# Patient Record
Sex: FEMALE | Race: WHITE | NOT HISPANIC OR LATINO | Employment: OTHER | URBAN - METROPOLITAN AREA
[De-identification: names, ages, dates, MRNs, and addresses within clinical notes are randomized per-mention and may not be internally consistent; named-entity substitution may affect disease eponyms.]

---

## 2017-03-23 ENCOUNTER — ALLSCRIPTS OFFICE VISIT (OUTPATIENT)
Dept: OTHER | Facility: OTHER | Age: 72
End: 2017-03-23

## 2017-03-23 DIAGNOSIS — Z12.11 ENCOUNTER FOR SCREENING FOR MALIGNANT NEOPLASM OF COLON: ICD-10-CM

## 2017-03-23 DIAGNOSIS — I10 ESSENTIAL (PRIMARY) HYPERTENSION: ICD-10-CM

## 2017-04-10 ENCOUNTER — GENERIC CONVERSION - ENCOUNTER (OUTPATIENT)
Dept: OTHER | Facility: OTHER | Age: 72
End: 2017-04-10

## 2017-04-10 ENCOUNTER — TRANSCRIBE ORDERS (OUTPATIENT)
Dept: LAB | Facility: HOSPITAL | Age: 72
End: 2017-04-10

## 2017-04-10 ENCOUNTER — APPOINTMENT (OUTPATIENT)
Dept: LAB | Facility: HOSPITAL | Age: 72
End: 2017-04-10
Payer: MEDICARE

## 2017-04-10 DIAGNOSIS — I10 ESSENTIAL (PRIMARY) HYPERTENSION: ICD-10-CM

## 2017-04-10 LAB
ALBUMIN SERPL BCP-MCNC: 3.2 G/DL (ref 3.5–5)
ALP SERPL-CCNC: 113 U/L (ref 46–116)
ALT SERPL W P-5'-P-CCNC: 24 U/L (ref 12–78)
ANION GAP SERPL CALCULATED.3IONS-SCNC: 7 MMOL/L (ref 4–13)
AST SERPL W P-5'-P-CCNC: 16 U/L (ref 5–45)
BACTERIA UR QL AUTO: ABNORMAL /HPF
BILIRUB SERPL-MCNC: 0.4 MG/DL (ref 0.2–1)
BILIRUB UR QL STRIP: NEGATIVE
BUN SERPL-MCNC: 14 MG/DL (ref 5–25)
CALCIUM SERPL-MCNC: 8.7 MG/DL (ref 8.3–10.1)
CHLORIDE SERPL-SCNC: 106 MMOL/L (ref 100–108)
CHOLEST SERPL-MCNC: 190 MG/DL (ref 50–200)
CLARITY UR: CLEAR
CO2 SERPL-SCNC: 29 MMOL/L (ref 21–32)
COLOR UR: YELLOW
CREAT SERPL-MCNC: 1.21 MG/DL (ref 0.6–1.3)
GFR SERPL CREATININE-BSD FRML MDRD: 43.7 ML/MIN/1.73SQ M
GLUCOSE P FAST SERPL-MCNC: 101 MG/DL (ref 65–99)
GLUCOSE UR STRIP-MCNC: NEGATIVE MG/DL
HDLC SERPL-MCNC: 52 MG/DL (ref 40–60)
HGB UR QL STRIP.AUTO: ABNORMAL
KETONES UR STRIP-MCNC: NEGATIVE MG/DL
LDLC SERPL CALC-MCNC: 112 MG/DL (ref 0–100)
LEUKOCYTE ESTERASE UR QL STRIP: ABNORMAL
NITRITE UR QL STRIP: NEGATIVE
NON-SQ EPI CELLS URNS QL MICRO: ABNORMAL /HPF
PH UR STRIP.AUTO: 6.5 [PH] (ref 4.5–8)
POTASSIUM SERPL-SCNC: 3.8 MMOL/L (ref 3.5–5.3)
PROT SERPL-MCNC: 7 G/DL (ref 6.4–8.2)
PROT UR STRIP-MCNC: NEGATIVE MG/DL
RBC #/AREA URNS AUTO: ABNORMAL /HPF
SODIUM SERPL-SCNC: 142 MMOL/L (ref 136–145)
SP GR UR STRIP.AUTO: 1.02 (ref 1–1.03)
TRIGL SERPL-MCNC: 131 MG/DL
UROBILINOGEN UR QL STRIP.AUTO: 0.2 E.U./DL
WBC #/AREA URNS AUTO: ABNORMAL /HPF

## 2017-04-10 PROCEDURE — 80061 LIPID PANEL: CPT

## 2017-04-10 PROCEDURE — 87086 URINE CULTURE/COLONY COUNT: CPT

## 2017-04-10 PROCEDURE — 36415 COLL VENOUS BLD VENIPUNCTURE: CPT

## 2017-04-10 PROCEDURE — 81001 URINALYSIS AUTO W/SCOPE: CPT

## 2017-04-10 PROCEDURE — 80053 COMPREHEN METABOLIC PANEL: CPT

## 2017-04-11 ENCOUNTER — GENERIC CONVERSION - ENCOUNTER (OUTPATIENT)
Dept: OTHER | Facility: OTHER | Age: 72
End: 2017-04-11

## 2017-04-11 LAB — BACTERIA UR CULT: NORMAL

## 2017-04-21 ENCOUNTER — ALLSCRIPTS OFFICE VISIT (OUTPATIENT)
Dept: OTHER | Facility: OTHER | Age: 72
End: 2017-04-21

## 2017-05-15 ENCOUNTER — GENERIC CONVERSION - ENCOUNTER (OUTPATIENT)
Dept: OTHER | Facility: OTHER | Age: 72
End: 2017-05-15

## 2017-06-27 ENCOUNTER — APPOINTMENT (EMERGENCY)
Dept: CT IMAGING | Facility: HOSPITAL | Age: 72
End: 2017-06-27
Payer: MEDICARE

## 2017-06-27 ENCOUNTER — HOSPITAL ENCOUNTER (EMERGENCY)
Facility: HOSPITAL | Age: 72
Discharge: HOME | End: 2017-06-28
Attending: EMERGENCY MEDICINE | Admitting: EMERGENCY MEDICINE
Payer: MEDICARE

## 2017-06-27 VITALS
DIASTOLIC BLOOD PRESSURE: 74 MMHG | RESPIRATION RATE: 17 BRPM | SYSTOLIC BLOOD PRESSURE: 134 MMHG | TEMPERATURE: 97.9 F | OXYGEN SATURATION: 97 % | HEART RATE: 71 BPM | WEIGHT: 275.13 LBS

## 2017-06-27 DIAGNOSIS — M54.16 LUMBAR BACK PAIN WITH RADICULOPATHY AFFECTING LEFT LOWER EXTREMITY: ICD-10-CM

## 2017-06-27 DIAGNOSIS — R10.9 LEFT FLANK PAIN: Primary | ICD-10-CM

## 2017-06-27 LAB
ALBUMIN SERPL BCP-MCNC: 3.4 G/DL (ref 3.5–5)
ALP SERPL-CCNC: 101 U/L (ref 46–116)
ALT SERPL W P-5'-P-CCNC: 18 U/L (ref 12–78)
ANION GAP SERPL CALCULATED.3IONS-SCNC: 9 MMOL/L (ref 4–13)
AST SERPL W P-5'-P-CCNC: 16 U/L (ref 5–45)
BASOPHILS # BLD AUTO: 0.04 THOUSANDS/ΜL (ref 0–0.1)
BASOPHILS NFR BLD AUTO: 1 % (ref 0–1)
BILIRUB DIRECT SERPL-MCNC: 0.1 MG/DL (ref 0–0.2)
BILIRUB SERPL-MCNC: 0.4 MG/DL (ref 0.2–1)
BUN SERPL-MCNC: 18 MG/DL (ref 5–25)
CALCIUM SERPL-MCNC: 9.1 MG/DL (ref 8.3–10.1)
CHLORIDE SERPL-SCNC: 106 MMOL/L (ref 100–108)
CLARITY, POC: CLEAR
CO2 SERPL-SCNC: 25 MMOL/L (ref 21–32)
COLOR, POC: NORMAL
CREAT SERPL-MCNC: 1.54 MG/DL (ref 0.6–1.3)
EOSINOPHIL # BLD AUTO: 0.11 THOUSAND/ΜL (ref 0–0.61)
EOSINOPHIL NFR BLD AUTO: 2 % (ref 0–6)
ERYTHROCYTE [DISTWIDTH] IN BLOOD BY AUTOMATED COUNT: 13.9 % (ref 11.6–15.1)
EXT BILIRUBIN, UA: NORMAL
EXT BLOOD URINE: NORMAL
EXT GLUCOSE, UA: NORMAL
EXT KETONES: NORMAL
EXT NITRITE, UA: NORMAL
EXT PH, UA: 5
EXT PROTEIN, UA: NORMAL
EXT SPECIFIC GRAVITY, UA: 1.03
EXT UROBILINOGEN: 1
GFR SERPL CREATININE-BSD FRML MDRD: 33 ML/MIN/1.73SQ M
GLUCOSE SERPL-MCNC: 105 MG/DL (ref 65–140)
HCT VFR BLD AUTO: 39.9 % (ref 34.8–46.1)
HGB BLD-MCNC: 12.8 G/DL (ref 11.5–15.4)
LYMPHOCYTES # BLD AUTO: 1.93 THOUSANDS/ΜL (ref 0.6–4.47)
LYMPHOCYTES NFR BLD AUTO: 28 % (ref 14–44)
MCH RBC QN AUTO: 26.6 PG (ref 26.8–34.3)
MCHC RBC AUTO-ENTMCNC: 32.1 G/DL (ref 31.4–37.4)
MCV RBC AUTO: 83 FL (ref 82–98)
MONOCYTES # BLD AUTO: 0.47 THOUSAND/ΜL (ref 0.17–1.22)
MONOCYTES NFR BLD AUTO: 7 % (ref 4–12)
NEUTROPHILS # BLD AUTO: 4.25 THOUSANDS/ΜL (ref 1.85–7.62)
NEUTS SEG NFR BLD AUTO: 62 % (ref 43–75)
NRBC BLD AUTO-RTO: 0 /100 WBCS
PLATELET # BLD AUTO: 224 THOUSANDS/UL (ref 149–390)
PMV BLD AUTO: 11.1 FL (ref 8.9–12.7)
POTASSIUM SERPL-SCNC: 4.2 MMOL/L (ref 3.5–5.3)
PROT SERPL-MCNC: 7.1 G/DL (ref 6.4–8.2)
RBC # BLD AUTO: 4.82 MILLION/UL (ref 3.81–5.12)
SODIUM SERPL-SCNC: 140 MMOL/L (ref 136–145)
WBC # BLD AUTO: 6.82 THOUSAND/UL (ref 4.31–10.16)
WBC # BLD EST: NORMAL 10*3/UL

## 2017-06-27 PROCEDURE — 74176 CT ABD & PELVIS W/O CONTRAST: CPT

## 2017-06-27 PROCEDURE — 80076 HEPATIC FUNCTION PANEL: CPT | Performed by: PHYSICIAN ASSISTANT

## 2017-06-27 PROCEDURE — 81002 URINALYSIS NONAUTO W/O SCOPE: CPT | Performed by: PHYSICIAN ASSISTANT

## 2017-06-27 PROCEDURE — 96374 THER/PROPH/DIAG INJ IV PUSH: CPT

## 2017-06-27 PROCEDURE — 36415 COLL VENOUS BLD VENIPUNCTURE: CPT | Performed by: PHYSICIAN ASSISTANT

## 2017-06-27 PROCEDURE — 85025 COMPLETE CBC W/AUTO DIFF WBC: CPT | Performed by: PHYSICIAN ASSISTANT

## 2017-06-27 PROCEDURE — 96375 TX/PRO/DX INJ NEW DRUG ADDON: CPT

## 2017-06-27 PROCEDURE — 80048 BASIC METABOLIC PNL TOTAL CA: CPT | Performed by: PHYSICIAN ASSISTANT

## 2017-06-27 RX ORDER — ASPIRIN 81 MG/1
81 TABLET ORAL DAILY
COMMUNITY
End: 2020-06-25 | Stop reason: ALTCHOICE

## 2017-06-27 RX ORDER — ONDANSETRON 2 MG/ML
4 INJECTION INTRAMUSCULAR; INTRAVENOUS ONCE
Status: COMPLETED | OUTPATIENT
Start: 2017-06-27 | End: 2017-06-27

## 2017-06-27 RX ORDER — KETOROLAC TROMETHAMINE 30 MG/ML
15 INJECTION, SOLUTION INTRAMUSCULAR; INTRAVENOUS ONCE
Status: COMPLETED | OUTPATIENT
Start: 2017-06-27 | End: 2017-06-27

## 2017-06-27 RX ORDER — AMLODIPINE BESYLATE 10 MG/1
10 TABLET ORAL DAILY
COMMUNITY
End: 2018-06-03 | Stop reason: SDUPTHER

## 2017-06-27 RX ORDER — LISINOPRIL 20 MG/1
20 TABLET ORAL DAILY
COMMUNITY
End: 2018-06-14 | Stop reason: SDUPTHER

## 2017-06-27 RX ORDER — LIDOCAINE 50 MG/G
1 PATCH TOPICAL EVERY 24 HOURS
Qty: 10 PATCH | Refills: 0 | Status: SHIPPED | OUTPATIENT
Start: 2017-06-27 | End: 2018-09-25 | Stop reason: ALTCHOICE

## 2017-06-27 RX ORDER — MORPHINE SULFATE 4 MG/ML
4 INJECTION, SOLUTION INTRAMUSCULAR; INTRAVENOUS ONCE
Status: COMPLETED | OUTPATIENT
Start: 2017-06-27 | End: 2017-06-27

## 2017-06-27 RX ORDER — FLUOXETINE HYDROCHLORIDE 20 MG/1
20 CAPSULE ORAL DAILY
COMMUNITY
End: 2018-03-19 | Stop reason: DRUGHIGH

## 2017-06-27 RX ORDER — DEXLANSOPRAZOLE 60 MG/1
60 CAPSULE, DELAYED RELEASE ORAL DAILY
COMMUNITY
End: 2018-03-01 | Stop reason: SDUPTHER

## 2017-06-27 RX ADMIN — ONDANSETRON 4 MG: 2 INJECTION INTRAMUSCULAR; INTRAVENOUS at 22:40

## 2017-06-27 RX ADMIN — SODIUM CHLORIDE 1000 ML: 0.9 INJECTION, SOLUTION INTRAVENOUS at 23:36

## 2017-06-27 RX ADMIN — MORPHINE SULFATE 4 MG: 4 INJECTION, SOLUTION INTRAMUSCULAR; INTRAVENOUS at 23:36

## 2017-06-27 RX ADMIN — KETOROLAC TROMETHAMINE 15 MG: 30 INJECTION, SOLUTION INTRAMUSCULAR at 22:40

## 2017-06-28 ENCOUNTER — ALLSCRIPTS OFFICE VISIT (OUTPATIENT)
Dept: OTHER | Facility: OTHER | Age: 72
End: 2017-06-28

## 2017-06-28 DIAGNOSIS — M54.9 DORSALGIA: ICD-10-CM

## 2017-06-28 PROCEDURE — 99284 EMERGENCY DEPT VISIT MOD MDM: CPT

## 2017-06-30 ENCOUNTER — GENERIC CONVERSION - ENCOUNTER (OUTPATIENT)
Dept: OTHER | Facility: OTHER | Age: 72
End: 2017-06-30

## 2017-07-10 ENCOUNTER — APPOINTMENT (OUTPATIENT)
Dept: PHYSICAL THERAPY | Facility: CLINIC | Age: 72
End: 2017-07-10
Payer: MEDICARE

## 2017-07-10 ENCOUNTER — GENERIC CONVERSION - ENCOUNTER (OUTPATIENT)
Dept: OTHER | Facility: OTHER | Age: 72
End: 2017-07-10

## 2017-07-10 DIAGNOSIS — M54.9 DORSALGIA: ICD-10-CM

## 2017-07-10 PROCEDURE — G8991 OTHER PT/OT GOAL STATUS: HCPCS

## 2017-07-10 PROCEDURE — 97161 PT EVAL LOW COMPLEX 20 MIN: CPT

## 2017-07-10 PROCEDURE — G8990 OTHER PT/OT CURRENT STATUS: HCPCS

## 2017-07-10 PROCEDURE — 97110 THERAPEUTIC EXERCISES: CPT

## 2017-07-11 ENCOUNTER — APPOINTMENT (OUTPATIENT)
Dept: PHYSICAL THERAPY | Facility: CLINIC | Age: 72
End: 2017-07-11
Payer: MEDICARE

## 2017-07-11 PROCEDURE — 97140 MANUAL THERAPY 1/> REGIONS: CPT

## 2017-07-11 PROCEDURE — 97110 THERAPEUTIC EXERCISES: CPT

## 2017-07-18 ENCOUNTER — APPOINTMENT (OUTPATIENT)
Dept: PHYSICAL THERAPY | Facility: CLINIC | Age: 72
End: 2017-07-18
Payer: MEDICARE

## 2017-07-20 ENCOUNTER — APPOINTMENT (OUTPATIENT)
Dept: PHYSICAL THERAPY | Facility: CLINIC | Age: 72
End: 2017-07-20
Payer: MEDICARE

## 2017-07-20 PROCEDURE — 97140 MANUAL THERAPY 1/> REGIONS: CPT

## 2017-07-20 PROCEDURE — 97110 THERAPEUTIC EXERCISES: CPT

## 2017-07-27 ENCOUNTER — APPOINTMENT (OUTPATIENT)
Dept: PHYSICAL THERAPY | Facility: CLINIC | Age: 72
End: 2017-07-27
Payer: MEDICARE

## 2017-07-27 PROCEDURE — 97110 THERAPEUTIC EXERCISES: CPT

## 2017-07-27 PROCEDURE — 97140 MANUAL THERAPY 1/> REGIONS: CPT

## 2017-08-01 ENCOUNTER — APPOINTMENT (OUTPATIENT)
Dept: PHYSICAL THERAPY | Facility: CLINIC | Age: 72
End: 2017-08-01
Payer: MEDICARE

## 2017-08-01 PROCEDURE — 97140 MANUAL THERAPY 1/> REGIONS: CPT

## 2017-08-01 PROCEDURE — 97110 THERAPEUTIC EXERCISES: CPT

## 2017-08-03 ENCOUNTER — APPOINTMENT (OUTPATIENT)
Dept: PHYSICAL THERAPY | Facility: CLINIC | Age: 72
End: 2017-08-03
Payer: MEDICARE

## 2017-08-15 ENCOUNTER — APPOINTMENT (OUTPATIENT)
Dept: PHYSICAL THERAPY | Facility: CLINIC | Age: 72
End: 2017-08-15
Payer: MEDICARE

## 2017-08-17 ENCOUNTER — APPOINTMENT (OUTPATIENT)
Dept: PHYSICAL THERAPY | Facility: CLINIC | Age: 72
End: 2017-08-17
Payer: MEDICARE

## 2017-08-22 ENCOUNTER — APPOINTMENT (OUTPATIENT)
Dept: PHYSICAL THERAPY | Facility: CLINIC | Age: 72
End: 2017-08-22
Payer: MEDICARE

## 2017-08-24 ENCOUNTER — APPOINTMENT (OUTPATIENT)
Dept: PHYSICAL THERAPY | Facility: CLINIC | Age: 72
End: 2017-08-24
Payer: MEDICARE

## 2017-08-29 ENCOUNTER — APPOINTMENT (OUTPATIENT)
Dept: PHYSICAL THERAPY | Facility: CLINIC | Age: 72
End: 2017-08-29
Payer: MEDICARE

## 2017-08-31 ENCOUNTER — APPOINTMENT (OUTPATIENT)
Dept: PHYSICAL THERAPY | Facility: CLINIC | Age: 72
End: 2017-08-31
Payer: MEDICARE

## 2017-10-31 ENCOUNTER — GENERIC CONVERSION - ENCOUNTER (OUTPATIENT)
Dept: OTHER | Facility: OTHER | Age: 72
End: 2017-10-31

## 2017-10-31 DIAGNOSIS — G31.84 MILD COGNITIVE IMPAIRMENT: ICD-10-CM

## 2017-10-31 DIAGNOSIS — R31.21 ASYMPTOMATIC MICROSCOPIC HEMATURIA: ICD-10-CM

## 2017-10-31 DIAGNOSIS — I10 ESSENTIAL (PRIMARY) HYPERTENSION: ICD-10-CM

## 2017-11-21 ENCOUNTER — GENERIC CONVERSION - ENCOUNTER (OUTPATIENT)
Dept: FAMILY MEDICINE CLINIC | Facility: CLINIC | Age: 72
End: 2017-11-21

## 2017-12-13 ENCOUNTER — HOSPITAL ENCOUNTER (EMERGENCY)
Facility: HOSPITAL | Age: 72
Discharge: HOME | End: 2017-12-13
Attending: EMERGENCY MEDICINE
Payer: COMMERCIAL

## 2017-12-13 ENCOUNTER — APPOINTMENT (EMERGENCY)
Dept: RADIOLOGY | Facility: HOSPITAL | Age: 72
End: 2017-12-13
Payer: COMMERCIAL

## 2017-12-13 VITALS
HEART RATE: 70 BPM | SYSTOLIC BLOOD PRESSURE: 143 MMHG | TEMPERATURE: 98.2 F | WEIGHT: 267.64 LBS | RESPIRATION RATE: 20 BRPM | DIASTOLIC BLOOD PRESSURE: 68 MMHG | OXYGEN SATURATION: 98 %

## 2017-12-13 DIAGNOSIS — S80.02XA CONTUSION OF LEFT KNEE, INITIAL ENCOUNTER: ICD-10-CM

## 2017-12-13 DIAGNOSIS — S39.012A STRAIN OF LUMBAR REGION, INITIAL ENCOUNTER: Primary | ICD-10-CM

## 2017-12-13 PROCEDURE — 73564 X-RAY EXAM KNEE 4 OR MORE: CPT

## 2017-12-13 PROCEDURE — 99284 EMERGENCY DEPT VISIT MOD MDM: CPT

## 2017-12-13 PROCEDURE — 72100 X-RAY EXAM L-S SPINE 2/3 VWS: CPT

## 2017-12-13 RX ORDER — DIAZEPAM 5 MG/1
5 TABLET ORAL 2 TIMES DAILY
Qty: 15 TABLET | Refills: 0 | Status: SHIPPED | OUTPATIENT
Start: 2017-12-13 | End: 2019-09-17 | Stop reason: ALTCHOICE

## 2017-12-13 RX ORDER — DIAZEPAM 5 MG/1
5 TABLET ORAL ONCE
Status: COMPLETED | OUTPATIENT
Start: 2017-12-13 | End: 2017-12-13

## 2017-12-13 RX ADMIN — DIAZEPAM 5 MG: 5 TABLET ORAL at 12:32
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
## 2017-12-13 NOTE — DISCHARGE INSTRUCTIONS
Contusion in Adults   WHAT YOU NEED TO KNOW:   A contusion is a bruise that appears on your skin after an injury  A bruise happens when small blood vessels tear but skin does not  When blood vessels tear, blood leaks into nearby tissue, such as soft tissue or muscle  DISCHARGE INSTRUCTIONS:   Return to the emergency department if:   · You have new trouble moving the injured area  · You have tingling or numbness in or near the injured area  · Your hand or foot below the bruise gets cold or turns pale  Contact your healthcare provider if:   · You find a new lump in the injured area  · Your symptoms do not improve with treatment after 4 to 5 days  · You have questions or concerns about your condition or care  Medicines: You may need any of the following:  · NSAIDs  help decrease swelling and pain or fever  This medicine is available with or without a doctor's order  NSAIDs can cause stomach bleeding or kidney problems in certain people  If you take blood thinner medicine, always ask your healthcare provider if NSAIDs are safe for you  Always read the medicine label and follow directions  · Prescription pain medicine  may be given  Do not wait until the pain is severe before you take your medicine  · Take your medicine as directed  Contact your healthcare provider if you think your medicine is not helping or if you have side effects  Tell him of her if you are allergic to any medicine  Keep a list of the medicines, vitamins, and herbs you take  Include the amounts, and when and why you take them  Bring the list or the pill bottles to follow-up visits  Carry your medicine list with you in case of an emergency  Follow up with your healthcare provider as directed: You may need to return within a week to check your injury again  Write down your questions so you remember to ask them during your visits    Help a contusion heal:   · Rest the injured area  or use it less than usual  If you bruised your leg or foot, you may need crutches or a cane to help you walk  This will help you keep weight off your injured body part  · Apply ice  to decrease swelling and pain  Ice may also help prevent tissue damage  Use an ice pack, or put crushed ice in a plastic bag  Cover it with a towel and place it on your bruise for 15 to 20 minutes every hour or as directed  · Use compression  to support the area and decrease swelling  Wrap an elastic bandage around the area over the bruised muscle  Make sure the bandage is not too tight  You should be able to fit 1 finger between the bandage and your skin  · Elevate (raise) your injured body part  above the level of your heart to help decrease pain and swelling  Use pillows, blankets, or rolled towels to elevate the area as often as you can  · Do not drink alcohol  as directed  Alcohol may slow healing  · Do not stretch injured muscles  right after your injury  Ask your healthcare provider when and how you may safely stretch after your injury  Gentle stretches can help increase your flexibility  · Do not massage the area or put heating pads  on the bruise right after your injury  Heat and massage may slow healing  Your healthcare provider may tell you to apply heat after several days  At that time, heat will start to help the injury heal   Prevent another contusion:   · Stretch and warm up before you play sports or exercise  · Wear protective gear when you play sports  Examples are shin guards and padding  · If you begin a new physical activity, start slowly to give your body a chance to adjust   © 2017 [employer] Information is for End User's use only and may not be sold, redistributed or otherwise used for commercial purposes  All illustrations and images included in CareNotes® are the copyrighted property of [employer]  or [employer]  The above information is an  only   It is not intended as medical advice for individual conditions or treatments  Talk to your doctor, nurse or pharmacist before following any medical regimen to see if it is safe and effective for you  Tylenol/valium for pain    Low Back Strain   WHAT YOU NEED TO KNOW:   Low back strain is an injury to your lower back muscles or tendons  Tendons are strong tissues that connect muscles to bones  The lower back supports most of your body weight and helps you move, twist, and bend  DISCHARGE INSTRUCTIONS:   Return to the emergency department if:   · You hear or feel a pop in your lower back  · You have increased swelling or pain in your lower back  · You have trouble moving your legs  · Your legs are numb  Contact your healthcare provider if:   · You have a fever  · Your pain does not go away, even after treatment  · You have questions or concerns about your condition or care  Medicines: The following medicines may be ordered by your healthcare provider:  · Acetaminophen decreases pain and fever  It is available without a doctor's order  Ask how much to take and how often to take it  Follow directions  Acetaminophen can cause liver damage if not taken correctly  · NSAIDs , such as ibuprofen, help decrease swelling, pain, and fever  This medicine is available with or without a doctor's order  NSAIDs can cause stomach bleeding or kidney problems in certain people  If you take blood thinner medicine, always ask your healthcare provider if NSAIDs are safe for you  Always read the medicine label and follow directions  · Muscle relaxers  help decrease pain and muscle spasms  · Prescription pain medicine  may be given  Ask how to take this medicine safely  · Take your medicine as directed  Contact your healthcare provider if you think your medicine is not helping or if you have side effects  Tell him or her if you are allergic to any medicine  Keep a list of the medicines, vitamins, and herbs you take   Include the amounts, and when and why you take them  Bring the list or the pill bottles to follow-up visits  Carry your medicine list with you in case of an emergency  Self-care:   · Rest  as directed  You may need to rest in bed for a period of time after your injury  Do not lift heavy objects  · Apply ice  on your back for 15 to 20 minutes every hour or as directed  Use an ice pack, or put crushed ice in a plastic bag  Cover it with a towel  Ice helps prevent tissue damage and decreases swelling and pain  · Apply heat  on your lower back for 20 to 30 minutes every 2 hours for as many days as directed  Heat helps decrease pain and muscle spasms  · Slowly start to increase your activity  as the pain decreases, or as directed  Prevent another low back strain:   · Use correct body movements  ¨ Bend at the hips and knees when you  objects  Do not bend from the waist  Use your leg muscles as you lift the load  Do not use your back  Keep the object close to your chest as you lift it  Try not to twist or lift anything above your waist     ¨ Change your position often when you stand for long periods of time  Rest one foot on a small box or footrest, and then switch to the other foot often  ¨ Try not to sit for long periods of time  When you do, sit in a straight-backed chair with your feet flat on the floor  ¨ Never reach, pull, or push while you are sitting  · Warm up before you exercise  Do exercises that strengthen your back muscles  Ask your healthcare provider about the best exercise plan for you  · Maintain a healthy weight  Ask your healthcare provider how much you should weigh  Ask him to help you create a weight loss plan if you are overweight  © 2017 [employer]  Information is for End User's use only and may not be sold, redistributed or otherwise used for commercial purposes   All illustrations and images included in CareNotes® are the copyrighted property of [employer]  or [employer]  The above information is an  only  It is not intended as medical advice for individual conditions or treatments  Talk to your doctor, nurse or pharmacist before following any medical regimen to see if it is safe and effective for you

## 2017-12-13 NOTE — ED NOTES
Patient transported to [facility], [address] [place] [address]  12/13/17 9328

## 2017-12-20 ENCOUNTER — HOSPITAL ENCOUNTER (EMERGENCY)
Facility: HOSPITAL | Age: 72
Discharge: HOME | End: 2017-12-20
Attending: EMERGENCY MEDICINE
Payer: COMMERCIAL

## 2017-12-20 ENCOUNTER — ALLSCRIPTS OFFICE VISIT (OUTPATIENT)
Dept: OTHER | Facility: OTHER | Age: 72
End: 2017-12-20

## 2017-12-20 ENCOUNTER — APPOINTMENT (EMERGENCY)
Dept: RADIOLOGY | Facility: HOSPITAL | Age: 72
End: 2017-12-20
Payer: COMMERCIAL

## 2017-12-20 ENCOUNTER — APPOINTMENT (EMERGENCY)
Dept: CT IMAGING | Facility: HOSPITAL | Age: 72
End: 2017-12-20
Payer: COMMERCIAL

## 2017-12-20 VITALS
WEIGHT: 250 LBS | OXYGEN SATURATION: 95 % | SYSTOLIC BLOOD PRESSURE: 160 MMHG | DIASTOLIC BLOOD PRESSURE: 70 MMHG | BODY MASS INDEX: 44.3 KG/M2 | HEART RATE: 78 BPM | RESPIRATION RATE: 17 BRPM | HEIGHT: 63 IN | TEMPERATURE: 98.3 F

## 2017-12-20 DIAGNOSIS — S16.1XXA STRAIN OF NECK MUSCLE, INITIAL ENCOUNTER: ICD-10-CM

## 2017-12-20 DIAGNOSIS — G44.209 ACUTE NON INTRACTABLE TENSION-TYPE HEADACHE: Primary | ICD-10-CM

## 2017-12-20 PROCEDURE — 99284 EMERGENCY DEPT VISIT MOD MDM: CPT

## 2017-12-20 PROCEDURE — 73030 X-RAY EXAM OF SHOULDER: CPT

## 2017-12-20 PROCEDURE — 70450 CT HEAD/BRAIN W/O DYE: CPT

## 2017-12-20 RX ORDER — ACETAMINOPHEN 325 MG/1
650 TABLET ORAL ONCE
Status: COMPLETED | OUTPATIENT
Start: 2017-12-20 | End: 2017-12-20

## 2017-12-20 RX ORDER — METHOCARBAMOL 750 MG/1
750 TABLET, FILM COATED ORAL 3 TIMES DAILY PRN
Qty: 21 TABLET | Refills: 0 | Status: SHIPPED | OUTPATIENT
Start: 2017-12-20 | End: 2018-09-25 | Stop reason: ALTCHOICE

## 2017-12-20 RX ORDER — METHOCARBAMOL 500 MG/1
1000 TABLET, FILM COATED ORAL ONCE
Status: COMPLETED | OUTPATIENT
Start: 2017-12-20 | End: 2017-12-20

## 2017-12-20 RX ORDER — IBUPROFEN 600 MG/1
600 TABLET ORAL ONCE
Status: COMPLETED | OUTPATIENT
Start: 2017-12-20 | End: 2017-12-20

## 2017-12-20 RX ADMIN — ACETAMINOPHEN 650 MG: 325 TABLET ORAL at 19:41

## 2017-12-20 RX ADMIN — METHOCARBAMOL 1000 MG: 500 TABLET ORAL at 19:40

## 2017-12-20 RX ADMIN — IBUPROFEN 600 MG: 600 TABLET ORAL at 19:44

## 2017-12-21 NOTE — DISCHARGE INSTRUCTIONS
Acute Headache   WHAT YOU NEED TO KNOW:   An acute headache is pain or discomfort that starts suddenly and gets worse quickly  You may have an acute headache only when you feel stress or eat certain foods  Other acute headache pain can happen every day, and sometimes several times a day  DISCHARGE INSTRUCTIONS:   Return to the emergency department if:   · You have severe pain  · You have numbness or weakness on one side of your face or body  · You have a headache that occurs after a blow to the head, a fall, or other trauma  · You have a headache, are forgetful or confused, or have trouble speaking  · You have a headache, stiff neck, and a fever  Contact your healthcare provider if:   · You have a constant headache and are vomiting  · You have a headache each day that does not get better, even after treatment  · You have changes in your headaches, or new symptoms that occur when you have a headache  · You have questions or concerns about your condition or care  Medicines: You may need any of the following:  · Prescription pain medicine  may be given  The medicine your healthcare provider recommends will depend on the kind of headaches you have  You will need to take prescription headache medicines as directed to prevent a problem called rebound headache  These headaches happen with regular use of pain relievers for headache disorders  · NSAIDs , such as ibuprofen, help decrease swelling, pain, and fever  This medicine is available with or without a doctor's order  NSAIDs can cause stomach bleeding or kidney problems in certain people  If you take blood thinner medicine, always ask your healthcare provider if NSAIDs are safe for you  Always read the medicine label and follow directions  · Acetaminophen  decreases pain and fever  It is available without a doctor's order  Ask how much to take and how often to take it  Follow directions   Read the labels of all other medicines you are using to see if they also contain acetaminophen, or ask your doctor or pharmacist  Acetaminophen can cause liver damage if not taken correctly  Do not use more than 3 grams (3,000 milligrams) total of acetaminophen in one day  · Antidepressants  may be given for some kinds of headaches  · Take your medicine as directed  Contact your healthcare provider if you think your medicine is not helping or if you have side effects  Tell him or her if you are allergic to any medicine  Keep a list of the medicines, vitamins, and herbs you take  Include the amounts, and when and why you take them  Bring the list or the pill bottles to follow-up visits  Carry your medicine list with you in case of an emergency  Manage your symptoms:   · Apply heat or ice  on the headache area  Use a heat or ice pack  For an ice pack, you can also put crushed ice in a plastic bag  Cover the pack or bag with a towel before you apply it to your skin  Ice and heat both help decrease pain, and heat also helps decrease muscle spasms  Apply heat for 20 to 30 minutes every 2 hours  Apply ice for 15 to 20 minutes every hour  Apply heat or ice for as long and for as many days as directed  You may alternate heat and ice  · Relax your muscles  Lie down in a comfortable position and close your eyes  Relax your muscles slowly  Start at your toes and work your way up your body  · Keep a record of your headaches  Write down when your headaches start and stop  Include your symptoms and what you were doing when the headache began  Record what you ate or drank for 24 hours before the headache started  Describe the pain and where it hurts  Keep track of what you did to treat your headache and if it worked  Prevent an acute headache:   · Avoid anything that triggers an acute headache  Examples include exposure to chemicals, going to high altitude, or not getting enough sleep  Create a regular sleep routine   Go to sleep at the same time and wake up at the same time each day  Do not use electronic devices before bedtime  These may trigger a headache or prevent you from sleeping well  · Do not smoke  Nicotine and other chemicals in cigarettes and cigars can trigger an acute headache or make it worse  Ask your healthcare provider for information if you currently smoke and need help to quit  E-cigarettes or smokeless tobacco still contain nicotine  Talk to your healthcare provider before you use these products  · Limit alcohol as directed  Alcohol can trigger an acute headache or make it worse  If you have cluster headaches, do not drink alcohol during an episode  For other types of headaches, ask your healthcare provider if it is safe for you to drink alcohol  Ask how much is safe for you to drink, and how often  · Exercise as directed  Exercise can reduce tension and help with headache pain  Aim for 30 minutes of physical activity on most days of the week  Your healthcare provider can help you create an exercise plan  · Eat a variety of healthy foods  Healthy foods include fruits, vegetables, low-fat dairy products, lean meats, fish, whole grains, and cooked beans  Your healthcare provider or dietitian can help you create meals plans if you need to avoid foods that trigger headaches  Follow up with your healthcare provider as directed:  Bring your headache record with you when you see your healthcare provider  Write down your questions so you remember to ask them during your visits  © 2017 [facility] Information is for End User's use only and may not be sold, redistributed or otherwise used for commercial purposes  All illustrations and images included in CareNotes® are the copyrighted property of [employer]  or [employer]  The above information is an  only  It is not intended as medical advice for individual conditions or treatments   Talk to your doctor, nurse or pharmacist before following any medical regimen to see if it is safe and effective for you  Cervical Strain   WHAT YOU NEED TO KNOW:   A cervical strain is a stretched or torn muscle or tendon in your neck  Tendons are strong tissues that connect muscles to bones  Common causes of cervical strains include a car accident, a fall, or a sports injury  DISCHARGE INSTRUCTIONS:   Return to the emergency department if:   · You have pain or numbness from your shoulder down to your hand  · You have problems with your vision, hearing, or balance  · You feel confused or cannot concentrate  · You have problems with movement and strength  Contact your healthcare provider if:   · You have increased swelling or pain in your neck  · You have questions or concerns about your condition or care  Medicines: You may need any of the following:  · Acetaminophen  decreases pain and fever  It is available without a doctor's order  Ask how much to take and how often to take it  Follow directions  Read the labels of all other medicines you are using to see if they also contain acetaminophen, or ask your doctor or pharmacist  Acetaminophen can cause liver damage if not taken correctly  Do not use more than 4 grams (4,000 milligrams) total of acetaminophen in one day  · NSAIDs , such as ibuprofen, help decrease swelling, pain, and fever  This medicine is available with or without a doctor's order  NSAIDs can cause stomach bleeding or kidney problems in certain people  If you take blood thinner medicine, always ask your healthcare provider if NSAIDs are safe for you  Always read the medicine label and follow directions  · Muscle relaxers  help decrease pain and muscle spasms  · Prescription pain medicine  may be given  Ask your healthcare provider how to take this medicine safely  Some prescription pain medicines contain acetaminophen  Do not take other medicines that contain acetaminophen without talking to your healthcare provider   Too much acetaminophen may cause liver damage  Prescription pain medicine may cause constipation  Ask your healthcare provider how to prevent or treat constipation  · Take your medicine as directed  Contact your healthcare provider if you think your medicine is not helping or if you have side effects  Tell him or her if you are allergic to any medicine  Keep a list of the medicines, vitamins, and herbs you take  Include the amounts, and when and why you take them  Bring the list or the pill bottles to follow-up visits  Carry your medicine list with you in case of an emergency  Manage your symptoms:   · Apply heat  on your neck for 15 to 20 minutes, 4 to 6 times a day or as directed  Heat helps decrease pain, stiffness, and muscle spasms  · Begin gentle neck exercises  as soon as you can move your neck without pain  Exercises will help decrease stiffness and improve the strength and movement of your neck  Ask your healthcare provider what kind of exercises you should do  · Gradually return to your usual activities as directed  Stop if you have pain  Avoid activities that can cause more damage to your neck, such as heavy lifting or strenuous exercise  · Sleep without a pillow  to help decrease pain  Instead, roll a small towel tightly and place it under your neck  · Go to physical therapy as directed  A physical therapist teaches you exercises to help improve movement and strength, and to decrease pain  Prevent neck injury:   · Drive safely  Make sure everyone in your car wears a seatbelt  A seatbelt can save your life if you are in an accident  Do not use your cell phone when you are driving  This could distract you and cause an accident  Pull over if you need to make a call or send a text message  · Wear helmets, lifejackets, and protective gear  Always wear a helmet when you ride a bike or motorcycle, go skiing, or play sports that could cause a head injury   Wear protective equipment when you play sports  Wear a lifejacket when you are on a boat or doing water sports  Follow up with your healthcare provider as directed: You may be referred to an orthopedist or physical therapies  Write down your questions so you remember to ask them during your visits  © 2017 [address] [employer] Information is for End User's use only and may not be sold, redistributed or otherwise used for commercial purposes  All illustrations and images included in CareNotes® are the copyrighted property of [employer] M [employer] Inc  or [employer]  The above information is an  only  It is not intended as medical advice for individual conditions or treatments  Talk to your doctor, nurse or pharmacist before following any medical regimen to see if it is safe and effective for you
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
## 2018-01-12 NOTE — RESULT NOTES
Verified Results  (1) URINALYSIS w URINE C/S REFLEX (will reflex a microscopy if leukocytes, occult blood, or nitrites are not within normal limits) 82Gig3407 10:21AM Kerrie Malik Order Number: [id number]     Test Name Result Flag Reference   COLOR Yellow     CLARITY Clear     PH UA 6 5  4 5-8 0   LEUKOCYTE ESTERASE UA Trace A Negative   NITRITE UA Negative  Negative   PROTEIN UA Negative mg/dl  Negative   GLUCOSE UA Negative mg/dl  Negative   KETONES UA Negative mg/dl  Negative   UROBILINOGEN UA 0 2 E U /dl  0 2, 1 0 E U /dl   BILIRUBIN UA Negative  Negative   BLOOD UA Trace-Intact A Negative   SPECIFIC GRAVITY UA 1 020  1 003-1 030   BACTERIA Occasional /hpf  None Seen, Occasional   EPITHELIAL CELLS Moderate /hpf A None Seen, Occasional   RBC UA 1-2 /hpf A None Seen   WBC UA 2-4 /hpf A None Seen

## 2018-01-13 VITALS
HEART RATE: 78 BPM | DIASTOLIC BLOOD PRESSURE: 70 MMHG | HEIGHT: 64 IN | WEIGHT: 269.25 LBS | SYSTOLIC BLOOD PRESSURE: 112 MMHG | BODY MASS INDEX: 45.97 KG/M2 | OXYGEN SATURATION: 98 %

## 2018-01-14 VITALS
WEIGHT: 269 LBS | HEART RATE: 89 BPM | DIASTOLIC BLOOD PRESSURE: 90 MMHG | RESPIRATION RATE: 18 BRPM | HEIGHT: 64 IN | BODY MASS INDEX: 45.93 KG/M2 | OXYGEN SATURATION: 98 % | SYSTOLIC BLOOD PRESSURE: 140 MMHG

## 2018-01-15 NOTE — PROGRESS NOTES
Assessment    1  Benign essential hypertension (401 1) (I10)   2  Chronic obstructive pulmonary disease, unspecified COPD type (496) (J44 9)   3  Anxiety (300 00) (F41 9)   4  Intention tremor (333 1) (G25 2)    Plan  Benign essential hypertension    · (1) ALT (SGPT); Status:Active; Requested for:79Awl8925;    · (1) AST (SGOT); Status:Active; Requested for:26Xem7888;    · (1) [place]; Status:Active; Requested for:40Slz9100;    · (1) LIPID PANEL, FASTING; Status:Active; Requested for:60Eyp5785;    · (1) MICROALBUMIN, URINE RANDOM ([place] ONLY); Status:Active; Requested  for:95Nzv6751;    · (1) URINALYSIS (will reflex a microscopy if leukocytes, occult blood, protein or nitrites are  not within normal limits); Status:Active; Requested for:88Uuy3829;   Benign essential hypertension, Chronic obstructive pulmonary disease, unspecified  COPD type    · Follow-up visit in 3 months Evaluation and Treatment  Follow-up  Status: Complete   Done: 74GQI3847  Chronic obstructive pulmonary disease, unspecified COPD type    · Combivent Respimat  MCG/ACT Inhalation Aerosol Solution; INHALE 1  PUFFS Every 6 hours prn for shortness of breath  Chronic obstructive pulmonary disease, unspecified COPD type, SocHx: Former smoker    · Complete PFT; Status:Hold For - Scheduling; Requested for:15Ley5476;     Discussion/Summary  Discussion Summary:   Will request and review old records including recent op report and biopsy  Continue the current meds for now  Have the labs done prior to the next appt  Call if you need refills  We will schedule pft's for you  Use the inhaler as needed for shortness of breath  Continue to work on the weight  Counseling Documentation With Imm: The patient was counseled regarding instructions for management  Medication SE Review and Pt Understands Tx: Possible side effects of new medications were reviewed with the patient/guardian today   The treatment plan was reviewed with the patient/guardian  The patient/guardian understands and agrees with the treatment plan   Self Referrals:   Self Referrals: No      Chief Complaint  Chief Complaint Free Text Note Form: Pt in office to establish      History of Present Illness  HPI: [facility]  Was known to me from another practice and presents today with multiple concerns  Had breast surgery again for a blocked milk duct on the left breast  No cancer per patient  Done by Dr Giovanna Baumgarten  Had labs done last month prior to the surgery  Review of Systems  Complete-Female:   Constitutional: no fever, not feeling poorly and no chills  Eyes: no eye pain and no purulent discharge from the eyes  ENT: no nosebleeds and no sore throat  Cardiovascular: no chest pain and no palpitations  Respiratory: shortness of breath, shortness of breath during exertion and Worse in hot weather  Has not had pft's in years  , but no wheezing  Gastrointestinal: no abdominal pain and no constipation  Genitourinary: no dysuria and no incontinence  Musculoskeletal: no arthralgias and no myalgias  Integumentary: no rashes and no skin lesions  Neurological: Sometimes has a tremor right hand  Psychiatric: anxiety and Tremor is worse when she has anxiety, but no depression  ROS Reviewed:   ROS reviewed  Past Medical History  Active Problems And Past Medical History Reviewed: The active problems and past medical history were reviewed and updated today  Surgical History    1  History of Breast Surgery Lumpectomy    Family History  Family History Reviewed: The family history was reviewed and updated today  Social History    · Always uses seat belt   · Former smoker (O19 50) (O12 652)   · Retired   ·   Social History Reviewed: The social history was reviewed and updated today  Allergies    1   Codeine    Vitals  Vital Signs    Recorded: 63HUI7102 51:11MF   Systolic 631   Diastolic 82   Heart Rate 72 Height 5 ft 3 in   Weight 270 lb 9 6 oz   BMI Calculated 47 93   BSA Calculated 2 2     Physical Exam    Constitutional   General appearance: No acute distress, well appearing and well nourished  Eyes   Conjunctiva and lids: No swelling, erythema or discharge  Pupils and irises: Equal, round and reactive to light  Ears, Nose, Mouth, and Throat   External inspection of ears and nose: Normal     Otoscopic examination: Tympanic membranes translucent with normal light reflex  Canals patent without erythema  Nasal mucosa, septum, and turbinates: Normal without edema or erythema  Oropharynx: Normal with no erythema, edema, exudate or lesions  Pulmonary   Respiratory effort: No increased work of breathing or signs of respiratory distress  Auscultation of lungs: Abnormal   Decreased breath sounds bilaterally with no wheezing or rales  Cardiovascular   Auscultation of heart: Normal rate and rhythm, normal S1 and S2, without murmurs  RRR distant heart sounds  Lymphatic   Palpation of lymph nodes in neck: No lymphadenopathy  Musculoskeletal   Gait and station: Normal     Inspection/palpation of joints, bones, and muscles: Normal     Neurologic Intention tremor noted right hand  Minimal tremor noted left hand  Sensation: No sensory loss  Psychiatric   Orientation to person, place, and time: Normal     Mood and affect: Normal          Results/Data  PHQ-2 Adult Depression Screening 07Kux1635 01:15PM User, [employer]     Test Name Result Flag Reference   PHQ-2 Adult Depression Score 0     Over the last two weeks, how often have you been bothered by any of the following problems? Little interest or pleasure in doing things: Not at all - 0  Feeling down, depressed, or hopeless: Not at all - 0   PHQ-2 Adult Depression Screening Negative         Provider Comments  Provider Comments: Will need to review old records for previous surgeries that were done  Pt cannot recall        Signatures   Electronically signed by : MONIQUE Angelo;  Aug 18 2016  2:17PM EST                       (Author)

## 2018-01-16 NOTE — PROGRESS NOTES
Assessment    1  Medicare annual wellness visit, subsequent (V70 0) (Z00 00)   2  Screening for malignant neoplasm of colon (V76 51) (Z12 11)   3  At risk for falls (V15 88) (Z91 81)   4  Urinary incontinence in female (788 30) (R32)    Plan  At risk for falls    · There ways to avoid falling ; Status:Complete;   Done: 21Apr2017   · These are things you can do to prevent falls in and around the home ; Status:Complete;    Done: 21Apr2017  Screening for malignant neoplasm of colon    · (1) OCCULT BLOOD, FECAL IMMUNOCHEMICAL TEST; Status:Active; Requested  for:21Apr2017;   Urinary incontinence in female    · 3 - Erick CUADRA, Alessandra  Obstetrics/Gynecology Co-Management  *  Status: Hold For -  Scheduling  Requested for: 21Apr2017  are Referring to a non- Preferred Provider : Scheduling access issues  Care Summary provided  : Yes   · The plan of care for urinary incontinence is detailed in the plan and/or discussion section  of today's note ; Status:Complete;   Done: 21Apr2017    Discussion/Summary    Will get fit test to check the stool for hidden blood  Will request the ekg from [facility]  REad over the information given to you  Will refer to Dr Fugn Running for mixed incontinence  Pt to bring us a copy of her living will  Impression: Subsequent Annual Wellness Visit, with preventive exam as well as age and risk appropriate counseling completed  Cardiovascular screening and counseling: counseling was given on maintaining a healthy diet, counseling was given on maintaining a healthy weight, counseling was given on ways to improve blood pressure and Will obtain ekg from [facility]  Diabetes screening and counseling: screening is current  Cervical cancer screening and counseling: the risks and benefits of screening were discussed and screening is current  Osteoporosis screening and counseling: the risks and benefits of screening were discussed and screening is current     Abdominal aortic aneurysm screening and counseling: screening not indicated  Glaucoma screening and counseling: the risks and benefits of screening were discussed and screening is current  HIV screening and counseling: the risks and benefits of screening were discussed and the patient declines screening  Immunizations: the risks and benefits of influenza vaccination were discussed with the patient, influenza vaccine is up to date this year, the lifetime pneumococcal vaccine has been completed, hepatitis B vaccination series is not indicated at this time due to the patient's low risk of christel the disease, Zostavax vaccination up to date and Td vaccination up to date  Advance Directive Planning: complete and up to date  Advice and education were given regarding fall risk reduction and skin self-exam  Patient Discussion: plan discussed with the patient, follow-up visit needed in one year  Chief Complaint  PATIENT IS HERE FOR AWV      History of Present Illness  Pt is here for annual wellness exam  Not sure if she had a previous one  The patient is being seen for the initial annual wellness visit  Medicare Screening and Risk Factors   Hospitalizations: no previous hospitalizations  Once per lifetime medicare screening tests: ECG (Done at [facility]) and AAA screening US has not yet been done  Medicare Screening Tests Risk Questions   Abdominal aortic aneurysm risk assessment: none indicated  Osteoporosis risk assessment: , female gender and over 48years of age, but none indicated  HIV risk assessment: none indicated  Drug and Alcohol Use: The patient is a former cigarette smoker and quit smoking 7 YRS  The patient reports occasional alcohol use  She has never used illicit drugs  Diet and Physical Activity: Current diet includes low salt food choices, unhealthy food choices, frequent junk food, 2-3 cups of tea per day and 1 cans of regular soda per day  She exercises infrequently  Exercise: walking     Mood Disorder and Cognitive Impairment Screening: PHQ-9 Depression Scale   Over the past 2 weeks, how often have you been bothered by the following problems? 1 ) Little interest or pleasure in doing things? Not at all    2 ) Feeling down, depressed or hopeless? Not at all    3 ) Trouble falling asleep or sleeping too much? Several days  4 ) Feeling tired or having little energy? Several days  5 ) Poor appetite or overeating? Several days  6 ) Feeling bad about yourself, or that you are a failure, or have let yourself or your family down? Several days  7 ) Trouble concentrating on things, such as reading a newspaper or watching television? Not at all    8 ) Moving or speaking so slowly that other people could have noticed, or the opposite, moving or speaking faster than usual? Several days  She denies feeling down, depressed, or hopeless over the past two weeks  She denies feeling little interest or pleasure in doing things over the past two weeks  Functional Ability/Level of Safety: Hearing is normal bilaterally, normal in the right ear, normal in the left ear and a hearing aid is not used  Activities of daily living details: does not need help using the phone, no transportation help needed, does not need help shopping, no meal preparation help needed, does not need help doing housework, does not need help doing laundry, does not need help managing medications and does not need help managing money  Fall risk factors:  polypharmacy and antidepressant use, but The patient fell 2 times in the past 12 months  Home safety risk factors:  no unfamiliar surroundings, no loose rugs, no poor household lighting, no uneven floors, no household clutter, grab bars in the bathroom and handrails on the stairs  Advance Directives: Advance directives: living will, durable power of  for health care directives and Pt's daughter Kacey Denis has a copy phone # [phone]   end of life decisions were reviewed with the patient and I agree with the patient's decisions  Concerns with the patient's end of life decisions: Pt does not want resuscitation and wants to be cremated next to her   Co-Managers and Medical Equipment/Suppliers: See Patient Care Team   Symptoms Include: no vertigo, but no dizziness, no syncope and no impaired balance  The patient is currently experiencing urinary symptoms  Urinary Incontinence Symptoms includes: urinary incontinence, but no incomplete bladder emptying        Patient Care Team    Care Team Member Role Specialty Office Number   Brittani Parker   [phone]   Jay MARIE MD  Surgical Oncology [phone]     Active Problems    1  Anxiety (300 00) (F41 9)   2  Benign essential hypertension (401 1) (I10)   3  Breast cancer (174 9) (C50 919)   4  Cardiomegaly (429 3) (I51 7)   5  Change in bowel habits (787 99) (R19 4)   6  Chronic obstructive pulmonary disease, unspecified COPD type (496) (J44 9)   7  Dysuria (788 1) (R30 0)   8  GERD without esophagitis (530 81) (K21 9)   9  Grieving (309 0) (F43 20)   10  Intention tremor (333 1) (G25 2)   11  Mixed hyperlipidemia (272 2) (E78 2)   12  Mixed incontinence (788 33) (N39 46)   13  Morbid obesity (278 01) (E66 01)   14  Need for influenza vaccination (V04 81) (Z23)   15  Osteoarthritis (715 90) (M19 90)   16  Osteopenia (733 90) (M85 80)   17  Personal history of breast cancer (V10 3) (Z85 3)    Past Medical History    1  History of Acute ill-defined cerebrovascular disease (436) (I67 89)   2  History of Depressive disorder (311) (F32 9)   3  History of benign essential tremor (V12 49) (Z86 69)   4  History of chronic kidney disease (V13 09) (Z87 448)   5  History of Impaired fasting glucose (790 21) (R73 01)   6  History of Intention tremor (333 1) (G25 2)   7  History of Osteochondropathy (732 9) (M93 90)   8  History of Panic disorder without agoraphobia with mild panic attacks (300 01) (F41 0)    Surgical History    1   History of Breast Lumpectomy Location 2  History of Breast Surgery Lumpectomy    Family History  Mother    1  Family history of alcoholism (V17 0) (Z81 1)   2  Family history of asthma (V17 5) (Z82 5)   3  Family history of malignant neoplasm (V16 9) (Z80 9)   4  Family history of mental disorder (V17 0) (Z81 8)   5  Family history of osteoarthritis (V17 89) (Z82 69)   6  Denied: Family history of substance abuse  Father    7  Family history of gout (V18 19) (Z82 69)   8  Family history of malignant neoplasm (V16 9) (Z80 9)  Sister    5  Family history of Carotid arterial disease   10  Family history of chronic obstructive pulmonary disease (V17 6) (Z82 5)   11  Family history of hypercholesterolemia (V18 19) (Z83 42)   15  Family history of hypertension (V17 49) (Z82 49)   13  Family history of myocardial infarction (V17 3) (Z82 49)    Social History    · Always uses seat belt   · Denies alcohol consumption (V49 89) (Z78 9)   · Former smoker (S33 00) (I89 568)   · Retired   ·     Current Meds   1  AmLODIPine Besylate 10 MG Oral Tablet; take one tablet by mouth daily; Therapy: 11QOA2553 to (Last Rx:18Nov2016)  Requested for: 71RFP2669 Ordered   2  Aspirin 81 MG CAPS; Therapy: (Recorded:98Smd0840) to Recorded   3  Combivent Respimat  MCG/ACT Inhalation Aerosol Solution; ONE INHALATION 4   TIMES DAILY (MAX OF 6 INHALATIONS PER 24 HOURS); Therapy: 21IED9128 to (Last Alexis Middleton)  Requested for: 20Oct2016 Ordered   4  Dexilant 60 MG Oral Capsule Delayed Release; TAKE 1 CAPSULE DAILY EVERY   MORNING BEFORE BREAKFAST  Requested for: 84SQG5501; Last Rx:18Nov2016   Ordered   5  FLUoxetine HCl - 40 MG Oral Capsule; TAKE 1 CAPSULE DAILY  Requested for:   70CHC2808; Last Rx:18Nov2016 Ordered   6  Lisinopril 20 MG Oral Tablet; TAKE 1 TABLET DAILY  Requested for: 47XOC9625; Last   Rx:18Nov2016 Ordered    Allergies    1  Amoxil TABS   2   Codeine    Immunizations  Influenza --- Az Cinnamon: 28-Npd-6225Hzqitrx Splinter: 94-Cjk-4277[place]ivw [place]: 28-Jan-2011; Series4:  08-Sep-2011; Series5: 20-Aug-2012; Series6: 20-Sep-2013; Series7: 23-Sep-2014; Series8:  27-Oct-2015; Series9: 18-Nov-2016   PCV --- Series1: 06-Aug-2015   Pneumo Other --- Series1: 01-Jul-2011   Tdap --- Series1: 01-Jul-2011   Zoster --- Series1: 24-Jun-2015     Vitals  Signs   Recorded: 21Apr2017 02:05PM   Heart Rate: 111  Respiration: 16  Systolic: 199  Diastolic: 90  Height: 5 ft 4 in  Weight: 271 lb   BMI Calculated: 46 52  BSA Calculated: 2 24  O2 Saturation: 95    Attending Note  Collaborating Physician Note: Collaborating Note: I supervised the Advanced Practitioner and I agree with the Advanced Practitioner note  Signatures   Electronically signed by : MONIQUE Angelo; Apr 21 2017  2:39PM EST                       (Author)    Electronically signed by : Ailyn Ashton DO;  Apr 23 2017  7:45AM EST                       (Co-author)

## 2018-01-17 NOTE — RESULT NOTES
Verified Results  (1) URINALYSIS w URINE C/S REFLEX (will reflex a microscopy if leukocytes, occult blood, or nitrites are not within normal limits) 28Hoi3906 10:21AM Maria L Sheridan Order Number: [id number]     Test Name Result Flag Reference   COLOR Yellow     CLARITY Clear     PH UA 6 5  4 5-8 0   LEUKOCYTE ESTERASE UA Trace A Negative   NITRITE UA Negative  Negative   PROTEIN UA Negative mg/dl  Negative   GLUCOSE UA Negative mg/dl  Negative   KETONES UA Negative mg/dl  Negative   UROBILINOGEN UA 0 2 E U /dl  0 2, 1 0 E U /dl   BILIRUBIN UA Negative  Negative   BLOOD UA Trace-Intact A Negative   SPECIFIC GRAVITY UA 1 020  1 003-1 030   BACTERIA Occasional /hpf  None Seen, Occasional   EPITHELIAL CELLS Moderate /hpf A None Seen, Occasional   RBC UA 1-2 /hpf A None Seen   WBC UA 2-4 /hpf A None Seen   CLINICAL REPORT (Report)     Test:        Urine culture  Specimen Type:   Urine  Specimen Date:   4/10/2017 10:21 AM  Result Date:    4/11/2017 1:45 PM  Result Status:   Final result  Resulting Lab:    [facility]            Tel: [phone]      CULTURE                                       ------------------                                   No Growth <1000 cfu/mL

## 2018-01-17 NOTE — RESULT NOTES
PFT Results v2:   Diagnosis/Reason For Study: COPD   Referring Provider: Dr Pernell Rosas   Spirometry: Forced vital capacity: 2 53L and 86% Predicted Values  Forced expiratory volume in one second: 1 79L and 81% Predicted Value  FEV1/FVC ratio is 94% Predicted Values  F EF 25-75 percent, 1 07 L, 59% predicted    Post Bronchodilator Spirometry: Forced vital capacity : 2 59L and 88% Predicted Values  Forced expiratory volume in one second : 1 94L and 88% Predicted Value  FEV1/FVC ratio is 99% Predicted Values  F EF 25-75 percent, 1 16 L, 89% predicted    Lung Volumes: Total lung capacity : 5 08L and 100% Predicted Values  RV: 110% Predicted Values  RV/T% Predicted Values  DLCO:   DLCO 81% Predicted Values  PFT Interpretation:   Patient had a full lung function testing with spirometry lung volumes and DLCO  Patient gave a good effort  Results meet the ATS standards for acceptability and repeat ability  The flow volume curve is normal   There is mild small airway obstructive ventilatory limitation with an appreciable response to the bronchi dilator  The lung volumes and DLCO are normal   Findings are consistent with asthma  Clinical correlation is required        Future Appointments    Date/Time Provider Specialty Site   2016 02:15 PM Mimi Walters, [address] [place] [address]      Electronically signed by : AWAIS Mccloud ; Aug 25 2016  6:23PM EST                       (Author)

## 2018-01-22 VITALS
RESPIRATION RATE: 16 BRPM | BODY MASS INDEX: 46.26 KG/M2 | DIASTOLIC BLOOD PRESSURE: 90 MMHG | HEIGHT: 64 IN | SYSTOLIC BLOOD PRESSURE: 160 MMHG | WEIGHT: 271 LBS | HEART RATE: 111 BPM | OXYGEN SATURATION: 95 %

## 2018-01-22 VITALS
SYSTOLIC BLOOD PRESSURE: 160 MMHG | RESPIRATION RATE: 16 BRPM | HEART RATE: 92 BPM | BODY MASS INDEX: 45.87 KG/M2 | DIASTOLIC BLOOD PRESSURE: 84 MMHG | WEIGHT: 267.25 LBS

## 2018-01-22 VITALS
SYSTOLIC BLOOD PRESSURE: 128 MMHG | OXYGEN SATURATION: 97 % | RESPIRATION RATE: 18 BRPM | DIASTOLIC BLOOD PRESSURE: 72 MMHG | HEART RATE: 83 BPM | HEIGHT: 64 IN | TEMPERATURE: 96.5 F

## 2018-01-23 VITALS
SYSTOLIC BLOOD PRESSURE: 140 MMHG | WEIGHT: 266 LBS | DIASTOLIC BLOOD PRESSURE: 82 MMHG | BODY MASS INDEX: 45.41 KG/M2 | TEMPERATURE: 97.2 F | OXYGEN SATURATION: 94 % | HEIGHT: 64 IN | RESPIRATION RATE: 15 BRPM | HEART RATE: 81 BPM

## 2018-03-01 DIAGNOSIS — K21.9 GASTROESOPHAGEAL REFLUX DISEASE WITHOUT ESOPHAGITIS: Primary | ICD-10-CM

## 2018-03-06 DIAGNOSIS — K21.9 GASTROESOPHAGEAL REFLUX DISEASE WITHOUT ESOPHAGITIS: ICD-10-CM

## 2018-03-19 DIAGNOSIS — F32.9 REACTIVE DEPRESSION: Primary | ICD-10-CM

## 2018-03-19 RX ORDER — FLUOXETINE HYDROCHLORIDE 40 MG/1
CAPSULE ORAL
Qty: 90 CAPSULE | Refills: 1 | Status: SHIPPED | OUTPATIENT
Start: 2018-03-19 | End: 2018-09-09 | Stop reason: SDUPTHER

## 2018-04-30 ENCOUNTER — OFFICE VISIT (OUTPATIENT)
Dept: FAMILY MEDICINE CLINIC | Facility: CLINIC | Age: 73
End: 2018-04-30
Payer: MEDICARE

## 2018-04-30 VITALS
WEIGHT: 256 LBS | DIASTOLIC BLOOD PRESSURE: 72 MMHG | HEIGHT: 63 IN | OXYGEN SATURATION: 98 % | SYSTOLIC BLOOD PRESSURE: 135 MMHG | BODY MASS INDEX: 45.36 KG/M2 | HEART RATE: 87 BPM

## 2018-04-30 DIAGNOSIS — R53.83 FATIGUE, UNSPECIFIED TYPE: ICD-10-CM

## 2018-04-30 DIAGNOSIS — G25.2 INTENTION TREMOR: Primary | ICD-10-CM

## 2018-04-30 DIAGNOSIS — F32.A DEPRESSIVE DISORDER: ICD-10-CM

## 2018-04-30 DIAGNOSIS — Z12.39 SCREENING FOR MALIGNANT NEOPLASM OF BREAST: ICD-10-CM

## 2018-04-30 DIAGNOSIS — N64.4 MASTALGIA: ICD-10-CM

## 2018-04-30 DIAGNOSIS — G25.0 BENIGN ESSENTIAL TREMOR: ICD-10-CM

## 2018-04-30 PROCEDURE — 99214 OFFICE O/P EST MOD 30 MIN: CPT | Performed by: FAMILY MEDICINE

## 2018-04-30 RX ORDER — MIRABEGRON 50 MG/1
1 TABLET, FILM COATED, EXTENDED RELEASE ORAL DAILY
Refills: 0 | COMMUNITY
Start: 2018-04-27 | End: 2019-09-17 | Stop reason: ALTCHOICE

## 2018-04-30 NOTE — PROGRESS NOTES
Assessment/Plan:     Chronic Problems:  Benign essential tremor  Blood pressure today is perfect continue the current medications  Depressive disorder    Continue current dose of fluoxetine as you seemed to be doing very well on this dose  Intention tremor    Continue the current medicines but it looks like that intention tremor is somewhat better  Visit Diagnosis:  Diagnoses and all orders for this visit:    Intention tremor    Benign essential tremor  -     Comprehensive metabolic panel; Future  -     Lipid panel; Future  -     Microalbumin / creatinine urine ratio    Mastalgia    Screening for malignant neoplasm of breast  Comments: Will get bilateral diagnostic mammogram and ultrasound as patient has a history of breast cancer on the left  Orders:  -     Mammo diagnostic bilateral w 3d & cad; Future  -     US breast right limited (diagnostic); Future    Depressive disorder    Fatigue, unspecified type  Comments: Will check labs but I have to wonder if it is not from boredom and a very long winter  Keep busy  Orders:  -     CBC and differential; Future  -     TSH, 3rd generation with T4 reflex; Future    Other orders  -     MYRBETRIQ 50 MG TB24; Take 1 tablet by mouth daily          Subjective:    Patient ID: Soraya Coughlin is a 67 y o  female  Pt is here for routine f/u  Feels she has no energy since her sister passed away  Does not feel depressed  Sleeps ok at night  No anxiety  Does not feel sick  Does not think her myrbetriq is helping  Ran out and no change in urinary symptoms  Has her usual aches and pains  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 and the sun to come out  Wonders if the racing season will help her be more motivated  Takes all other meds as directed  No side effects noted           The following portions of the patient's history were reviewed and updated as appropriate: allergies, current medications, past family history, past medical history, past social history, past surgical history and problem list     Review of Systems   Constitutional: Negative for chills, diaphoresis, fatigue and fever  HENT: Negative  Eyes: Negative  Respiratory: Positive for shortness of breath (still with sob if she overexerts  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  )  Negative for cough and wheezing  Cardiovascular: Negative for chest pain and palpitations  Gastrointestinal: Negative for abdominal pain, diarrhea, nausea, rectal pain and vomiting  Genitourinary: Positive for dysuria and urgency  Negative for flank pain and frequency  Still with some incontinence  Gets an urge to urinate that is painful  Musculoskeletal:        Usual joint pains  Skin: Negative for rash and wound  Neurological: Negative for dizziness, light-headedness and headaches  Psychiatric/Behavioral: Negative for dysphoric mood and sleep disturbance  The patient is not nervous/anxious            /72   Pulse 87   Ht 5' 3" (1 6 m)   Wt 116 kg (256 lb)   SpO2 98%   BMI 45 35 kg/m²   Social History     Social History    Marital status: Single     Spouse name: N/A    Number of children: N/A    Years of education: N/A     Occupational History    Retired      Social History Main Topics    Smoking status: Former Smoker    Smokeless tobacco: Never Used    Alcohol use Yes      Comment: socially - Per allscripts - denies alcohol consumption    Drug use: No    Sexual activity: Not on file     Other Topics Concern    Not on file     Social History Narrative         Always uses seat belt     Past Medical History:   Diagnosis Date    Acute ill-defined cerebrovascular disease     Benign essential tremor     Chronic kidney disease     COPD [facility]chronic obstructive pulmonary disease) ([facility] [address] )     Depressive disorder     Hypertension     Impaired fasting glucose     Intention tremor     last assessed: 8/18/2016    Osteochondropathy     Panic disorder without agoraphobia with mild panic attacks  Psychiatric disorder     anxiety     Family History   Problem Relation Age of Onset    Alcohol abuse Mother     Asthma Mother     Cancer Mother     Mental illness Mother     Osteoarthritis Mother     Gout Father     Cancer Father     Other Sister      carotid arterial disease    COPD Sister     Hyperlipidemia Sister     Hypertension Sister     Heart attack Sister      Past Surgical History:   Procedure Laterality Date    AUGMENTATION BREAST      BREAST LUMPECTOMY W/ NEEDLE LOCALIZATION Left     description: benign last assessed; 8/21/2016    CHOLECYSTECTOMY      HYSTERECTOMY         Current Outpatient Prescriptions:     amLODIPine (NORVASC) 10 mg tablet, Take 10 mg by mouth daily, Disp: , Rfl:     aspirin (ECOTRIN LOW STRENGTH) 81 mg EC tablet, Take 81 mg by mouth daily, Disp: , Rfl:     DEXILANT 60 MG capsule, take 1 capsule by mouth every morning BEFORE BREAKFAST, Disp: 90 capsule, Rfl: 1    diazepam (VALIUM) 5 mg tablet, Take 1 tablet by mouth 2 (two) times a day, Disp: 15 tablet, Rfl: 0    FLUoxetine (PROzac) 40 MG capsule, take 1 capsule by mouth once daily, Disp: 90 capsule, Rfl: 1    lidocaine (LIDODERM) 5 %, Place 1 patch on the skin every 24 hours for 30 days Remove & Discard patch within 12 hours or as directed by MD, Disp: 10 patch, Rfl: 0    lisinopril (ZESTRIL) 20 mg tablet, Take 20 mg by mouth daily, Disp: , Rfl:     methocarbamol (ROBAXIN) 750 mg tablet, Take 1 tablet by mouth 3 (three) times a day as needed for muscle spasms for up to 7 days, Disp: 21 tablet, Rfl: 0    MYRBETRIQ 50 MG TB24, Take 1 tablet by mouth daily, Disp: , Rfl: 0    Allergies   Allergen Reactions    Amoxicillin     Codeine     Morphine           Lab Review   No visits with results within 6 Month(s) from this visit     Latest known visit with results is:   Admission on 06/27/2017, Discharged on 06/28/2017   Component Date Value    WBC 06/27/2017 6 82     RBC 06/27/2017 4 82     Hemoglobin 06/27/2017 12 8     Hematocrit 06/27/2017 39 9     MCV 06/27/2017 83     MCH 06/27/2017 26 6*    MCHC 06/27/2017 32 1     RDW 06/27/2017 13 9     MPV 06/27/2017 11 1     Platelets 90/28/0088 224     nRBC 06/27/2017 0     Neutrophils Relative 06/27/2017 62     Lymphocytes Relative 06/27/2017 28     Monocytes Relative 06/27/2017 7     Eosinophils Relative 06/27/2017 2     Basophils Relative 06/27/2017 1     Neutrophils Absolute 06/27/2017 4 25     Lymphocytes Absolute 06/27/2017 1 93     Monocytes Absolute 06/27/2017 0 47     Eosinophils Absolute 06/27/2017 0 11     Basophils Absolute 06/27/2017 0 04     Sodium 06/27/2017 140     Potassium 06/27/2017 4 2     Chloride 06/27/2017 106     CO2 06/27/2017 25     Anion Gap 06/27/2017 9     BUN 06/27/2017 18     Creatinine 06/27/2017 1 54*    Glucose 06/27/2017 105     Calcium 06/27/2017 9 1     eGFR 06/27/2017 33 0     Total Bilirubin 06/27/2017 0 40     Bilirubin, Direct 06/27/2017 0 10     Alkaline Phosphatase 06/27/2017 101     AST 06/27/2017 16     ALT 06/27/2017 18     Total Protein 06/27/2017 7 1     Albumin 06/27/2017 3 4*    Color, UA 06/27/2017 moshe     Clarity, UA 06/27/2017 clear     EXT Glucose, UA (Ref: Ne* 06/27/2017 neg     EXT Bilirubin, UA (Ref: * 06/27/2017 Large     EXT Ketones, UA (Ref: Ne* 06/27/2017 Small     EXT Spec Grav, UA 06/27/2017 1 030     EXT pH, UA 06/27/2017 5 0     EXT Protein, UA (Ref: Ne* 06/27/2017 Trace     EXT Urobilinogen, UA (Re* 06/27/2017 1 0     EXT Nitrite, UA (Ref: Ne* 06/27/2017 neg     EXT Leukocytes, UA (Ref:* 06/27/2017 neg     EXT Blood, UA (Ref: Nega* 06/27/2017 neg         Imaging: No results found  Objective:     Physical Exam   Constitutional: She is oriented to person, place, and time  She appears well-developed and well-nourished  No distress  HENT:   Head: Normocephalic and atraumatic     Right Ear: External ear normal    Left Ear: External ear normal    Nose: Nose normal    Mouth/Throat: Oropharynx is clear and moist    Eyes: Conjunctivae and EOM are normal  Pupils are equal, round, and reactive to light  Right eye exhibits no discharge  Left eye exhibits no discharge  Neck: Normal range of motion  Neck supple  Cardiovascular: Normal rate, regular rhythm and normal heart sounds  No murmur heard  Pulmonary/Chest: Effort normal and breath sounds normal  No respiratory distress  She has no wheezes  She has no rales  Abdominal: Soft  Abdomen is obese  Musculoskeletal: Normal range of motion  She exhibits no edema or tenderness  Lymphadenopathy:     She has no cervical adenopathy  Neurological: She is alert and oriented to person, place, and time  Intermittent intention tremor  Skin: Skin is warm and dry  No rash noted  She is not diaphoretic  No erythema  Breasts very tender on the right with irregularity at 2:00  Left breast s/p lumpectomy for breast cancer  Psychiatric: She has a normal mood and affect  Her behavior is normal  Judgment and thought content normal          Patient Instructions    Have the labs done fasting and I will call with results  I have to wonder if her fatigue is not from a very long winter and just boredom  See how it goes now that your  Racing season is here  Will order bilateral mammogram and ultrasound on the right as the breasts are very tender  Continue current medications your blood pressure is perfect        MONIQUE Gordon

## 2018-04-30 NOTE — PATIENT INSTRUCTIONS
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

## 2018-05-04 ENCOUNTER — TELEPHONE (OUTPATIENT)
Dept: FAMILY MEDICINE CLINIC | Facility: CLINIC | Age: 73
End: 2018-05-04

## 2018-05-04 ENCOUNTER — APPOINTMENT (OUTPATIENT)
Dept: LAB | Facility: HOSPITAL | Age: 73
End: 2018-05-04
Payer: MEDICARE

## 2018-05-04 DIAGNOSIS — G25.0 BENIGN ESSENTIAL TREMOR: ICD-10-CM

## 2018-05-04 DIAGNOSIS — R53.83 FATIGUE, UNSPECIFIED TYPE: ICD-10-CM

## 2018-05-04 LAB
ALBUMIN SERPL BCP-MCNC: 3.2 G/DL (ref 3.5–5)
ALP SERPL-CCNC: 103 U/L (ref 46–116)
ALT SERPL W P-5'-P-CCNC: 19 U/L (ref 12–78)
ANION GAP SERPL CALCULATED.3IONS-SCNC: 7 MMOL/L (ref 4–13)
AST SERPL W P-5'-P-CCNC: 13 U/L (ref 5–45)
BASOPHILS # BLD AUTO: 0.04 THOUSANDS/ΜL (ref 0–0.1)
BASOPHILS NFR BLD AUTO: 1 % (ref 0–1)
BILIRUB SERPL-MCNC: 0.5 MG/DL (ref 0.2–1)
BUN SERPL-MCNC: 16 MG/DL (ref 5–25)
CALCIUM SERPL-MCNC: 8.6 MG/DL (ref 8.3–10.1)
CHLORIDE SERPL-SCNC: 105 MMOL/L (ref 100–108)
CHOLEST SERPL-MCNC: 191 MG/DL (ref 50–200)
CO2 SERPL-SCNC: 29 MMOL/L (ref 21–32)
CREAT SERPL-MCNC: 1.17 MG/DL (ref 0.6–1.3)
CREAT UR-MCNC: 238 MG/DL
EOSINOPHIL # BLD AUTO: 0.25 THOUSAND/ΜL (ref 0–0.61)
EOSINOPHIL NFR BLD AUTO: 4 % (ref 0–6)
ERYTHROCYTE [DISTWIDTH] IN BLOOD BY AUTOMATED COUNT: 14.6 % (ref 11.6–15.1)
GFR SERPL CREATININE-BSD FRML MDRD: 46 ML/MIN/1.73SQ M
GLUCOSE P FAST SERPL-MCNC: 93 MG/DL (ref 65–99)
HCT VFR BLD AUTO: 42.9 % (ref 34.8–46.1)
HDLC SERPL-MCNC: 41 MG/DL (ref 40–60)
HGB BLD-MCNC: 13.4 G/DL (ref 11.5–15.4)
LDLC SERPL CALC-MCNC: 121 MG/DL (ref 0–100)
LYMPHOCYTES # BLD AUTO: 1.4 THOUSANDS/ΜL (ref 0.6–4.47)
LYMPHOCYTES NFR BLD AUTO: 25 % (ref 14–44)
MCH RBC QN AUTO: 26.1 PG (ref 26.8–34.3)
MCHC RBC AUTO-ENTMCNC: 31.2 G/DL (ref 31.4–37.4)
MCV RBC AUTO: 84 FL (ref 82–98)
MICROALBUMIN UR-MCNC: 13.6 MG/L (ref 0–20)
MICROALBUMIN/CREAT 24H UR: 6 MG/G CREATININE (ref 0–30)
MONOCYTES # BLD AUTO: 0.42 THOUSAND/ΜL (ref 0.17–1.22)
MONOCYTES NFR BLD AUTO: 8 % (ref 4–12)
NEUTROPHILS # BLD AUTO: 3.51 THOUSANDS/ΜL (ref 1.85–7.62)
NEUTS SEG NFR BLD AUTO: 62 % (ref 43–75)
NONHDLC SERPL-MCNC: 150 MG/DL
NRBC BLD AUTO-RTO: 0 /100 WBCS
PLATELET # BLD AUTO: 227 THOUSANDS/UL (ref 149–390)
PMV BLD AUTO: 11.5 FL (ref 8.9–12.7)
POTASSIUM SERPL-SCNC: 3.9 MMOL/L (ref 3.5–5.3)
PROT SERPL-MCNC: 7.1 G/DL (ref 6.4–8.2)
RBC # BLD AUTO: 5.14 MILLION/UL (ref 3.81–5.12)
SODIUM SERPL-SCNC: 141 MMOL/L (ref 136–145)
T4 FREE SERPL-MCNC: 0.99 NG/DL (ref 0.76–1.46)
TRIGL SERPL-MCNC: 147 MG/DL
TSH SERPL DL<=0.05 MIU/L-ACNC: 4.38 UIU/ML (ref 0.36–3.74)
WBC # BLD AUTO: 5.63 THOUSAND/UL (ref 4.31–10.16)

## 2018-05-04 PROCEDURE — 36415 COLL VENOUS BLD VENIPUNCTURE: CPT

## 2018-05-04 PROCEDURE — 85025 COMPLETE CBC W/AUTO DIFF WBC: CPT

## 2018-05-04 PROCEDURE — 84439 ASSAY OF FREE THYROXINE: CPT

## 2018-05-04 PROCEDURE — 82043 UR ALBUMIN QUANTITATIVE: CPT | Performed by: FAMILY MEDICINE

## 2018-05-04 PROCEDURE — 82570 ASSAY OF URINE CREATININE: CPT | Performed by: FAMILY MEDICINE

## 2018-05-04 PROCEDURE — 80061 LIPID PANEL: CPT

## 2018-05-04 PROCEDURE — 84443 ASSAY THYROID STIM HORMONE: CPT

## 2018-05-04 PROCEDURE — 80053 COMPREHEN METABOLIC PANEL: CPT

## 2018-05-04 NOTE — TELEPHONE ENCOUNTER
Spoke with patient and she is aware of results         ----- Message from [address] [place] [address]  sent at 5/4/2018 12:59 PM EDT -----  Labs look ok  Just tsh is pending   Will call if abnormal

## 2018-05-08 ENCOUNTER — TELEPHONE (OUTPATIENT)
Dept: FAMILY MEDICINE CLINIC | Facility: CLINIC | Age: 73
End: 2018-05-08

## 2018-05-08 ENCOUNTER — HOSPITAL ENCOUNTER (OUTPATIENT)
Dept: MAMMOGRAPHY | Facility: CLINIC | Age: 73
Discharge: HOME | End: 2018-05-08
Payer: MEDICARE

## 2018-05-08 ENCOUNTER — HOSPITAL ENCOUNTER (OUTPATIENT)
Dept: ULTRASOUND IMAGING | Facility: CLINIC | Age: 73
Discharge: HOME | End: 2018-05-08
Payer: MEDICARE

## 2018-05-08 DIAGNOSIS — Z12.39 SCREENING FOR MALIGNANT NEOPLASM OF BREAST: ICD-10-CM

## 2018-05-08 PROCEDURE — G0279 TOMOSYNTHESIS, MAMMO: HCPCS

## 2018-05-08 PROCEDURE — 77066 DX MAMMO INCL CAD BI: CPT

## 2018-05-08 PROCEDURE — 76642 ULTRASOUND BREAST LIMITED: CPT

## 2018-05-08 NOTE — TELEPHONE ENCOUNTER
Spoke with patient she is aware of her results       She is still waiting on her tsh results that were pending, also she wanted to know what derm you told her to call for her skin

## 2018-05-08 NOTE — TELEPHONE ENCOUNTER
----- Message from [address] [place] [address]  sent at 5/8/2018 12:19 PM EDT -----  Let her know mammo and us are wnl, but they are getting her old films to compare

## 2018-05-08 NOTE — TELEPHONE ENCOUNTER
Spoke with patient and she is aware of her tsh results and she also has the number for dedicated derm

## 2018-05-18 ENCOUNTER — TELEPHONE (OUTPATIENT)
Dept: FAMILY MEDICINE CLINIC | Facility: CLINIC | Age: 73
End: 2018-05-18

## 2018-05-18 NOTE — TELEPHONE ENCOUNTER
----- Message from [address] [place] [address]  sent at 5/17/2018  1:39 PM EDT -----  Let her know they see nothing in her breast but are requesting bilateral diagnostic mammo in 1 year

## 2018-06-03 DIAGNOSIS — I10 ESSENTIAL HYPERTENSION: Primary | ICD-10-CM

## 2018-06-03 RX ORDER — AMLODIPINE BESYLATE 10 MG/1
TABLET ORAL
Qty: 90 TABLET | Refills: 1 | Status: SHIPPED | OUTPATIENT
Start: 2018-06-03 | End: 2018-09-13 | Stop reason: SDUPTHER

## 2018-06-14 DIAGNOSIS — I10 ESSENTIAL HYPERTENSION: Primary | ICD-10-CM

## 2018-06-14 RX ORDER — LISINOPRIL 20 MG/1
TABLET ORAL
Qty: 90 TABLET | Refills: 1 | Status: SHIPPED | OUTPATIENT
Start: 2018-06-14 | End: 2018-07-02 | Stop reason: SDUPTHER

## 2018-07-02 DIAGNOSIS — I10 ESSENTIAL HYPERTENSION: ICD-10-CM

## 2018-07-02 RX ORDER — LISINOPRIL 20 MG/1
20 TABLET ORAL DAILY
Qty: 90 TABLET | Refills: 1 | Status: SHIPPED | OUTPATIENT
Start: 2018-07-02 | End: 2018-12-26 | Stop reason: SDUPTHER

## 2018-09-09 DIAGNOSIS — F32.9 REACTIVE DEPRESSION: ICD-10-CM

## 2018-09-10 RX ORDER — FLUOXETINE HYDROCHLORIDE 40 MG/1
CAPSULE ORAL
Qty: 90 CAPSULE | Refills: 0 | Status: SHIPPED | OUTPATIENT
Start: 2018-09-10 | End: 2018-12-11 | Stop reason: SDUPTHER

## 2018-09-13 ENCOUNTER — TELEPHONE (OUTPATIENT)
Dept: FAMILY MEDICINE CLINIC | Facility: CLINIC | Age: 73
End: 2018-09-13

## 2018-09-13 DIAGNOSIS — K21.9 GASTROESOPHAGEAL REFLUX DISEASE WITHOUT ESOPHAGITIS: ICD-10-CM

## 2018-09-13 DIAGNOSIS — R12 HEARTBURN: Primary | ICD-10-CM

## 2018-09-13 DIAGNOSIS — I10 ESSENTIAL HYPERTENSION: ICD-10-CM

## 2018-09-13 RX ORDER — DEXLANSOPRAZOLE 60 MG/1
1 CAPSULE, DELAYED RELEASE ORAL
Qty: 90 CAPSULE | Refills: 0 | OUTPATIENT
Start: 2018-09-13

## 2018-09-13 RX ORDER — PANTOPRAZOLE SODIUM 40 MG/1
40 TABLET, DELAYED RELEASE ORAL DAILY
Qty: 90 TABLET | Refills: 0 | Status: SHIPPED | OUTPATIENT
Start: 2018-09-13 | End: 2018-09-14

## 2018-09-13 RX ORDER — AMLODIPINE BESYLATE 10 MG/1
10 TABLET ORAL DAILY
Qty: 90 TABLET | Refills: 0 | Status: CANCELLED | OUTPATIENT
Start: 2018-09-13

## 2018-09-13 RX ORDER — AMLODIPINE BESYLATE 10 MG/1
10 TABLET ORAL DAILY
Qty: 90 TABLET | Refills: 1 | Status: SHIPPED | OUTPATIENT
Start: 2018-09-13 | End: 2018-12-11 | Stop reason: SDUPTHER

## 2018-09-13 NOTE — TELEPHONE ENCOUNTER
Please let her know they are not covering dexilant and I sent in pantoprazole for her  Let me know if this is not helping

## 2018-09-13 NOTE — TELEPHONE ENCOUNTER
Dexilant 60 mg- she said [employer] won't cover this  I'm not sure if we are working on a prior auth for this, but she hasn't taken it and has been sick everyday because she doesn't have it  Amlodipine 10 mg      [facility]

## 2018-09-14 ENCOUNTER — TELEPHONE (OUTPATIENT)
Dept: FAMILY MEDICINE CLINIC | Facility: CLINIC | Age: 73
End: 2018-09-14

## 2018-09-14 DIAGNOSIS — K21.00 REFLUX ESOPHAGITIS: Primary | ICD-10-CM

## 2018-09-14 RX ORDER — DEXLANSOPRAZOLE 60 MG/1
60 CAPSULE, DELAYED RELEASE ORAL DAILY
Qty: 90 CAPSULE | Refills: 0 | Status: SHIPPED | OUTPATIENT
Start: 2018-09-14 | End: 2018-09-14 | Stop reason: SDUPTHER

## 2018-09-14 NOTE — TELEPHONE ENCOUNTER
Spoke pharmacy medication would be $9 I am not sure what the issue was with the [facility] thing but she is okay to  medication

## 2018-09-14 NOTE — TELEPHONE ENCOUNTER
Patient stated she is still waiting on dexilant script    The script the pharmacy has on file is too old to fill      Pt is at pharmacy now waiting on script

## 2018-09-17 NOTE — TELEPHONE ENCOUNTER
Spoke with patient and she is aware   She will pay 9 dollars for the dexilant instead she got it cheaper

## 2018-09-25 ENCOUNTER — OFFICE VISIT (OUTPATIENT)
Dept: FAMILY MEDICINE CLINIC | Facility: CLINIC | Age: 73
End: 2018-09-25
Payer: MEDICARE

## 2018-09-25 VITALS
BODY MASS INDEX: 46.95 KG/M2 | WEIGHT: 265 LBS | RESPIRATION RATE: 16 BRPM | DIASTOLIC BLOOD PRESSURE: 76 MMHG | HEIGHT: 63 IN | TEMPERATURE: 97.5 F | OXYGEN SATURATION: 99 % | HEART RATE: 90 BPM | SYSTOLIC BLOOD PRESSURE: 142 MMHG

## 2018-09-25 DIAGNOSIS — N18.30 CKD (CHRONIC KIDNEY DISEASE) STAGE 3, GFR 30-59 ML/MIN (HCC): ICD-10-CM

## 2018-09-25 DIAGNOSIS — I10 ESSENTIAL HYPERTENSION: Primary | ICD-10-CM

## 2018-09-25 DIAGNOSIS — E04.1 THYROID NODULE: ICD-10-CM

## 2018-09-25 DIAGNOSIS — G25.0 BENIGN ESSENTIAL TREMOR: ICD-10-CM

## 2018-09-25 DIAGNOSIS — F32.A DEPRESSIVE DISORDER: ICD-10-CM

## 2018-09-25 DIAGNOSIS — G25.2 INTENTION TREMOR: ICD-10-CM

## 2018-09-25 DIAGNOSIS — F32.9 REACTIVE DEPRESSION: ICD-10-CM

## 2018-09-25 PROCEDURE — 99214 OFFICE O/P EST MOD 30 MIN: CPT | Performed by: FAMILY MEDICINE

## 2018-09-25 NOTE — PATIENT INSTRUCTIONS
Discussed all with patient  Will get ultrasound of thyroid nodule and will call you with results  Continue medications and take blood pressure at home  Will check labs and will call you with results  Follow up in 4 months

## 2018-09-25 NOTE — PROGRESS NOTES
Assessment/Plan:     Chronic Problems:  Depressive disorder  Doing well on fluoxetine, continue med  Benign essential tremor  States tremor is only when she is very anxious/nervous, but that it is improving  Essential hypertension  Recheck BP is 122/81, continue medications  CKD (chronic kidney disease) stage 3, GFR 30-59 ml/min  Will continue to follow  Visit Diagnosis:  Diagnoses and all orders for this visit:    Essential hypertension    Reactive depression    Benign essential tremor  -     Comprehensive metabolic panel; Future  -     Microalbumin / creatinine urine ratio  -     Urinalysis with microscopic    CKD (chronic kidney disease) stage 3, GFR 30-59 ml/min    Intention tremor    Depressive disorder    Thyroid nodule  -     US thyroid; Future  -     TSH, 3rd generation with Free T4 reflex; Future          Subjective:    Patient ID: Masoud Valverde is a 75 y o  female  Patient is here for routine follow up  Patient has a lump on right side of neck  She noticed the lump about a month ago, it is not painful, does not affect her swallowing  She does a history of nodules in her throat and Dr Miya Villaseñor removed them  Mammo done in May, colonoscopy last in 2013  She is not taking BP's at home  States she is not taking her valium  Takes all other meds as directed  No side effects noted  The following portions of the patient's history were reviewed and updated as appropriate: allergies, current medications, past family history, past medical history, past social history, past surgical history and problem list     Review of Systems   Constitutional: Positive for fatigue (always tired)  Negative for chills and fever  HENT: Positive for postnasal drip (slight)  Negative for congestion and sore throat  Respiratory: Positive for cough (slight)  Negative for chest tightness, shortness of breath and wheezing  Cardiovascular: Negative for chest pain and palpitations     Gastrointestinal: Negative for abdominal pain, diarrhea, nausea and vomiting  Genitourinary: Negative  Musculoskeletal: Negative  Neurological: Positive for tremors (slight tremor when nervous)  Negative for dizziness, light-headedness and headaches  Psychiatric/Behavioral: Positive for dysphoric mood (feels like fluoxetine is working well)  Negative for sleep disturbance  The patient is nervous/anxious (states it is manageable)            /76   Pulse 90   Temp 97 5 °F (36 4 °C)   Resp 16   Ht 5' 3" (1 6 m)   Wt 120 kg (265 lb)   SpO2 99%   BMI 46 94 kg/m²   Social History     Social History    Marital status: Single     Spouse name: N/A    Number of children: N/A    Years of education: N/A     Occupational History    Retired      Social History Main Topics    Smoking status: Former Smoker    Smokeless tobacco: Never Used    Alcohol use Yes      Comment: socially - Per allscripts - denies alcohol consumption    Drug use: No    Sexual activity: Not on file     Other Topics Concern    Not on file     Social History Narrative         Always uses seat belt     Past Medical History:   Diagnosis Date    Acute ill-defined cerebrovascular disease     Benign essential tremor     Chronic kidney disease     COPD [facility]ch[facility] obstructive [facility] disease) ([facility] [address]     Depressive disorder     Hypertension     Impaired fasting glucose     Intention tremor     last assessed: 8/18/2016    Osteochondropathy     Panic disorder without agoraphobia with mild panic attacks     Psychiatric disorder     anxiety     Family History   Problem Relation Age of Onset    Alcohol abuse Mother     Asthma Mother     Cancer Mother     Mental illness Mother     Osteoarthritis Mother     Gout Father     Cancer Father     Other Sister         carotid arterial disease    COPD Sister     Hyperlipidemia Sister     Hypertension Sister     Heart attack Sister      Past Surgical History:   Procedure Laterality Date    AUGMENTATION BREAST      BREAST LUMPECTOMY W/ NEEDLE LOCALIZATION Left     description: benign last assessed; 8/21/2016    CHOLECYSTECTOMY      HYSTERECTOMY         Current Outpatient Prescriptions:     amLODIPine (NORVASC) 10 mg tablet, Take 1 tablet (10 mg total) by mouth daily, Disp: 90 tablet, Rfl: 1    aspirin (ECOTRIN LOW STRENGTH) 81 mg EC tablet, Take 81 mg by mouth daily, Disp: , Rfl:     DEXILANT 60 MG capsule, TAKE 1 CAPSULE BY MOUTH EVERY DAY BEFORE BREAKFAST, Disp: 90 capsule, Rfl: 0    lisinopril (ZESTRIL) 20 mg tablet, Take 1 tablet (20 mg total) by mouth daily, Disp: 90 tablet, Rfl: 1    diazepam (VALIUM) 5 mg tablet, Take 1 tablet by mouth 2 (two) times a day (Patient not taking: Reported on 9/25/2018 ), Disp: 15 tablet, Rfl: 0    FLUoxetine (PROzac) 40 MG capsule, TAKE 1 CAPSULE BY MOUTH DAILY (Patient not taking: Reported on 9/25/2018), Disp: 90 capsule, Rfl: 0    MYRBETRIQ 50 MG TB24, Take 1 tablet by mouth daily, Disp: , Rfl: 0    Allergies   Allergen Reactions    Amoxicillin     Codeine     Morphine           Lab Review   No visits with results within 2 Month(s) from this visit     Latest known visit with results is:   Appointment on 05/04/2018   Component Date Value    WBC 05/04/2018 5 63     RBC 05/04/2018 5 14*    Hemoglobin 05/04/2018 13 4     Hematocrit 05/04/2018 42 9     MCV 05/04/2018 84     MCH 05/04/2018 26 1*    MCHC 05/04/2018 31 2*    RDW 05/04/2018 14 6     MPV 05/04/2018 11 5     Platelets 16/15/1687 227     nRBC 05/04/2018 0     Neutrophils Relative 05/04/2018 62     Lymphocytes Relative 05/04/2018 25     Monocytes Relative 05/04/2018 8     Eosinophils Relative 05/04/2018 4     Basophils Relative 05/04/2018 1     Neutrophils Absolute 05/04/2018 3 51     Lymphocytes Absolute 05/04/2018 1 40     Monocytes Absolute 05/04/2018 0 42     Eosinophils Absolute 05/04/2018 0 25     Basophils Absolute 05/04/2018 0 04     Sodium 05/04/2018 141     Potassium 05/04/2018 3 9     Chloride 05/04/2018 105     CO2 05/04/2018 29     ANION GAP 05/04/2018 7     BUN 05/04/2018 16     Creatinine 05/04/2018 1 17     Glucose, Fasting 05/04/2018 93     Calcium 05/04/2018 8 6     AST 05/04/2018 13     ALT 05/04/2018 19     Alkaline Phosphatase 05/04/2018 103     Total Protein 05/04/2018 7 1     Albumin 05/04/2018 3 2*    Total Bilirubin 05/04/2018 0 50     eGFR 05/04/2018 46     Cholesterol 05/04/2018 191     Triglycerides 05/04/2018 147     HDL, Direct 05/04/2018 41     LDL Calculated 05/04/2018 121*    Non-HDL-Chol (CHOL-HDL) 05/04/2018 150     TSH 3RD GENERATON 05/04/2018 4 383*    Free T4 05/04/2018 0 99         Imaging: No results found  Objective:     Physical Exam   Constitutional: She is oriented to person, place, and time  She appears well-developed and well-nourished  No distress  HENT:   Head: Normocephalic and atraumatic  Right Ear: External ear normal    Left Ear: External ear normal    Mouth/Throat: Oropharynx is clear and moist    Eyes: Conjunctivae and EOM are normal  Pupils are equal, round, and reactive to light  Right eye exhibits no discharge  Left eye exhibits no discharge  Neck: Normal range of motion  Neck supple  No thyromegaly (but ? small mass right thyroid) present  Cardiovascular: Normal rate, regular rhythm and normal heart sounds  Exam reveals no friction rub  No murmur heard  BP repeated 122/82  Pulmonary/Chest: Effort normal and breath sounds normal  No respiratory distress  She has no wheezes  She has no rales  She exhibits no tenderness  Abdominal: Soft  Bowel sounds are normal  There is no tenderness  Musculoskeletal: Normal range of motion  She exhibits no edema, tenderness or deformity  Lymphadenopathy:     She has no cervical adenopathy  Neurological: She is alert and oriented to person, place, and time  No cranial nerve deficit    + tremor   Skin: Skin is warm and dry  No rash noted   She is not diaphoretic  Psychiatric: She has a normal mood and affect  Her behavior is normal  Judgment and thought content normal          Patient Instructions   Discussed all with patient  Will get ultrasound of thyroid nodule and will call you with results  Continue medications and take blood pressure at home  Will check labs and will call you with results  Follow up in 4 months         MONIQUE Adler

## 2018-10-09 ENCOUNTER — APPOINTMENT (OUTPATIENT)
Dept: LAB | Facility: HOSPITAL | Age: 73
End: 2018-10-09
Payer: MEDICARE

## 2018-10-09 ENCOUNTER — HOSPITAL ENCOUNTER (OUTPATIENT)
Dept: ULTRASOUND IMAGING | Facility: HOSPITAL | Age: 73
Discharge: HOME | End: 2018-10-09
Payer: MEDICARE

## 2018-10-09 DIAGNOSIS — E04.1 THYROID NODULE: ICD-10-CM

## 2018-10-09 DIAGNOSIS — G25.0 BENIGN ESSENTIAL TREMOR: ICD-10-CM

## 2018-10-09 LAB
ALBUMIN SERPL BCP-MCNC: 3.2 G/DL (ref 3.5–5)
ALP SERPL-CCNC: 92 U/L (ref 46–116)
ALT SERPL W P-5'-P-CCNC: 18 U/L (ref 12–78)
ANION GAP SERPL CALCULATED.3IONS-SCNC: 8 MMOL/L (ref 4–13)
AST SERPL W P-5'-P-CCNC: 10 U/L (ref 5–45)
BACTERIA UR QL AUTO: ABNORMAL /HPF
BILIRUB SERPL-MCNC: 0.5 MG/DL (ref 0.2–1)
BILIRUB UR QL STRIP: NEGATIVE
BUN SERPL-MCNC: 14 MG/DL (ref 5–25)
CALCIUM SERPL-MCNC: 8.7 MG/DL (ref 8.3–10.1)
CHLORIDE SERPL-SCNC: 106 MMOL/L (ref 100–108)
CLARITY UR: CLEAR
CO2 SERPL-SCNC: 28 MMOL/L (ref 21–32)
COLOR UR: YELLOW
CREAT SERPL-MCNC: 1.28 MG/DL (ref 0.6–1.3)
CREAT UR-MCNC: 137 MG/DL
GFR SERPL CREATININE-BSD FRML MDRD: 41 ML/MIN/1.73SQ M
GLUCOSE P FAST SERPL-MCNC: 95 MG/DL (ref 65–99)
GLUCOSE UR STRIP-MCNC: NEGATIVE MG/DL
HGB UR QL STRIP.AUTO: ABNORMAL
KETONES UR STRIP-MCNC: NEGATIVE MG/DL
LEUKOCYTE ESTERASE UR QL STRIP: ABNORMAL
MICROALBUMIN UR-MCNC: 10.1 MG/L (ref 0–20)
MICROALBUMIN/CREAT 24H UR: 7 MG/G CREATININE (ref 0–30)
MUCOUS THREADS UR QL AUTO: ABNORMAL
NITRITE UR QL STRIP: NEGATIVE
NON-SQ EPI CELLS URNS QL MICRO: ABNORMAL /HPF
PH UR STRIP.AUTO: 5.5 [PH] (ref 4.5–8)
POTASSIUM SERPL-SCNC: 3.8 MMOL/L (ref 3.5–5.3)
PROT SERPL-MCNC: 7 G/DL (ref 6.4–8.2)
PROT UR STRIP-MCNC: NEGATIVE MG/DL
RBC #/AREA URNS AUTO: ABNORMAL /HPF
SODIUM SERPL-SCNC: 142 MMOL/L (ref 136–145)
SP GR UR STRIP.AUTO: 1.02 (ref 1–1.03)
T4 FREE SERPL-MCNC: 1.02 NG/DL (ref 0.76–1.46)
TSH SERPL DL<=0.05 MIU/L-ACNC: 4.72 UIU/ML (ref 0.36–3.74)
UROBILINOGEN UR QL STRIP.AUTO: 0.2 E.U./DL
WBC #/AREA URNS AUTO: ABNORMAL /HPF

## 2018-10-09 PROCEDURE — 36415 COLL VENOUS BLD VENIPUNCTURE: CPT

## 2018-10-09 PROCEDURE — 82570 ASSAY OF URINE CREATININE: CPT | Performed by: FAMILY MEDICINE

## 2018-10-09 PROCEDURE — 82043 UR ALBUMIN QUANTITATIVE: CPT | Performed by: FAMILY MEDICINE

## 2018-10-09 PROCEDURE — 80053 COMPREHEN METABOLIC PANEL: CPT

## 2018-10-09 PROCEDURE — 81001 URINALYSIS AUTO W/SCOPE: CPT | Performed by: FAMILY MEDICINE

## 2018-10-09 PROCEDURE — 84443 ASSAY THYROID STIM HORMONE: CPT

## 2018-10-09 PROCEDURE — 76536 US EXAM OF HEAD AND NECK: CPT

## 2018-10-09 PROCEDURE — 84439 ASSAY OF FREE THYROXINE: CPT

## 2018-10-26 ENCOUNTER — OFFICE VISIT (OUTPATIENT)
Dept: FAMILY MEDICINE CLINIC | Facility: CLINIC | Age: 73
End: 2018-10-26
Payer: MEDICARE

## 2018-10-26 VITALS
DIASTOLIC BLOOD PRESSURE: 82 MMHG | WEIGHT: 261.4 LBS | HEIGHT: 63 IN | BODY MASS INDEX: 46.32 KG/M2 | OXYGEN SATURATION: 98 % | SYSTOLIC BLOOD PRESSURE: 145 MMHG | HEART RATE: 76 BPM

## 2018-10-26 DIAGNOSIS — R53.83 OTHER FATIGUE: Primary | ICD-10-CM

## 2018-10-26 DIAGNOSIS — R79.89 ELEVATED TSH: ICD-10-CM

## 2018-10-26 DIAGNOSIS — Z23 NEED FOR INFLUENZA VACCINATION: ICD-10-CM

## 2018-10-26 PROCEDURE — 99213 OFFICE O/P EST LOW 20 MIN: CPT | Performed by: FAMILY MEDICINE

## 2018-10-26 PROCEDURE — G0008 ADMIN INFLUENZA VIRUS VAC: HCPCS

## 2018-10-26 PROCEDURE — 90662 IIV NO PRSV INCREASED AG IM: CPT

## 2018-10-26 RX ORDER — LEVOTHYROXINE SODIUM 0.03 MG/1
25 TABLET ORAL DAILY
Qty: 30 TABLET | Refills: 1 | Status: SHIPPED | OUTPATIENT
Start: 2018-10-26 | End: 2018-10-29 | Stop reason: SDUPTHER

## 2018-10-26 NOTE — PATIENT INSTRUCTIONS
Discussed all with patient  Even though the free T4 is normal and her TSH is only minimally elevated will start levothyroxine 1st thing in the morning every day with no other medications  Have the blood work rechecked in 6 weeks I will call with results and keep your follow-up appointment for January  Give it time see if this helps with your fatigue

## 2018-10-26 NOTE — PROGRESS NOTES
Assessment/Plan:     Chronic Problems:  No problem-specific Assessment & Plan notes found for this encounter  Visit Diagnosis:  Diagnoses and all orders for this visit:    Other fatigue  Comments: Will start levothyroxine and recheck in 6 weeks  Orders:  -     levothyroxine 25 mcg tablet; Take 1 tablet (25 mcg total) by mouth daily  -     TSH, 3rd generation with Free T4 reflex; Future  -     T3, free; Future  -     Thyroid Antibodies Panel; Future    Elevated TSH with severe fatigue  Comments: Will start levothyroxine first thing in the am with no other pills for 1 hour every day to see if this helps with your fatigue  Orders:  -     levothyroxine 25 mcg tablet; Take 1 tablet (25 mcg total) by mouth daily  -     TSH, 3rd generation with Free T4 reflex; Future  -     T3, free; Future  -     Thyroid Antibodies Panel; Future          Subjective:    Patient ID: Marcia Etienne is a 75 y o  female  Pt is here for f/u on her thyroid us and her labs  Feels very tired  Naps frequently during the day  Pt is never rested  Always wants to sleep  No pains but increased stress in her home  Having electricity problems in her trailer  Never checks her bp's at home, but has a machine  Thinks her bp is up today r/t the stress from her trailer  Takes all other meds as directed  No side effects noted  The following portions of the patient's history were reviewed and updated as appropriate: allergies, current medications, past family history, past medical history, past social history, past surgical history and problem list     Review of Systems   Constitutional: Positive for fatigue  Negative for chills, diaphoresis and fever  HENT: Negative  Eyes: Negative  Respiratory: Positive for cough (on occasion  Wants her flu shot  No fevers  )  Negative for shortness of breath and wheezing  Cardiovascular: Negative for chest pain and palpitations     Gastrointestinal: Negative for abdominal pain, constipation, diarrhea, nausea and vomiting  Genitourinary: Negative  Neurological: Positive for light-headedness (feels r/t not eating right  ) and headaches (when she is very stressed  )  Negative for dizziness  Psychiatric/Behavioral: Positive for sleep disturbance (sleeps too much  )  Negative for dysphoric mood  The patient is nervous/anxious            /82   Pulse 76   Ht 5' 3" (1 6 m)   Wt 119 kg (261 lb 6 4 oz)   SpO2 98%   BMI 46 30 kg/m²   Social History     Social History    Marital status: Single     Spouse name: N/A    Number of children: N/A    Years of education: N/A     Occupational History    Retired      Social History Main Topics    Smoking status: Former Smoker    Smokeless tobacco: Never Used    Alcohol use Yes      Comment: socially - Per allscripts - denies alcohol consumption    Drug use: No    Sexual activity: Not on file     Other Topics Concern    Not on file     Social History Narrative         Always uses seat belt     Past Medical History:   Diagnosis Date    Acute ill-defined cerebrovascular disease     Benign essential tremor     Chronic kidney disease     [facility]) ([facility] [address]     Depressive disorder     Hypertension     Impaired fasting glucose     Intention tremor     last assessed: 8/18/2016    Osteochondropathy     Panic disorder without agoraphobia with mild panic attacks     Psychiatric disorder     anxiety     Family History   Problem Relation Age of Onset    Alcohol abuse Mother     Asthma Mother     Cancer Mother     Mental illness Mother     Osteoarthritis Mother     Gout Father     Cancer Father     Other Sister         carotid arterial disease    COPD Sister     Hyperlipidemia Sister     Hypertension Sister     Heart attack Sister      Past Surgical History:   Procedure Laterality Date    AUGMENTATION BREAST      BREAST LUMPECTOMY W/ NEEDLE LOCALIZATION Left     description: benign last assessed; 8/21/2016    CHOLECYSTECTOMY      HYSTERECTOMY         Current Outpatient Prescriptions:     amLODIPine (NORVASC) 10 mg tablet, Take 1 tablet (10 mg total) by mouth daily, Disp: 90 tablet, Rfl: 1    aspirin (ECOTRIN LOW STRENGTH) 81 mg EC tablet, Take 81 mg by mouth daily, Disp: , Rfl:     DEXILANT 60 MG capsule, TAKE 1 CAPSULE BY MOUTH EVERY DAY BEFORE BREAKFAST, Disp: 90 capsule, Rfl: 0    diazepam (VALIUM) 5 mg tablet, Take 1 tablet by mouth 2 (two) times a day (Patient not taking: Reported on 9/25/2018 ), Disp: 15 tablet, Rfl: 0    FLUoxetine (PROzac) 40 MG capsule, TAKE 1 CAPSULE BY MOUTH DAILY (Patient not taking: Reported on 9/25/2018), Disp: 90 capsule, Rfl: 0    levothyroxine 25 mcg tablet, Take 1 tablet (25 mcg total) by mouth daily, Disp: 30 tablet, Rfl: 1    lisinopril (ZESTRIL) 20 mg tablet, Take 1 tablet (20 mg total) by mouth daily, Disp: 90 tablet, Rfl: 1    MYRBETRIQ 50 MG TB24, Take 1 tablet by mouth daily, Disp: , Rfl: 0    Allergies   Allergen Reactions    Amoxicillin     Codeine     Morphine           Lab Review   Appointment on 10/09/2018   Component Date Value    Sodium 10/09/2018 142     Potassium 10/09/2018 3 8     Chloride 10/09/2018 106     CO2 10/09/2018 28     ANION GAP 10/09/2018 8     BUN 10/09/2018 14     Creatinine 10/09/2018 1 28     Glucose, Fasting 10/09/2018 95     Calcium 10/09/2018 8 7     AST 10/09/2018 10     ALT 10/09/2018 18     Alkaline Phosphatase 10/09/2018 92     Total Protein 10/09/2018 7 0     Albumin 10/09/2018 3 2*    Total Bilirubin 10/09/2018 0 50     eGFR 10/09/2018 41     TSH 3RD GENERATON 10/09/2018 4 717*    Free T4 10/09/2018 1 02    Office Visit on 09/25/2018   Component Date Value    Creatinine, Ur 10/09/2018 137 0     Microalbum  ,U,Random 10/09/2018 10 1     Microalb Creat Ratio 10/09/2018 7     Clarity, UA 10/09/2018 Clear     Color, UA 10/09/2018 Yellow     Specific Gravity, UA 10/09/2018 1 020     pH, UA 10/09/2018 5 5     Glucose, UA 10/09/2018 Negative     Ketones, UA 10/09/2018 Negative     Blood, UA 10/09/2018 Trace-Intact*    Protein, UA 10/09/2018 Negative     Nitrite, UA 10/09/2018 Negative     Bilirubin, UA 10/09/2018 Negative     Urobilinogen, UA 10/09/2018 0 2     Leukocytes, UA 10/09/2018 Small*    WBC, UA 10/09/2018 0-1*    RBC, UA 10/09/2018 1-2*    Bacteria, UA 10/09/2018 Occasional     Epithelial Cells 10/09/2018 Moderate*    MUCOUS THREADS 10/09/2018 Occasional*        Imaging: Us Thyroid    Result Date: 10/10/2018  Narrative: THYROID ULTRASOUND INDICATION:    Palpable abnormality on the right    COMPARISON:  None TECHNIQUE:   Ultrasound of the thyroid was performed with a high frequency linear transducer in transverse and sagittal planes including volumetric imaging sweeps as well as traditional still imaging technique  FINDINGS:  Normal homogeneous smooth echotexture  Right lobe:  1 9 x 1 5 x 4 0 cm  Left lobe:  2 0 x 1 6 x 3 6 cm  Isthmus:  0 2 cm  There is a focus of coarse calcification in the right mid to lower pole  This is small measuring approximately 6 mm and not associated with a solid nodule  There is an ill-defined focus in the left mid to lower pole measuring 7 mm which is likely an area of heterogeneity  Impression: No nodules which meet ultrasound criteria for fine-needle aspiration or continued follow-up  Tiny coarse calcification on the right is unlikely palpable and an ill-defined subcentimeter focus in the left lower pole is likely an area of heterogeneity  Reference: ACR Thyroid Imaging, Reporting and Data System (TI-RADS): White Paper of the Weekdone  J AM Martin Radiol [id number]  (additional recommendations based on American Thyroid Association 2015 guidelines ) Workstation performed: [id number]       Objective:     Physical Exam   Constitutional: She is oriented to person, place, and time  She appears well-developed and well-nourished   No distress  Pt is morbidly obese  HENT:   Head: Normocephalic and atraumatic  Right Ear: External ear normal    Left Ear: External ear normal    Mouth/Throat: Oropharynx is clear and moist    Eyes: Pupils are equal, round, and reactive to light  Conjunctivae and EOM are normal  Right eye exhibits no discharge  Left eye exhibits no discharge  Neck: Normal range of motion  Neck supple  No thyromegaly present  Cardiovascular: Normal rate, regular rhythm and normal heart sounds  Exam reveals no friction rub  No murmur heard  Pulmonary/Chest: Effort normal and breath sounds normal  No respiratory distress  She has no wheezes  She has no rales  Musculoskeletal: Normal range of motion  She exhibits no edema, tenderness or deformity  Neurological: She is alert and oriented to person, place, and time  No cranial nerve deficit  Skin: Skin is warm and dry  No rash noted  She is not diaphoretic  Psychiatric: She has a normal mood and affect  Her behavior is normal  Judgment and thought content normal          Patient Instructions   Discussed all with patient  Even though the free T4 is normal and her TSH is only minimally elevated will start levothyroxine 1st thing in the morning every day with no other medications  Have the blood work rechecked in 6 weeks I will call with results and keep your follow-up appointment for January  Give it time see if this helps with your fatigue        MONIQUE Martinez

## 2018-10-29 DIAGNOSIS — R79.89 ELEVATED TSH: ICD-10-CM

## 2018-10-29 DIAGNOSIS — R53.83 OTHER FATIGUE: ICD-10-CM

## 2018-10-29 RX ORDER — LEVOTHYROXINE SODIUM 0.03 MG/1
TABLET ORAL
Qty: 90 TABLET | Refills: 0 | Status: SHIPPED | OUTPATIENT
Start: 2018-10-29 | End: 2018-12-04 | Stop reason: SDUPTHER

## 2018-11-30 ENCOUNTER — APPOINTMENT (OUTPATIENT)
Dept: LAB | Facility: HOSPITAL | Age: 73
End: 2018-11-30
Payer: MEDICARE

## 2018-11-30 DIAGNOSIS — R79.89 ELEVATED TSH: ICD-10-CM

## 2018-11-30 DIAGNOSIS — R53.83 OTHER FATIGUE: ICD-10-CM

## 2018-11-30 LAB
T3FREE SERPL-MCNC: 2.22 PG/ML (ref 2.3–4.2)
TSH SERPL DL<=0.05 MIU/L-ACNC: 2.65 UIU/ML (ref 0.36–3.74)

## 2018-11-30 PROCEDURE — 84481 FREE ASSAY (FT-3): CPT

## 2018-11-30 PROCEDURE — 84443 ASSAY THYROID STIM HORMONE: CPT

## 2018-11-30 PROCEDURE — 86800 THYROGLOBULIN ANTIBODY: CPT

## 2018-11-30 PROCEDURE — 36415 COLL VENOUS BLD VENIPUNCTURE: CPT

## 2018-11-30 PROCEDURE — 86376 MICROSOMAL ANTIBODY EACH: CPT

## 2018-12-01 LAB
THYROGLOB AB SERPL-ACNC: <1 IU/ML (ref 0–0.9)
THYROPEROXIDASE AB SERPL-ACNC: 12 IU/ML (ref 0–34)

## 2018-12-04 DIAGNOSIS — R79.89 ELEVATED TSH: ICD-10-CM

## 2018-12-04 DIAGNOSIS — R53.83 OTHER FATIGUE: ICD-10-CM

## 2018-12-04 RX ORDER — LEVOTHYROXINE SODIUM 0.05 MG/1
TABLET ORAL
Qty: 30 TABLET | Refills: 1 | Status: SHIPPED | OUTPATIENT
Start: 2018-12-04 | End: 2019-05-07 | Stop reason: SDUPTHER

## 2018-12-04 RX ORDER — LEVOTHYROXINE SODIUM 0.05 MG/1
50 TABLET ORAL DAILY
Qty: 30 TABLET | Refills: 1 | Status: SHIPPED | OUTPATIENT
Start: 2018-12-04 | End: 2018-12-04 | Stop reason: SDUPTHER

## 2018-12-11 DIAGNOSIS — F32.9 REACTIVE DEPRESSION: ICD-10-CM

## 2018-12-11 DIAGNOSIS — I10 ESSENTIAL HYPERTENSION: ICD-10-CM

## 2018-12-11 DIAGNOSIS — K21.00 REFLUX ESOPHAGITIS: ICD-10-CM

## 2018-12-12 RX ORDER — AMLODIPINE BESYLATE 10 MG/1
TABLET ORAL
Qty: 90 TABLET | Refills: 0 | Status: SHIPPED | OUTPATIENT
Start: 2018-12-12 | End: 2019-03-15 | Stop reason: SDUPTHER

## 2018-12-12 RX ORDER — FLUOXETINE HYDROCHLORIDE 40 MG/1
CAPSULE ORAL
Qty: 90 CAPSULE | Refills: 0 | Status: SHIPPED | OUTPATIENT
Start: 2018-12-12 | End: 2019-03-15 | Stop reason: SDUPTHER

## 2018-12-26 DIAGNOSIS — I10 ESSENTIAL HYPERTENSION: ICD-10-CM

## 2018-12-26 RX ORDER — LISINOPRIL 20 MG/1
TABLET ORAL
Qty: 90 TABLET | Refills: 0 | Status: SHIPPED | OUTPATIENT
Start: 2018-12-26 | End: 2019-03-21 | Stop reason: SDUPTHER

## 2019-03-15 DIAGNOSIS — F32.9 REACTIVE DEPRESSION: ICD-10-CM

## 2019-03-15 DIAGNOSIS — I10 ESSENTIAL HYPERTENSION: ICD-10-CM

## 2019-03-15 RX ORDER — FLUOXETINE HYDROCHLORIDE 40 MG/1
40 CAPSULE ORAL DAILY
Qty: 90 CAPSULE | Refills: 1 | Status: SHIPPED | OUTPATIENT
Start: 2019-03-15 | End: 2019-09-13 | Stop reason: SDUPTHER

## 2019-03-15 RX ORDER — AMLODIPINE BESYLATE 10 MG/1
10 TABLET ORAL DAILY
Qty: 90 TABLET | Refills: 1 | Status: SHIPPED | OUTPATIENT
Start: 2019-03-15 | End: 2019-09-13 | Stop reason: SDUPTHER

## 2019-03-18 DIAGNOSIS — K21.00 REFLUX ESOPHAGITIS: ICD-10-CM

## 2019-03-18 RX ORDER — DEXLANSOPRAZOLE 60 MG/1
1 CAPSULE, DELAYED RELEASE ORAL
Qty: 90 CAPSULE | Refills: 0 | Status: SHIPPED | OUTPATIENT
Start: 2019-03-18 | End: 2019-06-20 | Stop reason: SDUPTHER

## 2019-03-21 ENCOUNTER — OFFICE VISIT (OUTPATIENT)
Dept: FAMILY MEDICINE CLINIC | Facility: CLINIC | Age: 74
End: 2019-03-21
Payer: MEDICARE

## 2019-03-21 VITALS
HEIGHT: 63 IN | SYSTOLIC BLOOD PRESSURE: 124 MMHG | RESPIRATION RATE: 16 BRPM | WEIGHT: 264.02 LBS | DIASTOLIC BLOOD PRESSURE: 80 MMHG | OXYGEN SATURATION: 97 % | TEMPERATURE: 98.2 F | BODY MASS INDEX: 46.78 KG/M2 | HEART RATE: 67 BPM

## 2019-03-21 DIAGNOSIS — I10 ESSENTIAL HYPERTENSION: ICD-10-CM

## 2019-03-21 DIAGNOSIS — E03.9 HYPOTHYROIDISM, UNSPECIFIED TYPE: ICD-10-CM

## 2019-03-21 DIAGNOSIS — Z13.220 SCREENING, LIPID: ICD-10-CM

## 2019-03-21 DIAGNOSIS — R53.83 OTHER FATIGUE: ICD-10-CM

## 2019-03-21 DIAGNOSIS — R06.02 SHORTNESS OF BREATH: ICD-10-CM

## 2019-03-21 DIAGNOSIS — M25.562 ACUTE PAIN OF LEFT KNEE: Primary | ICD-10-CM

## 2019-03-21 DIAGNOSIS — Z87.891 FORMER SMOKER: ICD-10-CM

## 2019-03-21 DIAGNOSIS — E66.01 MORBID OBESITY (HCC): ICD-10-CM

## 2019-03-21 PROCEDURE — 99214 OFFICE O/P EST MOD 30 MIN: CPT | Performed by: FAMILY MEDICINE

## 2019-03-21 RX ORDER — LISINOPRIL 20 MG/1
20 TABLET ORAL DAILY
Qty: 90 TABLET | Refills: 1 | Status: SHIPPED | OUTPATIENT
Start: 2019-03-21 | End: 2019-04-01 | Stop reason: SDUPTHER

## 2019-03-21 NOTE — PATIENT INSTRUCTIONS
Discussed all with patient  Will get pulmonary function test as you have a very significant smoking history and order a lung screening  For the knee pain I would like you to have an x-ray of the knee and take 2 Tylenol up to 3 times daily for the pain  Also use eye and sports cream on the knee  Try to work on the weight as that is part of your problem  Read over the information below  Consider hypnosis as this helped you stop smoking in the past and you had a very significant history  Will get labs and call with results but I would like to see you back here in about 5 weeks  Also needs awv next visit  Obesity   AMBULATORY CARE:   Obesity  is when your body mass index (BMI) is greater than 30  Your healthcare provider will use your height and weight to measure your BMI  The risks of obesity include  many health problems, such as injuries or physical disability  You may need tests to check for the following:  · Diabetes     · High blood pressure or high cholesterol     · Heart disease     · Gallbladder or liver disease     · Cancer of the colon, breast, prostate, liver, or kidney     · Sleep apnea     · Arthritis or gout  Seek care immediately if:   · You have a severe headache, confusion, or difficulty speaking  · You have weakness on one side of your body  · You have chest pain, sweating, or shortness of breath  Contact your healthcare provider if:   · You have symptoms of gallbladder or liver disease, such as pain in your upper abdomen  · You have knee or hip pain and discomfort while walking  · You have symptoms of diabetes, such as intense hunger and thirst, and frequent urination  · You have symptoms of sleep apnea, such as snoring or daytime sleepiness  · You have questions or concerns about your condition or care  Treatment for obesity  focuses on helping you lose weight to improve your health  Even a small decrease in BMI can reduce the risk for many health problems   Your healthcare provider will help you set a weight-loss goal   · Lifestyle changes  are the first step in treating obesity  These include making healthy food choices and getting regular physical activity  Your healthcare provider may suggest a weight-loss program that involves coaching, education, and therapy  · Medicine  may help you lose weight when it is used with a healthy diet and physical activity  · Surgery  can help you lose weight if you are very obese and have other health problems  There are several types of weight-loss surgery  Ask your healthcare provider for more information  Be successful losing weight:   · Set small, realistic goals  An example of a small goal is to walk for 20 minutes 5 days a week  Anther goal is to lose 5% of your body weight  · Tell friends, family members, and coworkers about your goals  and ask for their support  Ask a friend to lose weight with you, or join a weight-loss support group  · Identify foods or triggers that may cause you to overeat , and find ways to avoid them  Remove tempting high-calorie foods from your home and workplace  Place a bowl of fresh fruit on your kitchen counter  If stress causes you to eat, then find other ways to cope with stress  · Keep a diary to track what you eat and drink  Also write down how many minutes of physical activity you do each day  Weigh yourself once a week and record it in your diary  Eating changes: You will need to eat 500 to 1,000 fewer calories each day than you currently eat to lose 1 to 2 pounds a week  The following changes will help you cut calories:  · Eat smaller portions  Use small plates, no larger than 9 inches in diameter  Fill your plate half full of fruits and vegetables  Measure your food using measuring cups until you know what a serving size looks like  · Eat 3 meals and 1 or 2 snacks each day  Plan your meals in advance  [employer] [employer] and eat at home most of the time  Eat slowly       · Eat fruits and vegetables at every meal   They are low in calories and high in fiber, which makes you feel full  Do not add butter, margarine, or cream sauce to vegetables  Use herbs to season steamed vegetables  · Eat less fat and fewer fried foods  Eat more baked or grilled chicken and fish  These protein sources are lower in calories and fat than red meat  Limit fast food  Dress your salads with olive oil and vinegar instead of bottled dressing  · Limit the amount of sugar you eat  Do not drink sugary beverages  Limit alcohol  Activity changes:  Physical activity is good for your body in many ways  It helps you burn calories and build strong muscles  It decreases stress and depression, and improves your mood  It can also help you sleep better  Talk to your healthcare provider before you begin an exercise program   · Exercise for at least 30 minutes 5 days a week  Start slowly  Set aside time each day for physical activity that you enjoy and that is convenient for you  It is best to do both weight training and an activity that increases your heart rate, such as walking, bicycling, or swimming  · Find ways to be more active  Do yard work and housecleaning  Walk up the stairs instead of using elevators  Spend your leisure time going to events that require walking, such as outdoor festivals or fairs  This extra physical activity can help you lose weight and keep it off  Follow up with your healthcare provider as directed: You may need to meet with a dietitian  Write down your questions so you remember to ask them during your visits  © 2017 [employer] Information is for End User's use only and may not be sold, redistributed or otherwise used for commercial purposes  All illustrations and images included in CareNotes® are the copyrighted property of [employer]  or [employer]  The above information is an  only   It is not intended as medical advice for individual conditions or treatments  Talk to your doctor, nurse or pharmacist before following any medical regimen to see if it is safe and effective for you  Weight Management   AMBULATORY CARE:   Why it is important to manage your weight:  Being overweight increases your risk of health conditions such as heart disease, high blood pressure, type 2 diabetes, and certain types of cancer  It can also increase your risk for osteoarthritis, sleep apnea, and other respiratory problems  Aim for a slow, steady weight loss  Even a small amount of weight loss can lower your risk of health problems  How to lose weight safely:  A safe and healthy way to lose weight is to eat fewer calories and get regular exercise  You can lose up about 1 pound a week by decreasing the number of calories you eat by 500 calories each day  You can decrease calories by eating smaller portion sizes or by cutting out high-calorie foods  Read labels to find out how many calories are in the foods you eat  You can also burn calories with exercise such as walking, swimming, or biking  You will be more likely to keep weight off if you make these changes part of your lifestyle  Healthy meal plan for weight management:  A healthy meal plan includes a variety of foods, contains fewer calories, and helps you stay healthy  A healthy meal plan includes the following:  · Eat whole-grain foods more often  A healthy meal plan should contain fiber  Fiber is the part of grains, fruits, and vegetables that is not broken down by your body  Whole-grain foods are healthy and provide extra fiber in your diet  Some examples of whole-grain foods are whole-wheat breads and pastas, oatmeal, brown rice, and bulgur  · Eat a variety of vegetables every day  Include dark, leafy greens such as spinach, kale, neeraj greens, and mustard greens  Eat yellow and orange vegetables such as carrots, sweet potatoes, and winter squash  · Eat a variety of fruits every day  Choose fresh or canned fruit (canned in its own juice or light syrup) instead of juice  Fruit juice has very little or no fiber  · Eat low-fat dairy foods  Drink fat-free (skim) milk or 1% milk  Eat fat-free yogurt and low-fat cottage cheese  Try low-fat cheeses such as mozzarella and other reduced-fat cheeses  · Choose meat and other protein foods that are low in fat  Choose beans or other legumes such as split peas or lentils  Choose fish, skinless poultry (chicken or turkey), or lean cuts of red meat (beef or pork)  Before you cook meat or poultry, cut off any visible fat  · Use less fat and oil  Try baking foods instead of frying them  Add less fat, such as margarine, sour cream, regular salad dressing and mayonnaise to foods  Eat fewer high-fat foods  Some examples of high-fat foods include french fries, doughnuts, ice cream, and cakes  · Eat fewer sweets  Limit foods and drinks that are high in sugar  This includes candy, cookies, regular soda, and sweetened drinks  Ways to decrease calories:   · Eat smaller portions  ¨ Use a small plate with smaller servings  ¨ Do not eat second helpings  ¨ When you eat at a restaurant, ask for a box and place half of your meal in the box before you eat  ¨ Share an entrée with someone else  · Replace high-calorie snacks with healthy, low-calorie snacks  ¨ Choose fresh fruit, vegetables, fat-free rice cakes, or air-popped popcorn instead of potato chips, nuts, or chocolate  ¨ Choose water or calorie-free drinks instead of soda or sweetened drinks  · Eat regular meals  Skipping meals can lead to overeating later in the day  Eat a healthy snack in place of a meal if you do not have time to eat a regular meal      · Do not shop for groceries when you are hungry  You may be more likely to make unhealthy food choices  Take a grocery list of healthy foods and shop after you have eaten    Exercise:  Exercise at least 30 minutes per day on most days of the week  Some examples of exercise include walking, biking, dancing, and swimming  You can also fit in more physical activity by taking the stairs instead of the elevator or parking farther away from stores  Ask your healthcare provider about the best exercise plan for you  Other things to consider as you try to lose weight:   · Be aware of situations that may give you the urge to overeat, such as eating while watching television  Find ways to avoid these situations  For example, read a book, go for a walk, or do crafts  · Meet with a weight loss support group or friends who are also trying to lose weight  This may help you stay motivated to continue working on your weight loss goals  © 2017 [employer]  Information is for End User's use only and may not be sold, redistributed or otherwise used for commercial purposes  All illustrations and images included in CareNotes® are the copyrighted property of [employer]  or [employer]  The above information is an  only  It is not intended as medical advice for individual conditions or treatments  Talk to your doctor, nurse or pharmacist before following any medical regimen to see if it is safe and effective for you  Low Fat Diet   AMBULATORY CARE:   A low-fat diet  is an eating plan that is low in total fat, unhealthy fat, and cholesterol  You may need to follow a low-fat diet if you have trouble digesting or absorbing fat  You may also need to follow this diet if you have high cholesterol  You can also lower your cholesterol by increasing the amount of fiber in your diet  Soluble fiber is a type of fiber that helps to decrease cholesterol levels  Different types of fat in food:   · Limit unhealthy fats  A diet that is high in cholesterol, saturated fat, and trans fat may cause unhealthy cholesterol levels  Unhealthy cholesterol levels increase your risk of heart disease      ¨ Cholesterol:  Limit intake of cholesterol to less than 200 mg per day  Cholesterol is found in meat, eggs, and dairy  ¨ Saturated fat:  Limit saturated fat to less than 7% of your total daily calories  Ask your dietitian how many calories you need each day  Saturated fat is found in butter, cheese, ice cream, whole milk, and palm oil  Saturated fat is also found in meat, such as beef, pork, chicken skin, and processed meats  Processed meats include sausage, hot dogs, and bologna  ¨ Trans fat:  Avoid trans fat as much as possible  Trans fat is used in fried and baked foods  Foods that say trans fat free on the label may still have up to 0 5 grams of trans fat per serving  · Include healthy fats  Replace foods that are high in saturated and trans fat with foods high in healthy fats  This may help to decrease high cholesterol levels  ¨ Monounsaturated fats: These are found in avocados, nuts, and vegetable oils, such as olive, canola, and sunflower oil  ¨ Polyunsaturated fats: These can be found in vegetable oils, such as soybean or corn oil  Omega-3 fats can help to decrease the risk of heart disease  Omega-3 fats are found in fish, such as salmon, herring, trout, and tuna  Omega-3 fats can also be found in plant foods, such as walnuts, flaxseed, soybeans, and canola oil    Foods to limit or avoid:   · Grains:      ¨ Snacks that are made with partially hydrogenated oils, such as chips, regular crackers, and butter-flavored popcorn    ¨ High-fat baked goods, such as biscuits, croissants, doughnuts, pies, cookies, and pastries    · Dairy:      ¨ Whole milk, 2% milk, and yogurt and ice cream made with whole milk    ¨ Half and half creamer, heavy cream, and whipping cream    ¨ Cheese, cream cheese, and sour cream    · Meats and proteins:      ¨ High-fat cuts of meat (T-bone steak, regular hamburger, and ribs)    ¨ [employer], poultry (turkey and chicken), and fish    ¨ Poultry (chicken and turkey) with skin    ¨ Cold cuts (salami or bologna), hot dogs, umanzor, and sausage    ¨ Whole eggs and egg yolks    · Vegetables and fruits with added fat:      ¨ Fried vegetables or vegetables in butter or high-fat sauces, such as cream or cheese sauces    ¨ Fried fruit or fruit served with butter or cream    · Fats:      ¨ Butter, stick margarine, and shortening    ¨ Coconut, palm oil, and palm kernel oil  Foods to include:   · Grains:      ¨ Whole-grain breads, cereals, pasta, and brown rice    ¨ Low-fat crackers and pretzels    · Vegetables and fruits:      ¨ Fresh, frozen, or canned vegetables (no salt or low-sodium)    ¨ Fresh, frozen, dried, or canned fruit (canned in light syrup or fruit juice)    ¨ Avocado    · Low-fat dairy products:      ¨ Nonfat (skim) or 1% milk    ¨ Nonfat or low-fat cheese, yogurt, and cottage cheese    · Meats and proteins:      ¨ Chicken or turkey with no skin    ¨ Baked or broiled fish    ¨ Lean beef and pork (loin, round, extra lean hamburger)    ¨ Beans and peas, unsalted nuts, soy products    ¨ Egg whites and substitutes    ¨ Seeds and nuts    · Fats:      ¨ Unsaturated oil, such as canola, olive, peanut, soybean, or sunflower oil    ¨ Soft or liquid margarine and vegetable oil spread    ¨ Low-fat salad dressing  Other ways to decrease fat:   · Read food labels before you buy foods  Choose foods that have less than 30% of calories from fat  Choose low-fat or fat-free dairy products  Remember that fat free does not mean calorie free  These foods still contain calories, and too many calories can lead to weight gain  · Trim fat from meat and avoid fried food  Trim all visible fat from meat before you cook it  Remove the skin from poultry  Do not fermin meat, fish, or poultry  Bake, roast, boil, or broil these foods instead  Avoid fried foods  Eat a baked potato instead of [place] fries  Steam vegetables instead of sautéing them in butter  · Add less fat to foods    Use imitation umanzor bits on salads and baked potatoes instead of regular umanzor bits  Use fat-free or low-fat salad dressings instead of regular dressings  Use low-fat or nonfat butter-flavored topping instead of regular butter or margarine on popcorn and other foods  Ways to decrease fat in recipes:  Replace high-fat ingredients with low-fat or nonfat ones  This may cause baked goods to be drier than usual  You may need to use nonfat cooking spray on pans to prevent food from sticking  You also may need to change the amount of other ingredients, such as water, in the recipe  Try the following:  · Use low-fat or light margarine instead of regular margarine or shortening  · Use lean ground turkey breast or chicken, or lean ground beef (less than 5% fat) instead of hamburger  · Add 1 teaspoon of canola oil to 8 ounces of skim milk instead of using cream or half and half  · Use grated zucchini, carrots, or apples in breads instead of coconut  · Use blenderized, low-fat cottage cheese, plain tofu, or low-fat ricotta cheese instead of cream cheese  · Use 1 egg white and 1 teaspoon of canola oil, or use ¼ cup (2 ounces) of fat-free egg substitute instead of a whole egg  · Replace half of the oil that is called for in a recipe with applesauce when you bake  Use 3 tablespoons of cocoa powder and 1 tablespoon of canola oil instead of a square of baking chocolate  How to increase fiber:  Eat enough high-fiber foods to get 20 to 30 grams of fiber every day  Slowly increase your fiber intake to avoid stomach cramps, gas, and other problems  · Eat 3 ounces of whole-grain foods each day  An ounce is about 1 slice of bread  Eat whole-grain breads, such as whole-wheat bread  Whole wheat, whole-wheat flour, or other whole grains should be listed as the first ingredient on the food label  Replace white flour with whole-grain flour or use half of each in recipes   Whole-grain flour is heavier than white flour, so you may have to add more yeast or baking powder  · Eat a high-fiber cereal for breakfast   Oatmeal is a good source of soluble fiber  Look for cereals that have bran or fiber in the name  Choose whole-grain products, such as brown rice, barley, and whole-wheat pasta  · Eat more beans, peas, and lentils  For example, add beans to soups or salads  Eat at least 5 cups of fruits and vegetables each day  Eat fruits and vegetables with the peel because the peel is high in fiber  © 2017 [employer]  Information is for End User's use only and may not be sold, redistributed or otherwise used for commercial purposes  All illustrations and images included in CareNotes® are the copyrighted property of [employer]  or [employer]  The above information is an  only  It is not intended as medical advice for individual conditions or treatments  Talk to your doctor, nurse or pharmacist before following any medical regimen to see if it is safe and effective for you  Heart Healthy Diet   AMBULATORY CARE:   A heart healthy diet  is an eating plan low in total fat, unhealthy fats, and sodium (salt)  A heart healthy diet helps decrease your risk for heart disease and stroke  Limit the amount of fat you eat to 25% to 35% of your total daily calories  Limit sodium to less than 2,300 mg each day  Healthy fats:  Healthy fats can help improve cholesterol levels  The risk for heart disease is decreased when cholesterol levels are normal  Choose healthy fats, such as the following:  · Unsaturated fat  is found in foods such as soybean, canola, olive, corn, and safflower oils  It is also found in soft tub margarine that is made with liquid vegetable oil  · Omega-3 fat  is found in certain fish, such as salmon, tuna, and trout, and in walnuts and flaxseed  Unhealthy fats:  Unhealthy fats can cause unhealthy cholesterol levels in your blood and increase your risk of heart disease   Limit unhealthy fats, such as the following:  · Cholesterol  is found in animal foods, such as eggs and lobster, and in dairy products made from whole milk  Limit cholesterol to less than 300 milligrams (mg) each day  You may need to limit cholesterol to 200 mg each day if you have heart disease  · Saturated fat  is found in meats, such as umanzor and hamburger  It is also found in chicken or turkey skin, whole milk, and butter  Limit saturated fat to less than 7% of your total daily calories  Limit saturated fat to less than 6% if you have heart disease or are at increased risk for it  · Trans fat  is found in packaged foods, such as potato chips and cookies  It is also in hard margarine, some fried foods, and shortening  Avoid trans fats as much as possible    Heart healthy foods and drinks to include:  Ask your dietitian or healthcare provider how many servings to have from each of the following food groups:  · Grains:      ¨ Whole-wheat breads, cereals, and pastas, and brown rice    ¨ Low-fat, low-sodium crackers and chips    · Vegetables:      ¨ Broccoli, green beans, green peas, and spinach    ¨ Collards, kale, and lima beans    ¨ Carrots, sweet potatoes, tomatoes, and peppers    ¨ Canned vegetables with no salt added    · Fruits:      ¨ Bananas, peaches, pears, and pineapple    ¨ Grapes, raisins, and dates    ¨ Oranges, tangerines, grapefruit, orange juice, and grapefruit juice    ¨ Apricots, mangoes, melons, and papaya    ¨ Raspberries and strawberries    ¨ Canned fruit with no added sugar    · Low-fat dairy products:      ¨ Nonfat (skim) milk, 1% milk, and low-fat almond, cashew, or soy milks fortified with calcium    ¨ Low-fat cheese, regular or frozen yogurt, and cottage cheese    · Meats and proteins , such as lean cuts of beef and pork (loin, leg, round), skinless chicken and turkey, legumes, soy products, egg whites, and nuts  Foods and drinks to limit or avoid:  Ask your dietitian or healthcare provider about these and other foods that are high in unhealthy fat, sodium, and sugar:  · Snack or packaged foods , such as frozen dinners, cookies, macaroni and cheese, and cereals with more than 300 mg of sodium per serving    · Canned or dry mixes  for cakes, soups, sauces, or gravies    · Vegetables with added sodium , such as instant potatoes, vegetables with added sauces, or regular canned vegetables    · Other foods high in sodium , such as ketchup, barbecue sauce, salad dressing, pickles, olives, soy sauce, and miso    · High-fat dairy foods  such as whole or 2% milk, cream cheese, or sour cream, and cheeses     · High-fat protein foods  such as high-fat cuts of beef (T-bone steaks, ribs), chicken or turkey with skin, and organ meats, such as liver    · Cured or smoked meats , such as hot dogs, umanzor, and sausage    · Unhealthy fats and oils , such as butter, stick margarine, shortening, and cooking oils such as coconut or palm oil    · Food and drinks high in sugar , such as soft drinks (soda), sports drinks, sweetened tea, candy, cake, cookies, pies, and doughnuts  Other diet guidelines to follow:   · Eat more foods containing omega-3 fats  Eat fish high in omega-3 fats at least 2 times a week  · Limit alcohol  Too much alcohol can damage your heart and raise your blood pressure  Women should limit alcohol to 1 drink a day  Men should limit alcohol to 2 drinks a day  A drink of alcohol is 12 ounces of beer, 5 ounces of wine, or 1½ ounces of liquor  · Choose low-sodium foods  High-sodium foods can lead to high blood pressure  Add little or no salt to food you prepare  Use herbs and spices in place of salt  · Eat more fiber  to help lower cholesterol levels  Eat at least 5 servings of fruits and vegetables each day  Eat 3 ounces of whole-grain foods each day  Legumes (beans) are also a good source of fiber  Lifestyle guidelines:   · Do not smoke    Nicotine and other chemicals in cigarettes and cigars can cause lung and heart damage  Ask your healthcare provider for information if you currently smoke and need help to quit  E-cigarettes or smokeless tobacco still contain nicotine  Talk to your healthcare provider before you use these products  · Exercise regularly  to help you maintain a healthy weight and improve your blood pressure and cholesterol levels  Ask your healthcare provider about the best exercise plan for you  Do not start an exercise program without asking your healthcare provider  Follow up with your healthcare provider as directed:  Write down your questions so you remember to ask them during your visits  © 2017 [employer] Information is for End User's use only and may not be sold, redistributed or otherwise used for commercial purposes  All illustrations and images included in CareNotes® are the copyrighted property of [employer]  or [employer]  The above information is an  only  It is not intended as medical advice for individual conditions or treatments  Talk to your doctor, nurse or pharmacist before following any medical regimen to see if it is safe and effective for you  Calorie Counting Diet   WHAT YOU NEED TO KNOW:   What is a calorie counting diet? It is a meal plan based on counting calories each day to reach a healthy body weight  You will need to eat fewer calories if you are trying to lose weight  Weight loss may decrease your risk for certain health problems or improve your health if you have health problems  Some of these health problems include heart disease, high blood pressure, and diabetes  What foods should I avoid? Your dietitian will tell you if you need to avoid certain foods based on your body weight and health condition  You may need to avoid high-fat foods if you are at risk for or have heart disease  You may need to eat fewer foods from the breads and starches food group if you have diabetes  How many calories are in foods? The following is a list of foods and drinks with the approximate number of calories in each  Check the food label to find the exact number of calories  A dietitian can tell you how many calories you should have from each food group each day    · Carbohydrate:      ¨ ½ of a 3-inch bagel, 1 slice of bread, or ½ of a hamburger bun or hot dog bun (80)    ¨ 1 (8-inch) flour tortilla or ½ cup of cooked rice (100)    ¨ 1 (6-inch) corn tortilla (80)    ¨ 1 (6-inch) pancake or 1 cup of bran flakes cereal (110)    ¨ ½ cup of cooked cereal (80)    ¨ ½ cup of cooked pasta (85)    ¨ 1 ounce of pretzels (100)    ¨ 3 cups of air-popped popcorn without butter or oil (80)    · Dairy:      ¨ 1 cup of skim or 1% milk (90)    ¨ 1 cup of 2% milk (120)    ¨ 1 cup of whole milk (160)    ¨ 1 cup of 2% chocolate milk (220)    ¨ 1 ounce of low-fat cheese with 3 grams of fat per ounce (70)    ¨ 1 ounce of cheddar cheese (114)    ¨ ½ cup of 1% fat cottage cheese (80)    ¨ 1 cup of plain or sugar-free, fat-free yogurt (90)    · Protein foods:      ¨ 3 ounces of fish (not breaded or fried) (95)    ¨ 3 ounces of breaded, fried fish (195)    ¨ ¾ cup of tuna canned in water (105)    ¨ 3 ounces of chicken breast without skin (105)    ¨ 1 fried chicken breast with skin (350)    ¨ ¼ cup of fat free egg substitute (40)    ¨ 1 large egg (75)    ¨ 3 ounces of lean beef or pork (165)    ¨ 3 ounces of fried pork chop or ham (185)    ¨ ½ cup of cooked dried beans, such as kidney, germain, lentils, or navy (115)    ¨ 3 ounces of bologna or lunch meat (225)    ¨ 2 links of breakfast sausage (140)    · Vegetables:      ¨ ½ cup of sliced mushrooms (10)    ¨ 1 cup of salad greens, such as lettuce, spinach, or garima (15)    ¨ ½ cup of steamed asparagus (20)    ¨ ½ cup of cooked summer squash, zucchini squash, or green or wax beans (25)    ¨ 1 cup of broccoli or cauliflower florets, or 1 medium tomato (25)    ¨ 1 large raw carrot or ½ cup of cooked carrots (40)    ¨ ? of a medium cucumber or 1 stalk of celery (5)    ¨ 1 small baked potato (160)    ¨ 1 cup of breaded, fried vegetables (230)    · Fruit:      ¨ 1 (6-inch) banana (55)     ¨ ½ of a 4-inch grapefruit (55)    ¨ 15 grapes (60)    ¨ 1 medium orange or apple (70)    ¨ 1 large peach (65)    ¨ 1 cup of fresh pineapple chunks (75)    ¨ 1 cup of melon cubes (50)    ¨ 1¼ cups of whole strawberries (45)    ¨ ½ cup of fruit canned in juice (55)    ¨ ½ cup of fruit canned in heavy syrup (110)    ¨ ?  cup of raisins (130)    ¨ ½ cup of unsweetened fruit juice (60)    ¨ ½ cup of grape, cranberry, or prune juice (90)    · Fat:      ¨ 10 peanuts or 2 teaspoons of peanut butter (55)    ¨ 2 tablespoons of avocado or 1 tablespoon of regular salad dressing (45)    ¨ 2 slices of umanzor (90)    ¨ 1 teaspoon of oil, such as safflower, canola, corn, or olive oil (45)    ¨ 2 teaspoons of low-fat margarine, or 1 tablespoon of low-fat mayonnaise (50)    ¨ 1 teaspoon of regular margarine (40)    ¨ 1 tablespoon of regular mayonnaise (135)    ¨ 1 tablespoon of cream cheese or 2 tablespoons of low-fat cream cheese (45)    ¨ 2 tablespoons of vegetable shortening (215)    · Dessert and sweets:      ¨ 8 animal crackers or 5 vanilla wafers (80)    ¨ 1 frozen fruit juice bar (80)    ¨ ½ cup of ice milk or low-fat frozen yogurt (90)    ¨ ½ cup of sherbet or sorbet (125)    ¨ ½ cup of sugar-free pudding or custard (60)    ¨ ½ cup of ice cream (140)    ¨ ½ cup of pudding or custard (175)    ¨ 1 (2-inch) square chocolate brownie (185)    · Combination foods:      ¨ Bean burrito made with an 8-inch tortilla, without cheese (275)    ¨ Chicken breast sandwich with lettuce and tomato (325)    ¨ 1 cup of chicken noodle soup (60)    ¨ 1 beef taco (175)    ¨ Regular hamburger with lettuce and tomato (310)    ¨ Regular cheeseburger with lettuce and tomato (410)     ¨ ¼ of a 12-inch cheese pizza (280)    ¨ Fried fish sandwich with lettuce and tomato (425)    ¨ Hot dog and bun (275)    ¨ 1½ cups of macaroni and cheese (310)    ¨ Taco salad with a fried tortilla shell (870)    · Low-calorie foods:      ¨ 1 tablespoon of ketchup or 1 tablespoon of fat free sour cream (15)    ¨ 1 teaspoon of mustard (5)    ¨ ¼ cup of salsa (20)    ¨ 1 large dill pickle (15)    ¨ 1 tablespoon of fat free salad dressing (10)    ¨ 2 teaspoons of low-sugar, light jam or jelly, or 1 tablespoon of sugar-free syrup (15)    ¨ 1 sugar-free popsicle (15)    ¨ 1 cup of club soda, seltzer water, or diet soda (0)  CARE AGREEMENT:   You have the right to help plan your care  Discuss treatment options with your caregivers to decide what care you want to receive  You always have the right to refuse treatment  The above information is an  only  It is not intended as medical advice for individual conditions or treatments  Talk to your doctor, nurse or pharmacist before following any medical regimen to see if it is safe and effective for you  © 2017 [employer]  Information is for End User's use only and may not be sold, redistributed or otherwise used for commercial purposes  All illustrations and images included in CareNotes® are the copyrighted property of [employer]  or [employer]

## 2019-03-21 NOTE — PROGRESS NOTES
Assessment/Plan:     Chronic Problems:  CKD (chronic kidney disease) stage 3, GFR 30-59 ml/min  Will get labs and call with results  Essential hypertension  BP is perfect  Continue current meds  Visit Diagnosis:  Diagnoses and all orders for this visit:    Acute pain of left knee  Comments: Will get x-ray and call with results  Okay to take 2 Tylenol up to 3 times daily for the pain  Also advised to use a sports cream like icy Hot on the knee  Orders:  -     XR knee 3 vw left non injury; Future  -     RF Screen w/ Reflex to Titer; Future  -     AMY Screen w/ Reflex to Titer/Pattern; Future  -     Sedimentation rate, automated; Future  -     Lyme Antibody Profile with reflex to WB; Future    Morbid obesity ([facility] [address] )  Comments:  Given info on weight loss  consider hypnosis as this has helped her stop smoking in the past      Shortness of breath  -     Complete pulmonary function test; Future  -     CT lung screening program; Future    Former smoker  Comments:  Pt was about a 75 pack year smoker  Will get pft's and lung screening  Orders:  -     Complete pulmonary function test; Future  -     CT lung screening program; Future    Other fatigue  Comments: Will check labs  Orders:  -     TSH, 3rd generation with Free T4 reflex; Future  -     CBC and differential; Future    Hypothyroidism, unspecified type  -     TSH, 3rd generation with Free T4 reflex; Future  -     CBC and differential; Future    Screening, lipid  -     Comprehensive metabolic panel; Future  -     Lipid panel; Future  -     Microalbumin / creatinine urine ratio  -     Urinalysis with microscopic    Essential hypertension  -     lisinopril (ZESTRIL) 20 mg tablet; Take 1 tablet (20 mg total) by mouth daily          Subjective:    Patient ID: Shara Linn is a 75 y o  female  Pt is here with c/o left knee pain for about 3 to 4 weeks and feels the same as when she had rheumatic fever  Pt googled it and feels she has all the symptoms  No h/o any injury  Not taking anything for the pain  Feels her levothyroxine is not working as she can sleep 24/7  Sister thinks she is depressed  Does not feel depressed or anxious  Excited about summer  Very tired all time  Not taking her myrbetriq as she did not think that was helping  Otherwise takes her meds as directed  The following portions of the patient's history were reviewed and updated as appropriate: allergies, current medications, past family history, past medical history, past social history, past surgical history and problem list     Review of Systems   Constitutional: Positive for diaphoresis and fatigue  Negative for chills and fever  HENT: Positive for ear pain (on the right  ) and sore throat (freaquently )  Eyes: Negative  Respiratory: Positive for shortness of breath (with exertion)  Negative for cough and wheezing  Cardiovascular: Positive for palpitations (rarely)  Negative for chest pain  Had a normal stress test with Dr Farzad Marx in 2015   Gastrointestinal: Positive for diarrhea (maybe once a week  )  Negative for blood in stool, constipation, nausea and vomiting  No heartburn unless she is off dexilant  Feels she can't manage without this drug  Genitourinary: Positive for urgency  Negative for dysuria and frequency  Musculoskeletal: Positive for arthralgias (left knee pain  Knee swells, but no redness or heat  )  Neurological: Positive for headaches  Negative for dizziness and light-headedness  Psychiatric/Behavioral: Negative for dysphoric mood  The patient is not nervous/anxious            /80   Pulse 67   Temp 98 2 °F (36 8 °C)   Resp 16   Ht 5' 3" (1 6 m)   Wt 120 kg (264 lb 0 4 oz)   SpO2 97%   BMI 46 77 kg/m²   Social History     Socioeconomic History    Marital status: Single     Spouse name: Not on file    Number of children: Not on file    Years of education: Not on file    Highest education level: Not on file   Occupational History    Occupation: Retired   Social Needs    Financial resource strain: Not on file    Food insecurity:     Worry: Not on file     Inability: Not on file   [employer] needs:     Medical: Not on file     Non-medical: Not on file   Tobacco Use    Smoking status: Former Smoker    Smokeless tobacco: Never Used   Substance and Sexual Activity    Alcohol use: Yes     Comment: socially - Per allscripts - denies alcohol consumption    Drug use: No    Sexual activity: Not on file   Lifestyle    Physical activity:     Days per week: Not on file     Minutes per session: Not on file    Stress: Not on file   Relationships    Social connections:     Talks on phone: Not on file     Gets together: Not on file     Attends [employer] service: Not on file     Active member of club or organization: Not on file     Attends meetings of clubs or organizations: Not on file     Relationship status: Not on file    Intimate partner violence:     Fear of current or ex partner: Not on file     Emotionally abused: Not on file     Physically abused: Not on file     Forced sexual activity: Not on file   Other Topics Concern    Not on file   Social History Narrative         Always uses seat belt     Past Medical History:   Diagnosis Date    Acute ill-defined cerebrovascular disease     Benign essential tremor     Chronic kidney disease     COPD (chronic obstructive pulmonary disease) ([facility] [address] )     Depressive disorder     Hypertension     Impaired fasting glucose     Intention tremor     last assessed: 8/18/2016    Osteochondropathy     Panic disorder without agoraphobia with mild panic attacks     Psychiatric disorder     anxiety     Family History   Problem Relation Age of Onset    Alcohol abuse Mother     Asthma Mother     Cancer Mother     Mental illness Mother     Osteoarthritis Mother     Gout Father     Cancer Father     Other Sister         carotid arterial disease    COPD Sister    Gildardo Pierce Hyperlipidemia Sister     Hypertension Sister     Heart attack Sister      Past Surgical History:   Procedure Laterality Date    AUGMENTATION BREAST      BREAST LUMPECTOMY W/ NEEDLE LOCALIZATION Left     description: benign last assessed; 8/21/2016    CHOLECYSTECTOMY      HYSTERECTOMY         Current Outpatient Medications:     amLODIPine (NORVASC) 10 mg tablet, Take 1 tablet (10 mg total) by mouth daily, Disp: 90 tablet, Rfl: 1    aspirin (ECOTRIN LOW STRENGTH) 81 mg EC tablet, Take 81 mg by mouth daily, Disp: , Rfl:     dexlansoprazole (DEXILANT) 60 MG capsule, Take 1 capsule (60 mg total) by mouth daily before breakfast, Disp: 90 capsule, Rfl: 0    FLUoxetine (PROzac) 40 MG capsule, Take 1 capsule (40 mg total) by mouth daily, Disp: 90 capsule, Rfl: 1    levothyroxine 50 mcg tablet, TAKE 1 TABLET BY MOUTH DAILY, Disp: 30 tablet, Rfl: 1    lisinopril (ZESTRIL) 20 mg tablet, Take 1 tablet (20 mg total) by mouth daily, Disp: 90 tablet, Rfl: 1    diazepam (VALIUM) 5 mg tablet, Take 1 tablet by mouth 2 (two) times a day (Patient not taking: Reported on 9/25/2018 ), Disp: 15 tablet, Rfl: 0    MYRBETRIQ 50 MG TB24, Take 1 tablet by mouth daily, Disp: , Rfl: 0    Allergies   Allergen Reactions    Amoxicillin     Codeine     Morphine           Lab Review   No visits with results within 2 Month(s) from this visit  Latest known visit with results is:   Appointment on 11/30/2018   Component Date Value    TSH 3RD GENERATON 11/30/2018 2 655     T3, Free 11/30/2018 2 22*    THYROID MICROSOMAL ANTIB* 11/30/2018 12     Thyroglobulin Ab 11/30/2018 <1 0         Imaging: No results found  Objective:     Physical Exam   Constitutional: She is oriented to person, place, and time  She appears well-developed and well-nourished  No distress  HENT:   Head: Normocephalic and atraumatic     Right Ear: External ear normal    Left Ear: External ear normal    Mouth/Throat: Oropharynx is clear and moist    Eyes: Pupils are equal, round, and reactive to light  Conjunctivae and EOM are normal  Right eye exhibits no discharge  Left eye exhibits no discharge  Neck: Normal range of motion  Neck supple  No thyromegaly present  Cardiovascular: Normal rate, regular rhythm and normal heart sounds  Exam reveals no friction rub  No murmur heard  Pulmonary/Chest: Effort normal and breath sounds normal  No respiratory distress  She has no wheezes  She has no rales  She exhibits no tenderness  Abdominal: Soft  Bowel sounds are normal  There is no tenderness  Musculoskeletal: Normal range of motion  She exhibits no edema, tenderness or deformity  Lymphadenopathy:     She has no cervical adenopathy  Neurological: She is alert and oriented to person, place, and time  No cranial nerve deficit  Skin: Skin is warm and dry  No rash noted  She is not diaphoretic  Psychiatric: She has a normal mood and affect  Her behavior is normal  Judgment and thought content normal          Patient Instructions   Discussed all with patient  Will get pulmonary function test as you have a very significant smoking history and order a lung screening  For the knee pain I would like you to have an x-ray of the knee and take 2 Tylenol up to 3 times daily for the pain  Also use eye and sports cream on the knee  Try to work on the weight as that is part of your problem  Read over the information below  Consider hypnosis as this helped you stop smoking in the past and you had a very significant history  Will get labs and call with results but I would like to see you back here in about 5 weeks  Also needs awv next visit  Obesity   AMBULATORY CARE:   Obesity  is when your body mass index (BMI) is greater than 30  Your healthcare provider will use your height and weight to measure your BMI  The risks of obesity include  many health problems, such as injuries or physical disability   You may need tests to check for the following:  · Diabetes · High blood pressure or high cholesterol     · Heart disease     · Gallbladder or liver disease     · Cancer of the colon, breast, prostate, liver, or kidney     · Sleep apnea     · Arthritis or gout  Seek care immediately if:   · You have a severe headache, confusion, or difficulty speaking  · You have weakness on one side of your body  · You have chest pain, sweating, or shortness of breath  Contact your healthcare provider if:   · You have symptoms of gallbladder or liver disease, such as pain in your upper abdomen  · You have knee or hip pain and discomfort while walking  · You have symptoms of diabetes, such as intense hunger and thirst, and frequent urination  · You have symptoms of sleep apnea, such as snoring or daytime sleepiness  · You have questions or concerns about your condition or care  Treatment for obesity  focuses on helping you lose weight to improve your health  Even a small decrease in BMI can reduce the risk for many health problems  Your healthcare provider will help you set a weight-loss goal   · Lifestyle changes  are the first step in treating obesity  These include making healthy food choices and getting regular physical activity  Your healthcare provider may suggest a weight-loss program that involves coaching, education, and therapy  · Medicine  may help you lose weight when it is used with a healthy diet and physical activity  · Surgery  can help you lose weight if you are very obese and have other health problems  There are several types of weight-loss surgery  Ask your healthcare provider for more information  Be successful losing weight:   · Set small, realistic goals  An example of a small goal is to walk for 20 minutes 5 days a week  Anther goal is to lose 5% of your body weight  · Tell friends, family members, and coworkers about your goals  and ask for their support   Ask a friend to lose weight with you, or join a weight-loss support group     · Identify foods or triggers that may cause you to overeat , and find ways to avoid them  Remove tempting high-calorie foods from your home and workplace  Place a bowl of fresh fruit on your kitchen counter  If stress causes you to eat, then find other ways to cope with stress  · Keep a diary to track what you eat and drink  Also write down how many minutes of physical activity you do each day  Weigh yourself once a week and record it in your diary  Eating changes: You will need to eat 500 to 1,000 fewer calories each day than you currently eat to lose 1 to 2 pounds a week  The following changes will help you cut calories:  · Eat smaller portions  Use small plates, no larger than 9 inches in diameter  Fill your plate half full of fruits and vegetables  Measure your food using measuring cups until you know what a serving size looks like  · Eat 3 meals and 1 or 2 snacks each day  Plan your meals in advance  Lynnette Lin and eat at home most of the time  Eat slowly  · Eat fruits and vegetables at every meal   They are low in calories and high in fiber, which makes you feel full  Do not add butter, margarine, or cream sauce to vegetables  Use herbs to season steamed vegetables  · Eat less fat and fewer fried foods  Eat more baked or grilled chicken and fish  These protein sources are lower in calories and fat than red meat  Limit fast food  Dress your salads with olive oil and vinegar instead of bottled dressing  · Limit the amount of sugar you eat  Do not drink sugary beverages  Limit alcohol  Activity changes:  Physical activity is good for your body in many ways  It helps you burn calories and build strong muscles  It decreases stress and depression, and improves your mood  It can also help you sleep better  Talk to your healthcare provider before you begin an exercise program   · Exercise for at least 30 minutes 5 days a week  Start slowly   Set aside time each day for physical activity that you enjoy and that is convenient for you  It is best to do both weight training and an activity that increases your heart rate, such as walking, bicycling, or swimming  · Find ways to be more active  Do yard work and housecleaning  Walk up the stairs instead of using elevators  Spend your leisure time going to events that require walking, such as outdoor festivals or fairs  This extra physical activity can help you lose weight and keep it off  Follow up with your healthcare provider as directed: You may need to meet with a dietitian  Write down your questions so you remember to ask them during your visits  © 2017 [facility] Information is for End User's use only and may not be sold, redistributed or otherwise used for commercial purposes  All illustrations and images included in CareNotes® are the copyrighted property of A D A M [employer] Inc  or Sammy [employer]  The above information is an  only  It is not intended as medical advice for individual conditions or treatments  Talk to your doctor, nurse or pharmacist before following any medical regimen to see if it is safe and effective for you  Weight Management   AMBULATORY CARE:   Why it is important to manage your weight:  Being overweight increases your risk of health conditions such as heart disease, high blood pressure, type 2 diabetes, and certain types of cancer  It can also increase your risk for osteoarthritis, sleep apnea, and other respiratory problems  Aim for a slow, steady weight loss  Even a small amount of weight loss can lower your risk of health problems  How to lose weight safely:  A safe and healthy way to lose weight is to eat fewer calories and get regular exercise  You can lose up about 1 pound a week by decreasing the number of calories you eat by 500 calories each day  You can decrease calories by eating smaller portion sizes or by cutting out high-calorie foods   Read labels to find out how many calories are in the foods you eat  You can also burn calories with exercise such as walking, swimming, or biking  You will be more likely to keep weight off if you make these changes part of your lifestyle  Healthy meal plan for weight management:  A healthy meal plan includes a variety of foods, contains fewer calories, and helps you stay healthy  A healthy meal plan includes the following:  · Eat whole-grain foods more often  A healthy meal plan should contain fiber  Fiber is the part of grains, fruits, and vegetables that is not broken down by your body  Whole-grain foods are healthy and provide extra fiber in your diet  Some examples of whole-grain foods are whole-wheat breads and pastas, oatmeal, brown rice, and bulgur  · Eat a variety of vegetables every day  Include dark, leafy greens such as spinach, kale, neeraj greens, and mustard greens  Eat yellow and orange vegetables such as carrots, sweet potatoes, and winter squash  · Eat a variety of fruits every day  Choose fresh or canned fruit (canned in its own juice or light syrup) instead of juice  Fruit juice has very little or no fiber  · Eat low-fat dairy foods  Drink fat-free (skim) milk or 1% milk  Eat fat-free yogurt and low-fat cottage cheese  Try low-fat cheeses such as mozzarella and other reduced-fat cheeses  · Choose meat and other protein foods that are low in fat  Choose beans or other legumes such as split peas or lentils  Choose fish, skinless poultry (chicken or turkey), or lean cuts of red meat (beef or pork)  Before you cook meat or poultry, cut off any visible fat  · Use less fat and oil  Try baking foods instead of frying them  Add less fat, such as margarine, sour cream, regular salad dressing and mayonnaise to foods  Eat fewer high-fat foods  Some examples of high-fat foods include french fries, doughnuts, ice cream, and cakes  · Eat fewer sweets  Limit foods and drinks that are high in sugar   This includes candy, cookies, regular soda, and sweetened drinks  Ways to decrease calories:   · Eat smaller portions  ¨ Use a small plate with smaller servings  ¨ Do not eat second helpings  ¨ When you eat at a restaurant, ask for a box and place half of your meal in the box before you eat  ¨ Share an entrée with someone else  · Replace high-calorie snacks with healthy, low-calorie snacks  ¨ Choose fresh fruit, vegetables, fat-free rice cakes, or air-popped popcorn instead of potato chips, nuts, or chocolate  ¨ Choose water or calorie-free drinks instead of soda or sweetened drinks  · Eat regular meals  Skipping meals can lead to overeating later in the day  Eat a healthy snack in place of a meal if you do not have time to eat a regular meal      · Do not shop for groceries when you are hungry  You may be more likely to make unhealthy food choices  Take a grocery list of healthy foods and shop after you have eaten  Exercise:  Exercise at least 30 minutes per day on most days of the week  Some examples of exercise include walking, biking, dancing, and swimming  You can also fit in more physical activity by taking the stairs instead of the elevator or parking farther away from stores  Ask your healthcare provider about the best exercise plan for you  Other things to consider as you try to lose weight:   · Be aware of situations that may give you the urge to overeat, such as eating while watching television  Find ways to avoid these situations  For example, read a book, go for a walk, or do crafts  · Meet with a weight loss support group or friends who are also trying to lose weight  This may help you stay motivated to continue working on your weight loss goals  © 2017 [facility] Information is for End User's use only and may not be sold, redistributed or otherwise used for commercial purposes   All illustrations and images included in CareNotes® are the copyrighted property of [employer]  or Sammy Yan  The above information is an  only  It is not intended as medical advice for individual conditions or treatments  Talk to your doctor, nurse or pharmacist before following any medical regimen to see if it is safe and effective for you  Low Fat Diet   AMBULATORY CARE:   A low-fat diet  is an eating plan that is low in total fat, unhealthy fat, and cholesterol  You may need to follow a low-fat diet if you have trouble digesting or absorbing fat  You may also need to follow this diet if you have high cholesterol  You can also lower your cholesterol by increasing the amount of fiber in your diet  Soluble fiber is a type of fiber that helps to decrease cholesterol levels  Different types of fat in food:   · Limit unhealthy fats  A diet that is high in cholesterol, saturated fat, and trans fat may cause unhealthy cholesterol levels  Unhealthy cholesterol levels increase your risk of heart disease  ¨ Cholesterol:  Limit intake of cholesterol to less than 200 mg per day  Cholesterol is found in meat, eggs, and dairy  ¨ Saturated fat:  Limit saturated fat to less than 7% of your total daily calories  Ask your dietitian how many calories you need each day  Saturated fat is found in butter, cheese, ice cream, whole milk, and palm oil  Saturated fat is also found in meat, such as beef, pork, chicken skin, and processed meats  Processed meats include sausage, hot dogs, and bologna  ¨ Trans fat:  Avoid trans fat as much as possible  Trans fat is used in fried and baked foods  Foods that say trans fat free on the label may still have up to 0 5 grams of trans fat per serving  · Include healthy fats  Replace foods that are high in saturated and trans fat with foods high in healthy fats  This may help to decrease high cholesterol levels  ¨ Monounsaturated fats:   These are found in avocados, nuts, and vegetable oils, such as olive, canola, and sunflower oil  ¨ Polyunsaturated fats: These can be found in vegetable oils, such as soybean or corn oil  Omega-3 fats can help to decrease the risk of heart disease  Omega-3 fats are found in fish, such as salmon, herring, trout, and tuna  Omega-3 fats can also be found in plant foods, such as walnuts, flaxseed, soybeans, and canola oil    Foods to limit or avoid:   · Grains:      ¨ Snacks that are made with partially hydrogenated oils, such as chips, regular crackers, and butter-flavored popcorn    ¨ High-fat baked goods, such as biscuits, croissants, doughnuts, pies, cookies, and pastries    · Dairy:      ¨ Whole milk, 2% milk, and yogurt and ice cream made with whole milk    ¨ Half and half creamer, heavy cream, and whipping cream    ¨ Cheese, cream cheese, and sour cream    · Meats and proteins:      ¨ High-fat cuts of meat (T-bone steak, regular hamburger, and ribs)    ¨ Fried meat, poultry (turkey and chicken), and fish    ¨ Poultry (chicken and turkey) with skin    ¨ Cold cuts (salami or bologna), hot dogs, umanzor, and sausage    ¨ Whole eggs and egg yolks    · Vegetables and fruits with added fat:      ¨ Fried vegetables or vegetables in butter or high-fat sauces, such as cream or cheese sauces    ¨ Fried fruit or fruit served with butter or cream    · Fats:      ¨ Butter, stick margarine, and shortening    ¨ Coconut, palm oil, and palm kernel oil  Foods to include:   · Grains:      ¨ Whole-grain breads, cereals, pasta, and brown rice    ¨ Low-fat crackers and pretzels    · Vegetables and fruits:      ¨ Fresh, frozen, or canned vegetables (no salt or low-sodium)    ¨ Fresh, frozen, dried, or canned fruit (canned in light syrup or fruit juice)    ¨ Avocado    · Low-fat dairy products:      ¨ Nonfat (skim) or 1% milk    ¨ Nonfat or low-fat cheese, yogurt, and cottage cheese    · Meats and proteins:      ¨ Chicken or turkey with no skin    ¨ Baked or broiled fish    ¨ Lean beef and pork (loin, round, extra lean hamburger)    ¨ Beans and peas, unsalted nuts, soy products    ¨ Egg whites and substitutes    ¨ Seeds and nuts    · Fats:      ¨ Unsaturated oil, such as canola, olive, peanut, soybean, or sunflower oil    ¨ Soft or liquid margarine and vegetable oil spread    ¨ Low-fat salad dressing  Other ways to decrease fat:   · Read food labels before you buy foods  Choose foods that have less than 30% of calories from fat  Choose low-fat or fat-free dairy products  Remember that fat free does not mean calorie free  These foods still contain calories, and too many calories can lead to weight gain  · Trim fat from meat and avoid fried food  Trim all visible fat from meat before you cook it  Remove the skin from poultry  Do not fermin meat, fish, or poultry  Bake, roast, boil, or broil these foods instead  Avoid fried foods  Eat a baked potato instead of [place] fries  Steam vegetables instead of sautéing them in butter  · Add less fat to foods  Use imitation umanzor bits on salads and baked potatoes instead of regular umanzor bits  Use fat-free or low-fat salad dressings instead of regular dressings  Use low-fat or nonfat butter-flavored topping instead of regular butter or margarine on popcorn and other foods  Ways to decrease fat in recipes:  Replace high-fat ingredients with low-fat or nonfat ones  This may cause baked goods to be drier than usual  You may need to use nonfat cooking spray on pans to prevent food from sticking  You also may need to change the amount of other ingredients, such as water, in the recipe  Try the following:  · Use low-fat or light margarine instead of regular margarine or shortening  · Use lean ground turkey breast or chicken, or lean ground beef (less than 5% fat) instead of hamburger  · Add 1 teaspoon of canola oil to 8 ounces of skim milk instead of using cream or half and half  · Use grated zucchini, carrots, or apples in breads instead of coconut      · Use blenderized, low-fat cottage cheese, plain tofu, or low-fat ricotta cheese instead of cream cheese  · Use 1 egg white and 1 teaspoon of canola oil, or use ¼ cup (2 ounces) of fat-free egg substitute instead of a whole egg  · Replace half of the oil that is called for in a recipe with applesauce when you bake  Use 3 tablespoons of cocoa powder and 1 tablespoon of canola oil instead of a square of baking chocolate  How to increase fiber:  Eat enough high-fiber foods to get 20 to 30 grams of fiber every day  Slowly increase your fiber intake to avoid stomach cramps, gas, and other problems  · Eat 3 ounces of whole-grain foods each day  An ounce is about 1 slice of bread  Eat whole-grain breads, such as whole-wheat bread  Whole wheat, whole-wheat flour, or other whole grains should be listed as the first ingredient on the food label  Replace white flour with whole-grain flour or use half of each in recipes  Whole-grain flour is heavier than white flour, so you may have to add more yeast or baking powder  · Eat a high-fiber cereal for breakfast   Oatmeal is a good source of soluble fiber  Look for cereals that have bran or fiber in the name  Choose whole-grain products, such as brown rice, barley, and whole-wheat pasta  · Eat more beans, peas, and lentils  For example, add beans to soups or salads  Eat at least 5 cups of fruits and vegetables each day  Eat fruits and vegetables with the peel because the peel is high in fiber  © 2017 [employer] [employer] Information is for End User's use only and may not be sold, redistributed or otherwise used for commercial purposes  All illustrations and images included in CareNotes® are the copyrighted property of [employer]  or Sammy Yan  The above information is an  only  It is not intended as medical advice for individual conditions or treatments   Talk to your doctor, nurse or pharmacist before following any medical regimen to see if it is safe and effective for you  Heart Healthy Diet   AMBULATORY CARE:   A heart healthy diet  is an eating plan low in total fat, unhealthy fats, and sodium (salt)  A heart healthy diet helps decrease your risk for heart disease and stroke  Limit the amount of fat you eat to 25% to 35% of your total daily calories  Limit sodium to less than 2,300 mg each day  Healthy fats:  Healthy fats can help improve cholesterol levels  The risk for heart disease is decreased when cholesterol levels are normal  Choose healthy fats, such as the following:  · Unsaturated fat  is found in foods such as soybean, canola, olive, corn, and safflower oils  It is also found in soft tub margarine that is made with liquid vegetable oil  · Omega-3 fat  is found in certain fish, such as salmon, tuna, and trout, and in walnuts and flaxseed  Unhealthy fats:  Unhealthy fats can cause unhealthy cholesterol levels in your blood and increase your risk of heart disease  Limit unhealthy fats, such as the following:  · Cholesterol  is found in animal foods, such as eggs and lobster, and in dairy products made from whole milk  Limit cholesterol to less than 300 milligrams (mg) each day  You may need to limit cholesterol to 200 mg each day if you have heart disease  · Saturated fat  is found in meats, such as umanzor and hamburger  It is also found in chicken or turkey skin, whole milk, and butter  Limit saturated fat to less than 7% of your total daily calories  Limit saturated fat to less than 6% if you have heart disease or are at increased risk for it  · Trans fat  is found in packaged foods, such as potato chips and cookies  It is also in hard margarine, some fried foods, and shortening  Avoid trans fats as much as possible    Heart healthy foods and drinks to include:  Ask your dietitian or healthcare provider how many servings to have from each of the following food groups:  · Grains:      ¨ Whole-wheat breads, cereals, and pastas, and brown rice    ¨ Low-fat, low-sodium crackers and chips    · Vegetables:      ¨ Broccoli, green beans, green peas, and spinach    ¨ Collards, kale, and lima beans    ¨ Carrots, sweet potatoes, tomatoes, and peppers    ¨ Canned vegetables with no salt added    · Fruits:      ¨ Bananas, peaches, pears, and pineapple    ¨ Grapes, raisins, and dates    ¨ Oranges, tangerines, grapefruit, orange juice, and grapefruit juice    ¨ Apricots, mangoes, melons, and papaya    ¨ Raspberries and strawberries    ¨ Canned fruit with no added sugar    · Low-fat dairy products:      ¨ Nonfat (skim) milk, 1% milk, and low-fat almond, cashew, or soy milks fortified with calcium    ¨ Low-fat cheese, regular or frozen yogurt, and cottage cheese    · Meats and proteins , such as lean cuts of beef and pork (loin, leg, round), skinless chicken and turkey, legumes, soy products, egg whites, and nuts  Foods and drinks to limit or avoid:  Ask your dietitian or healthcare provider about these and other foods that are high in unhealthy fat, sodium, and sugar:  · Snack or packaged foods , such as frozen dinners, cookies, macaroni and cheese, and cereals with more than 300 mg of sodium per serving    · Canned or dry mixes  for cakes, soups, sauces, or gravies    · Vegetables with added sodium , such as instant potatoes, vegetables with added sauces, or regular canned vegetables    · Other foods high in sodium , such as ketchup, barbecue sauce, salad dressing, pickles, olives, soy sauce, and miso    · High-fat dairy foods  such as whole or 2% milk, cream cheese, or sour cream, and cheeses     · High-fat protein foods  such as high-fat cuts of beef (T-bone steaks, ribs), chicken or turkey with skin, and organ meats, such as liver    · Cured or smoked meats , such as hot dogs, umanzor, and sausage    · Unhealthy fats and oils , such as butter, stick margarine, shortening, and cooking oils such as coconut or palm oil    · Food and drinks high in sugar , such as soft drinks (soda), sports drinks, sweetened tea, candy, cake, cookies, pies, and doughnuts  Other diet guidelines to follow:   · Eat more foods containing omega-3 fats  Eat fish high in omega-3 fats at least 2 times a week  · Limit alcohol  Too much alcohol can damage your heart and raise your blood pressure  Women should limit alcohol to 1 drink a day  Men should limit alcohol to 2 drinks a day  A drink of alcohol is 12 ounces of beer, 5 ounces of wine, or 1½ ounces of liquor  · Choose low-sodium foods  High-sodium foods can lead to high blood pressure  Add little or no salt to food you prepare  Use herbs and spices in place of salt  · Eat more fiber  to help lower cholesterol levels  Eat at least 5 servings of fruits and vegetables each day  Eat 3 ounces of whole-grain foods each day  Legumes (beans) are also a good source of fiber  Lifestyle guidelines:   · Do not smoke  Nicotine and other chemicals in cigarettes and cigars can cause lung and heart damage  Ask your healthcare provider for information if you currently smoke and need help to quit  E-cigarettes or smokeless tobacco still contain nicotine  Talk to your healthcare provider before you use these products  · Exercise regularly  to help you maintain a healthy weight and improve your blood pressure and cholesterol levels  Ask your healthcare provider about the best exercise plan for you  Do not start an exercise program without asking your healthcare provider  Follow up with your healthcare provider as directed:  Write down your questions so you remember to ask them during your visits  © 2017 [address] [facility] Information is for End User's use only and may not be sold, redistributed or otherwise used for commercial purposes  All illustrations and images included in CareNotes® are the copyrighted property of [employer]  or Sammy Yan    The above information is an educational aid only  It is not intended as medical advice for individual conditions or treatments  Talk to your doctor, nurse or pharmacist before following any medical regimen to see if it is safe and effective for you  Calorie Counting Diet   WHAT YOU NEED TO KNOW:   What is a calorie counting diet? It is a meal plan based on counting calories each day to reach a healthy body weight  You will need to eat fewer calories if you are trying to lose weight  Weight loss may decrease your risk for certain health problems or improve your health if you have health problems  Some of these health problems include heart disease, high blood pressure, and diabetes  What foods should I avoid? Your dietitian will tell you if you need to avoid certain foods based on your body weight and health condition  You may need to avoid high-fat foods if you are at risk for or have heart disease  You may need to eat fewer foods from the breads and starches food group if you have diabetes  How many calories are in foods? The following is a list of foods and drinks with the approximate number of calories in each  Check the food label to find the exact number of calories  A dietitian can tell you how many calories you should have from each food group each day    · Carbohydrate:      ¨ ½ of a 3-inch bagel, 1 slice of bread, or ½ of a hamburger bun or hot dog bun (80)    ¨ 1 (8-inch) flour tortilla or ½ cup of cooked rice (100)    ¨ 1 (6-inch) corn tortilla (80)    ¨ 1 (6-inch) pancake or 1 cup of bran flakes cereal (110)    ¨ ½ cup of cooked cereal (80)    ¨ ½ cup of cooked pasta (85)    ¨ 1 ounce of pretzels (100)    ¨ 3 cups of air-popped popcorn without butter or oil (80)    · Dairy:      ¨ 1 cup of skim or 1% milk (90)    ¨ 1 cup of 2% milk (120)    ¨ 1 cup of whole milk (160)    ¨ 1 cup of 2% chocolate milk (220)    ¨ 1 ounce of low-fat cheese with 3 grams of fat per ounce (70)    ¨ 1 ounce of cheddar cheese (114)    ¨ ½ cup of 1% fat cottage cheese (80)    ¨ 1 cup of plain or sugar-free, fat-free yogurt (90)    · Protein foods:      ¨ 3 ounces of fish (not breaded or fried) (95)    ¨ 3 ounces of breaded, fried fish (195)    ¨ ¾ cup of tuna canned in water (105)    ¨ 3 ounces of chicken breast without skin (105)    ¨ 1 fried chicken breast with skin (350)    ¨ ¼ cup of fat free egg substitute (40)    ¨ 1 large egg (75)    ¨ 3 ounces of lean beef or pork (165)    ¨ 3 ounces of fried pork chop or ham (185)    ¨ ½ cup of cooked dried beans, such as kidney, germain, lentils, or navy (115)    ¨ 3 ounces of bologna or lunch meat (225)    ¨ 2 links of breakfast sausage (140)    · Vegetables:      ¨ ½ cup of sliced mushrooms (10)    ¨ 1 cup of salad greens, such as lettuce, spinach, or garima (15)    ¨ ½ cup of steamed asparagus (20)    ¨ ½ cup of cooked summer squash, zucchini squash, or green or wax beans (25)    ¨ 1 cup of broccoli or cauliflower florets, or 1 medium tomato (25)    ¨ 1 large raw carrot or ½ cup of cooked carrots (40)    ¨ ? of a medium cucumber or 1 stalk of celery (5)    ¨ 1 small baked potato (160)    ¨ 1 cup of breaded, fried vegetables (230)    · Fruit:      ¨ 1 (6-inch) banana (55)     ¨ ½ of a 4-inch grapefruit (55)    ¨ 15 grapes (60)    ¨ 1 medium orange or apple (70)    ¨ 1 large peach (65)    ¨ 1 cup of fresh pineapple chunks (75)    ¨ 1 cup of melon cubes (50)    ¨ 1¼ cups of whole strawberries (45)    ¨ ½ cup of fruit canned in juice (55)    ¨ ½ cup of fruit canned in heavy syrup (110)    ¨ ?  cup of raisins (130)    ¨ ½ cup of unsweetened fruit juice (60)    ¨ ½ cup of grape, cranberry, or prune juice (90)    · Fat:      ¨ 10 peanuts or 2 teaspoons of peanut butter (55)    ¨ 2 tablespoons of avocado or 1 tablespoon of regular salad dressing (45)    ¨ 2 slices of umanzor (90)    ¨ 1 teaspoon of oil, such as safflower, canola, corn, or olive oil (45)    ¨ 2 teaspoons of low-fat margarine, or 1 tablespoon of low-fat mayonnaise (50)    ¨ 1 teaspoon of regular margarine (40)    ¨ 1 tablespoon of regular mayonnaise (135)    ¨ 1 tablespoon of cream cheese or 2 tablespoons of low-fat cream cheese (45)    ¨ 2 tablespoons of vegetable shortening (215)    · Dessert and sweets:      ¨ 8 animal crackers or 5 vanilla wafers (80)    ¨ 1 frozen fruit juice bar (80)    ¨ ½ cup of ice milk or low-fat frozen yogurt (90)    ¨ ½ cup of sherbet or sorbet (125)    ¨ ½ cup of sugar-free pudding or custard (60)    ¨ ½ cup of ice cream (140)    ¨ ½ cup of pudding or custard (175)    ¨ 1 (2-inch) square chocolate brownie (185)    · Combination foods:      ¨ Bean burrito made with an 8-inch tortilla, without cheese (275)    ¨ Chicken breast sandwich with lettuce and tomato (325)    ¨ 1 cup of chicken noodle soup (60)    ¨ 1 beef taco (175)    ¨ Regular hamburger with lettuce and tomato (310)    ¨ Regular cheeseburger with lettuce and tomato (410)     ¨ ¼ of a 12-inch cheese pizza (280)    ¨ Fried fish sandwich with lettuce and tomato (425)    ¨ Hot dog and bun (275)    ¨ 1½ cups of macaroni and cheese (310)    ¨ Taco salad with a fried tortilla shell (870)    · Low-calorie foods:      ¨ 1 tablespoon of ketchup or 1 tablespoon of fat free sour cream (15)    ¨ 1 teaspoon of mustard (5)    ¨ ¼ cup of salsa (20)    ¨ 1 large dill pickle (15)    ¨ 1 tablespoon of fat free salad dressing (10)    ¨ 2 teaspoons of low-sugar, light jam or jelly, or 1 tablespoon of sugar-free syrup (15)    ¨ 1 sugar-free popsicle (15)    ¨ 1 cup of club soda, seltzer water, or diet soda (0)  CARE AGREEMENT:   You have the right to help plan your care  Discuss treatment options with your caregivers to decide what care you want to receive  You always have the right to refuse treatment  The above information is an  only  It is not intended as medical advice for individual conditions or treatments   Talk to your doctor, nurse or pharmacist before following any medical regimen to see if it is safe and effective for you  © 2017 [address] [employer] Information is for End User's use only and may not be sold, redistributed or otherwise used for commercial purposes  All illustrations and images included in CareNotes® are the copyrighted property of [employer]  or MONIQUE Kaye    Portions of the record may have been created with voice recognition software   Occasional wrong word or "sound a like" substitutions may have occurred due to the inherent limitations of voice recognition software   Read the chart carefully and recognize, using context, where substitutions have occurred  BMI Counseling: Body mass index is 46 77 kg/m²  Discussed the patient's BMI with her  The BMI is above average  BMI counseling and education was provided to the patient  Nutrition recommendations include reducing portion sizes, decreasing overall calorie intake, 3-5 servings of fruits/vegetables daily, reducing fast food intake, consuming healthier snacks, decreasing soda and/or juice intake, moderation in carbohydrate intake, increasing intake of lean protein, reducing intake of saturated fat and trans fat and reducing intake of cholesterol  Exercise recommendations include exercising 3-5 times per week

## 2019-03-22 ENCOUNTER — HOSPITAL ENCOUNTER (OUTPATIENT)
Dept: RADIOLOGY | Facility: HOSPITAL | Age: 74
Discharge: HOME | End: 2019-03-22
Payer: MEDICARE

## 2019-03-22 ENCOUNTER — APPOINTMENT (OUTPATIENT)
Dept: LAB | Facility: HOSPITAL | Age: 74
End: 2019-03-22
Payer: MEDICARE

## 2019-03-22 DIAGNOSIS — R53.83 OTHER FATIGUE: ICD-10-CM

## 2019-03-22 DIAGNOSIS — E03.9 HYPOTHYROIDISM, UNSPECIFIED TYPE: ICD-10-CM

## 2019-03-22 DIAGNOSIS — M25.562 ACUTE PAIN OF LEFT KNEE: ICD-10-CM

## 2019-03-22 DIAGNOSIS — Z13.220 SCREENING, LIPID: ICD-10-CM

## 2019-03-22 LAB
ALBUMIN SERPL BCP-MCNC: 3.4 G/DL (ref 3.5–5)
ALP SERPL-CCNC: 82 U/L (ref 46–116)
ALT SERPL W P-5'-P-CCNC: 22 U/L (ref 12–78)
ANION GAP SERPL CALCULATED.3IONS-SCNC: 9 MMOL/L (ref 4–13)
AST SERPL W P-5'-P-CCNC: 14 U/L (ref 5–45)
BACTERIA UR QL AUTO: ABNORMAL /HPF
BASOPHILS # BLD AUTO: 0.03 THOUSANDS/ΜL (ref 0–0.1)
BASOPHILS NFR BLD AUTO: 1 % (ref 0–1)
BILIRUB SERPL-MCNC: 0.4 MG/DL (ref 0.2–1)
BILIRUB UR QL STRIP: NEGATIVE
BUN SERPL-MCNC: 18 MG/DL (ref 5–25)
CALCIUM SERPL-MCNC: 9 MG/DL (ref 8.3–10.1)
CHLORIDE SERPL-SCNC: 107 MMOL/L (ref 100–108)
CHOLEST SERPL-MCNC: 184 MG/DL (ref 50–200)
CLARITY UR: CLEAR
CO2 SERPL-SCNC: 28 MMOL/L (ref 21–32)
COLOR UR: YELLOW
CREAT SERPL-MCNC: 1.24 MG/DL (ref 0.6–1.3)
CREAT UR-MCNC: 404 MG/DL
EOSINOPHIL # BLD AUTO: 0.1 THOUSAND/ΜL (ref 0–0.61)
EOSINOPHIL NFR BLD AUTO: 2 % (ref 0–6)
ERYTHROCYTE [DISTWIDTH] IN BLOOD BY AUTOMATED COUNT: 14.2 % (ref 11.6–15.1)
ERYTHROCYTE [SEDIMENTATION RATE] IN BLOOD: 10 MM/HOUR (ref 0–20)
GFR SERPL CREATININE-BSD FRML MDRD: 43 ML/MIN/1.73SQ M
GLUCOSE P FAST SERPL-MCNC: 98 MG/DL (ref 65–99)
GLUCOSE UR STRIP-MCNC: NEGATIVE MG/DL
HCT VFR BLD AUTO: 42.6 % (ref 34.8–46.1)
HDLC SERPL-MCNC: 43 MG/DL (ref 40–60)
HGB BLD-MCNC: 13.5 G/DL (ref 11.5–15.4)
HGB UR QL STRIP.AUTO: ABNORMAL
IMM GRANULOCYTES # BLD AUTO: 0.03 THOUSAND/UL (ref 0–0.2)
IMM GRANULOCYTES NFR BLD AUTO: 1 % (ref 0–2)
KETONES UR STRIP-MCNC: NEGATIVE MG/DL
LDLC SERPL CALC-MCNC: 114 MG/DL (ref 0–100)
LEUKOCYTE ESTERASE UR QL STRIP: ABNORMAL
LYMPHOCYTES # BLD AUTO: 1.36 THOUSANDS/ΜL (ref 0.6–4.47)
LYMPHOCYTES NFR BLD AUTO: 27 % (ref 14–44)
MCH RBC QN AUTO: 26.8 PG (ref 26.8–34.3)
MCHC RBC AUTO-ENTMCNC: 31.7 G/DL (ref 31.4–37.4)
MCV RBC AUTO: 85 FL (ref 82–98)
MICROALBUMIN UR-MCNC: 25.3 MG/L (ref 0–20)
MICROALBUMIN/CREAT 24H UR: 6 MG/G CREATININE (ref 0–30)
MONOCYTES # BLD AUTO: 0.34 THOUSAND/ΜL (ref 0.17–1.22)
MONOCYTES NFR BLD AUTO: 7 % (ref 4–12)
MUCOUS THREADS UR QL AUTO: ABNORMAL
NEUTROPHILS # BLD AUTO: 3.28 THOUSANDS/ΜL (ref 1.85–7.62)
NEUTS SEG NFR BLD AUTO: 62 % (ref 43–75)
NITRITE UR QL STRIP: NEGATIVE
NON-SQ EPI CELLS URNS QL MICRO: ABNORMAL /HPF
NONHDLC SERPL-MCNC: 141 MG/DL
NRBC BLD AUTO-RTO: 0 /100 WBCS
PH UR STRIP.AUTO: 6 [PH]
PLATELET # BLD AUTO: 214 THOUSANDS/UL (ref 149–390)
PMV BLD AUTO: 11.3 FL (ref 8.9–12.7)
POTASSIUM SERPL-SCNC: 4.1 MMOL/L (ref 3.5–5.3)
PROT SERPL-MCNC: 7 G/DL (ref 6.4–8.2)
PROT UR STRIP-MCNC: NEGATIVE MG/DL
RBC # BLD AUTO: 5.03 MILLION/UL (ref 3.81–5.12)
RBC #/AREA URNS AUTO: ABNORMAL /HPF
SODIUM SERPL-SCNC: 144 MMOL/L (ref 136–145)
SP GR UR STRIP.AUTO: >=1.03 (ref 1–1.03)
TRIGL SERPL-MCNC: 134 MG/DL
TSH SERPL DL<=0.05 MIU/L-ACNC: 1.23 UIU/ML (ref 0.36–3.74)
UROBILINOGEN UR QL STRIP.AUTO: 1 E.U./DL
WBC # BLD AUTO: 5.14 THOUSAND/UL (ref 4.31–10.16)
WBC #/AREA URNS AUTO: ABNORMAL /HPF

## 2019-03-22 PROCEDURE — 36415 COLL VENOUS BLD VENIPUNCTURE: CPT

## 2019-03-22 PROCEDURE — 80061 LIPID PANEL: CPT

## 2019-03-22 PROCEDURE — 82043 UR ALBUMIN QUANTITATIVE: CPT | Performed by: FAMILY MEDICINE

## 2019-03-22 PROCEDURE — 86618 LYME DISEASE ANTIBODY: CPT

## 2019-03-22 PROCEDURE — 82570 ASSAY OF URINE CREATININE: CPT | Performed by: FAMILY MEDICINE

## 2019-03-22 PROCEDURE — 84443 ASSAY THYROID STIM HORMONE: CPT

## 2019-03-22 PROCEDURE — 85652 RBC SED RATE AUTOMATED: CPT

## 2019-03-22 PROCEDURE — 86038 ANTINUCLEAR ANTIBODIES: CPT

## 2019-03-22 PROCEDURE — 86617 LYME DISEASE ANTIBODY: CPT

## 2019-03-22 PROCEDURE — 80053 COMPREHEN METABOLIC PANEL: CPT

## 2019-03-22 PROCEDURE — 85025 COMPLETE CBC W/AUTO DIFF WBC: CPT

## 2019-03-22 PROCEDURE — 73562 X-RAY EXAM OF KNEE 3: CPT

## 2019-03-22 PROCEDURE — 81001 URINALYSIS AUTO W/SCOPE: CPT | Performed by: FAMILY MEDICINE

## 2019-03-22 PROCEDURE — 86430 RHEUMATOID FACTOR TEST QUAL: CPT

## 2019-03-23 LAB — RHEUMATOID FACT SER QL LA: NEGATIVE

## 2019-03-25 LAB
B BURGDOR IGG SER IA-ACNC: 1.86
B BURGDOR IGM SER IA-ACNC: 0.51

## 2019-03-26 LAB
B BURGDOR IGG PATRN SER IB-IMP: NEGATIVE
B BURGDOR IGM PATRN SER IB-IMP: NEGATIVE
B BURGDOR18KD IGG SER QL IB: PRESENT
B BURGDOR23KD IGG SER QL IB: PRESENT
B BURGDOR23KD IGM SER QL IB: PRESENT
B BURGDOR28KD IGG SER QL IB: ABNORMAL
B BURGDOR30KD IGG SER QL IB: ABNORMAL
B BURGDOR39KD IGG SER QL IB: PRESENT
B BURGDOR39KD IGM SER QL IB: ABNORMAL
B BURGDOR41KD IGG SER QL IB: PRESENT
B BURGDOR41KD IGM SER QL IB: ABNORMAL
B BURGDOR45KD IGG SER QL IB: ABNORMAL
B BURGDOR58KD IGG SER QL IB: ABNORMAL
B BURGDOR66KD IGG SER QL IB: ABNORMAL
B BURGDOR93KD IGG SER QL IB: ABNORMAL
RYE IGE QN: NEGATIVE

## 2019-04-01 DIAGNOSIS — I10 ESSENTIAL HYPERTENSION: ICD-10-CM

## 2019-04-01 RX ORDER — LISINOPRIL 20 MG/1
TABLET ORAL
Qty: 90 TABLET | Refills: 0 | Status: SHIPPED | OUTPATIENT
Start: 2019-04-01 | End: 2019-07-01 | Stop reason: SDUPTHER

## 2019-04-10 DIAGNOSIS — R53.83 OTHER FATIGUE: ICD-10-CM

## 2019-04-10 DIAGNOSIS — R79.89 ELEVATED TSH: ICD-10-CM

## 2019-04-10 RX ORDER — LEVOTHYROXINE SODIUM 0.05 MG/1
TABLET ORAL
Qty: 30 TABLET | Refills: 0 | Status: SHIPPED | OUTPATIENT
Start: 2019-04-10 | End: 2019-05-06 | Stop reason: SDUPTHER

## 2019-05-06 DIAGNOSIS — R53.83 OTHER FATIGUE: ICD-10-CM

## 2019-05-06 DIAGNOSIS — R79.89 ELEVATED TSH: ICD-10-CM

## 2019-05-07 RX ORDER — LEVOTHYROXINE SODIUM 0.05 MG/1
TABLET ORAL
Qty: 30 TABLET | Refills: 0 | Status: SHIPPED | OUTPATIENT
Start: 2019-05-07 | End: 2019-06-17 | Stop reason: SDUPTHER

## 2019-05-23 ENCOUNTER — HOSPITAL ENCOUNTER (OUTPATIENT)
Dept: CT IMAGING | Facility: HOSPITAL | Age: 74
Discharge: HOME | End: 2019-05-23
Payer: COMMERCIAL

## 2019-05-23 ENCOUNTER — HOSPITAL ENCOUNTER (OUTPATIENT)
Dept: PULMONOLOGY | Facility: HOSPITAL | Age: 74
Discharge: HOME | End: 2019-05-23
Payer: MEDICARE

## 2019-05-23 DIAGNOSIS — R06.02 SHORTNESS OF BREATH: ICD-10-CM

## 2019-05-23 DIAGNOSIS — Z87.891 FORMER SMOKER: ICD-10-CM

## 2019-05-23 PROCEDURE — 94760 N-INVAS EAR/PLS OXIMETRY 1: CPT

## 2019-05-23 PROCEDURE — 94729 DIFFUSING CAPACITY: CPT | Performed by: INTERNAL MEDICINE

## 2019-05-23 PROCEDURE — 94060 EVALUATION OF WHEEZING: CPT | Performed by: INTERNAL MEDICINE

## 2019-05-23 PROCEDURE — 94060 EVALUATION OF WHEEZING: CPT

## 2019-05-23 PROCEDURE — 94727 GAS DIL/WSHOT DETER LNG VOL: CPT | Performed by: INTERNAL MEDICINE

## 2019-05-23 PROCEDURE — 94727 GAS DIL/WSHOT DETER LNG VOL: CPT

## 2019-05-23 PROCEDURE — 94729 DIFFUSING CAPACITY: CPT

## 2019-05-23 RX ORDER — ALBUTEROL SULFATE 2.5 MG/3ML
2.5 SOLUTION RESPIRATORY (INHALATION) ONCE
Status: COMPLETED | OUTPATIENT
Start: 2019-05-23 | End: 2019-05-23

## 2019-05-23 RX ADMIN — ALBUTEROL SULFATE 2.5 MG: 2.5 SOLUTION RESPIRATORY (INHALATION) at 13:01

## 2019-06-03 ENCOUNTER — TELEPHONE (OUTPATIENT)
Dept: FAMILY MEDICINE CLINIC | Facility: CLINIC | Age: 74
End: 2019-06-03

## 2019-06-17 DIAGNOSIS — R53.83 OTHER FATIGUE: ICD-10-CM

## 2019-06-17 DIAGNOSIS — R79.89 ELEVATED TSH: ICD-10-CM

## 2019-06-17 RX ORDER — LEVOTHYROXINE SODIUM 0.05 MG/1
TABLET ORAL
Qty: 90 TABLET | Refills: 0 | Status: SHIPPED | OUTPATIENT
Start: 2019-06-17 | End: 2019-09-12 | Stop reason: SDUPTHER

## 2019-06-17 RX ORDER — LEVOTHYROXINE SODIUM 0.05 MG/1
50 TABLET ORAL DAILY
Qty: 30 TABLET | Refills: 0 | Status: SHIPPED | OUTPATIENT
Start: 2019-06-17 | End: 2019-06-17 | Stop reason: SDUPTHER

## 2019-06-20 DIAGNOSIS — K21.00 REFLUX ESOPHAGITIS: ICD-10-CM

## 2019-06-21 RX ORDER — DEXLANSOPRAZOLE 60 MG/1
1 CAPSULE, DELAYED RELEASE ORAL
Qty: 90 CAPSULE | Refills: 0 | Status: SHIPPED | OUTPATIENT
Start: 2019-06-21 | End: 2019-09-28 | Stop reason: SDUPTHER

## 2019-07-01 DIAGNOSIS — I10 ESSENTIAL HYPERTENSION: ICD-10-CM

## 2019-07-01 RX ORDER — LISINOPRIL 20 MG/1
TABLET ORAL
Qty: 90 TABLET | Refills: 0 | Status: SHIPPED | OUTPATIENT
Start: 2019-07-01 | End: 2019-09-26 | Stop reason: SDUPTHER

## 2019-07-08 ENCOUNTER — OFFICE VISIT (OUTPATIENT)
Dept: FAMILY MEDICINE CLINIC | Facility: CLINIC | Age: 74
End: 2019-07-08
Payer: MEDICARE

## 2019-07-08 VITALS
DIASTOLIC BLOOD PRESSURE: 80 MMHG | WEIGHT: 264 LBS | BODY MASS INDEX: 46.78 KG/M2 | OXYGEN SATURATION: 97 % | TEMPERATURE: 99 F | HEIGHT: 63 IN | SYSTOLIC BLOOD PRESSURE: 124 MMHG | RESPIRATION RATE: 16 BRPM | HEART RATE: 84 BPM

## 2019-07-08 DIAGNOSIS — Z12.11 SCREENING FOR COLORECTAL CANCER: ICD-10-CM

## 2019-07-08 DIAGNOSIS — Z12.12 SCREENING FOR COLORECTAL CANCER: ICD-10-CM

## 2019-07-08 DIAGNOSIS — Z23 ENCOUNTER FOR IMMUNIZATION: ICD-10-CM

## 2019-07-08 DIAGNOSIS — Z00.00 MEDICARE ANNUAL WELLNESS VISIT, SUBSEQUENT: Primary | ICD-10-CM

## 2019-07-08 DIAGNOSIS — Z78.0 ASYMPTOMATIC POSTMENOPAUSAL STATE: ICD-10-CM

## 2019-07-08 DIAGNOSIS — Z12.31 ENCOUNTER FOR SCREENING MAMMOGRAM FOR BREAST CANCER: ICD-10-CM

## 2019-07-08 PROCEDURE — G0439 PPPS, SUBSEQ VISIT: HCPCS | Performed by: FAMILY MEDICINE

## 2019-07-08 RX ORDER — ZOSTER VACCINE RECOMBINANT, ADJUVANTED 50 MCG/0.5
50 KIT INTRAMUSCULAR ONCE
Qty: 1 EACH | Refills: 1 | Status: SHIPPED | OUTPATIENT
Start: 2019-07-08 | End: 2019-07-08

## 2019-07-08 NOTE — PATIENT INSTRUCTIONS
Schedule your colonoscopy and your mammo and your DEXA scan  Continue to work on the weight with your apple cider vinegar and honey  Cut back on carbohydrates and calories  Exercise more drink more water  Read over the information below  Obesity   AMBULATORY CARE:   Obesity  is when your body mass index (BMI) is greater than 30  Your healthcare provider will use your height and weight to measure your BMI  The risks of obesity include  many health problems, such as injuries or physical disability  You may need tests to check for the following:  · Diabetes     · High blood pressure or high cholesterol     · Heart disease     · Gallbladder or liver disease     · Cancer of the colon, breast, prostate, liver, or kidney     · Sleep apnea     · Arthritis or gout  Seek care immediately if:   · You have a severe headache, confusion, or difficulty speaking  · You have weakness on one side of your body  · You have chest pain, sweating, or shortness of breath  Contact your healthcare provider if:   · You have symptoms of gallbladder or liver disease, such as pain in your upper abdomen  · You have knee or hip pain and discomfort while walking  · You have symptoms of diabetes, such as intense hunger and thirst, and frequent urination  · You have symptoms of sleep apnea, such as snoring or daytime sleepiness  · You have questions or concerns about your condition or care  Treatment for obesity  focuses on helping you lose weight to improve your health  Even a small decrease in BMI can reduce the risk for many health problems  Your healthcare provider will help you set a weight-loss goal   · Lifestyle changes  are the first step in treating obesity  These include making healthy food choices and getting regular physical activity  Your healthcare provider may suggest a weight-loss program that involves coaching, education, and therapy       · Medicine  may help you lose weight when it is used with a healthy diet and physical activity  · Surgery  can help you lose weight if you are very obese and have other health problems  There are several types of weight-loss surgery  Ask your healthcare provider for more information  Be successful losing weight:   · Set small, realistic goals  An example of a small goal is to walk for 20 minutes 5 days a week  Anther goal is to lose 5% of your body weight  · Tell friends, family members, and coworkers about your goals  and ask for their support  Ask a friend to lose weight with you, or join a weight-loss support group  · Identify foods or triggers that may cause you to overeat , and find ways to avoid them  Remove tempting high-calorie foods from your home and workplace  Place a bowl of fresh fruit on your kitchen counter  If stress causes you to eat, then find other ways to cope with stress  · Keep a diary to track what you eat and drink  Also write down how many minutes of physical activity you do each day  Weigh yourself once a week and record it in your diary  Eating changes: You will need to eat 500 to 1,000 fewer calories each day than you currently eat to lose 1 to 2 pounds a week  The following changes will help you cut calories:  · Eat smaller portions  Use small plates, no larger than 9 inches in diameter  Fill your plate half full of fruits and vegetables  Measure your food using measuring cups until you know what a serving size looks like  · Eat 3 meals and 1 or 2 snacks each day  Plan your meals in advance  [employer] and eat at home most of the time  Eat slowly  · Eat fruits and vegetables at every meal   They are low in calories and high in fiber, which makes you feel full  Do not add butter, margarine, or cream sauce to vegetables  Use herbs to season steamed vegetables  · Eat less fat and fewer fried foods  Eat more baked or grilled chicken and fish  These protein sources are lower in calories and fat than red meat   Limit fast food  Dress your salads with olive oil and vinegar instead of bottled dressing  · Limit the amount of sugar you eat  Do not drink sugary beverages  Limit alcohol  Activity changes:  Physical activity is good for your body in many ways  It helps you burn calories and build strong muscles  It decreases stress and depression, and improves your mood  It can also help you sleep better  Talk to your healthcare provider before you begin an exercise program   · Exercise for at least 30 minutes 5 days a week  Start slowly  Set aside time each day for physical activity that you enjoy and that is convenient for you  It is best to do both weight training and an activity that increases your heart rate, such as walking, bicycling, or swimming  · Find ways to be more active  Do yard work and housecleaning  Walk up the stairs instead of using elevators  Spend your leisure time going to events that require walking, such as outdoor festivals or fairs  This extra physical activity can help you lose weight and keep it off  Follow up with your healthcare provider as directed: You may need to meet with a dietitian  Write down your questions so you remember to ask them during your visits  © 2017 [facility] [facility] Information is for End User's use only and may not be sold, redistributed or otherwise used for commercial purposes  All illustrations and images included in CareNotes® are the copyrighted property of [employer]  or Sammy Yan  The above information is an  only  It is not intended as medical advice for individual conditions or treatments  Talk to your doctor, nurse or pharmacist before following any medical regimen to see if it is safe and effective for you  Urinary Incontinence   WHAT YOU NEED TO KNOW:   What is urinary incontinence? Urinary incontinence (UI) is when you lose control of your bladder  What causes UI?   UI occurs because your bladder cannot store or empty urine properly  The following are the most common types of UI:  · Stress incontinence  is when you leak urine due to increased bladder pressure  This may happen when you cough, sneeze, or exercise  · Urge incontinence  is when you feel the need to urinate right away and leak urine accidentally  · Mixed incontinence  is when you have both stress and urge UI  What are the signs and symptoms of UI?   · You feel like your bladder does not empty completely when you urinate  · You urinate often and need to urinate immediately  · You leak urine when you sleep, or you wake up with the urge to urinate  · You leak urine when you cough, sneeze, exercise, or laugh  How is UI diagnosed? Your healthcare provider will ask how often you leak urine and whether you have stress or urge symptoms  Tell him which medicines you take, how often you urinate, and how much liquid you drink each day  You may need any of the following tests:  · Urine tests  may show infection or kidney function  · A pelvic exam  may be done to check for blockages  A pelvic exam will also show if your bladder, uterus, or other organs have moved out of place  · An x-ray, ultrasound, or CT  may show problems with parts of your urinary system  You may be given contrast liquid to help your organs show up better in the pictures  Tell the healthcare provider if you have ever had an allergic reaction to contrast liquid  Do not enter the MRI room with anything metal  Metal can cause serious injury  Tell the healthcare provider if you have any metal in or on your body  · A bladder scan  will show how much urine is left in your bladder after you urinate  You will be asked to urinate and then healthcare providers will use a small ultrasound machine to check the urine left in your bladder  · Cystometry  is used to check the function of your urinary system   Your healthcare provider checks the pressure in your bladder while filling it with fluid  Your bladder pressure may also be tested when your bladder is full and while you urinate  How is UI treated? · Medicines  can help strengthen your bladder control  · Electrical stimulation  is used to send a small amount of electrical energy to your pelvic floor muscles  This helps control your bladder function  Electrodes may be placed outside your body or in your rectum  For women, the electrodes may be placed in the vagina  · A bulking agent  may be injected into the wall of your urethra to make it thicker  This helps keep your urethra closed and decreases urine leakage  · Devices  such as a clamp, pessary, or tampon may help stop urine leaks  Ask your healthcare provider for more information about these and other devices  · Surgery  may be needed if other treatments do not work  Several types of surgery can help improve your bladder control  Ask your healthcare provider for more information about the surgery you may need  How can I manage my symptoms? · Do pelvic muscle exercises often  Your pelvic muscles help you stop urinating  Squeeze these muscles tight for 5 seconds, then relax for 5 seconds  Gradually work up to squeezing for 10 seconds  Do 3 sets of 15 repetitions a day, or as directed  This will help strengthen your pelvic muscles and improve bladder control  · A catheter  may be used to help empty your bladder  A catheter is a tiny, plastic tube that is put into your bladder to drain your urine  Your healthcare provider may tell you to use a catheter to prevent your bladder from getting too full and leaking urine  · Keep a UI record  Write down how often you leak urine and how much you leak  Make a note of what you were doing when you leaked urine  · Train your bladder  Go to the bathroom at set times, such as every 2 hours, even if you do not feel the urge to go  You can also try to hold your urine when you feel the urge to go   For example, hold your urine for 5 minutes when you feel the urge to go  As that becomes easier, hold your urine for 10 minutes  · Drink liquids as directed  Ask your healthcare provider how much liquid to drink each day and which liquids are best for you  You may need to limit the amount of liquid you drink to help control your urine leakage  Limit or do not have drinks that contain caffeine or alcohol  Do not drink any liquid right before you go to bed  · Prevent constipation  Eat a variety of high-fiber foods  Good examples are high-fiber cereals, beans, vegetables, and whole-grain breads  Prune juice may help make your bowel movement softer  Walking is the best way to trigger your intestines to have a bowel movement  · Exercise regularly and maintain a healthy weight  Ask your healthcare provider how much you should weigh and about the best exercise plan for you  Weight loss and exercise will decrease pressure on your bladder and help you control your leakage  Ask him to help you create a weight loss plan if you are overweight  When should I seek immediate care? · You have severe pain  · You are confused or cannot think clearly  When should I contact my healthcare provider? · You have a fever  · You see blood in your urine  · You have pain when you urinate  · You have new or worse pain, even after treatment  · Your mouth feels dry or you have vision changes  · Your urine is cloudy or smells bad  · You have questions or concerns about your condition or care  CARE AGREEMENT:   You have the right to help plan your care  Learn about your health condition and how it may be treated  Discuss treatment options with your caregivers to decide what care you want to receive  You always have the right to refuse treatment  The above information is an  only  It is not intended as medical advice for individual conditions or treatments   Talk to your doctor, nurse or pharmacist before following any medical regimen to see if it is safe and effective for you  © 2017 [address] [employer] Information is for End User's use only and may not be sold, redistributed or otherwise used for commercial purposes  All illustrations and images included in CareNotes® are the copyrighted property of [employer]  or Sammy Yan  Cigarette Smoking and Your Health   AMBULATORY CARE:   Risks to your health if you smoke:  Nicotine and other chemicals found in tobacco damage every cell in your body  Even if you are a light smoker, you have an increased risk for cancer, heart disease, and lung disease  If you are pregnant or have diabetes, smoking increases your risk for complications  Benefits to your health if you stop smoking:   · You decrease respiratory symptoms such as coughing, wheezing, and shortness of breath  · You reduce your risk for cancers of the lung, mouth, throat, kidney, bladder, pancreas, stomach, and cervix  If you already have cancer, you increase the benefits of chemotherapy  You also reduce your risk for cancer returning or a second cancer from developing  · You reduce your risk for heart disease, blood clots, heart attack, and stroke  · You reduce your risk for lung infections, and diseases such as pneumonia, asthma, chronic bronchitis, and emphysema  · Your circulation improves  More oxygen can be delivered to your body  If you have diabetes, you lower your risk for complications, such as kidney, artery, and eye diseases  You also lower your risk for nerve damage  Nerve damage can lead to amputations, poor vision, and blindness  · You improve your body's ability to heal and to fight infections  Benefits to the health of others if you stop smoking:  Tobacco is harmful to nonsmokers who breathe in your secondhand smoke   The following are ways the health of others around you may improve when you stop smoking:  · You lower the risks for lung cancer and heart disease in nonsmoking adults  · If you are pregnant, you lower the risk for miscarriage, early delivery, low birth weight, and stillbirth  You also lower your baby's risk for SIDS, obesity, developmental delay, and neurobehavioral problems, such as ADHD  · If you have children, you lower their risk for ear infections, colds, pneumonia, bronchitis, and asthma  For more information and support to stop smoking:   · [employer]  Phone: 7- 922 - [phone]  Web Address: www [employer]  Follow up with your healthcare provider as directed:  Write down your questions so you remember to ask them during your visits  © 2017 [address] [employer] Information is for End User's use only and may not be sold, redistributed or otherwise used for commercial purposes  All illustrations and images included in CareNotes® are the copyrighted property of [employer]  or Sammy Yan  The above information is an  only  It is not intended as medical advice for individual conditions or treatments  Talk to your doctor, nurse or pharmacist before following any medical regimen to see if it is safe and effective for you  Fall Prevention   AMBULATORY CARE:   Fall prevention  includes ways to make your home and other areas safer  It also includes ways you can move more carefully to prevent a fall  Health conditions that cause changes in your blood pressure, vision, or muscle strength and coordination may increase your risk for falls  Medicines may also increase your risk for falls if they make you dizzy, weak, or sleepy  Call 911 or have someone else call if:   · You have fallen and are unconscious  · You have fallen and cannot move part of your body  Contact your healthcare provider if:   · You have fallen and have pain or a headache  · You have questions or concerns about your condition or care  Fall prevention tips:   · Stand or sit up slowly    This may help you keep your balance and prevent falls  · Use assistive devices as directed  Your healthcare provider may suggest that you use a cane or walker to help you keep your balance  You may need to have grab bars put in your bathroom near the toilet or in the shower  · Wear shoes that fit well and have soles that   Wear shoes both inside and outside  Use slippers with good   Do not wear shoes with high heels  · Wear a personal alarm  This is a device that allows you to call 911 if you fall and need help  Ask your healthcare provider for more information  · Stay active  Exercise can help strengthen your muscles and improve your balance  Your healthcare provider may recommend water aerobics or walking  He or she may also recommend physical therapy to improve your coordination  Never start an exercise program without talking to your healthcare provider first      · Manage your medical conditions  Keep all appointments with your healthcare providers  Visit your eye doctor as directed  Home safety tips:   · Add items to prevent falls in the bathroom  Put nonslip strips on your bath or shower floor to prevent you from slipping  Use a bath mat if you do not have carpet in the bathroom  This will prevent you from falling when you step out of the bath or shower  Use a shower seat so you do not need to stand while you shower  Sit on the toilet or a chair in your bathroom to dry yourself and put on clothing  This will prevent you from losing your balance from drying or dressing yourself while you are standing  · Keep paths clear  Remove books, shoes, and other objects from walkways and stairs  Place cords for telephones and lamps out of the way so that you do not need to walk over them  Tape them down if you cannot move them  Remove small rugs  If you cannot remove a rug, secure it with double-sided tape  This will prevent you from tripping  · Install bright lights in your home    Use night lights to help light paths to the bathroom or kitchen  Always turn on the light before you start walking  · Keep items you use often on shelves within reach  Do not use a step stool to help you reach an item  · Paint or place reflective tape on the edges of your stairs  This will help you see the stairs better  Follow up with your healthcare provider as directed:  Write down your questions so you remember to ask them during your visits  © 2017 [address] [employer] Information is for End User's use only and may not be sold, redistributed or otherwise used for commercial purposes  All illustrations and images included in CareNotes® are the copyrighted property of [employer] Inc  or [employer]  The above information is an  only  It is not intended as medical advice for individual conditions or treatments  Talk to your doctor, nurse or pharmacist before following any medical regimen to see if it is safe and effective for you  Advance Directives   WHAT YOU NEED TO KNOW:   What are advance directives? Advance directives are legal documents that state your wishes and plans for medical care  These plans are made ahead of time in case you lose your ability to make decisions for yourself  Advance directives can apply to any medical decision, such as the treatments you want, and if you want to donate organs  What are the types of advance directives? There are many types of advance directives, and each state has rules about how to use them  You may choose a combination of any of the following:  · Living will: This is a written record of the treatment you want  You can also choose which treatments you do not want, which to limit, and which to stop at a certain time  This includes surgery, medicine, IV fluid, and tube feedings  · Durable power of  for healthcare [facility]):   This is a written record that states who you want to make healthcare choices for you when you are unable to make them for yourself  This person, called a proxy, is usually a family member or a friend  You may choose more than 1 proxy  · Do not resuscitate (DNR) order:  A DNR order is used in case your heart stops beating or you stop breathing  It is a request not to have certain forms of treatment, such as CPR  A DNR order may be included in other types of advance directives  · Medical directive: This covers the care that you want if you are in a coma, near death, or unable to make decisions for yourself  You can list the treatments you want for each condition  Treatment may include pain medicine, surgery, blood transfusions, dialysis, IV or tube feedings, and a ventilator (breathing machine)  · Values history: This document has questions about your views, beliefs, and how you feel and think about life  This information can help others choose the care that you would choose  Why are advance directives important? An advance directive helps you control your care  Although spoken wishes may be used, it is better to have your wishes written down  Spoken wishes can be misunderstood, or not followed  Treatments may be given even if you do not want them  An advance directive may make it easier for your family to make difficult choices about your care  How do I decide what to put in my advance directives? · Make informed decisions:  Make sure you fully understand treatments or care you may receive  Think about the benefits and problems your decisions could cause for you or your family  Talk to healthcare providers if you have concerns or questions before you write down your wishes  You may also want to talk with your Mormonism or , or a   Check your state laws to make sure that what you put in your advance directive is legal      · Sign all forms:  Sign and date your advance directive when you have finished  You may also need 2 witnesses to sign the forms   Witnesses cannot be your doctor or his staff, your spouse, heirs or beneficiaries, people you owe money to, or your chosen proxy  Talk to your family, proxy, and healthcare providers about your advance directive  Give each person a copy, and keep one for yourself in a place you can get to easily  Do not keep it hidden or locked away  · Review and revise your plans: You can revise your advance directive at any time, as long as you are able to make decisions  Review your plan every year, and when there are changes in your life, or your health  When you make changes, let your family, proxy, and healthcare providers know  Give each a new copy  Where can I find more information? · [employer]  [address] , [place]  Phone: 5- [phone] - [phone]  Phone: 6- 701 [phone]  Web Address: http://www  aafp org  · [address] [facility])  [address] [address] [facility], [address]  Phone: [phone]  Phone: [phone]  Web Address: Jessica fitzgerald  CARE AGREEMENT:   You have the right to help plan your care  To help with this plan, you must learn about your health condition and treatment options  You must also learn about advance directives and how they are used  Work with your healthcare providers to decide what care will be used to treat you  You always have the right to refuse treatment  The above information is an  only  It is not intended as medical advice for individual conditions or treatments  Talk to your doctor, nurse or pharmacist before following any medical regimen to see if it is safe and effective for you  © 2017 [employer] [employer]  Information is for End User's use only and may not be sold, redistributed or otherwise used for commercial purposes  All illustrations and images included in CareNotes® are the copyrighted property of [employer]  or Sammy Yan

## 2019-07-08 NOTE — PROGRESS NOTES
Assessment and Plan:     Problem List Items Addressed This Visit     None      Visit Diagnoses     Medicare annual wellness visit, subsequent    -  Primary    Encounter for screening mammogram for breast cancer        Relevant Orders    Mammo screening bilateral w 3d & cad    Screening for colorectal cancer        Relevant Orders    Ambulatory referral to Gastroenterology    Asymptomatic postmenopausal state        Relevant Orders    DXA bone density spine hip and pelvis    Encounter for immunization        Relevant Medications    SHINGRIX 50 MCG/0 5ML SUSR         History of Present Illness:     Patient presents for Medicare Annual Wellness visit    Patient Care Team:  Jessica De La O as PCP - General (Family Medicine)  Jacques Vance MD     Problem List:     Patient Active Problem List   Diagnosis    Benign essential tremor    Depressive disorder    Essential hypertension    CKD (chronic kidney disease) stage 3, GFR 30-59 ml/min ([facility])    Hypothyroidism    Shortness of breath      Past Medical and Surgical History:     Past Medical History:   Diagnosis Date    Acute ill-defined cerebrovascular disease     Benign essential tremor     Chronic kidney disease     [facility] obstructive pulmonary disease) ([facility] [address] [facility]     Depressive disorder     Hypertension     Impaired fasting glucose     Intention tremor     last assessed: 8/18/2016    Osteochondropathy     Panic disorder without agoraphobia with mild panic attacks     Psychiatric disorder     anxiety     Past Surgical History:   Procedure Laterality Date    AUGMENTATION BREAST      BREAST LUMPECTOMY W/ NEEDLE LOCALIZATION Left     description: benign last assessed; 8/21/2016    CHOLECYSTECTOMY      HYSTERECTOMY        Family History:     Family History   Problem Relation Age of Onset    Alcohol abuse Mother     Asthma Mother     Cancer Mother     Mental illness Mother     Osteoarthritis Mother     Gout Father     Cancer Father  Other Sister         carotid arterial disease    COPD Sister     Hyperlipidemia Sister     Hypertension Sister     Heart attack Sister       Social History:     Social History     Tobacco Use   Smoking Status Former Smoker   Smokeless Tobacco Never Used     Social History     Substance and Sexual Activity   Alcohol Use Yes    Comment: socially - Per allscripts - denies alcohol consumption     Social History     Substance and Sexual Activity   Drug Use No      Medications and Allergies:     Current Outpatient Medications   Medication Sig Dispense Refill    amLODIPine (NORVASC) 10 mg tablet Take 1 tablet (10 mg total) by mouth daily 90 tablet 1    aspirin (ECOTRIN LOW STRENGTH) 81 mg EC tablet Take 81 mg by mouth daily      dexlansoprazole (DEXILANT) 60 MG capsule Take 1 capsule (60 mg total) by mouth daily before breakfast 90 capsule 0    FLUoxetine (PROzac) 40 MG capsule Take 1 capsule (40 mg total) by mouth daily 90 capsule 1    levothyroxine 50 mcg tablet TAKE 1 TABLET(50 MCG) BY MOUTH DAILY 90 tablet 0    lisinopril (ZESTRIL) 20 mg tablet TAKE 1 TABLET BY MOUTH EVERY DAY 90 tablet 0    MYRBETRIQ 50 MG TB24 Take 1 tablet by mouth daily  0    diazepam (VALIUM) 5 mg tablet Take 1 tablet by mouth 2 (two) times a day (Patient not taking: Reported on 9/25/2018 ) 15 tablet 0    SHINGRIX 50 MCG/0 5ML SUSR Inject 50 mcg into a muscle once for 1 dose 1 each 1     No current facility-administered medications for this visit        Allergies   Allergen Reactions    Amoxicillin     Codeine     Morphine       Immunizations:     Immunization History   Administered Date(s) Administered    INFLUENZA 01/30/2007, 10/02/2009, 01/28/2011, 09/08/2011, 08/20/2012, 09/20/2013, 09/23/2014, 10/27/2015, 10/26/2018    Influenza Split High Dose Preservative Free IM 11/18/2016, 10/31/2017    Influenza TIV (IM) 01/30/2007, 10/02/2009, 01/28/2011, 09/08/2011, 08/20/2012, 09/20/2013, 09/23/2014, 10/27/2015    Influenza, high dose seasonal 0 5 mL 10/26/2018    Pneumococcal Conjugate 13-Valent 08/06/2015    Pneumococcal Polysaccharide PPV23 07/01/2011    Tdap 07/01/2011    Zoster 06/24/2015      Medicare Screening Tests and Risk Assessments:     Temi Kaufman is here for her Subsequent Wellness visit  Health Risk Assessment:  Patient rates overall health as good  Patient feels that their physical health rating is Slightly better  Eyesight was rated as Slightly worse  Hearing was rated as Same  Patient feels that their emotional and mental health rating is Slightly better  Pain experienced by patient in the last 7 days has been Some  Patient's pain rating has been 3/10  Emotional/Mental Health:  Patient has not been feeling nervous/anxious  PHQ-9 Depression Screening:    Frequency of the following problems over the past two weeks:      1  Little interest or pleasure in doing things: 0 - not at all      2  Feeling down, depressed, or hopeless: 0 - not at all  PHQ-2 Score: 0          Broken Bones/Falls: Fall Risk Assessment:    In the past year, patient has experienced: History of falling in past year    Number of falls: 1    Injured during fall: Yes     Patient feels steady when standing or walking  Patient is not worried about falling  Bladder/Bowel:  Patient has not leaked urine accidently in the last six months  Patient reports no loss of bowel control  (Additional Comments: Sometimes )    Immunizations:  Patient has had a flu vaccination within the last year  Patient has received a pneumonia shot  Home Safety:  Patient has trouble with stairs inside or outside of their home  Patient currently reports that there are no safety hazards present in home, working smoke alarms, no working carbon monoxide detectors        Preventative Screenings:   Breast cancer screening performed, colon cancer screen completed, cholesterol screen completed, no glaucoma eye exam completed    Nutrition:  Current diet: Regular and No Added Salt with servings of the following:    Medications:  Patient is currently taking over-the-counter supplements  Patient is able to manage medications  Lifestyle Choices:  Patient reports no tobacco use  Patient has smoked or used tobacco in the past   Patient has stopped her tobacco use  Patient reports no alcohol use  Patient drives a vehicle  Patient wears seat belt  Activities of Daily Living:  Can get out of bed by his or her self, able to dress self, able to make own meals, able to do own shopping, able to bathe self, can do own laundry/housekeeping, can manage own money, pay bills and track expenses    Previous Hospitalizations:  No hospitalization or ED visit in past 12 months        Advanced Directives:  Patient has decided on a power of   Patient has spoken to designated power of   Patient has completed advanced directive  Preventative Screening/Counseling:      Cardiovascular:      General: Risks and Benefits Discussed and Screening Current          Diabetes:      General: Risks and Benefits Discussed and Screening Current          Colorectal Cancer:      General: Risks and Benefits Discussed      Due for studies: Colonoscopy - High Risk      Comments: Last colonoscopy 2013 with multiple small colon polyps  No pathology available  Breast Cancer:      General: Risks and Benefits Discussed          Cervical Cancer:      General: Risks and Benefits Discussed and Screening Not Indicated          Osteoporosis:      General: Risks and Benefits Discussed      Due for studies: DXA Appendicular          AAA:      General: Risks and Benefits Discussed and Screening Not Indicated          Glaucoma:      General: Risks and Benefits Discussed and Patient Declines      Comments: Pt states she can't afford an eye doctor           HIV:      General: Risks and Benefits Discussed and Patient Declines          Hepatitis C:      General: Risks and Benefits Discussed and Patient Declines        Advanced Directives:   Patient has living will for healthcare, has durable POA for healthcare, patient has an advanced directive  Information on ACP and/or AD provided  5 wishes given  End of life assessment reviewed with patient  Provider agrees with end of life decisions   Additional Comments: Pt would not want resuscitation if no hope for recovery     Immunizations:      Influenza: Risks & Benefits Discussed and Influenza UTD This Year      Pneumococcal: Risks & Benefits Discussed and Lifetime Vaccine Completed      Shingrix: Risks & Benefits Discussed and Shingrix Vaccine Needed Today      Hepatitis B (Low risk patients): Prevention Counseling and Series Not Indicated      Zostavax: Risks & Benefits Discussed and Zostavax Vaccine UTD      TDAP: Risks & Benefits Discussed and Tdap Vaccine UTD

## 2019-07-30 ENCOUNTER — TELEPHONE (OUTPATIENT)
Dept: GASTROENTEROLOGY | Facility: CLINIC | Age: 74
End: 2019-07-30

## 2019-08-01 ENCOUNTER — TELEPHONE (OUTPATIENT)
Dept: GASTROENTEROLOGY | Facility: CLINIC | Age: 74
End: 2019-08-01

## 2019-08-14 NOTE — TELEPHONE ENCOUNTER
08/14/19  Screened by: Stefan Traylor    Referring Provider     Pre- Screening: There is no height or weight on file to calculate BMI  Has patient been referred for a routine screening Colonoscopy? yes  Is the patient between 39-70 years old? yes    SCHEDULING STAFF   If the patient is between 45yrs-49yrs, please advise patient to confirm benefits/coverage with their insurance company for a routine screening colonoscopy, some insurance carriers will only cover at [phone] or older   If the patient is over 66years old, please schedule an office visit  Do you have any of the following symptoms? No       Have you had a coronary or vascular stent within the last year? no    Have you had a heart attack or stroke in the last 6 months? no    Have you had intestinal surgery in the last 3 months? no    Do you have problems with: no    Do you use: no    Have you been hospitalized in the last Month? no    Have you been diagnosed with a bleeding disorder or anemia? no    Have you had chest pain (angina) or breathing problems  (COPD) in the last 3 months? yes    Do you have any difficulty walking up a flight of stairs? no    Have you had Kidney failure or insufficiency? no    Have you had heart valve surgery? no    Are you confined to a wheelchair? no    Do you take yes- ASA    Have you been diagnosed with Diabetes or are you taking any   Diabetic medications? no    : If patient answers NO to medical questions, then schedule procedure  If patient answers YES to medical questions, then schedule office appointment  Previous Colonoscopy yes  Date and Facility of last colonoscopy?  2015- Dr Shabana Cruz     Comments:

## 2019-08-15 ENCOUNTER — PREP FOR PROCEDURE (OUTPATIENT)
Dept: GASTROENTEROLOGY | Facility: CLINIC | Age: 74
End: 2019-08-15

## 2019-08-15 DIAGNOSIS — Z12.11 SPECIAL SCREENING FOR MALIGNANT NEOPLASMS, COLON: Primary | ICD-10-CM

## 2019-09-12 DIAGNOSIS — R53.83 OTHER FATIGUE: ICD-10-CM

## 2019-09-12 DIAGNOSIS — R79.89 ELEVATED TSH: ICD-10-CM

## 2019-09-13 DIAGNOSIS — I10 ESSENTIAL HYPERTENSION: ICD-10-CM

## 2019-09-13 DIAGNOSIS — F32.9 REACTIVE DEPRESSION: ICD-10-CM

## 2019-09-13 RX ORDER — LEVOTHYROXINE SODIUM 0.05 MG/1
TABLET ORAL
Qty: 90 TABLET | Refills: 3 | Status: SHIPPED | OUTPATIENT
Start: 2019-09-13 | End: 2020-09-17 | Stop reason: SDUPTHER

## 2019-09-13 RX ORDER — AMLODIPINE BESYLATE 10 MG/1
10 TABLET ORAL DAILY
Qty: 90 TABLET | Refills: 1 | Status: SHIPPED | OUTPATIENT
Start: 2019-09-13 | End: 2020-03-03 | Stop reason: SDUPTHER

## 2019-09-13 RX ORDER — FLUOXETINE HYDROCHLORIDE 40 MG/1
40 CAPSULE ORAL DAILY
Qty: 90 CAPSULE | Refills: 1 | Status: SHIPPED | OUTPATIENT
Start: 2019-09-13 | End: 2020-03-09 | Stop reason: SDUPTHER

## 2019-09-17 ENCOUNTER — ANESTHESIA EVENT (OUTPATIENT)
Dept: GASTROENTEROLOGY | Facility: HOSPITAL | Age: 74
End: 2019-09-17

## 2019-09-17 ENCOUNTER — ANESTHESIA (OUTPATIENT)
Dept: GASTROENTEROLOGY | Facility: HOSPITAL | Age: 74
End: 2019-09-17

## 2019-09-17 ENCOUNTER — HOSPITAL ENCOUNTER (OUTPATIENT)
Dept: GASTROENTEROLOGY | Facility: HOSPITAL | Age: 74
Setting detail: OUTPATIENT SURGERY
Discharge: HOME | End: 2019-09-17
Attending: INTERNAL MEDICINE | Admitting: INTERNAL MEDICINE
Payer: MEDICARE

## 2019-09-17 VITALS
HEIGHT: 63 IN | BODY MASS INDEX: 46.02 KG/M2 | TEMPERATURE: 97.2 F | HEART RATE: 63 BPM | SYSTOLIC BLOOD PRESSURE: 130 MMHG | WEIGHT: 259.7 LBS | RESPIRATION RATE: 22 BRPM | DIASTOLIC BLOOD PRESSURE: 78 MMHG | OXYGEN SATURATION: 98 %

## 2019-09-17 DIAGNOSIS — Z12.11 SPECIAL SCREENING FOR MALIGNANT NEOPLASMS, COLON: ICD-10-CM

## 2019-09-17 PROCEDURE — 88305 TISSUE EXAM BY PATHOLOGIST: CPT | Performed by: PATHOLOGY

## 2019-09-17 PROCEDURE — 45380 COLONOSCOPY AND BIOPSY: CPT | Performed by: INTERNAL MEDICINE

## 2019-09-17 PROCEDURE — 45385 COLONOSCOPY W/LESION REMOVAL: CPT | Performed by: INTERNAL MEDICINE

## 2019-09-17 RX ORDER — SODIUM CHLORIDE, SODIUM LACTATE, POTASSIUM CHLORIDE, CALCIUM CHLORIDE 600; 310; 30; 20 MG/100ML; MG/100ML; MG/100ML; MG/100ML
125 INJECTION, SOLUTION INTRAVENOUS CONTINUOUS
Status: DISCONTINUED | OUTPATIENT
Start: 2019-09-17 | End: 2019-09-17

## 2019-09-17 RX ORDER — LIDOCAINE HYDROCHLORIDE 10 MG/ML
INJECTION, SOLUTION EPIDURAL; INFILTRATION; INTRACAUDAL; PERINEURAL AS NEEDED
Status: DISCONTINUED | OUTPATIENT
Start: 2019-09-17 | End: 2019-09-17 | Stop reason: SURG

## 2019-09-17 RX ORDER — PROPOFOL 10 MG/ML
INJECTION, EMULSION INTRAVENOUS AS NEEDED
Status: DISCONTINUED | OUTPATIENT
Start: 2019-09-17 | End: 2019-09-17 | Stop reason: SURG

## 2019-09-17 RX ADMIN — LIDOCAINE HYDROCHLORIDE 50 MG: 10 INJECTION, SOLUTION EPIDURAL; INFILTRATION; INTRACAUDAL; PERINEURAL at 10:05

## 2019-09-17 RX ADMIN — PROPOFOL 20 MG: 10 INJECTION, EMULSION INTRAVENOUS at 10:08

## 2019-09-17 RX ADMIN — PROPOFOL 20 MG: 10 INJECTION, EMULSION INTRAVENOUS at 10:13

## 2019-09-17 RX ADMIN — PROPOFOL 20 MG: 10 INJECTION, EMULSION INTRAVENOUS at 10:11

## 2019-09-17 RX ADMIN — PROPOFOL 30 MG: 10 INJECTION, EMULSION INTRAVENOUS at 10:16

## 2019-09-17 RX ADMIN — PROPOFOL 100 MG: 10 INJECTION, EMULSION INTRAVENOUS at 10:05

## 2019-09-17 RX ADMIN — SODIUM CHLORIDE, SODIUM LACTATE, POTASSIUM CHLORIDE, AND CALCIUM CHLORIDE: .6; .31; .03; .02 INJECTION, SOLUTION INTRAVENOUS at 10:02

## 2019-09-17 RX ADMIN — PROPOFOL 30 MG: 10 INJECTION, EMULSION INTRAVENOUS at 10:19

## 2019-09-17 RX ADMIN — PROPOFOL 30 MG: 10 INJECTION, EMULSION INTRAVENOUS at 10:14

## 2019-09-17 NOTE — DISCHARGE INSTRUCTIONS
Colonoscopy   WHAT YOU NEED TO KNOW:   A colonoscopy is a procedure to examine the inside of your colon (intestine) with a scope  Polyps or tissue growths may have been removed during your colonoscopy  It is normal to feel bloated and to have some abdominal discomfort  You should be passing gas  If you have hemorrhoids or you had polyps removed, you may have a small amount of bleeding  DISCHARGE INSTRUCTIONS:   Seek care immediately if:   · You have a large amount of bright red blood in your bowel movements  · Your abdomen is hard and firm and you have severe pain  · You have sudden trouble breathing  Contact your healthcare provider if:   · You develop a rash or hives  · You have a fever within 24 hours of your procedure  · You have not had a bowel movement for 3 days after your procedure  · You have questions or concerns about your condition or care  Activity:   · Do not lift, strain, or run  for 3 days after your procedure  · Rest after your procedure  You have been given medicine to relax you  Do not  drive or make important decisions until the day after your procedure  Return to your normal activity as directed  · Relieve gas and discomfort from bloating  by lying on your right side with a heating pad on your abdomen  You may need to take short walks to help the gas move out  Eat small meals until bloating is relieved  If you had polyps removed: For 7 days after your procedure:  · Do not  take aspirin  · Do not  go on long car rides  Help prevent constipation:   · Eat a variety of healthy foods  Healthy foods include fruit, vegetables, whole-grain breads, low-fat dairy products, beans, lean meat, and fish  Ask if you need to be on a special diet  Your healthcare provider may recommend that you eat high-fiber foods such as cooked beans  Fiber helps you have regular bowel movements  · Drink liquids as directed    Adults should drink between 9 and 13 eight-ounce cups of liquid every day  Ask what amount is best for you  For most people, good liquids to drink are water, juice, and milk  · Exercise as directed  Talk to your healthcare provider about the best exercise plan for you  Exercise can help prevent constipation, decrease your blood pressure and improve your health  Follow up with your healthcare provider as directed:  Write down your questions so you remember to ask them during your visits  © 2017 [employer] Information is for End User's use only and may not be sold, redistributed or otherwise used for commercial purposes  All illustrations and images included in CareNotes® are the copyrighted property of [employer]  or [employer]  The above information is an  only  It is not intended as medical advice for individual conditions or treatments  Talk to your doctor, nurse or pharmacist before following any medical regimen to see if it is safe and effective for you

## 2019-09-17 NOTE — ANESTHESIA POSTPROCEDURE EVALUATION
Post-Op Assessment Note    CV Status:  Stable  Pain Score: 0    Pain management: adequate     Mental Status:  Sleepy   Hydration Status:  Stable   PONV Controlled:  None   Airway Patency:  Patent and adequate   Post Op Vitals Reviewed: Yes      Staff: CRNA           /65 (09/17/19 1027)    Temp (!) 97 2 °F (36 2 °C) (09/17/19 1027)    Pulse 64 (09/17/19 1027)   Resp 22 (09/17/19 1027)    SpO2 94 % (09/17/19 1027)

## 2019-09-17 NOTE — ANESTHESIA PREPROCEDURE EVALUATION
Review of Systems/Medical History  Patient summary reviewed  Chart reviewed  No history of anesthetic complications     Cardiovascular  Hypertension ,   Comment: Acute ill-defined cerebrovascular disease,  Pulmonary  Smoker ex-smoker  , COPD , Shortness of breath,        GI/Hepatic       Chronic kidney disease ,        Endo/Other  History of thyroid disease , hypothyroidism,      GYN       Hematology   Musculoskeletal    Comment: Osteochondropathy      Neurology   Psychology   Anxiety, Depression ,     Comment: Panic disorder without agoraphobia with mild panic attacks       PSH:    HYSTERECTOMY CHOLECYSTECTOMY   AUGMENTATION BREAST BREAST LUMPECTOMY W/ NEEDLE LOCALIZATION       Physical Exam    Airway    Mallampati score: II  TM Distance: >3 FB  Neck ROM: full     Dental   No notable dental hx     Cardiovascular  Cardiovascular exam normal    Pulmonary  Pulmonary exam normal Breath sounds clear to auscultation,     Other Findings        Anesthesia Plan  ASA Score- 3     Anesthesia Type- IV sedation with anesthesia with ASA Monitors  Additional Monitors:   Airway Plan:         Plan Factors- Patient instructed to abstain from smoking on day of procedure       Induction- intravenous  Postoperative Plan-     Informed Consent- Anesthetic plan and risks discussed with patient  I personally reviewed this patient with the CRNA  Discussed and agreed on the Anesthesia Plan with the CRNA             Lab Results   Component Value Date    WBC 5 14 03/22/2019    HGB 13 5 03/22/2019    HCT 42 6 03/22/2019    MCV 85 03/22/2019     03/22/2019     Lab Results   Component Value Date    CALCIUM 9 0 03/22/2019    K 4 1 03/22/2019    CO2 28 03/22/2019     03/22/2019    BUN 18 03/22/2019    CREATININE 1 24 03/22/2019         Discussed with pt the benefits/alternatives and risks or General Anesthesia including breathing tube remaining in place if not strong enough, PONV, damage to lips and teeth, sore throat, eye injury or blindness    I, Dr Jorge Orozco, the attending physician, have personally seen and evaluated the patient prior to anesthetic care  I have reviewed the pre-anesthetic record, and other medical records if appropriate to the anesthetic care  If a CRNA is involved in the case, I have reviewed the CRNA assessment, if present, and agree  The patient is in a suitable condition to proceed with my formulated anesthetic plan

## 2019-09-17 NOTE — H&P
History and Physical -  [facility]  Sherl Dakins 75 y o  female MRN: [id number]      HPI: Sherl Dakins is a 75y o  year old female who presents for screening colonoscopy      REVIEW OF SYSTEMS: Per the HPI, and otherwise unremarkable      Historical Information   Past Medical History:   Diagnosis Date    Acute ill-defined cerebrovascular disease     Benign essential tremor     Chronic kidney disease     Colon polyp     COPD (chronic obstructive pulmonary disease) (HCC)     Depressive disorder     Disease of thyroid gland     Heart murmur     Hypertension     Impaired fasting glucose     Intention tremor     last assessed: 8/18/2016    Osteochondropathy     Panic disorder without agoraphobia with mild panic attacks     Psychiatric disorder     anxiety    Stroke [facility])      Past Surgical History:   Procedure Laterality Date    AUGMENTATION BREAST      BREAST LUMPECTOMY W/ NEEDLE LOCALIZATION Left     description: benign last assessed; 8/21/2016    CHOLECYSTECTOMY      COLONOSCOPY      EGD      HYSTERECTOMY       Social History   Social History     Substance and Sexual Activity   Alcohol Use Yes    Comment: socially - Per allscripts - denies alcohol consumption     Social History     Substance and Sexual Activity   Drug Use No     Social History     Tobacco Use   Smoking Status Former Smoker   Smokeless Tobacco Never Used   Tobacco Comment    10 years ago     Family History   Problem Relation Age of Onset    Alcohol abuse Mother     Asthma Mother     Cancer Mother     Mental illness Mother     Osteoarthritis Mother     Gout Father     Cancer Father     Other Sister         carotid arterial disease    COPD Sister     Hyperlipidemia Sister     Hypertension Sister     Heart attack Sister        Meds/Allergies       (Not in a hospital admission)    Allergies   Allergen Reactions    Amoxicillin     Codeine     Morphine        Objective     Blood pressure 148/87, pulse 69, temperature 97 7 °F (36 5 °C), temperature source Oral, resp  rate 19, height 5' 3" (1 6 m), weight 118 kg (259 lb 11 2 oz), SpO2 95 %  PHYSICAL EXAM    Gen: NAD  CV: RRR  CHEST: Clear  ABD: soft, NT/ND  EXT: no edema      ASSESSMENT/PLAN:  This is a 75y o  year old female here for screening:  The, and she is stable and optimized for her procedure

## 2019-09-26 DIAGNOSIS — I10 ESSENTIAL HYPERTENSION: ICD-10-CM

## 2019-09-26 RX ORDER — LISINOPRIL 20 MG/1
TABLET ORAL
Qty: 90 TABLET | Refills: 0 | Status: SHIPPED | OUTPATIENT
Start: 2019-09-26 | End: 2019-12-29 | Stop reason: SDUPTHER

## 2019-09-28 DIAGNOSIS — K21.00 REFLUX ESOPHAGITIS: ICD-10-CM

## 2019-09-30 RX ORDER — DEXLANSOPRAZOLE 60 MG/1
1 CAPSULE, DELAYED RELEASE ORAL
Qty: 90 CAPSULE | Refills: 0 | Status: SHIPPED | OUTPATIENT
Start: 2019-09-30 | End: 2020-01-07 | Stop reason: SDUPTHER

## 2019-11-05 ENCOUNTER — IMMUNIZATIONS (OUTPATIENT)
Dept: FAMILY MEDICINE CLINIC | Facility: CLINIC | Age: 74
End: 2019-11-05
Payer: MEDICARE

## 2019-11-05 DIAGNOSIS — Z23 NEED FOR INFLUENZA VACCINATION: Primary | ICD-10-CM

## 2019-11-05 PROCEDURE — 90662 IIV NO PRSV INCREASED AG IM: CPT

## 2019-11-05 PROCEDURE — G0008 ADMIN INFLUENZA VIRUS VAC: HCPCS

## 2019-11-19 ENCOUNTER — HOSPITAL ENCOUNTER (OUTPATIENT)
Dept: MAMMOGRAPHY | Facility: CLINIC | Age: 74
Discharge: HOME | End: 2019-11-19
Payer: MEDICARE

## 2019-11-19 ENCOUNTER — TRANSCRIBE ORDERS (OUTPATIENT)
Dept: LAB | Facility: CLINIC | Age: 74
End: 2019-11-19

## 2019-11-19 DIAGNOSIS — Z12.31 SCREENING MAMMOGRAM FOR HIGH-RISK PATIENT: Primary | ICD-10-CM

## 2019-11-19 DIAGNOSIS — Z12.31 ENCOUNTER FOR SCREENING MAMMOGRAM FOR BREAST CANCER: ICD-10-CM

## 2019-11-19 DIAGNOSIS — Z78.0 ASYMPTOMATIC POSTMENOPAUSAL STATE: ICD-10-CM

## 2019-11-19 PROCEDURE — 77080 DXA BONE DENSITY AXIAL: CPT

## 2019-11-26 ENCOUNTER — HOSPITAL ENCOUNTER (OUTPATIENT)
Dept: MAMMOGRAPHY | Facility: CLINIC | Age: 74
Discharge: HOME | End: 2019-11-26
Payer: MEDICARE

## 2019-11-26 VITALS — HEIGHT: 63 IN | BODY MASS INDEX: 45.89 KG/M2 | WEIGHT: 259 LBS

## 2019-11-26 PROCEDURE — 77063 BREAST TOMOSYNTHESIS BI: CPT

## 2019-11-26 PROCEDURE — 77067 SCR MAMMO BI INCL CAD: CPT

## 2019-11-29 ENCOUNTER — TELEPHONE (OUTPATIENT)
Dept: FAMILY MEDICINE CLINIC | Facility: CLINIC | Age: 74
End: 2019-11-29

## 2019-11-29 NOTE — TELEPHONE ENCOUNTER
----- Message from [address] [place] [address]  sent at 11/29/2019  7:23 AM EST -----  Mammo is wnl   Repeat yearly

## 2019-12-27 DIAGNOSIS — I10 ESSENTIAL HYPERTENSION: ICD-10-CM

## 2019-12-29 RX ORDER — LISINOPRIL 20 MG/1
TABLET ORAL
Qty: 90 TABLET | Refills: 0 | Status: SHIPPED | OUTPATIENT
Start: 2019-12-29 | End: 2020-03-30 | Stop reason: SDUPTHER

## 2020-01-07 DIAGNOSIS — K21.00 REFLUX ESOPHAGITIS: ICD-10-CM

## 2020-01-09 RX ORDER — DEXLANSOPRAZOLE 60 MG/1
1 CAPSULE, DELAYED RELEASE ORAL
Qty: 90 CAPSULE | Refills: 0 | Status: SHIPPED | OUTPATIENT
Start: 2020-01-09 | End: 2020-03-03 | Stop reason: SDUPTHER

## 2020-01-29 ENCOUNTER — TELEPHONE (OUTPATIENT)
Dept: FAMILY MEDICINE CLINIC | Facility: CLINIC | Age: 75
End: 2020-01-29

## 2020-01-29 NOTE — TELEPHONE ENCOUNTER
I made her an apt for 02/25/20  She would like blood work put in her chart that she can get done before her visit  She was saying she thinks she might have a thyroid issue

## 2020-01-30 DIAGNOSIS — I10 ESSENTIAL HYPERTENSION: ICD-10-CM

## 2020-01-30 DIAGNOSIS — Z13.220 SCREENING, LIPID: ICD-10-CM

## 2020-01-30 DIAGNOSIS — E66.01 MORBID OBESITY (HCC): ICD-10-CM

## 2020-01-30 DIAGNOSIS — R79.89 ELEVATED TSH: Primary | ICD-10-CM

## 2020-02-24 ENCOUNTER — APPOINTMENT (OUTPATIENT)
Dept: LAB | Facility: HOSPITAL | Age: 75
End: 2020-02-24
Payer: MEDICARE

## 2020-02-24 DIAGNOSIS — Z13.220 SCREENING, LIPID: ICD-10-CM

## 2020-02-24 DIAGNOSIS — R79.89 ELEVATED TSH: ICD-10-CM

## 2020-02-24 LAB
ALBUMIN SERPL BCP-MCNC: 3.2 G/DL (ref 3.5–5)
ALP SERPL-CCNC: 92 U/L (ref 46–116)
ALT SERPL W P-5'-P-CCNC: 18 U/L (ref 12–78)
ANION GAP SERPL CALCULATED.3IONS-SCNC: 9 MMOL/L (ref 4–13)
AST SERPL W P-5'-P-CCNC: 12 U/L (ref 5–45)
BILIRUB SERPL-MCNC: 0.5 MG/DL (ref 0.2–1)
BUN SERPL-MCNC: 17 MG/DL (ref 5–25)
CALCIUM SERPL-MCNC: 8.6 MG/DL (ref 8.3–10.1)
CHLORIDE SERPL-SCNC: 107 MMOL/L (ref 100–108)
CHOLEST SERPL-MCNC: 171 MG/DL (ref 50–200)
CO2 SERPL-SCNC: 28 MMOL/L (ref 21–32)
CREAT SERPL-MCNC: 1.36 MG/DL (ref 0.6–1.3)
GFR SERPL CREATININE-BSD FRML MDRD: 38 ML/MIN/1.73SQ M
GLUCOSE P FAST SERPL-MCNC: 99 MG/DL (ref 65–99)
HDLC SERPL-MCNC: 41 MG/DL
LDLC SERPL CALC-MCNC: 105 MG/DL (ref 0–100)
NONHDLC SERPL-MCNC: 130 MG/DL
POTASSIUM SERPL-SCNC: 3.4 MMOL/L (ref 3.5–5.3)
PROT SERPL-MCNC: 6.9 G/DL (ref 6.4–8.2)
SODIUM SERPL-SCNC: 144 MMOL/L (ref 136–145)
TRIGL SERPL-MCNC: 126 MG/DL
TSH SERPL DL<=0.05 MIU/L-ACNC: 3.56 UIU/ML (ref 0.36–3.74)

## 2020-02-24 PROCEDURE — 36415 COLL VENOUS BLD VENIPUNCTURE: CPT

## 2020-02-24 PROCEDURE — 84443 ASSAY THYROID STIM HORMONE: CPT

## 2020-02-24 PROCEDURE — 80061 LIPID PANEL: CPT

## 2020-02-24 PROCEDURE — 80053 COMPREHEN METABOLIC PANEL: CPT

## 2020-02-25 ENCOUNTER — OFFICE VISIT (OUTPATIENT)
Dept: FAMILY MEDICINE CLINIC | Facility: CLINIC | Age: 75
End: 2020-02-25
Payer: MEDICARE

## 2020-02-25 VITALS
DIASTOLIC BLOOD PRESSURE: 72 MMHG | RESPIRATION RATE: 20 BRPM | BODY MASS INDEX: 46.42 KG/M2 | HEART RATE: 96 BPM | WEIGHT: 262 LBS | HEIGHT: 63 IN | SYSTOLIC BLOOD PRESSURE: 118 MMHG | OXYGEN SATURATION: 97 % | TEMPERATURE: 98.6 F

## 2020-02-25 DIAGNOSIS — E03.9 HYPOTHYROIDISM, UNSPECIFIED TYPE: Primary | ICD-10-CM

## 2020-02-25 DIAGNOSIS — R05.9 COUGH: ICD-10-CM

## 2020-02-25 DIAGNOSIS — R53.83 OTHER FATIGUE: ICD-10-CM

## 2020-02-25 DIAGNOSIS — N18.30 CKD (CHRONIC KIDNEY DISEASE) STAGE 3, GFR 30-59 ML/MIN (HCC): ICD-10-CM

## 2020-02-25 DIAGNOSIS — E78.2 MIXED HYPERLIPIDEMIA: ICD-10-CM

## 2020-02-25 DIAGNOSIS — Z85.3 HISTORY OF BREAST CANCER: ICD-10-CM

## 2020-02-25 DIAGNOSIS — E87.6 HYPOKALEMIA: ICD-10-CM

## 2020-02-25 DIAGNOSIS — E66.01 MORBID OBESITY WITH BMI OF 45.0-49.9, ADULT (HCC): ICD-10-CM

## 2020-02-25 PROCEDURE — 3074F SYST BP LT 130 MM HG: CPT | Performed by: FAMILY MEDICINE

## 2020-02-25 PROCEDURE — 1036F TOBACCO NON-USER: CPT | Performed by: FAMILY MEDICINE

## 2020-02-25 PROCEDURE — 4040F PNEUMOC VAC/ADMIN/RCVD: CPT | Performed by: FAMILY MEDICINE

## 2020-02-25 PROCEDURE — 3078F DIAST BP <80 MM HG: CPT | Performed by: FAMILY MEDICINE

## 2020-02-25 PROCEDURE — 99214 OFFICE O/P EST MOD 30 MIN: CPT | Performed by: FAMILY MEDICINE

## 2020-02-25 PROCEDURE — 3008F BODY MASS INDEX DOCD: CPT | Performed by: FAMILY MEDICINE

## 2020-02-25 PROCEDURE — 1160F RVW MEDS BY RX/DR IN RCRD: CPT | Performed by: FAMILY MEDICINE

## 2020-02-25 RX ORDER — ROSUVASTATIN CALCIUM 10 MG/1
10 TABLET, COATED ORAL DAILY
Qty: 30 TABLET | Refills: 1 | Status: SHIPPED | OUTPATIENT
Start: 2020-02-25 | End: 2020-04-27 | Stop reason: SDUPTHER

## 2020-02-25 RX ORDER — POTASSIUM CHLORIDE 20 MEQ/1
20 TABLET, EXTENDED RELEASE ORAL DAILY
Qty: 30 TABLET | Refills: 3 | Status: SHIPPED | OUTPATIENT
Start: 2020-02-25 | End: 2020-02-27 | Stop reason: SDUPTHER

## 2020-02-25 NOTE — ASSESSMENT & PLAN NOTE
Pt has not followed with hematology oncology for years  Pt has cough and new onset sweating  Will get cxr

## 2020-02-25 NOTE — PROGRESS NOTES
BMI Counseling: Body mass index is 46 41 kg/m²  The BMI is above normal  Nutrition recommendations include decreasing portion sizes, encouraging healthy choices of fruits and vegetables, decreasing fast food intake, consuming healthier snacks, limiting drinks that contain sugar and moderation in carbohydrate intake  Exercise recommendations include moderate physical activity 150 minutes/week  No pharmacotherapy was ordered  Assessment/Plan:     Chronic Problems:  Hypothyroidism  TSH wnl  Continue the current dose of meds  Mixed hyperlipidemia  Will start rosuvastatin 10 mg daily and recheck labs in 3 months  History of breast cancer  Pt has not followed with hematology oncology for years  Pt has cough and new onset sweating  Will get cxr  CKD (chronic kidney disease) stage 3, GFR 30-59 ml/min  Advised pt she needs to hydrate  Visit Diagnosis:  Diagnoses and all orders for this visit:    Hypothyroidism, unspecified type    Mixed hyperlipidemia  -     rosuvastatin (CRESTOR) 10 MG tablet; Take 1 tablet (10 mg total) by mouth daily  -     AST; Future  -     ALT; Future  -     LDL cholesterol, direct; Future    Other fatigue  Comments: Will check cbc and iron studies  Orders:  -     CBC and differential; Future  -     Iron, TIBC and Ferritin Panel; Future  -     Iron Saturation %; Future  -     XR chest pa & lateral; Future    History of breast cancer  -     XR chest pa & lateral; Future    Cough  -     XR chest pa & lateral; Future    Morbid obesity with BMI of 45 0-49 9, adult (HCC)    Hypokalemia  -     potassium chloride (K-DUR,KLOR-CON) 20 mEq tablet; Take 1 tablet (20 mEq total) by mouth daily  -     Basic metabolic panel; Future    CKD (chronic kidney disease) stage 3, GFR 30-59 ml/min (HCC)    Other orders  -     Cholecalciferol (VITAMIN D3 PO); Take by mouth          Subjective:    Patient ID: Reese Baldwin is a 75 y o  female  Pt is here for routine f/u appt   C/O hot flashes that started about 1 1/2 weeks ago  Worried about her thyroid as all she does is sleep  Took a pill on Friday and did not wake up until Monday  Feels well other than just tired  Had dexa done and told to take d3 for the bones  Takes all other meds as directed  No side effects noted  Had labs done and here to discuss results  The following portions of the patient's history were reviewed and updated as appropriate: allergies, current medications, past family history, past medical history, past social history, past surgical history and problem list     Review of Systems   Constitutional: Positive for fatigue  Negative for chills, diaphoresis (but feels one minute she has a fever and the next minute she is fine  ) and fever  HENT: Negative for congestion, ear pain, sinus pressure, sinus pain, sneezing and sore throat  Eyes: Positive for visual disturbance  Told she has a cataract in the right eye  Respiratory: Positive for cough and wheezing (if she exerts herself a lot  )  Negative for shortness of breath  Cardiovascular: Negative for chest pain and palpitations  Gastrointestinal: Negative for abdominal pain, constipation, diarrhea, nausea and vomiting  Genitourinary: Positive for urgency (needs to wear a pad at night  )  Negative for dysuria and frequency  Musculoskeletal: Negative for arthralgias and myalgias  Neurological: Positive for headaches (when she forgets to eat and sleeps all day  )  Negative for dizziness and light-headedness  Psychiatric/Behavioral: Positive for sleep disturbance (sleeps too much  )  Negative for dysphoric mood  The patient is not nervous/anxious  Thinks she is doing ok on her meds  Just worries about paying her bills            /72   Pulse 96   Temp 98 6 °F (37 °C) (Tympanic)   Resp 20   Ht 5' 3" (1 6 m)   Wt 119 kg (262 lb)   SpO2 97%   BMI 46 41 kg/m²   Social History     Socioeconomic History    Marital status: Single Spouse name: Not on file    Number of children: Not on file    Years of education: Not on file    Highest education level: Not on file   Occupational History    Occupation: Retired   Social Needs    Financial resource strain: Not on file    Food insecurity:     Worry: Not on file     Inability: Not on file   [employer] needs:     Medical: Not on file     Non-medical: Not on file   Tobacco Use    Smoking status: Former Smoker    Smokeless tobacco: Never Used    Tobacco comment: 10 years ago   Substance and Sexual Activity    Alcohol use: Yes     Comment: socially - Per allscripts - denies alcohol consumption    Drug use: No    Sexual activity: Not on file   Lifestyle    Physical activity:     Days per week: Not on file     Minutes per session: Not on file    Stress: Not on file   Relationships    Social connections:     Talks on phone: Not on file     Gets together: Not on file     Attends [employer] service: Not on file     Active member of club or organization: Not on file     Attends meetings of clubs or organizations: Not on file     Relationship status: Not on file    Intimate partner violence:     Fear of current or ex partner: Not on file     Emotionally abused: Not on file     Physically abused: Not on file     Forced sexual activity: Not on file   Other Topics Concern    Not on file   Social History Narrative         Always uses seat belt     Past Medical History:   Diagnosis Date    Acute ill-defined cerebrovascular disease     Benign essential tremor     Breast cancer ([facility] [address] )     pt states doesn't remember the year thinks it might have been on the right      Chronic kidney disease     Colon polyp     COPD (chronic obstructive pulmonary disease) (HCC)     Depressive disorder     Disease of thyroid gland     Heart murmur     Hypertension     Impaired fasting glucose     Intention tremor     last assessed: 8/18/2016    Osteochondropathy     Panic disorder without agoraphobia with mild panic attacks     Psychiatric disorder     anxiety    Stroke ([facility] [address] )     Tubular adenoma of colon      Family History   Problem Relation Age of Onset    Alcohol abuse Mother     Asthma Mother     Cancer Mother     Mental illness Mother     Osteoarthritis Mother     Gout Father     Cancer Father     Other Sister         carotid arterial disease    COPD Sister     Hyperlipidemia Sister     Hypertension Sister     Heart attack Sister     Breast cancer Sister     No Known Problems Daughter     No Known Problems Maternal Grandmother     No Known Problems Paternal Grandmother     Lung cancer Sister     No Known Problems Sister     No Known Problems Daughter     No Known Problems Daughter      Past Surgical History:   Procedure Laterality Date    AUGMENTATION BREAST      pt states during mammo she doesn't have any other surgery than the 2 biopsy    BREAST BIOPSY Left     pt doesn't recall when and believes the neg biop was on left    BREAST LUMPECTOMY W/ NEEDLE LOCALIZATION Left     description: benign last assessed; 8/21/2016    CHOLECYSTECTOMY      COLONOSCOPY      EGD      HYSTERECTOMY         Current Outpatient Medications:     amLODIPine (NORVASC) 10 mg tablet, Take 1 tablet (10 mg total) by mouth daily, Disp: 90 tablet, Rfl: 1    aspirin (ECOTRIN LOW STRENGTH) 81 mg EC tablet, Take 81 mg by mouth daily, Disp: , Rfl:     Cholecalciferol (VITAMIN D3 PO), Take by mouth, Disp: , Rfl:     dexlansoprazole (DEXILANT) 60 MG capsule, Take 1 capsule (60 mg total) by mouth daily before breakfast, Disp: 90 capsule, Rfl: 0    FLUoxetine (PROzac) 40 MG capsule, Take 1 capsule (40 mg total) by mouth daily, Disp: 90 capsule, Rfl: 1    levothyroxine 50 mcg tablet, TAKE 1 TABLET(50 MCG) BY MOUTH DAILY, Disp: 90 tablet, Rfl: 3    lisinopril (ZESTRIL) 20 mg tablet, TAKE 1 TABLET BY MOUTH EVERY DAY, Disp: 90 tablet, Rfl: 0    potassium chloride (K-DUR,KLOR-CON) 20 mEq tablet, Take 1 tablet (20 mEq total) by mouth daily, Disp: 30 tablet, Rfl: 3    rosuvastatin (CRESTOR) 10 MG tablet, Take 1 tablet (10 mg total) by mouth daily, Disp: 30 tablet, Rfl: 1    Allergies   Allergen Reactions    Amoxicillin     Codeine     Morphine           Lab Review   Appointment on 02/24/2020   Component Date Value    Sodium 02/24/2020 144     Potassium 02/24/2020 3 4*    Chloride 02/24/2020 107     CO2 02/24/2020 28     ANION GAP 02/24/2020 9     BUN 02/24/2020 17     Creatinine 02/24/2020 1 36*    Glucose, Fasting 02/24/2020 99     Calcium 02/24/2020 8 6     AST 02/24/2020 12     ALT 02/24/2020 18     Alkaline Phosphatase 02/24/2020 92     Total Protein 02/24/2020 6 9     Albumin 02/24/2020 3 2*    Total Bilirubin 02/24/2020 0 50     eGFR 02/24/2020 38     Cholesterol 02/24/2020 171     Triglycerides 02/24/2020 126     HDL, Direct 02/24/2020 41     LDL Calculated 02/24/2020 105*    Non-HDL-Chol (CHOL-HDL) 02/24/2020 130     TSH 3RD GENERATON 02/24/2020 3 562         Imaging: No results found  Objective:     Physical Exam   Constitutional: She is oriented to person, place, and time  She appears well-developed and well-nourished  No distress  HENT:   Head: Normocephalic and atraumatic  Right Ear: External ear normal    Left Ear: External ear normal    Mouth/Throat: Oropharynx is clear and moist    Eyes: Pupils are equal, round, and reactive to light  Conjunctivae and EOM are normal  Right eye exhibits no discharge  Left eye exhibits no discharge  Neck: Normal range of motion  Neck supple  No thyromegaly present  Cardiovascular: Normal rate, regular rhythm and normal heart sounds  No murmur heard  Pulmonary/Chest: Effort normal and breath sounds normal  No respiratory distress  She has no wheezes  She has no rales  She exhibits no tenderness  Abdominal: Soft  Bowel sounds are normal  There is no tenderness  Abdomen is morbidly obese      Musculoskeletal: Normal range of motion  She exhibits no edema, tenderness or deformity  Lymphadenopathy:     She has no cervical adenopathy  Neurological: She is alert and oriented to person, place, and time  No cranial nerve deficit  Pt has mild tremor  Skin: Skin is warm and dry  No rash noted  She is not diaphoretic  Psychiatric: She has a normal mood and affect  Her behavior is normal  Judgment and thought content normal          Patient Instructions   Discussed all with patient  Please have the CBC iron studies and the BMP repeated after you start your potassium and stay on your potassium for 1 week  Start the rosuvastatin which is the new cholesterol medication at night and repeat that blood work in 6 weeks I wrote it on the slip  Please have the chest x-ray done I will call with results  You do have a history of breast cancer and have not been followed by oncology for a while and have a minor cough  Continue current medications  Try to work on the weight but you do need protein in your diet as your protein level is low  Since you do not eat foods with potassium specifically bananas and you 10 to run low I am adding potassium daily to your medications  Follow-up here in about 4 months  Let me know if the fatigue continues or gets worse we may have to do more of an extensive workup  Obesity   AMBULATORY CARE:   Obesity  is when your body mass index (BMI) is greater than 30  Your healthcare provider will use your height and weight to measure your BMI  The risks of obesity include  many health problems, such as injuries or physical disability  You may need tests to check for the following:  · Diabetes     · High blood pressure or high cholesterol     · Heart disease     · Gallbladder or liver disease     · Cancer of the colon, breast, prostate, liver, or kidney     · Sleep apnea     · Arthritis or gout  Seek care immediately if:   · You have a severe headache, confusion, or difficulty speaking       · You have weakness on one side of your body  · You have chest pain, sweating, or shortness of breath  Contact your healthcare provider if:   · You have symptoms of gallbladder or liver disease, such as pain in your upper abdomen  · You have knee or hip pain and discomfort while walking  · You have symptoms of diabetes, such as intense hunger and thirst, and frequent urination  · You have symptoms of sleep apnea, such as snoring or daytime sleepiness  · You have questions or concerns about your condition or care  Treatment for obesity  focuses on helping you lose weight to improve your health  Even a small decrease in BMI can reduce the risk for many health problems  Your healthcare provider will help you set a weight-loss goal   · Lifestyle changes  are the first step in treating obesity  These include making healthy food choices and getting regular physical activity  Your healthcare provider may suggest a weight-loss program that involves coaching, education, and therapy  · Medicine  may help you lose weight when it is used with a healthy diet and physical activity  · Surgery  can help you lose weight if you are very obese and have other health problems  There are several types of weight-loss surgery  Ask your healthcare provider for more information  Be successful losing weight:   · Set small, realistic goals  An example of a small goal is to walk for 20 minutes 5 days a week  Anther goal is to lose 5% of your body weight  · Tell friends, family members, and coworkers about your goals  and ask for their support  Ask a friend to lose weight with you, or join a weight-loss support group  · Identify foods or triggers that may cause you to overeat , and find ways to avoid them  Remove tempting high-calorie foods from your home and workplace  Place a bowl of fresh fruit on your kitchen counter  If stress causes you to eat, then find other ways to cope with stress       · Keep a diary to track what you eat and drink  Also write down how many minutes of physical activity you do each day  Weigh yourself once a week and record it in your diary  Eating changes: You will need to eat 500 to 1,000 fewer calories each day than you currently eat to lose 1 to 2 pounds a week  The following changes will help you cut calories:  · Eat smaller portions  Use small plates, no larger than 9 inches in diameter  Fill your plate half full of fruits and vegetables  Measure your food using measuring cups until you know what a serving size looks like  · Eat 3 meals and 1 or 2 snacks each day  Plan your meals in advance  Cherie Guadarrama and eat at home most of the time  Eat slowly  · Eat fruits and vegetables at every meal   They are low in calories and high in fiber, which makes you feel full  Do not add butter, margarine, or cream sauce to vegetables  Use herbs to season steamed vegetables  · Eat less fat and fewer fried foods  Eat more baked or grilled chicken and fish  These protein sources are lower in calories and fat than red meat  Limit fast food  Dress your salads with olive oil and vinegar instead of bottled dressing  · Limit the amount of sugar you eat  Do not drink sugary beverages  Limit alcohol  Activity changes:  Physical activity is good for your body in many ways  It helps you burn calories and build strong muscles  It decreases stress and depression, and improves your mood  It can also help you sleep better  Talk to your healthcare provider before you begin an exercise program   · Exercise for at least 30 minutes 5 days a week  Start slowly  Set aside time each day for physical activity that you enjoy and that is convenient for you  It is best to do both weight training and an activity that increases your heart rate, such as walking, bicycling, or swimming  · Find ways to be more active  Do yard work and housecleaning  Walk up the stairs instead of using elevators   Spend your leisure time going to events that require walking, such as outdoor festivals or fairs  This extra physical activity can help you lose weight and keep it off  Follow up with your healthcare provider as directed: You may need to meet with a dietitian  Write down your questions so you remember to ask them during your visits  © 2017 [employer] Information is for End User's use only and may not be sold, redistributed or otherwise used for commercial purposes  All illustrations and images included in CareNotes® are the copyrighted property of [employer]  or [employer]  The above information is an  only  It is not intended as medical advice for individual conditions or treatments  Talk to your doctor, nurse or pharmacist before following any medical regimen to see if it is safe and effective for you  Weight Management   AMBULATORY CARE:   Why it is important to manage your weight:  Being overweight increases your risk of health conditions such as heart disease, high blood pressure, type 2 diabetes, and certain types of cancer  It can also increase your risk for osteoarthritis, sleep apnea, and other respiratory problems  Aim for a slow, steady weight loss  Even a small amount of weight loss can lower your risk of health problems  How to lose weight safely:  A safe and healthy way to lose weight is to eat fewer calories and get regular exercise  You can lose up about 1 pound a week by decreasing the number of calories you eat by 500 calories each day  You can decrease calories by eating smaller portion sizes or by cutting out high-calorie foods  Read labels to find out how many calories are in the foods you eat  You can also burn calories with exercise such as walking, swimming, or biking  You will be more likely to keep weight off if you make these changes part of your lifestyle     Healthy meal plan for weight management:  A healthy meal plan includes a variety of foods, contains fewer calories, and helps you stay healthy  A healthy meal plan includes the following:  · Eat whole-grain foods more often  A healthy meal plan should contain fiber  Fiber is the part of grains, fruits, and vegetables that is not broken down by your body  Whole-grain foods are healthy and provide extra fiber in your diet  Some examples of whole-grain foods are whole-wheat breads and pastas, oatmeal, brown rice, and bulgur  · Eat a variety of vegetables every day  Include dark, leafy greens such as spinach, kale, neeraj greens, and mustard greens  Eat yellow and orange vegetables such as carrots, sweet potatoes, and winter squash  · Eat a variety of fruits every day  Choose fresh or canned fruit (canned in its own juice or light syrup) instead of juice  Fruit juice has very little or no fiber  · Eat low-fat dairy foods  Drink fat-free (skim) milk or 1% milk  Eat fat-free yogurt and low-fat cottage cheese  Try low-fat cheeses such as mozzarella and other reduced-fat cheeses  · Choose meat and other protein foods that are low in fat  Choose beans or other legumes such as split peas or lentils  Choose fish, skinless poultry (chicken or turkey), or lean cuts of red meat (beef or pork)  Before you cook meat or poultry, cut off any visible fat  · Use less fat and oil  Try baking foods instead of frying them  Add less fat, such as margarine, sour cream, regular salad dressing and mayonnaise to foods  Eat fewer high-fat foods  Some examples of high-fat foods include french fries, doughnuts, ice cream, and cakes  · Eat fewer sweets  Limit foods and drinks that are high in sugar  This includes candy, cookies, regular soda, and sweetened drinks  Ways to decrease calories:   · Eat smaller portions  ¨ Use a small plate with smaller servings  ¨ Do not eat second helpings  ¨ When you eat at a restaurant, ask for a box and place half of your meal in the box before you eat      ¨ Share an entrée with someone else     · Replace high-calorie snacks with healthy, low-calorie snacks  ¨ Choose fresh fruit, vegetables, fat-free rice cakes, or air-popped popcorn instead of potato chips, nuts, or chocolate  ¨ Choose water or calorie-free drinks instead of soda or sweetened drinks  · Eat regular meals  Skipping meals can lead to overeating later in the day  Eat a healthy snack in place of a meal if you do not have time to eat a regular meal      · Do not shop for groceries when you are hungry  You may be more likely to make unhealthy food choices  Take a grocery list of healthy foods and shop after you have eaten  Exercise:  Exercise at least 30 minutes per day on most days of the week  Some examples of exercise include walking, biking, dancing, and swimming  You can also fit in more physical activity by taking the stairs instead of the elevator or parking farther away from stores  Ask your healthcare provider about the best exercise plan for you  Other things to consider as you try to lose weight:   · Be aware of situations that may give you the urge to overeat, such as eating while watching television  Find ways to avoid these situations  For example, read a book, go for a walk, or do crafts  · Meet with a weight loss support group or friends who are also trying to lose weight  This may help you stay motivated to continue working on your weight loss goals  © 2017 [employer] Information is for End User's use only and may not be sold, redistributed or otherwise used for commercial purposes  All illustrations and images included in CareNotes® are the copyrighted property of [employer]  or [employer]  The above information is an  only  It is not intended as medical advice for individual conditions or treatments  Talk to your doctor, nurse or pharmacist before following any medical regimen to see if it is safe and effective for you      Low Fat Diet   AMBULATORY CARE:   A low-fat diet  is an eating plan that is low in total fat, unhealthy fat, and cholesterol  You may need to follow a low-fat diet if you have trouble digesting or absorbing fat  You may also need to follow this diet if you have high cholesterol  You can also lower your cholesterol by increasing the amount of fiber in your diet  Soluble fiber is a type of fiber that helps to decrease cholesterol levels  Different types of fat in food:   · Limit unhealthy fats  A diet that is high in cholesterol, saturated fat, and trans fat may cause unhealthy cholesterol levels  Unhealthy cholesterol levels increase your risk of heart disease  ¨ Cholesterol:  Limit intake of cholesterol to less than 200 mg per day  Cholesterol is found in meat, eggs, and dairy  ¨ Saturated fat:  Limit saturated fat to less than 7% of your total daily calories  Ask your dietitian how many calories you need each day  Saturated fat is found in butter, cheese, ice cream, whole milk, and palm oil  Saturated fat is also found in meat, such as beef, pork, chicken skin, and processed meats  Processed meats include sausage, hot dogs, and bologna  ¨ Trans fat:  Avoid trans fat as much as possible  Trans fat is used in fried and baked foods  Foods that say trans fat free on the label may still have up to 0 5 grams of trans fat per serving  · Include healthy fats  Replace foods that are high in saturated and trans fat with foods high in healthy fats  This may help to decrease high cholesterol levels  ¨ Monounsaturated fats: These are found in avocados, nuts, and vegetable oils, such as olive, canola, and sunflower oil  ¨ Polyunsaturated fats: These can be found in vegetable oils, such as soybean or corn oil  Omega-3 fats can help to decrease the risk of heart disease  Omega-3 fats are found in fish, such as salmon, herring, trout, and tuna   Omega-3 fats can also be found in plant foods, such as walnuts, flaxseed, soybeans, and canola oil    Foods to limit or avoid:   · Grains:      ¨ Snacks that are made with partially hydrogenated oils, such as chips, regular crackers, and butter-flavored popcorn    ¨ High-fat baked goods, such as biscuits, croissants, doughnuts, pies, cookies, and pastries    · Dairy:      ¨ Whole milk, 2% milk, and yogurt and ice cream made with whole milk    ¨ Half and half creamer, heavy cream, and whipping cream    ¨ Cheese, cream cheese, and sour cream    · Meats and proteins:      ¨ High-fat cuts of meat (T-bone steak, regular hamburger, and ribs)    ¨ Fried meat, poultry (turkey and chicken), and fish    ¨ Poultry (chicken and turkey) with skin    ¨ Cold cuts (salami or bologna), hot dogs, umanzor, and sausage    ¨ Whole eggs and egg yolks    · Vegetables and fruits with added fat:      ¨ Fried vegetables or vegetables in butter or high-fat sauces, such as cream or cheese sauces    ¨ Fried fruit or fruit served with butter or cream    · Fats:      ¨ Butter, stick margarine, and shortening    ¨ Coconut, palm oil, and palm kernel oil  Foods to include:   · Grains:      ¨ Whole-grain breads, cereals, pasta, and brown rice    ¨ Low-fat crackers and pretzels    · Vegetables and fruits:      ¨ Fresh, frozen, or canned vegetables (no salt or low-sodium)    ¨ Fresh, frozen, dried, or canned fruit (canned in light syrup or fruit juice)    ¨ Avocado    · Low-fat dairy products:      ¨ Nonfat (skim) or 1% milk    ¨ Nonfat or low-fat cheese, yogurt, and cottage cheese    · Meats and proteins:      ¨ Chicken or turkey with no skin    ¨ Baked or broiled fish    ¨ Lean beef and pork (loin, round, extra lean hamburger)    ¨ Beans and peas, unsalted nuts, soy products    ¨ Egg whites and substitutes    ¨ Seeds and nuts    · Fats:      ¨ Unsaturated oil, such as canola, olive, peanut, soybean, or sunflower oil    ¨ Soft or liquid margarine and vegetable oil spread    ¨ Low-fat salad dressing  Other ways to decrease fat:   · Read food labels before you buy foods  Choose foods that have less than 30% of calories from fat  Choose low-fat or fat-free dairy products  Remember that fat free does not mean calorie free  These foods still contain calories, and too many calories can lead to weight gain  · Trim fat from meat and avoid fried food  Trim all visible fat from meat before you cook it  Remove the skin from poultry  Do not fermin meat, fish, or poultry  Bake, roast, boil, or broil these foods instead  Avoid fried foods  Eat a baked potato instead of Western [place] fries  Steam vegetables instead of sautéing them in butter  · Add less fat to foods  Use imitation umanzor bits on salads and baked potatoes instead of regular umanzor bits  Use fat-free or low-fat salad dressings instead of regular dressings  Use low-fat or nonfat butter-flavored topping instead of regular butter or margarine on popcorn and other foods  Ways to decrease fat in recipes:  Replace high-fat ingredients with low-fat or nonfat ones  This may cause baked goods to be drier than usual  You may need to use nonfat cooking spray on pans to prevent food from sticking  You also may need to change the amount of other ingredients, such as water, in the recipe  Try the following:  · Use low-fat or light margarine instead of regular margarine or shortening  · Use lean ground turkey breast or chicken, or lean ground beef (less than 5% fat) instead of hamburger  · Add 1 teaspoon of canola oil to 8 ounces of skim milk instead of using cream or half and half  · Use grated zucchini, carrots, or apples in breads instead of coconut  · Use blenderized, low-fat cottage cheese, plain tofu, or low-fat ricotta cheese instead of cream cheese  · Use 1 egg white and 1 teaspoon of canola oil, or use ¼ cup (2 ounces) of fat-free egg substitute instead of a whole egg  · Replace half of the oil that is called for in a recipe with applesauce when you bake   Use 3 tablespoons of cocoa powder and 1 tablespoon of canola oil instead of a square of baking chocolate  How to increase fiber:  Eat enough high-fiber foods to get 20 to 30 grams of fiber every day  Slowly increase your fiber intake to avoid stomach cramps, gas, and other problems  · Eat 3 ounces of whole-grain foods each day  An ounce is about 1 slice of bread  Eat whole-grain breads, such as whole-wheat bread  Whole wheat, whole-wheat flour, or other whole grains should be listed as the first ingredient on the food label  Replace white flour with whole-grain flour or use half of each in recipes  Whole-grain flour is heavier than white flour, so you may have to add more yeast or baking powder  · Eat a high-fiber cereal for breakfast   Oatmeal is a good source of soluble fiber  Look for cereals that have bran or fiber in the name  Choose whole-grain products, such as brown rice, barley, and whole-wheat pasta  · Eat more beans, peas, and lentils  For example, add beans to soups or salads  Eat at least 5 cups of fruits and vegetables each day  Eat fruits and vegetables with the peel because the peel is high in fiber  © 2017 [employer]  Information is for End User's use only and may not be sold, redistributed or otherwise used for commercial purposes  All illustrations and images included in CareNotes® are the copyrighted property of [employer]  or Sammy Yan  The above information is an  only  It is not intended as medical advice for individual conditions or treatments  Talk to your doctor, nurse or pharmacist before following any medical regimen to see if it is safe and effective for you  Heart Healthy Diet   AMBULATORY CARE:   A heart healthy diet  is an eating plan low in total fat, unhealthy fats, and sodium (salt)  A heart healthy diet helps decrease your risk for heart disease and stroke  Limit the amount of fat you eat to 25% to 35% of your total daily calories   Limit sodium to less than 2,300 mg each day  Healthy fats:  Healthy fats can help improve cholesterol levels  The risk for heart disease is decreased when cholesterol levels are normal  Choose healthy fats, such as the following:  · Unsaturated fat  is found in foods such as soybean, canola, olive, corn, and safflower oils  It is also found in soft tub margarine that is made with liquid vegetable oil  · Omega-3 fat  is found in certain fish, such as salmon, tuna, and trout, and in walnuts and flaxseed  Unhealthy fats:  Unhealthy fats can cause unhealthy cholesterol levels in your blood and increase your risk of heart disease  Limit unhealthy fats, such as the following:  · Cholesterol  is found in animal foods, such as eggs and lobster, and in dairy products made from whole milk  Limit cholesterol to less than 300 milligrams (mg) each day  You may need to limit cholesterol to 200 mg each day if you have heart disease  · Saturated fat  is found in meats, such as umanzor and hamburger  It is also found in chicken or turkey skin, whole milk, and butter  Limit saturated fat to less than 7% of your total daily calories  Limit saturated fat to less than 6% if you have heart disease or are at increased risk for it  · Trans fat  is found in packaged foods, such as potato chips and cookies  It is also in hard margarine, some fried foods, and shortening  Avoid trans fats as much as possible    Heart healthy foods and drinks to include:  Ask your dietitian or healthcare provider how many servings to have from each of the following food groups:  · Grains:      ¨ Whole-wheat breads, cereals, and pastas, and brown rice    ¨ Low-fat, low-sodium crackers and chips    · Vegetables:      ¨ Broccoli, green beans, green peas, and spinach    ¨ Collards, kale, and lima beans    ¨ Carrots, sweet potatoes, tomatoes, and peppers    ¨ Canned vegetables with no salt added    · Fruits:      ¨ Bananas, peaches, pears, and pineapple    ¨ Grapes, raisins, and dates    ¨ Oranges, tangerines, grapefruit, orange juice, and grapefruit juice    ¨ Apricots, mangoes, melons, and papaya    ¨ Raspberries and strawberries    ¨ Canned fruit with no added sugar    · Low-fat dairy products:      ¨ Nonfat (skim) milk, 1% milk, and low-fat almond, cashew, or soy milks fortified with calcium    ¨ Low-fat cheese, regular or frozen yogurt, and cottage cheese    · Meats and proteins , such as lean cuts of beef and pork (loin, leg, round), skinless chicken and turkey, legumes, soy products, egg whites, and nuts  Foods and drinks to limit or avoid:  Ask your dietitian or healthcare provider about these and other foods that are high in unhealthy fat, sodium, and sugar:  · Snack or packaged foods , such as frozen dinners, cookies, macaroni and cheese, and cereals with more than 300 mg of sodium per serving    · Canned or dry mixes  for cakes, soups, sauces, or gravies    · Vegetables with added sodium , such as instant potatoes, vegetables with added sauces, or regular canned vegetables    · Other foods high in sodium , such as ketchup, barbecue sauce, salad dressing, pickles, olives, soy sauce, and miso    · High-fat dairy foods  such as whole or 2% milk, cream cheese, or sour cream, and cheeses     · High-fat protein foods  such as high-fat cuts of beef (T-bone steaks, ribs), chicken or turkey with skin, and organ meats, such as liver    · Cured or smoked meats , such as hot dogs, umanzor, and sausage    · Unhealthy fats and oils , such as butter, stick margarine, shortening, and cooking oils such as coconut or palm oil    · Food and drinks high in sugar , such as soft drinks (soda), sports drinks, sweetened tea, candy, cake, cookies, pies, and doughnuts  Other diet guidelines to follow:   · Eat more foods containing omega-3 fats  Eat fish high in omega-3 fats at least 2 times a week  · Limit alcohol    Too much alcohol can damage your heart and raise your blood pressure  Women should limit alcohol to 1 drink a day  Men should limit alcohol to 2 drinks a day  A drink of alcohol is 12 ounces of beer, 5 ounces of wine, or 1½ ounces of liquor  · Choose low-sodium foods  High-sodium foods can lead to high blood pressure  Add little or no salt to food you prepare  Use herbs and spices in place of salt  · Eat more fiber  to help lower cholesterol levels  Eat at least 5 servings of fruits and vegetables each day  Eat 3 ounces of whole-grain foods each day  Legumes (beans) are also a good source of fiber  Lifestyle guidelines:   · Do not smoke  Nicotine and other chemicals in cigarettes and cigars can cause lung and heart damage  Ask your healthcare provider for information if you currently smoke and need help to quit  E-cigarettes or smokeless tobacco still contain nicotine  Talk to your healthcare provider before you use these products  · Exercise regularly  to help you maintain a healthy weight and improve your blood pressure and cholesterol levels  Ask your healthcare provider about the best exercise plan for you  Do not start an exercise program without asking your healthcare provider  Follow up with your healthcare provider as directed:  Write down your questions so you remember to ask them during your visits  © 2017 [address] [facility] Information is for End User's use only and may not be sold, redistributed or otherwise used for commercial purposes  All illustrations and images included in CareNotes® are the copyrighted property of [employer]  or Sammy Yan  The above information is an  only  It is not intended as medical advice for individual conditions or treatments  Talk to your doctor, nurse or pharmacist before following any medical regimen to see if it is safe and effective for you  Calorie Counting Diet   WHAT YOU NEED TO KNOW:   What is a calorie counting diet?   It is a meal plan based on counting calories each day to reach a healthy body weight  You will need to eat fewer calories if you are trying to lose weight  Weight loss may decrease your risk for certain health problems or improve your health if you have health problems  Some of these health problems include heart disease, high blood pressure, and diabetes  What foods should I avoid? Your dietitian will tell you if you need to avoid certain foods based on your body weight and health condition  You may need to avoid high-fat foods if you are at risk for or have heart disease  You may need to eat fewer foods from the breads and starches food group if you have diabetes  How many calories are in foods? The following is a list of foods and drinks with the approximate number of calories in each  Check the food label to find the exact number of calories  A dietitian can tell you how many calories you should have from each food group each day    · Carbohydrate:      ¨ ½ of a 3-inch bagel, 1 slice of bread, or ½ of a hamburger bun or hot dog bun (80)    ¨ 1 (8-inch) flour tortilla or ½ cup of cooked rice (100)    ¨ 1 (6-inch) corn tortilla (80)    ¨ 1 (6-inch) pancake or 1 cup of bran flakes cereal (110)    ¨ ½ cup of cooked cereal (80)    ¨ ½ cup of cooked pasta (85)    ¨ 1 ounce of pretzels (100)    ¨ 3 cups of air-popped popcorn without butter or oil (80)    · Dairy:      ¨ 1 cup of skim or 1% milk (90)    ¨ 1 cup of 2% milk (120)    ¨ 1 cup of whole milk (160)    ¨ 1 cup of 2% chocolate milk (220)    ¨ 1 ounce of low-fat cheese with 3 grams of fat per ounce (70)    ¨ 1 ounce of cheddar cheese (114)    ¨ ½ cup of 1% fat cottage cheese (80)    ¨ 1 cup of plain or sugar-free, fat-free yogurt (90)    · Protein foods:      ¨ 3 ounces of fish (not breaded or fried) (95)    ¨ 3 ounces of breaded, fried fish (195)    ¨ ¾ cup of tuna canned in water (105)    ¨ 3 ounces of chicken breast without skin (105)    ¨ 1 fried chicken breast with skin (350)    ¨ ¼ cup of fat free egg substitute (40)    ¨ 1 large egg (75)    ¨ 3 ounces of lean beef or pork (165)    ¨ 3 ounces of fried pork chop or ham (185)    ¨ ½ cup of cooked dried beans, such as kidney, germain, lentils, or navy (115)    ¨ 3 ounces of bologna or lunch meat (225)    ¨ 2 links of breakfast sausage (140)    · Vegetables:      ¨ ½ cup of sliced mushrooms (10)    ¨ 1 cup of salad greens, such as lettuce, spinach, or garima (15)    ¨ ½ cup of steamed asparagus (20)    ¨ ½ cup of cooked summer squash, zucchini squash, or green or wax beans (25)    ¨ 1 cup of broccoli or cauliflower florets, or 1 medium tomato (25)    ¨ 1 large raw carrot or ½ cup of cooked carrots (40)    ¨ ? of a medium cucumber or 1 stalk of celery (5)    ¨ 1 small baked potato (160)    ¨ 1 cup of breaded, fried vegetables (230)    · Fruit:      ¨ 1 (6-inch) banana (55)     ¨ ½ of a 4-inch grapefruit (55)    ¨ 15 grapes (60)    ¨ 1 medium orange or apple (70)    ¨ 1 large peach (65)    ¨ 1 cup of fresh pineapple chunks (75)    ¨ 1 cup of melon cubes (50)    ¨ 1¼ cups of whole strawberries (45)    ¨ ½ cup of fruit canned in juice (55)    ¨ ½ cup of fruit canned in heavy syrup (110)    ¨ ?  cup of raisins (130)    ¨ ½ cup of unsweetened fruit juice (60)    ¨ ½ cup of grape, cranberry, or prune juice (90)    · Fat:      ¨ 10 peanuts or 2 teaspoons of peanut butter (55)    ¨ 2 tablespoons of avocado or 1 tablespoon of regular salad dressing (45)    ¨ 2 slices of umanzor (90)    ¨ 1 teaspoon of oil, such as safflower, canola, corn, or olive oil (45)    ¨ 2 teaspoons of low-fat margarine, or 1 tablespoon of low-fat mayonnaise (50)    ¨ 1 teaspoon of regular margarine (40)    ¨ 1 tablespoon of regular mayonnaise (135)    ¨ 1 tablespoon of cream cheese or 2 tablespoons of low-fat cream cheese (45)    ¨ 2 tablespoons of vegetable shortening (215)    · Dessert and sweets:      ¨ 8 animal crackers or 5 vanilla wafers (80)    ¨ 1 frozen fruit juice bar (80)    ¨ ½ cup of ice milk or low-fat frozen yogurt (90)    ¨ ½ cup of sherbet or sorbet (125)    ¨ ½ cup of sugar-free pudding or custard (60)    ¨ ½ cup of ice cream (140)    ¨ ½ cup of pudding or custard (175)    ¨ 1 (2-inch) square chocolate brownie (185)    · Combination foods:      ¨ Bean burrito made with an 8-inch tortilla, without cheese (275)    ¨ Chicken breast sandwich with lettuce and tomato (325)    ¨ 1 cup of chicken noodle soup (60)    ¨ 1 beef taco (175)    ¨ Regular hamburger with lettuce and tomato (310)    ¨ Regular cheeseburger with lettuce and tomato (410)     ¨ ¼ of a 12-inch cheese pizza (280)    ¨ Fried fish sandwich with lettuce and tomato (425)    ¨ Hot dog and bun (275)    ¨ 1½ cups of macaroni and cheese (310)    ¨ Taco salad with a fried tortilla shell (870)    · Low-calorie foods:      ¨ 1 tablespoon of ketchup or 1 tablespoon of fat free sour cream (15)    ¨ 1 teaspoon of mustard (5)    ¨ ¼ cup of salsa (20)    ¨ 1 large dill pickle (15)    ¨ 1 tablespoon of fat free salad dressing (10)    ¨ 2 teaspoons of low-sugar, light jam or jelly, or 1 tablespoon of sugar-free syrup (15)    ¨ 1 sugar-free popsicle (15)    ¨ 1 cup of club soda, seltzer water, or diet soda (0)  CARE AGREEMENT:   You have the right to help plan your care  Discuss treatment options with your caregivers to decide what care you want to receive  You always have the right to refuse treatment  The above information is an  only  It is not intended as medical advice for individual conditions or treatments  Talk to your doctor, nurse or pharmacist before following any medical regimen to see if it is safe and effective for you  © 2017 [employer] [employer] Information is for End User's use only and may not be sold, redistributed or otherwise used for commercial purposes   All illustrations and images included in CareNotes® are the copyrighted property of [employer]  or [employer]  MONIQUE Angelo    Portions of the record may have been created with voice recognition software  Occasional wrong word or "sound a like" substitutions may have occurred due to the inherent limitations of voice recognition software  Read the chart carefully and recognize, using context, where substitutions have occurred  BMI Counseling: Body mass index is 46 41 kg/m²  The BMI is above normal  Nutrition recommendations include reducing portion sizes, decreasing overall calorie intake, 3-5 servings of fruits/vegetables daily, reducing fast food intake, consuming healthier snacks, decreasing soda and/or juice intake, moderation in carbohydrate intake, increasing intake of lean protein, reducing intake of saturated fat and trans fat and reducing intake of cholesterol  Exercise recommendations include moderate aerobic physical activity for 150 minutes/week

## 2020-02-25 NOTE — PATIENT INSTRUCTIONS
Discussed all with patient  Please have the CBC iron studies and the BMP repeated after you start your potassium and stay on your potassium for 1 week  Start the rosuvastatin which is the new cholesterol medication at night and repeat that blood work in 6 weeks I wrote it on the slip  Please have the chest x-ray done I will call with results  You do have a history of breast cancer and have not been followed by oncology for a while and have a minor cough  Continue current medications  Try to work on the weight but you do need protein in your diet as your protein level is low  Since you do not eat foods with potassium specifically bananas and you 10 to run low I am adding potassium daily to your medications  Follow-up here in about 4 months  Let me know if the fatigue continues or gets worse we may have to do more of an extensive workup  Obesity   AMBULATORY CARE:   Obesity  is when your body mass index (BMI) is greater than 30  Your healthcare provider will use your height and weight to measure your BMI  The risks of obesity include  many health problems, such as injuries or physical disability  You may need tests to check for the following:  · Diabetes     · High blood pressure or high cholesterol     · Heart disease     · Gallbladder or liver disease     · Cancer of the colon, breast, prostate, liver, or kidney     · Sleep apnea     · Arthritis or gout  Seek care immediately if:   · You have a severe headache, confusion, or difficulty speaking  · You have weakness on one side of your body  · You have chest pain, sweating, or shortness of breath  Contact your healthcare provider if:   · You have symptoms of gallbladder or liver disease, such as pain in your upper abdomen  · You have knee or hip pain and discomfort while walking  · You have symptoms of diabetes, such as intense hunger and thirst, and frequent urination       · You have symptoms of sleep apnea, such as snoring or daytime sleepiness  · You have questions or concerns about your condition or care  Treatment for obesity  focuses on helping you lose weight to improve your health  Even a small decrease in BMI can reduce the risk for many health problems  Your healthcare provider will help you set a weight-loss goal   · Lifestyle changes  are the first step in treating obesity  These include making healthy food choices and getting regular physical activity  Your healthcare provider may suggest a weight-loss program that involves coaching, education, and therapy  · Medicine  may help you lose weight when it is used with a healthy diet and physical activity  · Surgery  can help you lose weight if you are very obese and have other health problems  There are several types of weight-loss surgery  Ask your healthcare provider for more information  Be successful losing weight:   · Set small, realistic goals  An example of a small goal is to walk for 20 minutes 5 days a week  Anther goal is to lose 5% of your body weight  · Tell friends, family members, and coworkers about your goals  and ask for their support  Ask a friend to lose weight with you, or join a weight-loss support group  · Identify foods or triggers that may cause you to overeat , and find ways to avoid them  Remove tempting high-calorie foods from your home and workplace  Place a bowl of fresh fruit on your kitchen counter  If stress causes you to eat, then find other ways to cope with stress  · Keep a diary to track what you eat and drink  Also write down how many minutes of physical activity you do each day  Weigh yourself once a week and record it in your diary  Eating changes: You will need to eat 500 to 1,000 fewer calories each day than you currently eat to lose 1 to 2 pounds a week  The following changes will help you cut calories:  · Eat smaller portions  Use small plates, no larger than 9 inches in diameter   Fill your plate half full of fruits and vegetables  Measure your food using measuring cups until you know what a serving size looks like  · Eat 3 meals and 1 or 2 snacks each day  Plan your meals in advance  Danella Meeter and eat at home most of the time  Eat slowly  · Eat fruits and vegetables at every meal   They are low in calories and high in fiber, which makes you feel full  Do not add butter, margarine, or cream sauce to vegetables  Use herbs to season steamed vegetables  · Eat less fat and fewer fried foods  Eat more baked or grilled chicken and fish  These protein sources are lower in calories and fat than red meat  Limit fast food  Dress your salads with olive oil and vinegar instead of bottled dressing  · Limit the amount of sugar you eat  Do not drink sugary beverages  Limit alcohol  Activity changes:  Physical activity is good for your body in many ways  It helps you burn calories and build strong muscles  It decreases stress and depression, and improves your mood  It can also help you sleep better  Talk to your healthcare provider before you begin an exercise program   · Exercise for at least 30 minutes 5 days a week  Start slowly  Set aside time each day for physical activity that you enjoy and that is convenient for you  It is best to do both weight training and an activity that increases your heart rate, such as walking, bicycling, or swimming  · Find ways to be more active  Do yard work and housecleaning  Walk up the stairs instead of using elevators  Spend your leisure time going to events that require walking, such as outdoor festivals or fairs  This extra physical activity can help you lose weight and keep it off  Follow up with your healthcare provider as directed: You may need to meet with a dietitian  Write down your questions so you remember to ask them during your visits     © 2017 [facility] Information is for End User's use only and may not be sold, redistributed or otherwise used for commercial purposes  All illustrations and images included in CareNotes® are the copyrighted property of [employer]  or [employer]  The above information is an  only  It is not intended as medical advice for individual conditions or treatments  Talk to your doctor, nurse or pharmacist before following any medical regimen to see if it is safe and effective for you  Weight Management   AMBULATORY CARE:   Why it is important to manage your weight:  Being overweight increases your risk of health conditions such as heart disease, high blood pressure, type 2 diabetes, and certain types of cancer  It can also increase your risk for osteoarthritis, sleep apnea, and other respiratory problems  Aim for a slow, steady weight loss  Even a small amount of weight loss can lower your risk of health problems  How to lose weight safely:  A safe and healthy way to lose weight is to eat fewer calories and get regular exercise  You can lose up about 1 pound a week by decreasing the number of calories you eat by 500 calories each day  You can decrease calories by eating smaller portion sizes or by cutting out high-calorie foods  Read labels to find out how many calories are in the foods you eat  You can also burn calories with exercise such as walking, swimming, or biking  You will be more likely to keep weight off if you make these changes part of your lifestyle  Healthy meal plan for weight management:  A healthy meal plan includes a variety of foods, contains fewer calories, and helps you stay healthy  A healthy meal plan includes the following:  · Eat whole-grain foods more often  A healthy meal plan should contain fiber  Fiber is the part of grains, fruits, and vegetables that is not broken down by your body  Whole-grain foods are healthy and provide extra fiber in your diet  Some examples of whole-grain foods are whole-wheat breads and pastas, oatmeal, brown rice, and bulgur      · Eat a variety of vegetables every day  Include dark, leafy greens such as spinach, kale, neeraj greens, and mustard greens  Eat yellow and orange vegetables such as carrots, sweet potatoes, and winter squash  · Eat a variety of fruits every day  Choose fresh or canned fruit (canned in its own juice or light syrup) instead of juice  Fruit juice has very little or no fiber  · Eat low-fat dairy foods  Drink fat-free (skim) milk or 1% milk  Eat fat-free yogurt and low-fat cottage cheese  Try low-fat cheeses such as mozzarella and other reduced-fat cheeses  · Choose meat and other protein foods that are low in fat  Choose beans or other legumes such as split peas or lentils  Choose fish, skinless poultry (chicken or turkey), or lean cuts of red meat (beef or pork)  Before you cook meat or poultry, cut off any visible fat  · Use less fat and oil  Try baking foods instead of frying them  Add less fat, such as margarine, sour cream, regular salad dressing and mayonnaise to foods  Eat fewer high-fat foods  Some examples of high-fat foods include french fries, doughnuts, ice cream, and cakes  · Eat fewer sweets  Limit foods and drinks that are high in sugar  This includes candy, cookies, regular soda, and sweetened drinks  Ways to decrease calories:   · Eat smaller portions  ¨ Use a small plate with smaller servings  ¨ Do not eat second helpings  ¨ When you eat at a restaurant, ask for a box and place half of your meal in the box before you eat  ¨ Share an entrée with someone else  · Replace high-calorie snacks with healthy, low-calorie snacks  ¨ Choose fresh fruit, vegetables, fat-free rice cakes, or air-popped popcorn instead of potato chips, nuts, or chocolate  ¨ Choose water or calorie-free drinks instead of soda or sweetened drinks  · Eat regular meals  Skipping meals can lead to overeating later in the day   Eat a healthy snack in place of a meal if you do not have time to eat a regular meal      · Do not shop for groceries when you are hungry  You may be more likely to make unhealthy food choices  Take a grocery list of healthy foods and shop after you have eaten  Exercise:  Exercise at least 30 minutes per day on most days of the week  Some examples of exercise include walking, biking, dancing, and swimming  You can also fit in more physical activity by taking the stairs instead of the elevator or parking farther away from stores  Ask your healthcare provider about the best exercise plan for you  Other things to consider as you try to lose weight:   · Be aware of situations that may give you the urge to overeat, such as eating while watching television  Find ways to avoid these situations  For example, read a book, go for a walk, or do crafts  · Meet with a weight loss support group or friends who are also trying to lose weight  This may help you stay motivated to continue working on your weight loss goals  © 2017 [address] [employer] Information is for End User's use only and may not be sold, redistributed or otherwise used for commercial purposes  All illustrations and images included in CareNotes® are the copyrighted property of [employer]  or [employer]  The above information is an  only  It is not intended as medical advice for individual conditions or treatments  Talk to your doctor, nurse or pharmacist before following any medical regimen to see if it is safe and effective for you  Low Fat Diet   AMBULATORY CARE:   A low-fat diet  is an eating plan that is low in total fat, unhealthy fat, and cholesterol  You may need to follow a low-fat diet if you have trouble digesting or absorbing fat  You may also need to follow this diet if you have high cholesterol  You can also lower your cholesterol by increasing the amount of fiber in your diet  Soluble fiber is a type of fiber that helps to decrease cholesterol levels     Different types of fat in food:   · Limit unhealthy fats  A diet that is high in cholesterol, saturated fat, and trans fat may cause unhealthy cholesterol levels  Unhealthy cholesterol levels increase your risk of heart disease  ¨ Cholesterol:  Limit intake of cholesterol to less than 200 mg per day  Cholesterol is found in meat, eggs, and dairy  ¨ Saturated fat:  Limit saturated fat to less than 7% of your total daily calories  Ask your dietitian how many calories you need each day  Saturated fat is found in butter, cheese, ice cream, whole milk, and palm oil  Saturated fat is also found in meat, such as beef, pork, chicken skin, and processed meats  Processed meats include sausage, hot dogs, and bologna  ¨ Trans fat:  Avoid trans fat as much as possible  Trans fat is used in fried and baked foods  Foods that say trans fat free on the label may still have up to 0 5 grams of trans fat per serving  · Include healthy fats  Replace foods that are high in saturated and trans fat with foods high in healthy fats  This may help to decrease high cholesterol levels  ¨ Monounsaturated fats: These are found in avocados, nuts, and vegetable oils, such as olive, canola, and sunflower oil  ¨ Polyunsaturated fats: These can be found in vegetable oils, such as soybean or corn oil  Omega-3 fats can help to decrease the risk of heart disease  Omega-3 fats are found in fish, such as salmon, herring, trout, and tuna  Omega-3 fats can also be found in plant foods, such as walnuts, flaxseed, soybeans, and canola oil    Foods to limit or avoid:   · Grains:      ¨ Snacks that are made with partially hydrogenated oils, such as chips, regular crackers, and butter-flavored popcorn    ¨ High-fat baked goods, such as biscuits, croissants, doughnuts, pies, cookies, and pastries    · Dairy:      ¨ Whole milk, 2% milk, and yogurt and ice cream made with whole milk    ¨ Half and half creamer, heavy cream, and whipping cream    ¨ Cheese, cream cheese, and sour cream    · Meats and proteins:      ¨ High-fat cuts of meat (T-bone steak, regular hamburger, and ribs)    ¨ Fried meat, poultry (turkey and chicken), and fish    ¨ Poultry (chicken and turkey) with skin    ¨ Cold cuts (salami or bologna), hot dogs, umanzor, and sausage    ¨ Whole eggs and egg yolks    · Vegetables and fruits with added fat:      ¨ Fried vegetables or vegetables in butter or high-fat sauces, such as cream or cheese sauces    ¨ Fried fruit or fruit served with butter or cream    · Fats:      ¨ Butter, stick margarine, and shortening    ¨ Coconut, palm oil, and palm kernel oil  Foods to include:   · Grains:      ¨ Whole-grain breads, cereals, pasta, and brown rice    ¨ Low-fat crackers and pretzels    · Vegetables and fruits:      ¨ Fresh, frozen, or canned vegetables (no salt or low-sodium)    ¨ Fresh, frozen, dried, or canned fruit (canned in light syrup or fruit juice)    ¨ Avocado    · Low-fat dairy products:      ¨ Nonfat (skim) or 1% milk    ¨ Nonfat or low-fat cheese, yogurt, and cottage cheese    · Meats and proteins:      ¨ Chicken or turkey with no skin    ¨ Baked or broiled fish    ¨ Lean beef and pork (loin, round, extra lean hamburger)    ¨ Beans and peas, unsalted nuts, soy products    ¨ Egg whites and substitutes    ¨ Seeds and nuts    · Fats:      ¨ Unsaturated oil, such as canola, olive, peanut, soybean, or sunflower oil    ¨ Soft or liquid margarine and vegetable oil spread    ¨ Low-fat salad dressing  Other ways to decrease fat:   · Read food labels before you buy foods  Choose foods that have less than 30% of calories from fat  Choose low-fat or fat-free dairy products  Remember that fat free does not mean calorie free  These foods still contain calories, and too many calories can lead to weight gain  · Trim fat from meat and avoid fried food  Trim all visible fat from meat before you cook it  Remove the skin from poultry   Do not fermin meat, fish, or poultry  Bake, roast, boil, or broil these foods instead  Avoid fried foods  Eat a baked potato instead of [place] fries  Steam vegetables instead of sautéing them in butter  · Add less fat to foods  Use imitation umanzor bits on salads and baked potatoes instead of regular umanzor bits  Use fat-free or low-fat salad dressings instead of regular dressings  Use low-fat or nonfat butter-flavored topping instead of regular butter or margarine on popcorn and other foods  Ways to decrease fat in recipes:  Replace high-fat ingredients with low-fat or nonfat ones  This may cause baked goods to be drier than usual  You may need to use nonfat cooking spray on pans to prevent food from sticking  You also may need to change the amount of other ingredients, such as water, in the recipe  Try the following:  · Use low-fat or light margarine instead of regular margarine or shortening  · Use lean ground turkey breast or chicken, or lean ground beef (less than 5% fat) instead of hamburger  · Add 1 teaspoon of canola oil to 8 ounces of skim milk instead of using cream or half and half  · Use grated zucchini, carrots, or apples in breads instead of coconut  · Use blenderized, low-fat cottage cheese, plain tofu, or low-fat ricotta cheese instead of cream cheese  · Use 1 egg white and 1 teaspoon of canola oil, or use ¼ cup (2 ounces) of fat-free egg substitute instead of a whole egg  · Replace half of the oil that is called for in a recipe with applesauce when you bake  Use 3 tablespoons of cocoa powder and 1 tablespoon of canola oil instead of a square of baking chocolate  How to increase fiber:  Eat enough high-fiber foods to get 20 to 30 grams of fiber every day  Slowly increase your fiber intake to avoid stomach cramps, gas, and other problems  · Eat 3 ounces of whole-grain foods each day  An ounce is about 1 slice of bread  Eat whole-grain breads, such as whole-wheat bread   Whole wheat, whole-wheat flour, or other whole grains should be listed as the first ingredient on the food label  Replace white flour with whole-grain flour or use half of each in recipes  Whole-grain flour is heavier than white flour, so you may have to add more yeast or baking powder  · Eat a high-fiber cereal for breakfast   Oatmeal is a good source of soluble fiber  Look for cereals that have bran or fiber in the name  Choose whole-grain products, such as brown rice, barley, and whole-wheat pasta  · Eat more beans, peas, and lentils  For example, add beans to soups or salads  Eat at least 5 cups of fruits and vegetables each day  Eat fruits and vegetables with the peel because the peel is high in fiber  © 2017 [employer] Information is for End User's use only and may not be sold, redistributed or otherwise used for commercial purposes  All illustrations and images included in CareNotes® are the copyrighted property of [employer]  or [employer]  The above information is an  only  It is not intended as medical advice for individual conditions or treatments  Talk to your doctor, nurse or pharmacist before following any medical regimen to see if it is safe and effective for you  Heart Healthy Diet   AMBULATORY CARE:   A heart healthy diet  is an eating plan low in total fat, unhealthy fats, and sodium (salt)  A heart healthy diet helps decrease your risk for heart disease and stroke  Limit the amount of fat you eat to 25% to 35% of your total daily calories  Limit sodium to less than 2,300 mg each day  Healthy fats:  Healthy fats can help improve cholesterol levels  The risk for heart disease is decreased when cholesterol levels are normal  Choose healthy fats, such as the following:  · Unsaturated fat  is found in foods such as soybean, canola, olive, corn, and safflower oils  It is also found in soft tub margarine that is made with liquid vegetable oil       · Omega-3 fat  is found in certain fish, such as salmon, tuna, and trout, and in walnuts and flaxseed  Unhealthy fats:  Unhealthy fats can cause unhealthy cholesterol levels in your blood and increase your risk of heart disease  Limit unhealthy fats, such as the following:  · Cholesterol  is found in animal foods, such as eggs and lobster, and in dairy products made from whole milk  Limit cholesterol to less than 300 milligrams (mg) each day  You may need to limit cholesterol to 200 mg each day if you have heart disease  · Saturated fat  is found in meats, such as umanzor and hamburger  It is also found in chicken or turkey skin, whole milk, and butter  Limit saturated fat to less than 7% of your total daily calories  Limit saturated fat to less than 6% if you have heart disease or are at increased risk for it  · Trans fat  is found in packaged foods, such as potato chips and cookies  It is also in hard margarine, some fried foods, and shortening  Avoid trans fats as much as possible    Heart healthy foods and drinks to include:  Ask your dietitian or healthcare provider how many servings to have from each of the following food groups:  · Grains:      ¨ Whole-wheat breads, cereals, and pastas, and brown rice    ¨ Low-fat, low-sodium crackers and chips    · Vegetables:      ¨ Broccoli, green beans, green peas, and spinach    ¨ Collards, kale, and lima beans    ¨ Carrots, sweet potatoes, tomatoes, and peppers    ¨ Canned vegetables with no salt added    · Fruits:      ¨ Bananas, peaches, pears, and pineapple    ¨ Grapes, raisins, and dates    ¨ Oranges, tangerines, grapefruit, orange juice, and grapefruit juice    ¨ Apricots, mangoes, melons, and papaya    ¨ Raspberries and strawberries    ¨ Canned fruit with no added sugar    · Low-fat dairy products:      ¨ Nonfat (skim) milk, 1% milk, and low-fat almond, cashew, or soy milks fortified with calcium    ¨ Low-fat cheese, regular or frozen yogurt, and cottage cheese    · Meats and proteins , such as lean cuts of beef and pork (loin, leg, round), skinless chicken and turkey, legumes, soy products, egg whites, and nuts  Foods and drinks to limit or avoid:  Ask your dietitian or healthcare provider about these and other foods that are high in unhealthy fat, sodium, and sugar:  · Snack or packaged foods , such as frozen dinners, cookies, macaroni and cheese, and cereals with more than 300 mg of sodium per serving    · Canned or dry mixes  for cakes, soups, sauces, or gravies    · Vegetables with added sodium , such as instant potatoes, vegetables with added sauces, or regular canned vegetables    · Other foods high in sodium , such as ketchup, barbecue sauce, salad dressing, pickles, olives, soy sauce, and miso    · High-fat dairy foods  such as whole or 2% milk, cream cheese, or sour cream, and cheeses     · High-fat protein foods  such as high-fat cuts of beef (T-bone steaks, ribs), chicken or turkey with skin, and organ meats, such as liver    · Cured or smoked meats , such as hot dogs, umanzor, and sausage    · Unhealthy fats and oils , such as butter, stick margarine, shortening, and cooking oils such as coconut or palm oil    · Food and drinks high in sugar , such as soft drinks (soda), sports drinks, sweetened tea, candy, cake, cookies, pies, and doughnuts  Other diet guidelines to follow:   · Eat more foods containing omega-3 fats  Eat fish high in omega-3 fats at least 2 times a week  · Limit alcohol  Too much alcohol can damage your heart and raise your blood pressure  Women should limit alcohol to 1 drink a day  Men should limit alcohol to 2 drinks a day  A drink of alcohol is 12 ounces of beer, 5 ounces of wine, or 1½ ounces of liquor  · Choose low-sodium foods  High-sodium foods can lead to high blood pressure  Add little or no salt to food you prepare  Use herbs and spices in place of salt  · Eat more fiber  to help lower cholesterol levels   Eat at least 5 servings of fruits and vegetables each day  Eat 3 ounces of whole-grain foods each day  Legumes (beans) are also a good source of fiber  Lifestyle guidelines:   · Do not smoke  Nicotine and other chemicals in cigarettes and cigars can cause lung and heart damage  Ask your healthcare provider for information if you currently smoke and need help to quit  E-cigarettes or smokeless tobacco still contain nicotine  Talk to your healthcare provider before you use these products  · Exercise regularly  to help you maintain a healthy weight and improve your blood pressure and cholesterol levels  Ask your healthcare provider about the best exercise plan for you  Do not start an exercise program without asking your healthcare provider  Follow up with your healthcare provider as directed:  Write down your questions so you remember to ask them during your visits  © 2017 [address] [facility] Information is for End User's use only and may not be sold, redistributed or otherwise used for commercial purposes  All illustrations and images included in CareNotes® are the copyrighted property of [employer]  or Sammy Yuri[employer]  The above information is an  only  It is not intended as medical advice for individual conditions or treatments  Talk to your doctor, nurse or pharmacist before following any medical regimen to see if it is safe and effective for you  Calorie Counting Diet   WHAT YOU NEED TO KNOW:   What is a calorie counting diet? It is a meal plan based on counting calories each day to reach a healthy body weight  You will need to eat fewer calories if you are trying to lose weight  Weight loss may decrease your risk for certain health problems or improve your health if you have health problems  Some of these health problems include heart disease, high blood pressure, and diabetes  What foods should I avoid?   Your dietitian will tell you if you need to avoid certain foods based on your body weight and health condition  You may need to avoid high-fat foods if you are at risk for or have heart disease  You may need to eat fewer foods from the breads and starches food group if you have diabetes  How many calories are in foods? The following is a list of foods and drinks with the approximate number of calories in each  Check the food label to find the exact number of calories  A dietitian can tell you how many calories you should have from each food group each day    · Carbohydrate:      ¨ ½ of a 3-inch bagel, 1 slice of bread, or ½ of a hamburger bun or hot dog bun (80)    ¨ 1 (8-inch) flour tortilla or ½ cup of cooked rice (100)    ¨ 1 (6-inch) corn tortilla (80)    ¨ 1 (6-inch) pancake or 1 cup of bran flakes cereal (110)    ¨ ½ cup of cooked cereal (80)    ¨ ½ cup of cooked pasta (85)    ¨ 1 ounce of pretzels (100)    ¨ 3 cups of air-popped popcorn without butter or oil (80)    · Dairy:      ¨ 1 cup of skim or 1% milk (90)    ¨ 1 cup of 2% milk (120)    ¨ 1 cup of whole milk (160)    ¨ 1 cup of 2% chocolate milk (220)    ¨ 1 ounce of low-fat cheese with 3 grams of fat per ounce (70)    ¨ 1 ounce of cheddar cheese (114)    ¨ ½ cup of 1% fat cottage cheese (80)    ¨ 1 cup of plain or sugar-free, fat-free yogurt (90)    · Protein foods:      ¨ 3 ounces of fish (not breaded or fried) (95)    ¨ 3 ounces of breaded, fried fish (195)    ¨ ¾ cup of tuna canned in water (105)    ¨ 3 ounces of chicken breast without skin (105)    ¨ 1 fried chicken breast with skin (350)    ¨ ¼ cup of fat free egg substitute (40)    ¨ 1 large egg (75)    ¨ 3 ounces of lean beef or pork (165)    ¨ 3 ounces of fried pork chop or ham (185)    ¨ ½ cup of cooked dried beans, such as kidney, germain, lentils, or navy (115)    ¨ 3 ounces of bologna or lunch meat (225)    ¨ 2 links of breakfast sausage (140)    · Vegetables:      ¨ ½ cup of sliced mushrooms (10)    ¨ 1 cup of salad greens, such as lettuce, spinach, or garima (15)    ¨ ½ cup of steamed asparagus (20)    ¨ ½ cup of cooked summer squash, zucchini squash, or green or wax beans (25)    ¨ 1 cup of broccoli or cauliflower florets, or 1 medium tomato (25)    ¨ 1 large raw carrot or ½ cup of cooked carrots (40)    ¨ ? of a medium cucumber or 1 stalk of celery (5)    ¨ 1 small baked potato (160)    ¨ 1 cup of breaded, fried vegetables (230)    · Fruit:      ¨ 1 (6-inch) banana (55)     ¨ ½ of a 4-inch grapefruit (55)    ¨ 15 grapes (60)    ¨ 1 medium orange or apple (70)    ¨ 1 large peach (65)    ¨ 1 cup of fresh pineapple chunks (75)    ¨ 1 cup of melon cubes (50)    ¨ 1¼ cups of whole strawberries (45)    ¨ ½ cup of fruit canned in juice (55)    ¨ ½ cup of fruit canned in heavy syrup (110)    ¨ ?  cup of raisins (130)    ¨ ½ cup of unsweetened fruit juice (60)    ¨ ½ cup of grape, cranberry, or prune juice (90)    · Fat:      ¨ 10 peanuts or 2 teaspoons of peanut butter (55)    ¨ 2 tablespoons of avocado or 1 tablespoon of regular salad dressing (45)    ¨ 2 slices of umanzor (90)    ¨ 1 teaspoon of oil, such as safflower, canola, corn, or olive oil (45)    ¨ 2 teaspoons of low-fat margarine, or 1 tablespoon of low-fat mayonnaise (50)    ¨ 1 teaspoon of regular margarine (40)    ¨ 1 tablespoon of regular mayonnaise (135)    ¨ 1 tablespoon of cream cheese or 2 tablespoons of low-fat cream cheese (45)    ¨ 2 tablespoons of vegetable shortening (215)    · Dessert and sweets:      ¨ 8 animal crackers or 5 vanilla wafers (80)    ¨ 1 frozen fruit juice bar (80)    ¨ ½ cup of ice milk or low-fat frozen yogurt (90)    ¨ ½ cup of sherbet or sorbet (125)    ¨ ½ cup of sugar-free pudding or custard (60)    ¨ ½ cup of ice cream (140)    ¨ ½ cup of pudding or custard (175)    ¨ 1 (2-inch) square chocolate brownie (185)    · Combination foods:      ¨ Bean burrito made with an 8-inch tortilla, without cheese (275)    ¨ Chicken breast sandwich with lettuce and tomato (325)    ¨ 1 cup of chicken noodle soup (60)    ¨ 1 beef taco (175)    ¨ Regular hamburger with lettuce and tomato (310)    ¨ Regular cheeseburger with lettuce and tomato (410)     ¨ ¼ of a 12-inch cheese pizza (280)    ¨ Fried fish sandwich with lettuce and tomato (425)    ¨ Hot dog and bun (275)    ¨ 1½ cups of macaroni and cheese (310)    ¨ Taco salad with a fried tortilla shell (870)    · Low-calorie foods:      ¨ 1 tablespoon of ketchup or 1 tablespoon of fat free sour cream (15)    ¨ 1 teaspoon of mustard (5)    ¨ ¼ cup of salsa (20)    ¨ 1 large dill pickle (15)    ¨ 1 tablespoon of fat free salad dressing (10)    ¨ 2 teaspoons of low-sugar, light jam or jelly, or 1 tablespoon of sugar-free syrup (15)    ¨ 1 sugar-free popsicle (15)    ¨ 1 cup of club soda, seltzer water, or diet soda (0)  CARE AGREEMENT:   You have the right to help plan your care  Discuss treatment options with your caregivers to decide what care you want to receive  You always have the right to refuse treatment  The above information is an  only  It is not intended as medical advice for individual conditions or treatments  Talk to your doctor, nurse or pharmacist before following any medical regimen to see if it is safe and effective for you  © 2017 [employer] Information is for End User's use only and may not be sold, redistributed or otherwise used for commercial purposes  All illustrations and images included in CareNotes® are the copyrighted property of [employer]  or Sammy Yan

## 2020-02-27 DIAGNOSIS — E78.2 MIXED HYPERLIPIDEMIA: ICD-10-CM

## 2020-02-27 DIAGNOSIS — E87.6 HYPOKALEMIA: ICD-10-CM

## 2020-02-27 RX ORDER — POTASSIUM CHLORIDE 20 MEQ/1
20 TABLET, EXTENDED RELEASE ORAL DAILY
Qty: 90 TABLET | Refills: 1 | Status: SHIPPED | OUTPATIENT
Start: 2020-02-27 | End: 2020-09-17 | Stop reason: SDUPTHER

## 2020-02-28 ENCOUNTER — HOSPITAL ENCOUNTER (OUTPATIENT)
Dept: RADIOLOGY | Facility: HOSPITAL | Age: 75
Discharge: HOME | End: 2020-02-28
Payer: MEDICARE

## 2020-02-28 DIAGNOSIS — R05.9 COUGH: ICD-10-CM

## 2020-02-28 DIAGNOSIS — Z85.3 HISTORY OF BREAST CANCER: ICD-10-CM

## 2020-02-28 DIAGNOSIS — R53.83 OTHER FATIGUE: ICD-10-CM

## 2020-02-28 PROCEDURE — 71046 X-RAY EXAM CHEST 2 VIEWS: CPT

## 2020-03-03 DIAGNOSIS — K21.00 REFLUX ESOPHAGITIS: ICD-10-CM

## 2020-03-03 DIAGNOSIS — I10 ESSENTIAL HYPERTENSION: ICD-10-CM

## 2020-03-03 RX ORDER — AMLODIPINE BESYLATE 10 MG/1
10 TABLET ORAL DAILY
Qty: 90 TABLET | Refills: 1 | Status: SHIPPED | OUTPATIENT
Start: 2020-03-03 | End: 2020-09-02 | Stop reason: SDUPTHER

## 2020-03-03 RX ORDER — DEXLANSOPRAZOLE 60 MG/1
60 CAPSULE, DELAYED RELEASE ORAL
Qty: 90 CAPSULE | Refills: 1 | Status: SHIPPED | OUTPATIENT
Start: 2020-03-03 | End: 2020-10-02 | Stop reason: SDUPTHER

## 2020-03-09 ENCOUNTER — APPOINTMENT (OUTPATIENT)
Dept: LAB | Facility: HOSPITAL | Age: 75
End: 2020-03-09
Payer: MEDICARE

## 2020-03-09 DIAGNOSIS — F32.9 REACTIVE DEPRESSION: ICD-10-CM

## 2020-03-09 DIAGNOSIS — R53.83 OTHER FATIGUE: ICD-10-CM

## 2020-03-09 DIAGNOSIS — E87.6 HYPOKALEMIA: ICD-10-CM

## 2020-03-09 LAB
ANION GAP SERPL CALCULATED.3IONS-SCNC: 6 MMOL/L (ref 4–13)
BASOPHILS # BLD AUTO: 0.05 THOUSANDS/ΜL (ref 0–0.1)
BASOPHILS NFR BLD AUTO: 1 % (ref 0–1)
BUN SERPL-MCNC: 16 MG/DL (ref 5–25)
CALCIUM SERPL-MCNC: 8.9 MG/DL (ref 8.3–10.1)
CHLORIDE SERPL-SCNC: 108 MMOL/L (ref 100–108)
CO2 SERPL-SCNC: 30 MMOL/L (ref 21–32)
CREAT SERPL-MCNC: 1.25 MG/DL (ref 0.6–1.3)
EOSINOPHIL # BLD AUTO: 0.16 THOUSAND/ΜL (ref 0–0.61)
EOSINOPHIL NFR BLD AUTO: 2 % (ref 0–6)
ERYTHROCYTE [DISTWIDTH] IN BLOOD BY AUTOMATED COUNT: 13.9 % (ref 11.6–15.1)
FERRITIN SERPL-MCNC: 29 NG/ML (ref 8–388)
GFR SERPL CREATININE-BSD FRML MDRD: 42 ML/MIN/1.73SQ M
GLUCOSE P FAST SERPL-MCNC: 93 MG/DL (ref 65–99)
HCT VFR BLD AUTO: 46.5 % (ref 34.8–46.1)
HGB BLD-MCNC: 14.1 G/DL (ref 11.5–15.4)
IMM GRANULOCYTES # BLD AUTO: 0.03 THOUSAND/UL (ref 0–0.2)
IMM GRANULOCYTES NFR BLD AUTO: 1 % (ref 0–2)
IRON SATN MFR SERPL: 26 %
IRON SERPL-MCNC: 72 UG/DL (ref 50–170)
LYMPHOCYTES # BLD AUTO: 1.75 THOUSANDS/ΜL (ref 0.6–4.47)
LYMPHOCYTES NFR BLD AUTO: 27 % (ref 14–44)
MCH RBC QN AUTO: 26.6 PG (ref 26.8–34.3)
MCHC RBC AUTO-ENTMCNC: 30.3 G/DL (ref 31.4–37.4)
MCV RBC AUTO: 88 FL (ref 82–98)
MONOCYTES # BLD AUTO: 0.49 THOUSAND/ΜL (ref 0.17–1.22)
MONOCYTES NFR BLD AUTO: 7 % (ref 4–12)
NEUTROPHILS # BLD AUTO: 4.12 THOUSANDS/ΜL (ref 1.85–7.62)
NEUTS SEG NFR BLD AUTO: 62 % (ref 43–75)
NRBC BLD AUTO-RTO: 0 /100 WBCS
PLATELET # BLD AUTO: 233 THOUSANDS/UL (ref 149–390)
PMV BLD AUTO: 11.4 FL (ref 8.9–12.7)
POTASSIUM SERPL-SCNC: 4 MMOL/L (ref 3.5–5.3)
RBC # BLD AUTO: 5.31 MILLION/UL (ref 3.81–5.12)
SODIUM SERPL-SCNC: 144 MMOL/L (ref 136–145)
TIBC SERPL-MCNC: 282 UG/DL (ref 250–450)
WBC # BLD AUTO: 6.6 THOUSAND/UL (ref 4.31–10.16)

## 2020-03-09 PROCEDURE — 85025 COMPLETE CBC W/AUTO DIFF WBC: CPT

## 2020-03-09 PROCEDURE — 83540 ASSAY OF IRON: CPT

## 2020-03-09 PROCEDURE — 36415 COLL VENOUS BLD VENIPUNCTURE: CPT

## 2020-03-09 PROCEDURE — 80048 BASIC METABOLIC PNL TOTAL CA: CPT

## 2020-03-09 PROCEDURE — 83550 IRON BINDING TEST: CPT

## 2020-03-09 PROCEDURE — 82728 ASSAY OF FERRITIN: CPT

## 2020-03-09 RX ORDER — FLUOXETINE HYDROCHLORIDE 40 MG/1
40 CAPSULE ORAL DAILY
Qty: 90 CAPSULE | Refills: 1 | Status: SHIPPED | OUTPATIENT
Start: 2020-03-09 | End: 2020-09-29 | Stop reason: SDUPTHER

## 2020-03-30 DIAGNOSIS — I10 ESSENTIAL HYPERTENSION: ICD-10-CM

## 2020-03-30 RX ORDER — LISINOPRIL 20 MG/1
20 TABLET ORAL DAILY
Qty: 90 TABLET | Refills: 1 | Status: SHIPPED | OUTPATIENT
Start: 2020-03-30 | End: 2020-09-29 | Stop reason: SDUPTHER

## 2020-04-27 DIAGNOSIS — E78.2 MIXED HYPERLIPIDEMIA: ICD-10-CM

## 2020-04-28 RX ORDER — ROSUVASTATIN CALCIUM 10 MG/1
10 TABLET, COATED ORAL DAILY
Qty: 30 TABLET | Refills: 3 | Status: SHIPPED | OUTPATIENT
Start: 2020-04-28 | End: 2020-04-29 | Stop reason: SDUPTHER

## 2020-04-29 DIAGNOSIS — E78.2 MIXED HYPERLIPIDEMIA: ICD-10-CM

## 2020-04-29 RX ORDER — ROSUVASTATIN CALCIUM 10 MG/1
10 TABLET, COATED ORAL DAILY
Qty: 30 TABLET | Refills: 3 | Status: SHIPPED | OUTPATIENT
Start: 2020-04-29 | End: 2020-05-01 | Stop reason: SDUPTHER

## 2020-05-01 DIAGNOSIS — E78.2 MIXED HYPERLIPIDEMIA: ICD-10-CM

## 2020-05-02 RX ORDER — ROSUVASTATIN CALCIUM 10 MG/1
10 TABLET, COATED ORAL DAILY
Qty: 90 TABLET | Refills: 1 | Status: SHIPPED | OUTPATIENT
Start: 2020-05-02 | End: 2020-11-30 | Stop reason: SDUPTHER

## 2020-06-25 ENCOUNTER — OFFICE VISIT (OUTPATIENT)
Dept: FAMILY MEDICINE CLINIC | Facility: CLINIC | Age: 75
End: 2020-06-25
Payer: MEDICARE

## 2020-06-25 VITALS
BODY MASS INDEX: 46.25 KG/M2 | HEART RATE: 85 BPM | DIASTOLIC BLOOD PRESSURE: 80 MMHG | WEIGHT: 261 LBS | HEIGHT: 63 IN | OXYGEN SATURATION: 95 % | SYSTOLIC BLOOD PRESSURE: 130 MMHG | TEMPERATURE: 98.4 F | RESPIRATION RATE: 22 BRPM

## 2020-06-25 DIAGNOSIS — F32.A DEPRESSIVE DISORDER: ICD-10-CM

## 2020-06-25 DIAGNOSIS — N18.30 CKD (CHRONIC KIDNEY DISEASE) STAGE 3, GFR 30-59 ML/MIN (HCC): Primary | ICD-10-CM

## 2020-06-25 DIAGNOSIS — G25.0 BENIGN ESSENTIAL TREMOR: ICD-10-CM

## 2020-06-25 DIAGNOSIS — G31.84 MILD COGNITIVE IMPAIRMENT: ICD-10-CM

## 2020-06-25 DIAGNOSIS — E78.2 MIXED HYPERLIPIDEMIA: ICD-10-CM

## 2020-06-25 PROCEDURE — 99214 OFFICE O/P EST MOD 30 MIN: CPT | Performed by: FAMILY MEDICINE

## 2020-06-25 PROCEDURE — 1160F RVW MEDS BY RX/DR IN RCRD: CPT | Performed by: FAMILY MEDICINE

## 2020-06-25 PROCEDURE — 3079F DIAST BP 80-89 MM HG: CPT | Performed by: FAMILY MEDICINE

## 2020-06-25 PROCEDURE — 3075F SYST BP GE 130 - 139MM HG: CPT | Performed by: FAMILY MEDICINE

## 2020-06-25 PROCEDURE — 1036F TOBACCO NON-USER: CPT | Performed by: FAMILY MEDICINE

## 2020-06-25 PROCEDURE — 3008F BODY MASS INDEX DOCD: CPT | Performed by: FAMILY MEDICINE

## 2020-06-25 PROCEDURE — 4040F PNEUMOC VAC/ADMIN/RCVD: CPT | Performed by: FAMILY MEDICINE

## 2020-06-25 RX ORDER — CHOLECALCIFEROL (VITAMIN D3) 125 MCG
2000 CAPSULE ORAL DAILY
COMMUNITY

## 2020-08-06 ENCOUNTER — OFFICE VISIT (OUTPATIENT)
Dept: FAMILY MEDICINE CLINIC | Facility: CLINIC | Age: 75
End: 2020-08-06
Payer: MEDICARE

## 2020-08-06 ENCOUNTER — APPOINTMENT (OUTPATIENT)
Dept: LAB | Facility: HOSPITAL | Age: 75
End: 2020-08-06
Payer: MEDICARE

## 2020-08-06 VITALS
TEMPERATURE: 96.5 F | BODY MASS INDEX: 46.81 KG/M2 | WEIGHT: 264.2 LBS | RESPIRATION RATE: 17 BRPM | DIASTOLIC BLOOD PRESSURE: 80 MMHG | SYSTOLIC BLOOD PRESSURE: 130 MMHG | HEIGHT: 63 IN | HEART RATE: 77 BPM | OXYGEN SATURATION: 98 %

## 2020-08-06 DIAGNOSIS — E78.2 MIXED HYPERLIPIDEMIA: Primary | ICD-10-CM

## 2020-08-06 DIAGNOSIS — Z12.31 ENCOUNTER FOR SCREENING MAMMOGRAM FOR BREAST CANCER: ICD-10-CM

## 2020-08-06 DIAGNOSIS — Z00.00 MEDICARE ANNUAL WELLNESS VISIT, SUBSEQUENT: Primary | ICD-10-CM

## 2020-08-06 DIAGNOSIS — Z23 ENCOUNTER FOR IMMUNIZATION: ICD-10-CM

## 2020-08-06 LAB
ALBUMIN SERPL BCP-MCNC: 3.3 G/DL (ref 3.5–5)
ALP SERPL-CCNC: 85 U/L (ref 46–116)
ALT SERPL W P-5'-P-CCNC: 17 U/L (ref 12–78)
ANION GAP SERPL CALCULATED.3IONS-SCNC: 8 MMOL/L (ref 4–13)
AST SERPL W P-5'-P-CCNC: 13 U/L (ref 5–45)
BACTERIA UR QL AUTO: ABNORMAL /HPF
BILIRUB SERPL-MCNC: 0.5 MG/DL (ref 0.2–1)
BILIRUB UR QL STRIP: NEGATIVE
BUN SERPL-MCNC: 22 MG/DL (ref 5–25)
CALCIUM SERPL-MCNC: 8.7 MG/DL (ref 8.3–10.1)
CHLORIDE SERPL-SCNC: 110 MMOL/L (ref 100–108)
CHOLEST SERPL-MCNC: 114 MG/DL (ref 50–200)
CLARITY UR: ABNORMAL
CO2 SERPL-SCNC: 27 MMOL/L (ref 21–32)
COLOR UR: YELLOW
CREAT SERPL-MCNC: 1.24 MG/DL (ref 0.6–1.3)
CREAT UR-MCNC: 334 MG/DL
FERRITIN SERPL-MCNC: 41 NG/ML (ref 8–388)
GFR SERPL CREATININE-BSD FRML MDRD: 42 ML/MIN/1.73SQ M
GLUCOSE P FAST SERPL-MCNC: 101 MG/DL (ref 65–99)
GLUCOSE UR STRIP-MCNC: NEGATIVE MG/DL
HDLC SERPL-MCNC: 48 MG/DL
HGB UR QL STRIP.AUTO: ABNORMAL
IRON SATN MFR SERPL: 25 %
IRON SERPL-MCNC: 63 UG/DL (ref 50–170)
KETONES UR STRIP-MCNC: NEGATIVE MG/DL
LDLC SERPL CALC-MCNC: 48 MG/DL (ref 0–100)
LDLC SERPL DIRECT ASSAY-MCNC: 56 MG/DL (ref 0–100)
LEUKOCYTE ESTERASE UR QL STRIP: ABNORMAL
MICROALBUMIN UR-MCNC: 29.4 MG/L (ref 0–20)
MICROALBUMIN/CREAT 24H UR: 9 MG/G CREATININE (ref 0–30)
NITRITE UR QL STRIP: NEGATIVE
NON-SQ EPI CELLS URNS QL MICRO: ABNORMAL /HPF
NONHDLC SERPL-MCNC: 66 MG/DL
PH UR STRIP.AUTO: 5.5 [PH]
POTASSIUM SERPL-SCNC: 4.3 MMOL/L (ref 3.5–5.3)
PROT SERPL-MCNC: 7 G/DL (ref 6.4–8.2)
PROT UR STRIP-MCNC: NEGATIVE MG/DL
RBC #/AREA URNS AUTO: ABNORMAL /HPF
SODIUM SERPL-SCNC: 145 MMOL/L (ref 136–145)
SP GR UR STRIP.AUTO: >=1.03 (ref 1–1.03)
TIBC SERPL-MCNC: 255 UG/DL (ref 250–450)
TRIGL SERPL-MCNC: 88 MG/DL
UROBILINOGEN UR QL STRIP.AUTO: 1 E.U./DL
WBC #/AREA URNS AUTO: ABNORMAL /HPF

## 2020-08-06 PROCEDURE — 1160F RVW MEDS BY RX/DR IN RCRD: CPT | Performed by: FAMILY MEDICINE

## 2020-08-06 PROCEDURE — 83721 ASSAY OF BLOOD LIPOPROTEIN: CPT

## 2020-08-06 PROCEDURE — 1123F ACP DISCUSS/DSCN MKR DOCD: CPT | Performed by: FAMILY MEDICINE

## 2020-08-06 PROCEDURE — 36415 COLL VENOUS BLD VENIPUNCTURE: CPT

## 2020-08-06 PROCEDURE — 4040F PNEUMOC VAC/ADMIN/RCVD: CPT | Performed by: FAMILY MEDICINE

## 2020-08-06 PROCEDURE — 3079F DIAST BP 80-89 MM HG: CPT | Performed by: FAMILY MEDICINE

## 2020-08-06 PROCEDURE — 82728 ASSAY OF FERRITIN: CPT

## 2020-08-06 PROCEDURE — 82570 ASSAY OF URINE CREATININE: CPT | Performed by: FAMILY MEDICINE

## 2020-08-06 PROCEDURE — 3075F SYST BP GE 130 - 139MM HG: CPT | Performed by: FAMILY MEDICINE

## 2020-08-06 PROCEDURE — 82043 UR ALBUMIN QUANTITATIVE: CPT | Performed by: FAMILY MEDICINE

## 2020-08-06 PROCEDURE — 83550 IRON BINDING TEST: CPT

## 2020-08-06 PROCEDURE — 1170F FXNL STATUS ASSESSED: CPT | Performed by: FAMILY MEDICINE

## 2020-08-06 PROCEDURE — 3008F BODY MASS INDEX DOCD: CPT | Performed by: FAMILY MEDICINE

## 2020-08-06 PROCEDURE — 1036F TOBACCO NON-USER: CPT | Performed by: FAMILY MEDICINE

## 2020-08-06 PROCEDURE — G0439 PPPS, SUBSEQ VISIT: HCPCS | Performed by: FAMILY MEDICINE

## 2020-08-06 PROCEDURE — 80053 COMPREHEN METABOLIC PANEL: CPT

## 2020-08-06 PROCEDURE — 83540 ASSAY OF IRON: CPT

## 2020-08-06 PROCEDURE — 81001 URINALYSIS AUTO W/SCOPE: CPT | Performed by: FAMILY MEDICINE

## 2020-08-06 PROCEDURE — 80061 LIPID PANEL: CPT

## 2020-08-06 PROCEDURE — 1125F AMNT PAIN NOTED PAIN PRSNT: CPT | Performed by: FAMILY MEDICINE

## 2020-08-06 RX ORDER — ZOSTER VACCINE RECOMBINANT, ADJUVANTED 50 MCG/0.5
KIT INTRAMUSCULAR
Qty: 1 EACH | Refills: 1 | Status: SHIPPED | OUTPATIENT
Start: 2020-08-06 | End: 2021-09-02

## 2020-08-06 NOTE — PROGRESS NOTES
Assessment and Plan:     Problem List Items Addressed This Visit     None      Visit Diagnoses     Medicare annual wellness visit, subsequent    -  Primary    Encounter for screening mammogram for breast cancer        Needs her mammo end of November this year  Relevant Orders    Mammo screening bilateral w 3d & cad    Encounter for immunization        Let us know if able to get the shingles shot  Relevant Medications    Zoster Vac Recomb Adjuvanted (Shingrix) 50 MCG/0 5ML SUSR           Preventive health issues were discussed with patient, and age appropriate screening tests were ordered as noted in patient's After Visit Summary  Personalized health advice and appropriate referrals for health education or preventive services given if needed, as noted in patient's After Visit Summary  History of Present Illness:     Patient presents for Medicare Annual Wellness visit    Patient Care Team:  Breanna Mcgarry as PCP - General (Family Medicine)  Ana Clemons MD     Problem List:     Patient Active Problem List   Diagnosis    Benign essential tremor    Depressive disorder    Essential hypertension    CKD (chronic kidney disease) stage 3, GFR 30-59 ml/min (HCC)    Hypothyroidism    Shortness of breath    Mixed hyperlipidemia    History of breast cancer    Mild cognitive impairment      Past Medical and Surgical History:     Past Medical History:   Diagnosis Date    Acute ill-defined cerebrovascular disease     Benign essential tremor     Breast cancer ([address] )     pt states doesn't remember the year thinks it might have been on the right      Chronic kidney disease     Colon polyp     COPD (chronic obstructive pulmonary disease) (HCC)     Depressive disorder     Disease of thyroid gland     Heart murmur     Hypertension     Impaired fasting glucose     Intention tremor     last assessed: 8/18/2016    Osteochondropathy     Panic disorder without agoraphobia with mild panic attacks     Psychiatric disorder     anxiety    Stroke [facility])     Tubular adenoma of colon      Past Surgical History:   Procedure Laterality Date    AUGMENTATION BREAST      pt states during mammo she doesn't have any other surgery than the 2 biopsy    BREAST BIOPSY Left     pt doesn't recall when and believes the neg biop was on left    BREAST LUMPECTOMY W/ NEEDLE LOCALIZATION Left     description: benign last assessed; 8/21/2016    CHOLECYSTECTOMY      COLONOSCOPY      EGD      HYSTERECTOMY        Family History:     Family History   Problem Relation Age of Onset    Alcohol abuse Mother     Asthma Mother     Cancer Mother     Mental illness Mother     Osteoarthritis Mother     Gout Father     Cancer Father     Other Sister         carotid arterial disease    COPD Sister     Hyperlipidemia Sister     Hypertension Sister     Heart attack Sister     Breast cancer Sister     No Known Problems Daughter     No Known Problems Maternal Grandmother     No Known Problems Paternal Grandmother     Lung cancer Sister     No Known Problems Sister     No Known Problems Daughter     No Known Problems Daughter       Social History:        Social History     Socioeconomic History    Marital status: Single     Spouse name: None    Number of children: None    Years of education: None    Highest education level: None   Occupational History    Occupation: Retired   Social Needs    Financial resource strain: None    Food insecurity     Worry: None     Inability: None    Transportation needs     Medical: None     Non-medical: None   Tobacco Use    Smoking status: Former Smoker    Smokeless tobacco: Never Used    Tobacco comment: 10 years ago   Substance and Sexual Activity    Alcohol use: Yes     Comment: socially - Per allscripts - denies alcohol consumption    Drug use: No    Sexual activity: None   Lifestyle    Physical activity     Days per week: None     Minutes per session: None    Stress: None   Relationships    Social connections     Talks on phone: None     Gets together: None     Attends Christian service: None     Active member of club or organization: None     Attends meetings of clubs or organizations: None     Relationship status: None    Intimate partner violence     Fear of current or ex partner: None     Emotionally abused: None     Physically abused: None     Forced sexual activity: None   Other Topics Concern    None   Social History Narrative         Always uses seat belt      Medications and Allergies:     Current Outpatient Medications   Medication Sig Dispense Refill    amLODIPine (NORVASC) 10 mg tablet Take 1 tablet (10 mg total) by mouth daily 90 tablet 1    Cholecalciferol (VITAMIN D3) 50 MCG (2000 UT) TABS Take 2,000 Units by mouth daily      dexlansoprazole (Dexilant) 60 MG capsule Take 1 capsule (60 mg total) by mouth daily before breakfast 90 capsule 1    FLUoxetine (PROzac) 40 MG capsule Take 1 capsule (40 mg total) by mouth daily 90 capsule 1    lisinopril (ZESTRIL) 20 mg tablet Take 1 tablet (20 mg total) by mouth daily 90 tablet 1    potassium chloride (K-DUR,KLOR-CON) 20 mEq tablet Take 1 tablet (20 mEq total) by mouth daily 90 tablet 1    rosuvastatin (CRESTOR) 10 MG tablet Take 1 tablet (10 mg total) by mouth daily 90 tablet 1    levothyroxine 50 mcg tablet TAKE 1 TABLET(50 MCG) BY MOUTH DAILY (Patient not taking: Reported on 6/25/2020) 90 tablet 3    Zoster Vac Recomb Adjuvanted (Shingrix) 50 MCG/0 5ML SUSR 0 5mL IM for one dose, followed by 0 5mL IM 2-6 months after first dose 1 each 1     No current facility-administered medications for this visit        Allergies   Allergen Reactions    Amoxicillin     Codeine     Morphine       Immunizations:     Immunization History   Administered Date(s) Administered    INFLUENZA 01/30/2007, 10/02/2009, 01/28/2011, 09/08/2011, 08/20/2012, 09/20/2013, 09/23/2014, 10/27/2015, 10/26/2018    Influenza Split High Dose Preservative Free IM 11/18/2016, 10/31/2017    Influenza TIV (IM) 01/30/2007, 10/02/2009, 01/28/2011, 09/08/2011, 08/20/2012, 09/20/2013, 09/23/2014, 10/27/2015    Influenza, high dose seasonal 0 5 mL 10/26/2018, 11/05/2019    Pneumococcal Conjugate 13-Valent 08/06/2015    Pneumococcal Polysaccharide PPV23 07/01/2011    Tdap 07/01/2011    Zoster 06/24/2015      Health Maintenance:         Topic Date Due    DXA SCAN  11/19/2021         Topic Date Due    Influenza Vaccine  07/01/2020      Medicare Health Risk Assessment:     /80 (BP Location: Right arm, Patient Position: Sitting, Cuff Size: Adult)   Pulse 77   Temp (!) 96 5 °F (35 8 °C)   Resp 17   Ht 5' 3" (1 6 m)   Wt 120 kg (264 lb 3 2 oz)   SpO2 98%   BMI 46 80 kg/m²          Health Risk Assessment:   Patient rates overall health as good  Patient feels that their physical health rating is same  Eyesight was rated as slightly worse  Hearing was rated as same  Patient feels that their emotional and mental health rating is same  Pain experienced in the last 7 days has been some  Patient's pain rating has been 6/10  Patient states that she has experienced no weight loss or gain in last 6 months  Fall Risk Screening: In the past year, patient has experienced: no history of falling in past year      Urinary Incontinence Screening:   Patient has leaked urine accidently in the last six months  Home Safety:  Patient does not have trouble with stairs inside or outside of their home  Patient has working smoke alarms and has no working carbon monoxide detector  Nutrition:   Current diet is Low Saturated Fat, Frequent junk food and No Added Salt  Medications:   Patient is currently taking over-the-counter supplements  OTC medications include: see medication list  Patient is able to manage medications       Activities of Daily Living (ADLs)/Instrumental Activities of Daily Living (IADLs):   Walk and transfer into and out of bed and chair?: Yes  Dress and groom yourself?: Yes    Bathe or shower yourself?: Yes    Feed yourself? Yes  Do your laundry/housekeeping?: Yes  Manage your money, pay your bills and track your expenses?: Yes  Make your own meals?: Yes    Do your own shopping?: Yes    Previous Hospitalizations:   Any hospitalizations or ED visits within the last 12 months?: No      Advance Care Planning:   Living will: Yes    Durable POA for healthcare:  Yes    Advanced directive: Yes    Advanced directive counseling given: Yes    Five wishes given: Yes    Patient declined ACP directive: No    End of Life Decisions reviewed with patient: Yes    Provider agrees with end of life decisions: Yes      Comments: Pt would not want resuscitation if no hope for recovery    Cognitive Screening:   Provider or family/friend/caregiver concerned regarding cognition?: Yes    Cognition Comments: Pt was given mmse at last visit in June 28/30    PREVENTIVE SCREENINGS      Cardiovascular Screening:    General: Screening Not Indicated and History Lipid Disorder      Diabetes Screening:     General: Screening Current      Breast Cancer Screening:     General: History Breast Cancer    Due for: Mammogram        Cervical Cancer Screening:    General: Screening Not Indicated      Osteoporosis Screening:    General: Screening Current      Abdominal Aortic Aneurysm (AAA) Screening:        General: Risks and Benefits Discussed and Screening Not Indicated      Lung Cancer Screening:     General: Screening Not Indicated and Risks and Benefits Discussed      Hepatitis C Screening:    General: Risks and Benefits Discussed and Screening Not Indicated      Rossy Ng

## 2020-08-06 NOTE — PATIENT INSTRUCTIONS
Discussed all with patient  Read over the information given  Have your mammo done the end of November this year  If you are able to get the new shingles shot it is a series of 2 call and let us now  Medicare Preventive Visit Patient Instructions  Thank you for completing your Welcome to Medicare Visit or Medicare Annual Wellness Visit today  Your next wellness visit will be due in one year (8/6/2021)  The screening/preventive services that you may require over the next 5-10 years are detailed below  Some tests may not apply to you based off risk factors and/or age  Screening tests ordered at today's visit but not completed yet may show as past due  Also, please note that scanned in results may not display below  Preventive Screenings:  Service Recommendations Previous Testing/Comments   Colorectal Cancer Screening  * Colonoscopy    * Fecal Occult Blood Test (FOBT)/Fecal Immunochemical Test (FIT)  * Fecal DNA/Cologuard Test  * Flexible Sigmoidoscopy Age: 54-65 years old   Colonoscopy: every 10 years (may be performed more frequently if at higher risk)  OR  FOBT/FIT: every 1 year  OR  Cologuard: every 3 years  OR  Sigmoidoscopy: every 5 years  Screening may be recommended earlier than age 48 if at higher risk for colorectal cancer  Also, an individualized decision between you and your healthcare provider will decide whether screening between the ages of 74-80 would be appropriate  Colonoscopy: 09/17/2019  FOBT/FIT: Not on file  Cologuard: Not on file  Sigmoidoscopy: Not on file         Breast Cancer Screening Age: 36 years old  Frequency: every 1-2 years  Not required if history of left and right mastectomy Mammogram: 11/26/2019    History Breast Cancer   Cervical Cancer Screening Between the ages of 21-29, pap smear recommended once every 3 years  Between the ages of 33-67, can perform pap smear with HPV co-testing every 5 years     Recommendations may differ for women with a history of total hysterectomy, cervical cancer, or abnormal pap smears in past  Pap Smear: Not on file    Screening Not Indicated   Hepatitis C Screening Once for adults born between 1945 and 1965  More frequently in patients at high risk for Hepatitis C Hep C Antibody: Not on file       Diabetes Screening 1-2 times per year if you're at risk for diabetes or have pre-diabetes Fasting glucose: 93 mg/dL   A1C: No results in last 5 years    Screening Current   Cholesterol Screening Once every 5 years if you don't have a lipid disorder  May order more often based on risk factors  Lipid panel: 02/24/2020    Screening Not Indicated  History Lipid Disorder     Other Preventive Screenings Covered by Medicare:  1  Abdominal Aortic Aneurysm (AAA) Screening: covered once if your at risk  You're considered to be at risk if you have a family history of AAA  2  Lung Cancer Screening: covers low dose CT scan once per year if you meet all of the following conditions: (1) Age 50-69; (2) No signs or symptoms of lung cancer; (3) Current smoker or have quit smoking within the last 15 years; (4) You have a tobacco smoking history of at least 30 pack years (packs per day multiplied by number of years you smoked); (5) You get a written order from a healthcare provider  3  Glaucoma Screening: covered annually if you're considered high risk: (1) You have diabetes OR (2) Family history of glaucoma OR (3)  aged 48 and older OR (3)  American aged 72 and older  3  Osteoporosis Screening: covered every 2 years if you meet one of the following conditions: (1) You're estrogen deficient and at risk for osteoporosis based off medical history and other findings; (2) Have a vertebral abnormality; (3) On glucocorticoid therapy for more than 3 months; (4) Have primary hyperparathyroidism; (5) On osteoporosis medications and need to assess response to drug therapy     · Last bone density test (DXA Scan): 11/19/2019   5  HIV Screening: covered annually if you're between the age of 12-76  Also covered annually if you are younger than 13 and older than 72 with risk factors for HIV infection  For pregnant patients, it is covered up to 3 times per pregnancy  Immunizations:  Immunization Recommendations   Influenza Vaccine Annual influenza vaccination during flu season is recommended for all persons aged >= 6 months who do not have contraindications   Pneumococcal Vaccine (Prevnar and Pneumovax)  * Prevnar = PCV13  * Pneumovax = PPSV23   Adults 25-60 years old: 1-3 doses may be recommended based on certain risk factors  Adults 72 years old: Prevnar (PCV13) vaccine recommended followed by Pneumovax (PPSV23) vaccine  If already received PPSV23 since turning 65, then PCV13 recommended at least one year after PPSV23 dose  Hepatitis B Vaccine 3 dose series if at intermediate or high risk (ex: diabetes, end stage renal disease, liver disease)   Tetanus (Td) Vaccine - COST NOT COVERED BY MEDICARE PART B Following completion of primary series, a booster dose should be given every 10 years to maintain immunity against tetanus  Td may also be given as tetanus wound prophylaxis  Tdap Vaccine - COST NOT COVERED BY MEDICARE PART B Recommended at least once for all adults  For pregnant patients, recommended with each pregnancy  Shingles Vaccine (Shingrix) - COST NOT COVERED BY MEDICARE PART B  2 shot series recommended in those aged 48 and above     Health Maintenance Due:      Topic Date Due    DXA SCAN  11/19/2021     Immunizations Due:      Topic Date Due    Influenza Vaccine  07/01/2020     Advance Directives   What are advance directives? Advance directives are legal documents that state your wishes and plans for medical care  These plans are made ahead of time in case you lose your ability to make decisions for yourself  Advance directives can apply to any medical decision, such as the treatments you want, and if you want to donate organs     What are the types of advance directives? There are many types of advance directives, and each state has rules about how to use them  You may choose a combination of any of the following:  · Living will: This is a written record of the treatment you want  You can also choose which treatments you do not want, which to limit, and which to stop at a certain time  This includes surgery, medicine, IV fluid, and tube feedings  · Durable power of  for healthcare [facility]): This is a written record that states who you want to make healthcare choices for you when you are unable to make them for yourself  This person, called a proxy, is usually a family member or a friend  You may choose more than 1 proxy  · Do not resuscitate (DNR) order:  A DNR order is used in case your heart stops beating or you stop breathing  It is a request not to have certain forms of treatment, such as CPR  A DNR order may be included in other types of advance directives  · Medical directive: This covers the care that you want if you are in a coma, near death, or unable to make decisions for yourself  You can list the treatments you want for each condition  Treatment may include pain medicine, surgery, blood transfusions, dialysis, IV or tube feedings, and a ventilator (breathing machine)  · Values history: This document has questions about your views, beliefs, and how you feel and think about life  This information can help others choose the care that you would choose  Why are advance directives important? An advance directive helps you control your care  Although spoken wishes may be used, it is better to have your wishes written down  Spoken wishes can be misunderstood, or not followed  Treatments may be given even if you do not want them  An advance directive may make it easier for your family to make difficult choices about your care  Urinary Incontinence   Urinary incontinence (UI)  is when you lose control of your bladder   UI develops because your bladder cannot store or empty urine properly  The 3 most common types of UI are stress incontinence, urge incontinence, or both  Medicines:   · May be given to help strengthen your bladder control  Report any side effects of medication to your healthcare provider  Do pelvic muscle exercises often:  Your pelvic muscles help you stop urinating  Squeeze these muscles tight for 5 seconds, then relax for 5 seconds  Gradually work up to squeezing for 10 seconds  Do 3 sets of 15 repetitions a day, or as directed  This will help strengthen your pelvic muscles and improve bladder control  Train your bladder:  Go to the bathroom at set times, such as every 2 hours, even if you do not feel the urge to go  You can also try to hold your urine when you feel the urge to go  For example, hold your urine for 5 minutes when you feel the urge to go  As that becomes easier, hold your urine for 10 minutes  Self-care:   · Keep a UI record  Write down how often you leak urine and how much you leak  Make a note of what you were doing when you leaked urine  · Drink liquids as directed  You may need to limit the amount of liquid you drink to help control your urine leakage  Do not drink any liquid right before you go to bed  Limit or do not have drinks that contain caffeine or alcohol  · Prevent constipation  Eat a variety of high-fiber foods  Good examples are high-fiber cereals, beans, vegetables, and whole-grain breads  Walking is the best way to trigger your intestines to have a bowel movement  · Exercise regularly and maintain a healthy weight  Weight loss and exercise will decrease pressure on your bladder and help you control your leakage  · Use a catheter as directed  to help empty your bladder  A catheter is a tiny, plastic tube that is put into your bladder to drain your urine  · Go to behavior therapy as directed  Behavior therapy may be used to help you learn to control your urge to urinate      Weight Management   Why it is important to manage your weight:  Being overweight increases your risk of health conditions such as heart disease, high blood pressure, type 2 diabetes, and certain types of cancer  It can also increase your risk for osteoarthritis, sleep apnea, and other respiratory problems  Aim for a slow, steady weight loss  Even a small amount of weight loss can lower your risk of health problems  How to lose weight safely:  A safe and healthy way to lose weight is to eat fewer calories and get regular exercise  You can lose up about 1 pound a week by decreasing the number of calories you eat by 500 calories each day  Healthy meal plan for weight management:  A healthy meal plan includes a variety of foods, contains fewer calories, and helps you stay healthy  A healthy meal plan includes the following:  · Eat whole-grain foods more often  A healthy meal plan should contain fiber  Fiber is the part of grains, fruits, and vegetables that is not broken down by your body  Whole-grain foods are healthy and provide extra fiber in your diet  Some examples of whole-grain foods are whole-wheat breads and pastas, oatmeal, brown rice, and bulgur  · Eat a variety of vegetables every day  Include dark, leafy greens such as spinach, kale, neeraj greens, and mustard greens  Eat yellow and orange vegetables such as carrots, sweet potatoes, and winter squash  · Eat a variety of fruits every day  Choose fresh or canned fruit (canned in its own juice or light syrup) instead of juice  Fruit juice has very little or no fiber  · Eat low-fat dairy foods  Drink fat-free (skim) milk or 1% milk  Eat fat-free yogurt and low-fat cottage cheese  Try low-fat cheeses such as mozzarella and other reduced-fat cheeses  · Choose meat and other protein foods that are low in fat  Choose beans or other legumes such as split peas or lentils   Choose fish, skinless poultry (chicken or turkey), or lean cuts of red meat (beef or pork)  Before you cook meat or poultry, cut off any visible fat  · Use less fat and oil  Try baking foods instead of frying them  Add less fat, such as margarine, sour cream, regular salad dressing and mayonnaise to foods  Eat fewer high-fat foods  Some examples of high-fat foods include french fries, doughnuts, ice cream, and cakes  · Eat fewer sweets  Limit foods and drinks that are high in sugar  This includes candy, cookies, regular soda, and sweetened drinks  Exercise:  Exercise at least 30 minutes per day on most days of the week  Some examples of exercise include walking, biking, dancing, and swimming  You can also fit in more physical activity by taking the stairs instead of the elevator or parking farther away from stores  Ask your healthcare provider about the best exercise plan for you  © Copyright [employer] 2018 Information is for End User's use only and may not be sold, redistributed or otherwise used for commercial purposes  All illustrations and images included in CareNotes® are the copyrighted property of [employer]  or [facility] Preventive Visit Patient Instructions  Thank you for completing your Welcome to Medicare Visit or Medicare Annual Wellness Visit today  Your next wellness visit will be due in one year (8/6/2021)  The screening/preventive services that you may require over the next 5-10 years are detailed below  Some tests may not apply to you based off risk factors and/or age  Screening tests ordered at today's visit but not completed yet may show as past due  Also, please note that scanned in results may not display below    Preventive Screenings:  Service Recommendations Previous Testing/Comments   Colorectal Cancer Screening  * Colonoscopy    * Fecal Occult Blood Test (FOBT)/Fecal Immunochemical Test (FIT)  * Fecal DNA/Cologuard Test  * Flexible Sigmoidoscopy Age: 54-65 years old   Colonoscopy: every 10 years (may be performed more frequently if at higher risk)  OR  FOBT/FIT: every 1 year  OR  Cologuard: every 3 years  OR  Sigmoidoscopy: every 5 years  Screening may be recommended earlier than age 48 if at higher risk for colorectal cancer  Also, an individualized decision between you and your healthcare provider will decide whether screening between the ages of 74-80 would be appropriate  Colonoscopy: 09/17/2019  FOBT/FIT: Not on file  Cologuard: Not on file  Sigmoidoscopy: Not on file         Breast Cancer Screening Age: 35 years old  Frequency: every 1-2 years  Not required if history of left and right mastectomy Mammogram: 11/26/2019    History Breast Cancer   Cervical Cancer Screening Between the ages of 21-29, pap smear recommended once every 3 years  Between the ages of 33-67, can perform pap smear with HPV co-testing every 5 years  Recommendations may differ for women with a history of total hysterectomy, cervical cancer, or abnormal pap smears in past  Pap Smear: Not on file    Screening Not Indicated   Hepatitis C Screening Once for adults born between 1945 and 1965  More frequently in patients at high risk for Hepatitis C Hep C Antibody: Not on file       Diabetes Screening 1-2 times per year if you're at risk for diabetes or have pre-diabetes Fasting glucose: 93 mg/dL   A1C: No results in last 5 years    Screening Current   Cholesterol Screening Once every 5 years if you don't have a lipid disorder  May order more often based on risk factors  Lipid panel: 02/24/2020    Screening Not Indicated  History Lipid Disorder     Other Preventive Screenings Covered by Medicare:  6  Abdominal Aortic Aneurysm (AAA) Screening: covered once if your at risk  You're considered to be at risk if you have a family history of AAA    7  Lung Cancer Screening: covers low dose CT scan once per year if you meet all of the following conditions: (1) Age 50-69; (2) No signs or symptoms of lung cancer; (3) Current smoker or have quit smoking within the last 15 years; (4) You have a tobacco smoking history of at least 30 pack years (packs per day multiplied by number of years you smoked); (5) You get a written order from a healthcare provider  8  Glaucoma Screening: covered annually if you're considered high risk: (1) You have diabetes OR (2) Family history of glaucoma OR (3)  aged 48 and older OR (3)  American aged 72 and older  5  Osteoporosis Screening: covered every 2 years if you meet one of the following conditions: (1) You're estrogen deficient and at risk for osteoporosis based off medical history and other findings; (2) Have a vertebral abnormality; (3) On glucocorticoid therapy for more than 3 months; (4) Have primary hyperparathyroidism; (5) On osteoporosis medications and need to assess response to drug therapy  · Last bone density test (DXA Scan): 11/19/2019   10  HIV Screening: covered annually if you're between the age of 15-65  Also covered annually if you are younger than 13 and older than 72 with risk factors for HIV infection  For pregnant patients, it is covered up to 3 times per pregnancy  Immunizations:  Immunization Recommendations   Influenza Vaccine Annual influenza vaccination during flu season is recommended for all persons aged >= 6 months who do not have contraindications   Pneumococcal Vaccine (Prevnar and Pneumovax)  * Prevnar = PCV13  * Pneumovax = PPSV23   Adults 25-60 years old: 1-3 doses may be recommended based on certain risk factors  Adults 72 years old: Prevnar (PCV13) vaccine recommended followed by Pneumovax (PPSV23) vaccine  If already received PPSV23 since turning 65, then PCV13 recommended at least one year after PPSV23 dose     Hepatitis B Vaccine 3 dose series if at intermediate or high risk (ex: diabetes, end stage renal disease, liver disease)   Tetanus (Td) Vaccine - COST NOT COVERED BY MEDICARE PART B Following completion of primary series, a booster dose should be given every 10 years to maintain immunity against tetanus  Td may also be given as tetanus wound prophylaxis  Tdap Vaccine - COST NOT COVERED BY MEDICARE PART B Recommended at least once for all adults  For pregnant patients, recommended with each pregnancy  Shingles Vaccine (Shingrix) - COST NOT COVERED BY MEDICARE PART B  2 shot series recommended in those aged 48 and above     Health Maintenance Due:      Topic Date Due    DXA SCAN  11/19/2021     Immunizations Due:      Topic Date Due    Influenza Vaccine  07/01/2020     Advance Directives   What are advance directives? Advance directives are legal documents that state your wishes and plans for medical care  These plans are made ahead of time in case you lose your ability to make decisions for yourself  Advance directives can apply to any medical decision, such as the treatments you want, and if you want to donate organs  What are the types of advance directives? There are many types of advance directives, and each state has rules about how to use them  You may choose a combination of any of the following:  · Living will: This is a written record of the treatment you want  You can also choose which treatments you do not want, which to limit, and which to stop at a certain time  This includes surgery, medicine, IV fluid, and tube feedings  · Durable power of  for healthcare [facility]): This is a written record that states who you want to make healthcare choices for you when you are unable to make them for yourself  This person, called a proxy, is usually a family member or a friend  You may choose more than 1 proxy  · Do not resuscitate (DNR) order:  A DNR order is used in case your heart stops beating or you stop breathing  It is a request not to have certain forms of treatment, such as CPR  A DNR order may be included in other types of advance directives  · Medical directive:   This covers the care that you want if you are in a coma, near death, or unable to make decisions for yourself  You can list the treatments you want for each condition  Treatment may include pain medicine, surgery, blood transfusions, dialysis, IV or tube feedings, and a ventilator (breathing machine)  · Values history: This document has questions about your views, beliefs, and how you feel and think about life  This information can help others choose the care that you would choose  Why are advance directives important? An advance directive helps you control your care  Although spoken wishes may be used, it is better to have your wishes written down  Spoken wishes can be misunderstood, or not followed  Treatments may be given even if you do not want them  An advance directive may make it easier for your family to make difficult choices about your care  Urinary Incontinence   Urinary incontinence (UI)  is when you lose control of your bladder  UI develops because your bladder cannot store or empty urine properly  The 3 most common types of UI are stress incontinence, urge incontinence, or both  Medicines:   · May be given to help strengthen your bladder control  Report any side effects of medication to your healthcare provider  Do pelvic muscle exercises often:  Your pelvic muscles help you stop urinating  Squeeze these muscles tight for 5 seconds, then relax for 5 seconds  Gradually work up to squeezing for 10 seconds  Do 3 sets of 15 repetitions a day, or as directed  This will help strengthen your pelvic muscles and improve bladder control  Train your bladder:  Go to the bathroom at set times, such as every 2 hours, even if you do not feel the urge to go  You can also try to hold your urine when you feel the urge to go  For example, hold your urine for 5 minutes when you feel the urge to go  As that becomes easier, hold your urine for 10 minutes  Self-care:   · Keep a UI record  Write down how often you leak urine and how much you leak   Make a note of what you were doing when you leaked urine   · Drink liquids as directed  You may need to limit the amount of liquid you drink to help control your urine leakage  Do not drink any liquid right before you go to bed  Limit or do not have drinks that contain caffeine or alcohol  · Prevent constipation  Eat a variety of high-fiber foods  Good examples are high-fiber cereals, beans, vegetables, and whole-grain breads  Walking is the best way to trigger your intestines to have a bowel movement  · Exercise regularly and maintain a healthy weight  Weight loss and exercise will decrease pressure on your bladder and help you control your leakage  · Use a catheter as directed  to help empty your bladder  A catheter is a tiny, plastic tube that is put into your bladder to drain your urine  · Go to behavior therapy as directed  Behavior therapy may be used to help you learn to control your urge to urinate  Weight Management   Why it is important to manage your weight:  Being overweight increases your risk of health conditions such as heart disease, high blood pressure, type 2 diabetes, and certain types of cancer  It can also increase your risk for osteoarthritis, sleep apnea, and other respiratory problems  Aim for a slow, steady weight loss  Even a small amount of weight loss can lower your risk of health problems  How to lose weight safely:  A safe and healthy way to lose weight is to eat fewer calories and get regular exercise  You can lose up about 1 pound a week by decreasing the number of calories you eat by 500 calories each day  Healthy meal plan for weight management:  A healthy meal plan includes a variety of foods, contains fewer calories, and helps you stay healthy  A healthy meal plan includes the following:  · Eat whole-grain foods more often  A healthy meal plan should contain fiber  Fiber is the part of grains, fruits, and vegetables that is not broken down by your body   Whole-grain foods are healthy and provide extra fiber in your diet  Some examples of whole-grain foods are whole-wheat breads and pastas, oatmeal, brown rice, and bulgur  · Eat a variety of vegetables every day  Include dark, leafy greens such as spinach, kale, neeraj greens, and mustard greens  Eat yellow and orange vegetables such as carrots, sweet potatoes, and winter squash  · Eat a variety of fruits every day  Choose fresh or canned fruit (canned in its own juice or light syrup) instead of juice  Fruit juice has very little or no fiber  · Eat low-fat dairy foods  Drink fat-free (skim) milk or 1% milk  Eat fat-free yogurt and low-fat cottage cheese  Try low-fat cheeses such as mozzarella and other reduced-fat cheeses  · Choose meat and other protein foods that are low in fat  Choose beans or other legumes such as split peas or lentils  Choose fish, skinless poultry (chicken or turkey), or lean cuts of red meat (beef or pork)  Before you cook meat or poultry, cut off any visible fat  · Use less fat and oil  Try baking foods instead of frying them  Add less fat, such as margarine, sour cream, regular salad dressing and mayonnaise to foods  Eat fewer high-fat foods  Some examples of high-fat foods include french fries, doughnuts, ice cream, and cakes  · Eat fewer sweets  Limit foods and drinks that are high in sugar  This includes candy, cookies, regular soda, and sweetened drinks  Exercise:  Exercise at least 30 minutes per day on most days of the week  Some examples of exercise include walking, biking, dancing, and swimming  You can also fit in more physical activity by taking the stairs instead of the elevator or parking farther away from stores  Ask your healthcare provider about the best exercise plan for you  © Copyright [employer] 2018 Information is for End User's use only and may not be sold, redistributed or otherwise used for commercial purposes   All illustrations and images included in CareNotes® are the copyrighted property of [employer]  or [facility]

## 2020-08-10 ENCOUNTER — TELEPHONE (OUTPATIENT)
Dept: FAMILY MEDICINE CLINIC | Facility: CLINIC | Age: 75
End: 2020-08-10

## 2020-08-27 DIAGNOSIS — N39.0 URINARY TRACT INFECTION WITHOUT HEMATURIA, SITE UNSPECIFIED: Primary | ICD-10-CM

## 2020-08-27 NOTE — TELEPHONE ENCOUNTER
Spoke with patient she was hysterical crying aung the phone  She said she thinks she has a uti  She has frequent urination and cant hole the urine in  She also has abd pain as well very bad  I asked her if she was vomiting or any nausea she said no and no fever  Spoke with [facility] and we are going to call in Macrobid 100 mg bid for 5 days and if no better by taking it tomorrow she is to call the office back  I did let her know if she gets a fever tonight or starts vomiting straight to the ER   Patient understands and is aware

## 2020-08-28 RX ORDER — NITROFURANTOIN 25; 75 MG/1; MG/1
100 CAPSULE ORAL 2 TIMES DAILY
Qty: 10 CAPSULE | Refills: 0 | OUTPATIENT
Start: 2020-08-28 | End: 2020-09-02

## 2020-09-02 DIAGNOSIS — I10 ESSENTIAL HYPERTENSION: ICD-10-CM

## 2020-09-03 RX ORDER — AMLODIPINE BESYLATE 10 MG/1
10 TABLET ORAL DAILY
Qty: 90 TABLET | Refills: 1 | Status: SHIPPED | OUTPATIENT
Start: 2020-09-03 | End: 2021-03-03 | Stop reason: SDUPTHER

## 2020-09-08 ENCOUNTER — TELEPHONE (OUTPATIENT)
Dept: FAMILY MEDICINE CLINIC | Facility: CLINIC | Age: 75
End: 2020-09-08

## 2020-09-08 DIAGNOSIS — R30.0 DYSURIA: Primary | ICD-10-CM

## 2020-09-08 NOTE — TELEPHONE ENCOUNTER
Could be a yeast infection or uti  Slip in emr for urine test  I will call with results  If she thinks it is a yeast infection will need f/u appt  Drink 6 to 8 glasses of water daily

## 2020-09-08 NOTE — TELEPHONE ENCOUNTER
Spoke with patient and she is going to the lab to have the urine done    she said that it is hard for her to hold her urine and it hurts  She was treated with antibiotic two weeks ago from [facility] for the same sx's  Per Marino Zuleta patient is to have the ua and culture done and then f/u here on Thursday with provider  Patient is aware   Will have to put her in on same day slot

## 2020-09-08 NOTE — TELEPHONE ENCOUNTER
She started with the vaginal burning when urinating yesterday  She said it is very painful in the morning and she drinks a whole bottle of cranberry juice and it feels a little better , but then it starts all over the next day  She thinks she is doing something wrong, very anxious and nervous

## 2020-09-09 ENCOUNTER — APPOINTMENT (OUTPATIENT)
Dept: LAB | Facility: HOSPITAL | Age: 75
End: 2020-09-09
Payer: MEDICARE

## 2020-09-09 DIAGNOSIS — N39.0 URINARY TRACT INFECTION WITHOUT HEMATURIA, SITE UNSPECIFIED: Primary | ICD-10-CM

## 2020-09-09 LAB
BACTERIA UR QL AUTO: ABNORMAL /HPF
BILIRUB UR QL STRIP: NEGATIVE
CLARITY UR: ABNORMAL
COLOR UR: YELLOW
GLUCOSE UR STRIP-MCNC: NEGATIVE MG/DL
HGB UR QL STRIP.AUTO: ABNORMAL
KETONES UR STRIP-MCNC: NEGATIVE MG/DL
LEUKOCYTE ESTERASE UR QL STRIP: ABNORMAL
NITRITE UR QL STRIP: POSITIVE
NON-SQ EPI CELLS URNS QL MICRO: ABNORMAL /HPF
PH UR STRIP.AUTO: 5.5 [PH]
PROT UR STRIP-MCNC: NEGATIVE MG/DL
RBC #/AREA URNS AUTO: ABNORMAL /HPF
SP GR UR STRIP.AUTO: 1.01 (ref 1–1.03)
UROBILINOGEN UR QL STRIP.AUTO: 0.2 E.U./DL
WBC #/AREA URNS AUTO: ABNORMAL /HPF

## 2020-09-09 PROCEDURE — 81001 URINALYSIS AUTO W/SCOPE: CPT | Performed by: FAMILY MEDICINE

## 2020-09-09 PROCEDURE — 87077 CULTURE AEROBIC IDENTIFY: CPT

## 2020-09-09 PROCEDURE — 87186 SC STD MICRODIL/AGAR DIL: CPT

## 2020-09-09 PROCEDURE — 87086 URINE CULTURE/COLONY COUNT: CPT

## 2020-09-09 RX ORDER — NITROFURANTOIN 25; 75 MG/1; MG/1
100 CAPSULE ORAL 2 TIMES DAILY
Qty: 10 CAPSULE | Refills: 0 | Status: SHIPPED | OUTPATIENT
Start: 2020-09-09 | End: 2020-09-14

## 2020-09-09 NOTE — TELEPHONE ENCOUNTER
Spoke with patient and she is at the lab now giving the urine sample   Also I did let her know she has to call me back so I can schedule her with Shauna Rank either tomorrow or Friday per Shauna Short

## 2020-09-11 ENCOUNTER — TELEPHONE (OUTPATIENT)
Dept: FAMILY MEDICINE CLINIC | Facility: CLINIC | Age: 75
End: 2020-09-11

## 2020-09-11 NOTE — TELEPHONE ENCOUNTER
----- Message from [address] [place] [address] Dr pichardo at 9/11/2020  9:47 AM EDT -----  Her urine came back positive for infection and she is on the correct abx  Are her symptoms better and she needs to drink plenty of water

## 2020-09-17 DIAGNOSIS — E87.6 HYPOKALEMIA: ICD-10-CM

## 2020-09-17 DIAGNOSIS — R53.83 OTHER FATIGUE: ICD-10-CM

## 2020-09-17 DIAGNOSIS — R79.89 ELEVATED TSH: ICD-10-CM

## 2020-09-18 RX ORDER — LEVOTHYROXINE SODIUM 0.05 MG/1
50 TABLET ORAL DAILY
Qty: 90 TABLET | Refills: 0 | Status: SHIPPED | OUTPATIENT
Start: 2020-09-18 | End: 2020-12-18 | Stop reason: SDUPTHER

## 2020-09-18 RX ORDER — POTASSIUM CHLORIDE 20 MEQ/1
20 TABLET, EXTENDED RELEASE ORAL DAILY
Qty: 90 TABLET | Refills: 0 | Status: SHIPPED | OUTPATIENT
Start: 2020-09-18 | End: 2020-12-18 | Stop reason: SDUPTHER

## 2020-09-29 DIAGNOSIS — F32.9 REACTIVE DEPRESSION: ICD-10-CM

## 2020-09-29 DIAGNOSIS — I10 ESSENTIAL HYPERTENSION: ICD-10-CM

## 2020-09-29 RX ORDER — FLUOXETINE HYDROCHLORIDE 40 MG/1
40 CAPSULE ORAL DAILY
Qty: 90 CAPSULE | Refills: 1 | Status: SHIPPED | OUTPATIENT
Start: 2020-09-29 | End: 2020-12-07 | Stop reason: ALTCHOICE

## 2020-09-29 RX ORDER — LISINOPRIL 20 MG/1
20 TABLET ORAL DAILY
Qty: 90 TABLET | Refills: 1 | Status: SHIPPED | OUTPATIENT
Start: 2020-09-29 | End: 2021-03-29 | Stop reason: SDUPTHER

## 2020-10-02 DIAGNOSIS — K21.00 REFLUX ESOPHAGITIS: ICD-10-CM

## 2020-10-09 DIAGNOSIS — K21.00 REFLUX ESOPHAGITIS: ICD-10-CM

## 2020-11-03 ENCOUNTER — IMMUNIZATIONS (OUTPATIENT)
Dept: FAMILY MEDICINE CLINIC | Facility: CLINIC | Age: 75
End: 2020-11-03
Payer: MEDICARE

## 2020-11-03 DIAGNOSIS — Z23 NEED FOR VACCINATION: Primary | ICD-10-CM

## 2020-11-03 PROCEDURE — 90662 IIV NO PRSV INCREASED AG IM: CPT

## 2020-11-03 PROCEDURE — G0008 ADMIN INFLUENZA VIRUS VAC: HCPCS

## 2020-11-30 DIAGNOSIS — E78.2 MIXED HYPERLIPIDEMIA: ICD-10-CM

## 2020-11-30 RX ORDER — ROSUVASTATIN CALCIUM 10 MG/1
10 TABLET, COATED ORAL DAILY
Qty: 90 TABLET | Refills: 1 | Status: SHIPPED | OUTPATIENT
Start: 2020-11-30 | End: 2021-06-02 | Stop reason: SDUPTHER

## 2020-12-07 ENCOUNTER — OFFICE VISIT (OUTPATIENT)
Dept: FAMILY MEDICINE CLINIC | Facility: CLINIC | Age: 75
End: 2020-12-07
Payer: MEDICARE

## 2020-12-07 VITALS
HEART RATE: 89 BPM | DIASTOLIC BLOOD PRESSURE: 80 MMHG | WEIGHT: 268 LBS | HEIGHT: 63 IN | SYSTOLIC BLOOD PRESSURE: 130 MMHG | TEMPERATURE: 97.1 F | BODY MASS INDEX: 47.48 KG/M2 | RESPIRATION RATE: 18 BRPM | OXYGEN SATURATION: 97 %

## 2020-12-07 DIAGNOSIS — R06.02 SOB (SHORTNESS OF BREATH): ICD-10-CM

## 2020-12-07 DIAGNOSIS — R07.89 ATYPICAL CHEST PAIN: Primary | ICD-10-CM

## 2020-12-07 DIAGNOSIS — E66.01 MORBID OBESITY WITH BMI OF 40.0-44.9, ADULT (HCC): ICD-10-CM

## 2020-12-07 DIAGNOSIS — G25.2 INTENTION TREMOR: ICD-10-CM

## 2020-12-07 DIAGNOSIS — E03.9 HYPOTHYROIDISM, UNSPECIFIED TYPE: ICD-10-CM

## 2020-12-07 DIAGNOSIS — R53.83 OTHER FATIGUE: ICD-10-CM

## 2020-12-07 DIAGNOSIS — Z13.220 SCREENING, LIPID: ICD-10-CM

## 2020-12-07 DIAGNOSIS — C50.911 BILATERAL MALIGNANT NEOPLASM OF BREAST IN FEMALE, UNSPECIFIED ESTROGEN RECEPTOR STATUS, UNSPECIFIED SITE OF BREAST (HCC): ICD-10-CM

## 2020-12-07 DIAGNOSIS — Z12.11 SCREENING FOR MALIGNANT NEOPLASM OF COLON: ICD-10-CM

## 2020-12-07 DIAGNOSIS — F33.2 SEVERE EPISODE OF RECURRENT MAJOR DEPRESSIVE DISORDER, WITHOUT PSYCHOTIC FEATURES (HCC): ICD-10-CM

## 2020-12-07 DIAGNOSIS — C50.912 BILATERAL MALIGNANT NEOPLASM OF BREAST IN FEMALE, UNSPECIFIED ESTROGEN RECEPTOR STATUS, UNSPECIFIED SITE OF BREAST (HCC): ICD-10-CM

## 2020-12-07 PROCEDURE — 99214 OFFICE O/P EST MOD 30 MIN: CPT | Performed by: FAMILY MEDICINE

## 2020-12-07 RX ORDER — VENLAFAXINE HYDROCHLORIDE 150 MG/1
150 CAPSULE, EXTENDED RELEASE ORAL
Qty: 30 CAPSULE | Refills: 0 | Status: SHIPPED | OUTPATIENT
Start: 2020-12-07 | End: 2021-01-22 | Stop reason: SINTOL

## 2020-12-07 RX ORDER — VENLAFAXINE HYDROCHLORIDE 75 MG/1
CAPSULE, EXTENDED RELEASE ORAL
Qty: 3 CAPSULE | Refills: 0 | Status: SHIPPED | OUTPATIENT
Start: 2020-12-07 | End: 2021-01-22 | Stop reason: SINTOL

## 2020-12-09 ENCOUNTER — TELEPHONE (OUTPATIENT)
Dept: FAMILY MEDICINE CLINIC | Facility: CLINIC | Age: 75
End: 2020-12-09

## 2020-12-18 DIAGNOSIS — E87.6 HYPOKALEMIA: ICD-10-CM

## 2020-12-18 DIAGNOSIS — R79.89 ELEVATED TSH: ICD-10-CM

## 2020-12-18 DIAGNOSIS — R53.83 OTHER FATIGUE: ICD-10-CM

## 2020-12-18 RX ORDER — LEVOTHYROXINE SODIUM 0.05 MG/1
50 TABLET ORAL DAILY
Qty: 90 TABLET | Refills: 0 | Status: SHIPPED | OUTPATIENT
Start: 2020-12-18 | End: 2020-12-18 | Stop reason: SDUPTHER

## 2020-12-18 RX ORDER — POTASSIUM CHLORIDE 20 MEQ/1
20 TABLET, EXTENDED RELEASE ORAL DAILY
Qty: 90 TABLET | Refills: 0 | Status: SHIPPED | OUTPATIENT
Start: 2020-12-18 | End: 2021-03-23 | Stop reason: SDUPTHER

## 2020-12-19 RX ORDER — LEVOTHYROXINE SODIUM 0.05 MG/1
50 TABLET ORAL DAILY
Qty: 90 TABLET | Refills: 0 | Status: SHIPPED | OUTPATIENT
Start: 2020-12-19 | End: 2021-06-11 | Stop reason: SDUPTHER

## 2021-01-18 ENCOUNTER — LAB (OUTPATIENT)
Dept: LAB | Facility: HOSPITAL | Age: 76
End: 2021-01-18
Payer: MEDICARE

## 2021-01-18 DIAGNOSIS — R06.02 SOB (SHORTNESS OF BREATH): ICD-10-CM

## 2021-01-18 DIAGNOSIS — E03.9 HYPOTHYROIDISM, UNSPECIFIED TYPE: ICD-10-CM

## 2021-01-18 DIAGNOSIS — Z13.220 SCREENING, LIPID: ICD-10-CM

## 2021-01-18 DIAGNOSIS — R53.83 OTHER FATIGUE: ICD-10-CM

## 2021-01-18 LAB
ALBUMIN SERPL BCP-MCNC: 3.1 G/DL (ref 3.5–5)
ALP SERPL-CCNC: 88 U/L (ref 46–116)
ALT SERPL W P-5'-P-CCNC: 19 U/L (ref 12–78)
ANION GAP SERPL CALCULATED.3IONS-SCNC: 9 MMOL/L (ref 4–13)
AST SERPL W P-5'-P-CCNC: 16 U/L (ref 5–45)
BASOPHILS # BLD AUTO: 0.03 THOUSANDS/ΜL (ref 0–0.1)
BASOPHILS NFR BLD AUTO: 0 % (ref 0–1)
BILIRUB SERPL-MCNC: 0.4 MG/DL (ref 0.2–1)
BUN SERPL-MCNC: 17 MG/DL (ref 5–25)
CALCIUM ALBUM COR SERPL-MCNC: 9.7 MG/DL (ref 8.3–10.1)
CALCIUM SERPL-MCNC: 9 MG/DL (ref 8.3–10.1)
CHLORIDE SERPL-SCNC: 108 MMOL/L (ref 100–108)
CHOLEST SERPL-MCNC: 122 MG/DL (ref 50–200)
CO2 SERPL-SCNC: 27 MMOL/L (ref 21–32)
CREAT SERPL-MCNC: 1.37 MG/DL (ref 0.6–1.3)
EOSINOPHIL # BLD AUTO: 0.19 THOUSAND/ΜL (ref 0–0.61)
EOSINOPHIL NFR BLD AUTO: 3 % (ref 0–6)
ERYTHROCYTE [DISTWIDTH] IN BLOOD BY AUTOMATED COUNT: 14.2 % (ref 11.6–15.1)
GFR SERPL CREATININE-BSD FRML MDRD: 37 ML/MIN/1.73SQ M
GLUCOSE P FAST SERPL-MCNC: 111 MG/DL (ref 65–99)
HCT VFR BLD AUTO: 42.4 % (ref 34.8–46.1)
HDLC SERPL-MCNC: 47 MG/DL
HGB BLD-MCNC: 13.1 G/DL (ref 11.5–15.4)
IMM GRANULOCYTES # BLD AUTO: 0.03 THOUSAND/UL (ref 0–0.2)
IMM GRANULOCYTES NFR BLD AUTO: 0 % (ref 0–2)
LDLC SERPL CALC-MCNC: 56 MG/DL (ref 0–100)
LYMPHOCYTES # BLD AUTO: 1.8 THOUSANDS/ΜL (ref 0.6–4.47)
LYMPHOCYTES NFR BLD AUTO: 26 % (ref 14–44)
MCH RBC QN AUTO: 26.8 PG (ref 26.8–34.3)
MCHC RBC AUTO-ENTMCNC: 30.9 G/DL (ref 31.4–37.4)
MCV RBC AUTO: 87 FL (ref 82–98)
MONOCYTES # BLD AUTO: 0.5 THOUSAND/ΜL (ref 0.17–1.22)
MONOCYTES NFR BLD AUTO: 7 % (ref 4–12)
NEUTROPHILS # BLD AUTO: 4.26 THOUSANDS/ΜL (ref 1.85–7.62)
NEUTS SEG NFR BLD AUTO: 64 % (ref 43–75)
NONHDLC SERPL-MCNC: 75 MG/DL
NRBC BLD AUTO-RTO: 0 /100 WBCS
PLATELET # BLD AUTO: 224 THOUSANDS/UL (ref 149–390)
PMV BLD AUTO: 10.8 FL (ref 8.9–12.7)
POTASSIUM SERPL-SCNC: 3.9 MMOL/L (ref 3.5–5.3)
PROT SERPL-MCNC: 6.9 G/DL (ref 6.4–8.2)
RBC # BLD AUTO: 4.89 MILLION/UL (ref 3.81–5.12)
SODIUM SERPL-SCNC: 144 MMOL/L (ref 136–145)
TRIGL SERPL-MCNC: 93 MG/DL
TSH SERPL DL<=0.05 MIU/L-ACNC: 3.35 UIU/ML (ref 0.36–3.74)
WBC # BLD AUTO: 6.81 THOUSAND/UL (ref 4.31–10.16)

## 2021-01-18 PROCEDURE — 84443 ASSAY THYROID STIM HORMONE: CPT

## 2021-01-18 PROCEDURE — 80061 LIPID PANEL: CPT

## 2021-01-18 PROCEDURE — 85025 COMPLETE CBC W/AUTO DIFF WBC: CPT

## 2021-01-18 PROCEDURE — 80053 COMPREHEN METABOLIC PANEL: CPT

## 2021-01-18 PROCEDURE — 36415 COLL VENOUS BLD VENIPUNCTURE: CPT

## 2021-01-21 ENCOUNTER — LAB (OUTPATIENT)
Dept: LAB | Facility: HOSPITAL | Age: 76
End: 2021-01-21
Payer: MEDICARE

## 2021-01-21 DIAGNOSIS — Z12.11 SCREENING FOR MALIGNANT NEOPLASM OF COLON: ICD-10-CM

## 2021-01-21 LAB
BACTERIA UR QL AUTO: ABNORMAL /HPF
BILIRUB UR QL STRIP: NEGATIVE
CLARITY UR: CLEAR
COLOR UR: YELLOW
CREAT UR-MCNC: 292 MG/DL
GLUCOSE UR STRIP-MCNC: NEGATIVE MG/DL
HEMOCCULT STL QL IA: NEGATIVE
HGB UR QL STRIP.AUTO: NEGATIVE
HYALINE CASTS #/AREA URNS LPF: ABNORMAL /LPF
KETONES UR STRIP-MCNC: ABNORMAL MG/DL
LEUKOCYTE ESTERASE UR QL STRIP: NEGATIVE
MICROALBUMIN UR-MCNC: 37.6 MG/L (ref 0–20)
MICROALBUMIN/CREAT 24H UR: 13 MG/G CREATININE (ref 0–30)
MUCOUS THREADS UR QL AUTO: ABNORMAL
NITRITE UR QL STRIP: NEGATIVE
NON-SQ EPI CELLS URNS QL MICRO: ABNORMAL /HPF
PH UR STRIP.AUTO: 5.5 [PH]
PROT UR STRIP-MCNC: NEGATIVE MG/DL
RBC #/AREA URNS AUTO: ABNORMAL /HPF
SP GR UR STRIP.AUTO: 1.02 (ref 1–1.03)
UROBILINOGEN UR QL STRIP.AUTO: 1 E.U./DL
WBC #/AREA URNS AUTO: ABNORMAL /HPF

## 2021-01-21 PROCEDURE — G0328 FECAL BLOOD SCRN IMMUNOASSAY: HCPCS

## 2021-01-21 PROCEDURE — 82043 UR ALBUMIN QUANTITATIVE: CPT | Performed by: FAMILY MEDICINE

## 2021-01-21 PROCEDURE — 81001 URINALYSIS AUTO W/SCOPE: CPT | Performed by: FAMILY MEDICINE

## 2021-01-21 PROCEDURE — 82570 ASSAY OF URINE CREATININE: CPT | Performed by: FAMILY MEDICINE

## 2021-01-22 ENCOUNTER — OFFICE VISIT (OUTPATIENT)
Dept: FAMILY MEDICINE CLINIC | Facility: CLINIC | Age: 76
End: 2021-01-22
Payer: MEDICARE

## 2021-01-22 VITALS
DIASTOLIC BLOOD PRESSURE: 78 MMHG | BODY MASS INDEX: 48.37 KG/M2 | HEART RATE: 89 BPM | HEIGHT: 63 IN | WEIGHT: 273 LBS | SYSTOLIC BLOOD PRESSURE: 128 MMHG | TEMPERATURE: 98.2 F | OXYGEN SATURATION: 97 %

## 2021-01-22 DIAGNOSIS — G25.2 INTENTION TREMOR: ICD-10-CM

## 2021-01-22 DIAGNOSIS — I10 ESSENTIAL HYPERTENSION: Primary | ICD-10-CM

## 2021-01-22 DIAGNOSIS — C50.911 BILATERAL MALIGNANT NEOPLASM OF BREAST IN FEMALE, UNSPECIFIED ESTROGEN RECEPTOR STATUS, UNSPECIFIED SITE OF BREAST (HCC): ICD-10-CM

## 2021-01-22 DIAGNOSIS — N18.32 STAGE 3B CHRONIC KIDNEY DISEASE (HCC): ICD-10-CM

## 2021-01-22 DIAGNOSIS — R73.01 IFG (IMPAIRED FASTING GLUCOSE): ICD-10-CM

## 2021-01-22 DIAGNOSIS — E66.01 MORBID OBESITY WITH BMI OF 40.0-44.9, ADULT (HCC): ICD-10-CM

## 2021-01-22 DIAGNOSIS — L98.9 SKIN LESION: ICD-10-CM

## 2021-01-22 DIAGNOSIS — C50.912 BILATERAL MALIGNANT NEOPLASM OF BREAST IN FEMALE, UNSPECIFIED ESTROGEN RECEPTOR STATUS, UNSPECIFIED SITE OF BREAST (HCC): ICD-10-CM

## 2021-01-22 DIAGNOSIS — F32.A DEPRESSIVE DISORDER: ICD-10-CM

## 2021-01-22 DIAGNOSIS — E03.9 HYPOTHYROIDISM, UNSPECIFIED TYPE: ICD-10-CM

## 2021-01-22 LAB — SL AMB POCT HEMOGLOBIN AIC: 5.5 (ref ?–6.5)

## 2021-01-22 PROCEDURE — 83036 HEMOGLOBIN GLYCOSYLATED A1C: CPT | Performed by: FAMILY MEDICINE

## 2021-01-22 PROCEDURE — 99214 OFFICE O/P EST MOD 30 MIN: CPT | Performed by: FAMILY MEDICINE

## 2021-01-22 NOTE — ASSESSMENT & PLAN NOTE
Lab Results   Component Value Date    EGFR 37 01/18/2021    EGFR 42 08/06/2020    EGFR 42 03/09/2020    CREATININE 1 37 (H) 01/18/2021    CREATININE 1 24 08/06/2020    CREATININE 1 25 03/09/2020   Stable and will continue to monitor

## 2021-01-22 NOTE — ASSESSMENT & PLAN NOTE
Hemoglobin A1c today is 5 5  Advised to continue to work on weight loss and cut back on carbohydrates and increase the exercise

## 2021-01-22 NOTE — PROGRESS NOTES
Assessment/Plan:     Chronic Problems:  Essential hypertension  BP at goal  Continue current meds  Intention tremor  Better since stopping venlafaxine but happens on occasion  None noted this visit  CKD (chronic kidney disease) stage 3, GFR 30-59 ml/min  Lab Results   Component Value Date    EGFR 37 01/18/2021    EGFR 42 08/06/2020    EGFR 42 03/09/2020    CREATININE 1 37 (H) 01/18/2021    CREATININE 1 24 08/06/2020    CREATININE 1 25 03/09/2020   Stable and will continue to monitor  Hypothyroidism  TSh upper limit of normal  Will recheck in 4 months  Depressive disorder  Doing well off meds despite family stress  IFG (impaired fasting glucose)  Hemoglobin A1c today is 5 5  Advised to continue to work on weight loss and cut back on carbohydrates and increase the exercise  Morbid obesity with BMI of 40 0-44 9, adult ([address] )  Advised to work on weight loss  Cut back on carbs and increase the exercise  Bilateral malignant neoplasm of breast in female [facility])  Pt is due for mammo in February Visit Diagnosis:  Diagnoses and all orders for this visit:    Essential hypertension    Intention tremor    Hypothyroidism, unspecified type  -     TSH, 3rd generation with Free T4 reflex; Future    Skin lesion to left eyelid  Comments: Will refer to plastic surgeon  Orders:  -     Ambulatory referral to Plastic Surgery; Future    Depressive disorder    IFG (impaired fasting glucose)    Morbid obesity with BMI of 40 0-44 9, adult (HCC)    Bilateral malignant neoplasm of breast in female, unspecified estrogen receptor status, unspecified site of breast ([facility] [address] )    Stage 3b chronic kidney disease  -     Comprehensive metabolic panel; Future          Subjective:    Patient ID: Laura Garcia is a 67 y o  female  Pt is here for routine f/u appt  Had her labs done, stool done, also scheduled for mammo on February 12th  Feels well today despite the stress in her home   Fighting with her kids over her ex husbands  Just wants to discuss labs  All meds reviewed with pt and current  Takes all other meds as directed  No side effects noted  The following portions of the patient's history were reviewed and updated as appropriate: allergies, current medications, past family history, past medical history, past social history, past surgical history and problem list     Review of Systems   Constitutional: Positive for fatigue  Negative for chills, diaphoresis and fever  HENT: Negative  Eyes: Negative  Respiratory: Positive for wheezing (at night at times  )  Negative for cough and shortness of breath  Cardiovascular: Negative for chest pain and palpitations  Gastrointestinal: Positive for abdominal pain (after a bm)  Negative for blood in stool, constipation, diarrhea, nausea and vomiting  Genitourinary: Negative  Musculoskeletal: Negative for back pain  Skin:        Pt would like the mass above her left eye lid removed  Neurological: Negative for dizziness, light-headedness and headaches  Psychiatric/Behavioral: Negative for dysphoric mood  The patient is not nervous/anxious            /78   Pulse 89   Temp 98 2 °F (36 8 °C)   Ht 5' 3" (1 6 m)   Wt 124 kg (273 lb)   SpO2 97%   BMI 48 36 kg/m²   Social History     Socioeconomic History    Marital status: Single     Spouse name: Not on file    Number of children: Not on file    Years of education: Not on file    Highest education level: Not on file   Occupational History    Occupation: Retired   Social Needs    Financial resource strain: Not on file    Food insecurity     Worry: Not on file     Inability: Not on file   [employer] needs     Medical: Not on file     Non-medical: Not on file   Tobacco Use    Smoking status: Former Smoker    Smokeless tobacco: Never Used    Tobacco comment: 10 years ago   Substance and Sexual Activity    Alcohol use: Yes     Comment: socially - Per allscripts - denies alcohol consumption    Drug use: No    Sexual activity: Not on file   Lifestyle    Physical activity     Days per week: Not on file     Minutes per session: Not on file    Stress: Not on file   Relationships    Social connections     Talks on phone: Not on file     Gets together: Not on file     Attends Zoroastrian service: Not on file     Active member of club or organization: Not on file     Attends meetings of clubs or organizations: Not on file     Relationship status: Not on file    Intimate partner violence     Fear of current or ex partner: Not on file     Emotionally abused: Not on file     Physically abused: Not on file     Forced sexual activity: Not on file   Other Topics Concern    Not on file   Social History Narrative         Always uses seat belt     Past Medical History:   Diagnosis Date    Acute ill-defined cerebrovascular disease     Benign essential tremor     Breast cancer ([facility] [address] )     pt states doesn't remember the year thinks it might have been on the right      Chronic kidney disease     Colon polyp     COPD (chronic obstructive pulmonary disease) (HCC)     Depressive disorder     Disease of thyroid gland     Heart murmur     Hypertension     Impaired fasting glucose     Intention tremor     last assessed: 8/18/2016    Osteochondropathy     Panic disorder without agoraphobia with mild panic attacks     Psychiatric disorder     anxiety    Stroke ([facility] [address] )     Tubular adenoma of colon      Family History   Problem Relation Age of Onset    Alcohol abuse Mother     Asthma Mother     Cancer Mother     Mental illness Mother     Osteoarthritis Mother     Gout Father     Cancer Father     Other Sister         carotid arterial disease    COPD Sister     Hyperlipidemia Sister     Hypertension Sister     Heart attack Sister     Breast cancer Sister     No Known Problems Daughter     No Known Problems Maternal Grandmother     No Known Problems Paternal Grandmother     Lung cancer Sister     No Known Problems Sister     No Known Problems Daughter     No Known Problems Daughter      Past Surgical History:   Procedure Laterality Date    AUGMENTATION BREAST      pt states during mammo she doesn't have any other surgery than the 2 biopsy    BREAST BIOPSY Left     pt doesn't recall when and believes the neg biop was on left    BREAST LUMPECTOMY W/ NEEDLE LOCALIZATION Left     description: benign last assessed; 8/21/2016    CHOLECYSTECTOMY      COLONOSCOPY      EGD      HYSTERECTOMY         Current Outpatient Medications:     amLODIPine (NORVASC) 10 mg tablet, Take 1 tablet (10 mg total) by mouth daily, Disp: 90 tablet, Rfl: 1    Cholecalciferol (VITAMIN D3) 50 MCG (2000 UT) TABS, Take 2,000 Units by mouth daily, Disp: , Rfl:     dexlansoprazole (Dexilant) 60 MG capsule, Take 1 capsule (60 mg total) by mouth daily before breakfast, Disp: 90 capsule, Rfl: 1    levothyroxine 50 mcg tablet, Take 1 tablet (50 mcg total) by mouth daily, Disp: 90 tablet, Rfl: 0    lisinopril (ZESTRIL) 20 mg tablet, Take 1 tablet (20 mg total) by mouth daily, Disp: 90 tablet, Rfl: 1    potassium chloride (K-DUR,KLOR-CON) 20 mEq tablet, Take 1 tablet (20 mEq total) by mouth daily, Disp: 90 tablet, Rfl: 0    rosuvastatin (CRESTOR) 10 MG tablet, Take 1 tablet (10 mg total) by mouth daily, Disp: 90 tablet, Rfl: 1    Zoster Vac Recomb Adjuvanted (Shingrix) 50 MCG/0 5ML SUSR, 0 5mL IM for one dose, followed by 0 5mL IM 2-6 months after first dose, Disp: 1 each, Rfl: 1    Allergies   Allergen Reactions    Amoxicillin     Codeine     Morphine           Lab Review   Lab on 01/21/2021   Component Date Value    OCCULT BLD, FECAL IMMUNO* 01/21/2021 Negative    Lab on 01/18/2021   Component Date Value    Sodium 01/18/2021 144     Potassium 01/18/2021 3 9     Chloride 01/18/2021 108     CO2 01/18/2021 27     ANION GAP 01/18/2021 9     BUN 01/18/2021 17     Creatinine 01/18/2021 1 37*    Glucose, Fasting 01/18/2021 111*    Calcium 01/18/2021 9 0     Corrected Calcium 01/18/2021 9 7     AST 01/18/2021 16     ALT 01/18/2021 19     Alkaline Phosphatase 01/18/2021 88     Total Protein 01/18/2021 6 9     Albumin 01/18/2021 3 1*    Total Bilirubin 01/18/2021 0 40     eGFR 01/18/2021 37     Cholesterol 01/18/2021 122     Triglycerides 01/18/2021 93     HDL, Direct 01/18/2021 47     LDL Calculated 01/18/2021 56     Non-HDL-Chol (CHOL-HDL) 01/18/2021 75     WBC 01/18/2021 6 81     RBC 01/18/2021 4 89     Hemoglobin 01/18/2021 13 1     Hematocrit 01/18/2021 42 4     MCV 01/18/2021 87     MCH 01/18/2021 26 8     MCHC 01/18/2021 30 9*    RDW 01/18/2021 14 2     MPV 01/18/2021 10 8     Platelets 15/85/0951 224     nRBC 01/18/2021 0     Neutrophils Relative 01/18/2021 64     Immat GRANS % 01/18/2021 0     Lymphocytes Relative 01/18/2021 26     Monocytes Relative 01/18/2021 7     Eosinophils Relative 01/18/2021 3     Basophils Relative 01/18/2021 0     Neutrophils Absolute 01/18/2021 4 26     Immature Grans Absolute 01/18/2021 0 03     Lymphocytes Absolute 01/18/2021 1 80     Monocytes Absolute 01/18/2021 0 50     Eosinophils Absolute 01/18/2021 0 19     Basophils Absolute 01/18/2021 0 03     TSH 3RD GENERATON 01/18/2021 3 352    Office Visit on 12/07/2020   Component Date Value    Creatinine, Ur 01/21/2021 292 0     Microalbum  ,U,Random 01/21/2021 37 6*    Microalb Creat Ratio 01/21/2021 13     Clarity, UA 01/21/2021 Clear     Color, UA 01/21/2021 Yellow     Specific Gravity, UA 01/21/2021 1 025     pH, UA 01/21/2021 5 5     Glucose, UA 01/21/2021 Negative     Ketones, UA 01/21/2021 Trace*    Blood, UA 01/21/2021 Negative     Protein, UA 01/21/2021 Negative     Nitrite, UA 01/21/2021 Negative     Bilirubin, UA 01/21/2021 Negative     Urobilinogen, UA 01/21/2021 1 0     Leukocytes, UA 01/21/2021 Negative     WBC, UA 01/21/2021 1-2*    RBC, UA 01/21/2021 None Seen     Hyaline Casts, UA 01/21/2021 4-10*    Bacteria, UA 01/21/2021 Occasional     Epithelial Cells 01/21/2021 Moderate*    MUCUS THREADS 01/21/2021 Moderate*        Imaging: No results found  Objective:     Physical Exam      Patient Instructions     Reviewed all with patient  Blood pressure today is at goal   Blood sugars slightly elevated but no diabetes but you do need to work on the weight and cutting back on carbohydrates like breads pasta rice cereal potatoes cakes and cookies  Try to increase the exercise  Thyroid numbers at goal but at the higher end of normal so I would like to repeat that in about 4 months  Stay on your current medications for now make your appointment with the plastic surgeon as that lesion on URI is interfering with her vision  MONIQUE Angelo    Portions of the record may have been created with voice recognition software  Occasional wrong word or "sound a like" substitutions may have occurred due to the inherent limitations of voice recognition software  Read the chart carefully and recognize, using context, where substitutions have occurred

## 2021-01-28 ENCOUNTER — IMMUNIZATIONS (OUTPATIENT)
Dept: FAMILY MEDICINE CLINIC | Facility: HOSPITAL | Age: 76
End: 2021-01-28

## 2021-01-28 DIAGNOSIS — Z23 ENCOUNTER FOR IMMUNIZATION: Primary | ICD-10-CM

## 2021-01-28 PROCEDURE — 91301 SARS-COV-2 / COVID-19 MRNA VACCINE (MODERNA) 100 MCG: CPT

## 2021-01-28 PROCEDURE — 0011A SARS-COV-2 / COVID-19 MRNA VACCINE (MODERNA) 100 MCG: CPT

## 2021-02-24 ENCOUNTER — IMMUNIZATIONS (OUTPATIENT)
Dept: FAMILY MEDICINE CLINIC | Facility: HOSPITAL | Age: 76
End: 2021-02-24

## 2021-02-24 DIAGNOSIS — Z23 ENCOUNTER FOR IMMUNIZATION: Primary | ICD-10-CM

## 2021-02-24 PROCEDURE — 0012A SARS-COV-2 / COVID-19 MRNA VACCINE (MODERNA) 100 MCG: CPT

## 2021-02-24 PROCEDURE — 91301 SARS-COV-2 / COVID-19 MRNA VACCINE (MODERNA) 100 MCG: CPT

## 2021-03-03 DIAGNOSIS — I10 ESSENTIAL HYPERTENSION: ICD-10-CM

## 2021-03-03 RX ORDER — AMLODIPINE BESYLATE 10 MG/1
10 TABLET ORAL DAILY
Qty: 90 TABLET | Refills: 1 | Status: SHIPPED | OUTPATIENT
Start: 2021-03-03 | End: 2021-08-31

## 2021-03-23 DIAGNOSIS — E87.6 HYPOKALEMIA: ICD-10-CM

## 2021-03-24 RX ORDER — POTASSIUM CHLORIDE 20 MEQ/1
20 TABLET, EXTENDED RELEASE ORAL DAILY
Qty: 90 TABLET | Refills: 0 | Status: SHIPPED | OUTPATIENT
Start: 2021-03-24 | End: 2021-09-21 | Stop reason: SDUPTHER

## 2021-03-29 DIAGNOSIS — I10 ESSENTIAL HYPERTENSION: ICD-10-CM

## 2021-03-30 RX ORDER — LISINOPRIL 20 MG/1
20 TABLET ORAL DAILY
Qty: 90 TABLET | Refills: 1 | Status: SHIPPED | OUTPATIENT
Start: 2021-03-30 | End: 2021-09-15 | Stop reason: SDUPTHER

## 2021-04-07 DIAGNOSIS — K21.00 REFLUX ESOPHAGITIS: ICD-10-CM

## 2021-05-06 ENCOUNTER — OFFICE VISIT (OUTPATIENT)
Dept: FAMILY MEDICINE CLINIC | Facility: CLINIC | Age: 76
End: 2021-05-06
Payer: MEDICARE

## 2021-05-06 ENCOUNTER — HOSPITAL ENCOUNTER (OUTPATIENT)
Dept: CT IMAGING | Facility: CLINIC | Age: 76
Discharge: HOME | End: 2021-05-06
Payer: MEDICARE

## 2021-05-06 VITALS
DIASTOLIC BLOOD PRESSURE: 100 MMHG | SYSTOLIC BLOOD PRESSURE: 120 MMHG | HEART RATE: 85 BPM | TEMPERATURE: 97.8 F | RESPIRATION RATE: 16 BRPM | OXYGEN SATURATION: 98 % | BODY MASS INDEX: 49.65 KG/M2 | HEIGHT: 63 IN | WEIGHT: 280.2 LBS

## 2021-05-06 DIAGNOSIS — S32.050A COMPRESSION FRACTURE OF L5 VERTEBRA, INITIAL ENCOUNTER (HCC): ICD-10-CM

## 2021-05-06 DIAGNOSIS — R10.12 LEFT UPPER QUADRANT ABDOMINAL PAIN: Primary | ICD-10-CM

## 2021-05-06 DIAGNOSIS — N28.89 MASS OF RIGHT KIDNEY: ICD-10-CM

## 2021-05-06 DIAGNOSIS — R10.12 LEFT UPPER QUADRANT ABDOMINAL PAIN: ICD-10-CM

## 2021-05-06 PROCEDURE — 99214 OFFICE O/P EST MOD 30 MIN: CPT | Performed by: FAMILY MEDICINE

## 2021-05-06 PROCEDURE — 74150 CT ABDOMEN W/O CONTRAST: CPT

## 2021-05-06 RX ORDER — ACETAMINOPHEN 500 MG
500 TABLET ORAL EVERY 6 HOURS PRN
COMMUNITY

## 2021-05-06 RX ORDER — OXYCODONE HYDROCHLORIDE AND ACETAMINOPHEN 5; 325 MG/1; MG/1
TABLET ORAL
COMMUNITY
Start: 2021-05-02 | End: 2021-05-12

## 2021-05-06 RX ORDER — CYCLOBENZAPRINE HCL 10 MG
10 TABLET ORAL
COMMUNITY
Start: 2021-04-20 | End: 2021-09-02 | Stop reason: ALTCHOICE

## 2021-05-06 NOTE — PATIENT INSTRUCTIONS
Reviewed all with patient  Will get a CT scan of the abdomen stat and call with results  If the pain is intolerable she was advised she must go to the ER  Use the Tylenol for pain but consider this that if it is not helping and you have the narcotic at home take 1 pill so you could have some relief of pain  Follow-up here in 1 week we will talk about physical therapy for your back at a later date   Will get us of the right kidney r/t to the hyperdensity arising from the posterior right kidney

## 2021-05-06 NOTE — PROGRESS NOTES
Assessment/Plan:     Chronic Problems:  No problem-specific Assessment & Plan notes found for this encounter  Visit Diagnosis:  Diagnoses and all orders for this visit:    Left upper quadrant abdominal pain s/p injury   Comments: Will get ct of the abdomen now  Advised er if pain is worse  Pt is refusing the narcotic given to her by the er  Orders:  -     CT abdomen wo contrast; Future    Compression fracture of L5 vertebra, initial encounter ([facility] )  Comments: Will discuss p t  at a later date  Will follow here in 1 week  Tylenol is ok for the pains  Mass of right kidney  Comments: Will get retroperitoneal us and discuss with pt  r/t  hyperdensity arising fromthe posterior right kidney    Orders:  -     US kidney and bladder; Future    Other orders  -     cyclobenzaprine (FLEXERIL) 10 mg tablet; Take 10 mg by mouth daily at bedtime  -     oxyCODONE-acetaminophen (PERCOCET) 5-325 mg per tablet; For moderate to severe pain, take 1 tab prn q6 hours day 1; 1 tab prn q 12 hours days 2; 1 tab prn once day 3   -     acetaminophen (TYLENOL) 500 mg tablet; Take 500 mg by mouth every 6 (six) hours as needed for mild pain Take 2 tablets by mouth every 6-8 hours as needed          Subjective:    Patient ID: Елена Snowden is a 67 y o  female  Pt is here with c/o falling about 3 weeks ago  Had diarrhea and was trying to get to the bathroom and she fell over a chair and hurt her left knee, ankle and ribs  Seen in the er twice for this  Told to see a surgeon  Pt was told she has a compression fx of L5 on the first encounter  Given narcotics but won't take them and instead taking 2 tylenol 500 mg every 4 hours  Went back to the er for rib pains that were worse without any further injuries  Had rib xrays done and no rib fractures  Pain is over the left upper quadrant and left anterior rib area  Takes all other meds as directed  No side effects noted  Not checking her bp's at home         The following portions of the patient's history were reviewed and updated as appropriate: allergies, current medications, past family history, past medical history, past social history, past surgical history and problem list     Review of Systems   Constitutional: Positive for fatigue  Negative for chills, diaphoresis and fever  HENT: Negative  Eyes: Negative  Respiratory: Positive for wheezing  Negative for cough and shortness of breath (but trying to take deep breaths  No change in pain with deep breath  )  Cardiovascular: Negative for chest pain and palpitations  Gastrointestinal: Positive for abdominal pain (under the left ribs  )  Negative for constipation, diarrhea, nausea and vomiting           /100 (BP Location: Left arm)   Pulse 85   Temp 97 8 °F (36 6 °C)   Resp 16   Ht 5' 3" (1 6 m)   Wt 127 kg (280 lb 3 2 oz)   SpO2 98%   BMI 49 64 kg/m²   Social History     Socioeconomic History    Marital status: Single     Spouse name: Not on file    Number of children: Not on file    Years of education: Not on file    Highest education level: Not on file   Occupational History    Occupation: Retired   Social Needs    Financial resource strain: Not on file    Food insecurity     Worry: Not on file     Inability: Not on file   [employer] needs     Medical: Not on file     Non-medical: Not on file   Tobacco Use    Smoking status: Former Smoker    Smokeless tobacco: Never Used    Tobacco comment: 10 years ago   Substance and Sexual Activity    Alcohol use: Yes     Comment: socially - Per allscripts - denies alcohol consumption    Drug use: No    Sexual activity: Not on file   Lifestyle    Physical activity     Days per week: Not on file     Minutes per session: Not on file    Stress: Not on file   Relationships    Social connections     Talks on phone: Not on file     Gets together: Not on file     Attends Yazdanism service: Not on file     Active member of club or organization: Not on file Attends meetings of clubs or organizations: Not on file     Relationship status: Not on file    Intimate partner violence     Fear of current or ex partner: Not on file     Emotionally abused: Not on file     Physically abused: Not on file     Forced sexual activity: Not on file   Other Topics Concern    Not on file   Social History Narrative         Always uses seat belt     Past Medical History:   Diagnosis Date    Acute ill-defined cerebrovascular disease     Benign essential tremor     [facility] cancer ([facility] )     pt states doesn't remember the year thinks it might have been on the right      Chronic kidney disease     Colon polyp     COPD (chronic obstructive pulmonary disease) (HCC)     Depressive disorder     Disease of thyroid gland     Heart murmur     Hypertension     Impaired fasting glucose     Intention tremor     last assessed: 8/18/2016    Osteochondropathy     Panic disorder without agoraphobia with mild panic attacks     Psychiatric disorder     anxiety    Stroke ([facility]     Tubular adenoma of colon      Family History   Problem Relation Age of Onset    Alcohol abuse Mother     Asthma Mother     Cancer Mother     Mental illness Mother     Osteoarthritis Mother     Gout Father     Cancer Father     Other Sister         carotid arterial disease    COPD Sister     Hyperlipidemia Sister     Hypertension Sister     Heart attack Sister     Breast cancer Sister     No Known Problems Daughter     No Known Problems Maternal Grandmother     No Known Problems Paternal Grandmother     Lung cancer Sister     No Known Problems Sister     No Known Problems Daughter     No Known Problems Daughter      Past Surgical History:   Procedure Laterality Date    AUGMENTATION BREAST      pt states during mammo she doesn't have any other surgery than the 2 biopsy    BREAST BIOPSY Left     pt doesn't recall when and believes the neg biop was on left    BREAST LUMPECTOMY W/ NEEDLE LOCALIZATION Left     description: benign last assessed; 8/21/2016    CHOLECYSTECTOMY      COLONOSCOPY      EGD      HYSTERECTOMY         Current Outpatient Medications:     acetaminophen (TYLENOL) 500 mg tablet, Take 500 mg by mouth every 6 (six) hours as needed for mild pain Take 2 tablets by mouth every 6-8 hours as needed, Disp: , Rfl:     amLODIPine (NORVASC) 10 mg tablet, Take 1 tablet (10 mg total) by mouth daily, Disp: 90 tablet, Rfl: 1    Cholecalciferol (VITAMIN D3) 50 MCG (2000 UT) TABS, Take 2,000 Units by mouth daily, Disp: , Rfl:     dexlansoprazole (Dexilant) 60 MG capsule, Take 1 capsule (60 mg total) by mouth daily before breakfast, Disp: 90 capsule, Rfl: 1    levothyroxine 50 mcg tablet, Take 1 tablet (50 mcg total) by mouth daily, Disp: 90 tablet, Rfl: 0    lisinopril (ZESTRIL) 20 mg tablet, Take 1 tablet (20 mg total) by mouth daily, Disp: 90 tablet, Rfl: 1    potassium chloride (K-DUR,KLOR-CON) 20 mEq tablet, Take 1 tablet (20 mEq total) by mouth daily, Disp: 90 tablet, Rfl: 0    rosuvastatin (CRESTOR) 10 MG tablet, Take 1 tablet (10 mg total) by mouth daily, Disp: 90 tablet, Rfl: 1    cyclobenzaprine (FLEXERIL) 10 mg tablet, Take 10 mg by mouth daily at bedtime, Disp: , Rfl:     oxyCODONE-acetaminophen (PERCOCET) 5-325 mg per tablet, For moderate to severe pain, take 1 tab prn q6 hours day 1; 1 tab prn q 12 hours days 2; 1 tab prn once day 3 , Disp: , Rfl:     Zoster Vac Recomb Adjuvanted (Shingrix) 50 MCG/0 5ML SUSR, 0 5mL IM for one dose, followed by 0 5mL IM 2-6 months after first dose, Disp: 1 each, Rfl: 1    Allergies   Allergen Reactions    Amoxicillin     Codeine     Morphine           Lab Review   No visits with results within 2 Month(s) from this visit  Latest known visit with results is:   Office Visit on 01/22/2021   Component Date Value    Hemoglobin A1C 01/22/2021 5 5         Imaging: No results found      Objective:     Physical Exam  Vitals signs and nursing note reviewed  Constitutional:       General: She is not in acute distress  Appearance: Normal appearance  She is well-developed  She is not ill-appearing, toxic-appearing or diaphoretic  HENT:      Head: Normocephalic and atraumatic  Right Ear: Tympanic membrane, ear canal and external ear normal  There is no impacted cerumen  Left Ear: Tympanic membrane, ear canal and external ear normal  There is no impacted cerumen  Nose: Nose normal  No congestion  Mouth/Throat:      Mouth: Mucous membranes are moist       Pharynx: No oropharyngeal exudate  Eyes:      General:         Right eye: No discharge  Left eye: No discharge  Extraocular Movements: Extraocular movements intact  Conjunctiva/sclera: Conjunctivae normal       Pupils: Pupils are equal, round, and reactive to light  Neck:      Musculoskeletal: Normal range of motion and neck supple  No neck rigidity  Cardiovascular:      Rate and Rhythm: Normal rate and regular rhythm  Heart sounds: No murmur  Pulmonary:      Effort: Pulmonary effort is normal  No respiratory distress  Breath sounds: Normal breath sounds  Abdominal:      General: Abdomen is flat  Bowel sounds are normal       Comments: Pt with severe pain left upper quadrant over spleen  Musculoskeletal:         General: No deformity  Right lower leg: No edema  Left lower leg: No edema  Comments: Ambulates very slowly with difficulty  No reproducible back pains  Skin:     General: Skin is warm  Capillary Refill: Capillary refill takes less than 2 seconds  Coloration: Skin is not pale  Findings: No erythema  Neurological:      General: No focal deficit present  Mental Status: She is alert and oriented to person, place, and time  Psychiatric:         Mood and Affect: Mood normal          Behavior: Behavior normal          Thought Content:  Thought content normal          Judgment: Judgment normal  Comments: Pt appears to be in significant pain  Patient Instructions   Reviewed all with patient  Will get a CT scan of the abdomen stat and call with results  If the pain is intolerable she was advised she must go to the ER  Use the Tylenol for pain but consider this that if it is not helping and you have the narcotic at home take 1 pill so you could have some relief of pain  Follow-up here in 1 week we will talk about physical therapy for your back at a later date  Will get us of the right kidney r/t to the hyperdensity arising from the posterior right kidney       MONIQUE Angelo    Portions of the record may have been created with voice recognition software  Occasional wrong word or "sound a like" substitutions may have occurred due to the inherent limitations of voice recognition software  Read the chart carefully and recognize, using context, where substitutions have occurred

## 2021-05-07 ENCOUNTER — TELEPHONE (OUTPATIENT)
Dept: FAMILY MEDICINE CLINIC | Facility: CLINIC | Age: 76
End: 2021-05-07

## 2021-05-07 DIAGNOSIS — S32.050A COMPRESSION FRACTURE OF L5 VERTEBRA, INITIAL ENCOUNTER (HCC): Primary | ICD-10-CM

## 2021-05-07 DIAGNOSIS — Z91.81 AT RISK FOR FALLS: ICD-10-CM

## 2021-05-07 NOTE — TELEPHONE ENCOUNTER
She asked for a walker to help her and something to help her get in and out of the shower  She said she lives by her self and it is hard

## 2021-05-13 ENCOUNTER — OFFICE VISIT (OUTPATIENT)
Dept: FAMILY MEDICINE CLINIC | Facility: CLINIC | Age: 76
End: 2021-05-13
Payer: MEDICARE

## 2021-05-13 ENCOUNTER — TELEPHONE (OUTPATIENT)
Dept: OTHER | Facility: OTHER | Age: 76
End: 2021-05-13

## 2021-05-13 VITALS
SYSTOLIC BLOOD PRESSURE: 110 MMHG | HEART RATE: 108 BPM | WEIGHT: 281 LBS | RESPIRATION RATE: 18 BRPM | HEIGHT: 63 IN | DIASTOLIC BLOOD PRESSURE: 78 MMHG | TEMPERATURE: 98.2 F | OXYGEN SATURATION: 99 % | BODY MASS INDEX: 49.79 KG/M2

## 2021-05-13 DIAGNOSIS — M54.31 SCIATICA OF RIGHT SIDE: Primary | ICD-10-CM

## 2021-05-13 DIAGNOSIS — S22.42XD CLOSED FRACTURE OF MULTIPLE RIBS OF LEFT SIDE WITH ROUTINE HEALING, SUBSEQUENT ENCOUNTER: ICD-10-CM

## 2021-05-13 PROCEDURE — 99214 OFFICE O/P EST MOD 30 MIN: CPT | Performed by: FAMILY MEDICINE

## 2021-05-13 RX ORDER — GABAPENTIN 100 MG/1
CAPSULE ORAL
Qty: 90 CAPSULE | Refills: 0 | Status: SHIPPED | OUTPATIENT
Start: 2021-05-13 | End: 2021-05-24 | Stop reason: SDUPTHER

## 2021-05-13 NOTE — PROGRESS NOTES
Assessment/Plan:     Chronic Problems:  No problem-specific Assessment & Plan notes found for this encounter  Visit Diagnosis:  Diagnoses and all orders for this visit:    Sciatica of right side  Comments:  [facility]  Will start gabapentin one today, bid tomorrow, then tid  Orders:  -     Misc  Devices (Round Shower Stool) MISC; Use daily Use as needed in the shower  -     gabapentin (NEURONTIN) 100 mg capsule; Take 1 tonight, bid tomorrow and then tid    Closed fracture of multiple ribs of left side with routine healing, subsequent encounter  Comments:  Use the tramadol as needed  Continue cough and deep breathing  Subjective:    Patient ID: Clauida Walton is a 67 y o  female  Pt is here for f/u on her rib pains which are a lot better  Pt had ct of the abdomen ->  Fractures of left posterior 10th and 11th ribs as well as left anterior 9th rib, likely subacute, small left pleural effusion, mild subcutaneous contusion in the left upper quadrant abdominal wall and moderate sized hiatal hernia  Now has burning pain in the right anterior thigh which is stabbing and screaming pain  No new injury  Mild back pains  Pt is taking tramadol for the pain with some relief  Pain will awaken her out of a sound sleep  Feels her ribs are healing but this pain 9in the right anterior thigh is terrible with not much relief  Plans on seeing [facility] for [facility]  Takes all other meds as directed  No side effects noted  The following portions of the patient's history were reviewed and updated as appropriate: allergies, current medications, past family history, past medical history, past social history, past surgical history and problem list     Review of Systems   Constitutional: Negative for chills, diaphoresis, fatigue and fever  HENT: Negative  Eyes: Negative  Respiratory: Positive for wheezing  Negative for cough and shortness of breath      Cardiovascular: Negative for chest pain and palpitations  Gastrointestinal: Negative for abdominal pain, constipation, diarrhea, nausea and vomiting  Genitourinary: Negative  Musculoskeletal: Positive for back pain  Negative for arthralgias (to right thigh  )  Neurological: Positive for numbness (with burning and tingling in the right anterior thigh  )  Negative for dizziness, light-headedness and headaches  Psychiatric/Behavioral: Negative for dysphoric mood  The patient is not nervous/anxious  Doing ok on her meds  Trying to watch her diet but gained 1 lbs            /78 (BP Location: Right arm, Patient Position: Sitting)   Pulse (!) 108   Temp 98 2 °F (36 8 °C) (Tympanic)   Resp 18   Ht 5' 3" (1 6 m)   Wt 127 kg (281 lb)   SpO2 99%   BMI 49 78 kg/m²   Social History     Socioeconomic History    Marital status: Single     Spouse name: Not on file    Number of children: Not on file    Years of education: Not on file    Highest education level: Not on file   Occupational History    Occupation: Retired   Social Needs    Financial resource strain: Not on file    Food insecurity     Worry: Not on file     Inability: Not on file   [employer] needs     Medical: Not on file     Non-medical: Not on file   Tobacco Use    Smoking status: Former Smoker    Smokeless tobacco: Never Used    Tobacco comment: 10 years ago   Substance and Sexual Activity    Alcohol use: Yes     Comment: socially - Per allscripts - denies alcohol consumption    Drug use: No    Sexual activity: Not on file   Lifestyle    Physical activity     Days per week: Not on file     Minutes per session: Not on file    Stress: Not on file   Relationships    Social connections     Talks on phone: Not on file     Gets together: Not on file     Attends [employer] service: Not on file     Active member of club or organization: Not on file     Attends meetings of clubs or organizations: Not on file     Relationship status: Not on file   Beth Intimate partner violence     Fear of current or ex partner: Not on file     Emotionally abused: Not on file     Physically abused: Not on file     Forced sexual activity: Not on file   Other Topics Concern    Not on file   Social History Narrative         Always uses seat belt     Past Medical History:   Diagnosis Date    Acute ill-defined cerebrovascular disease     Benign essential tremor     [facility]     pt states doesn't remember the year thinks it might have been on the right      Chronic kidney disease     Colon polyp     COPD (chronic obstructive pulmonary disease) (HCC)     Depressive disorder     Disease of thyroid gland     Heart murmur     Hypertension     Impaired fasting glucose     Intention tremor     last assessed: 8/18/2016    Osteochondropathy     Panic disorder without agoraphobia with mild panic attacks     Psychiatric disorder     anxiety    [facility]     Tubular adenoma of colon      Family History   Problem Relation Age of Onset    Alcohol abuse Mother     Asthma Mother     Cancer Mother     Mental illness Mother     Osteoarthritis Mother     Gout Father     Cancer Father     Other Sister         carotid arterial disease    COPD Sister     Hyperlipidemia Sister     Hypertension Sister     Heart attack Sister     Breast cancer Sister     No Known Problems Daughter     No Known Problems Maternal Grandmother     No Known Problems Paternal Grandmother     Lung cancer Sister     No Known Problems Sister     No Known Problems Daughter     No Known Problems Daughter      Past Surgical History:   Procedure Laterality Date    AUGMENTATION BREAST      pt states during mammo she doesn't have any other surgery than the 2 biopsy    BREAST BIOPSY Left     pt doesn't recall when and believes the neg biop was on left    BREAST LUMPECTOMY W/ NEEDLE LOCALIZATION Left     description: benign last assessed; 8/21/2016    CHOLECYSTECTOMY      COLONOSCOPY      EGD      HYSTERECTOMY         Current Outpatient Medications:     acetaminophen (TYLENOL) 500 mg tablet, Take 500 mg by mouth every 6 (six) hours as needed for mild pain Take 2 tablets by mouth every 6-8 hours as needed, Disp: , Rfl:     amLODIPine (NORVASC) 10 mg tablet, Take 1 tablet (10 mg total) by mouth daily, Disp: 90 tablet, Rfl: 1    Cholecalciferol (VITAMIN D3) 50 MCG (2000 UT) TABS, Take 2,000 Units by mouth daily, Disp: , Rfl:     cyclobenzaprine (FLEXERIL) 10 mg tablet, Take 10 mg by mouth daily at bedtime, Disp: , Rfl:     dexlansoprazole (Dexilant) 60 MG capsule, Take 1 capsule (60 mg total) by mouth daily before breakfast, Disp: 90 capsule, Rfl: 1    levothyroxine 50 mcg tablet, Take 1 tablet (50 mcg total) by mouth daily, Disp: 90 tablet, Rfl: 0    lisinopril (ZESTRIL) 20 mg tablet, Take 1 tablet (20 mg total) by mouth daily, Disp: 90 tablet, Rfl: 1    Misc  Devices (Walker) MISC, Use daily as needed (assistance with walking), Disp: 1 each, Rfl: 0    potassium chloride (K-DUR,KLOR-CON) 20 mEq tablet, Take 1 tablet (20 mEq total) by mouth daily, Disp: 90 tablet, Rfl: 0    rosuvastatin (CRESTOR) 10 MG tablet, Take 1 tablet (10 mg total) by mouth daily, Disp: 90 tablet, Rfl: 1    Zoster Vac Recomb Adjuvanted (Shingrix) 50 MCG/0 5ML SUSR, 0 5mL IM for one dose, followed by 0 5mL IM 2-6 months after first dose, Disp: 1 each, Rfl: 1    gabapentin (NEURONTIN) 100 mg capsule, Take 1 tonight, bid tomorrow and then tid, Disp: 90 capsule, Rfl: 0    Misc  Devices (Round Shower Stool) MISC, Use daily Use as needed in the shower  , Disp: 1 each, Rfl: 0    Allergies   Allergen Reactions    Amoxicillin     Codeine     Morphine           Lab Review   No visits with results within 2 Month(s) from this visit     Latest known visit with results is:   Office Visit on 01/22/2021   Component Date Value    Hemoglobin A1C 01/22/2021 5 5         Imaging: Ct Abdomen Wo Contrast    Result Date: 5/6/2021  Narrative: CT - ABDOMEN WITHOUT IV CONTRAST INDICATION:   R10 12: Left upper quadrant pain  Left upper quadrant abdominal pain status post fall from hammock 3 weeks ago  COMPARISON:  CT renal stone protocol dated 6/27/2017  TECHNIQUE: CT examination of the abdomen was performed without intravenous contrast   Axial, sagittal, and coronal 2D reformatted images were created from the source data and submitted for interpretation  Radiation dose length product (DLP) for this visit:  933 mGy-cm   This examination, like all CT scans performed in the [facility], was performed utilizing techniques to minimize radiation dose exposure, including the use of iterative reconstruction and automated exposure control  In addition, 3-D reconstructed images are submitted for review  Enteric contrast was not administered  FINDINGS: ABDOMEN LOWER CHEST:  Small left pleural effusion  Moderate size combined sliding and paraesophageal type hiatal hernia  Coronary artery calcifications  LIVER/BILIARY TREE:  Unremarkable  GALLBLADDER:  Cholecystectomy  SPLEEN:  Unremarkable  PANCREAS:  Unremarkable  ADRENAL GLANDS:  Stable bilateral benign adrenal adenomas measuring 1 8 cm on the right and 1 5 cm on the left side  KIDNEYS/URETERS:  No stone or hydronephrosis  1 8 cm exophytic left renal cortical cyst  VISUALIZED STOMACH AND BOWEL:  Unremarkable  VISUALIZED ABDOMINAL CAVITY:  No pathologically enlarged periportal, mesenteric or upper retroperitoneal lymph nodes  No ascites or free intraperitoneal air  No fluid collection  VISUALIZED BODY WALL:  Mild subcutaneous fat stranding in the left upper quadrant abdominal wall (series 02/01/1931), in keeping with contusion  VISUALIZED ABDOMINAL VESSELS:  Atherosclerotic calcifications  No aneurysm  OSSEOUS STRUCTURES:  Minimally displaced linear fractures of left posterior 10th and 11th ribs and left anterior 9th rib (series 201 images 21- 31), new from June 2017  There is evidence of early callus formation  Findings are in keeping with patient's  of acute injury  Degenerative changes of the spine  Impression: 1  Fractures of left posterior 10th and 11th ribs as well as left anterior 9th rib, likely subacute  2   Small left pleural effusion  3   Mild subcutaneous contusion in the left upper quadrant abdominal wall  4   Moderate sized hiatal hernia  The study was marked in [facility] for immediate notification  Workstation performed: [id number]       Objective:     Physical Exam  Vitals signs and nursing note reviewed  Constitutional:       General: She is not in acute distress  Appearance: Normal appearance  She is well-developed  She is not ill-appearing, toxic-appearing or diaphoretic  HENT:      Head: Normocephalic and atraumatic  Right Ear: Tympanic membrane, ear canal and external ear normal  There is no impacted cerumen  Left Ear: Tympanic membrane, ear canal and external ear normal  There is no impacted cerumen  Nose: Nose normal  No congestion  Mouth/Throat:      Mouth: Mucous membranes are moist       Pharynx: No oropharyngeal exudate  Eyes:      General:         Right eye: No discharge  Left eye: No discharge  Extraocular Movements: Extraocular movements intact  Conjunctiva/sclera: Conjunctivae normal       Pupils: Pupils are equal, round, and reactive to light  Neck:      Musculoskeletal: Normal range of motion and neck supple  No neck rigidity  Cardiovascular:      Rate and Rhythm: Regular rhythm  Tachycardia present  Heart sounds: No murmur  Pulmonary:      Effort: Pulmonary effort is normal  No respiratory distress  Breath sounds: Normal breath sounds  Musculoskeletal:         General: No deformity  Right lower leg: No edema  Left lower leg: No edema  Comments: Ambulates slowly with mild antalgic gait  Tender over right sij joint with negative slr   Patellar dtrs' 2+ on the left and 1+ on the right  Skin:     General: Skin is warm  Capillary Refill: Capillary refill takes less than 2 seconds  Coloration: Skin is not pale  Findings: No erythema  Neurological:      General: No focal deficit present  Mental Status: She is alert and oriented to person, place, and time  Psychiatric:         Mood and Affect: Mood normal          Behavior: Behavior normal          Thought Content: Thought content normal          Judgment: Judgment normal            Patient Instructions   Reviewed all with patient  CT scan of the abdomen does show several rib fractures which are better  I believe the numbness tingling and burning in the right thigh is coming from your low back  Start the gabapentin 100 mg tonight before bed, tomorrow 1 twice a day, then the next day 1 3 times daily  If the medicine is helping and you think we need to go up call me and I will increase the meds  Keep your follow-up appointment with Karen Harvey 1 for follow-up, use the tramadol as needed for severe pain  Keep the follow-up appointment here  Advised patient that when she goes to [facility] she needs to ask the name of the item she thinks could help her in the house with her safety getting in and out of the tub  For now you can  the shower stool  MONIQUE Angelo    Portions of the record may have been created with voice recognition software  Occasional wrong word or "sound a like" substitutions may have occurred due to the inherent limitations of voice recognition software  Read the chart carefully and recognize, using context, where substitutions have occurred

## 2021-05-13 NOTE — PATIENT INSTRUCTIONS
Reviewed all with patient  CT scan of the abdomen does show several rib fractures which are better  I believe the numbness tingling and burning in the right thigh is coming from your low back  Start the gabapentin 100 mg tonight before bed, tomorrow 1 twice a day, then the next day 1 3 times daily  If the medicine is helping and you think we need to go up call me and I will increase the meds  Keep your follow-up appointment with Karen Harvey 1 for follow-up, use the tramadol as needed for severe pain  Keep the follow-up appointment here  Advised patient that when she goes to [facility] she needs to ask the name of the item she thinks could help her in the house with her safety getting in and out of the tub  For now you can  the shower stool

## 2021-05-13 NOTE — TELEPHONE ENCOUNTER
Rodriguez Del Rio with [facility]'s pharmacy is calling because they were sent the original RX for the patient which was gabapentin, however it error in the system and they lost the script  She is looking for strength, quantity, and directions for the prescription

## 2021-05-24 ENCOUNTER — OFFICE VISIT (OUTPATIENT)
Dept: FAMILY MEDICINE CLINIC | Facility: CLINIC | Age: 76
End: 2021-05-24
Payer: MEDICARE

## 2021-05-24 VITALS
DIASTOLIC BLOOD PRESSURE: 70 MMHG | RESPIRATION RATE: 18 BRPM | OXYGEN SATURATION: 95 % | BODY MASS INDEX: 49.43 KG/M2 | HEART RATE: 100 BPM | WEIGHT: 279 LBS | HEIGHT: 63 IN | TEMPERATURE: 97.5 F | SYSTOLIC BLOOD PRESSURE: 110 MMHG

## 2021-05-24 DIAGNOSIS — R20.2 PARESTHESIA OF LOWER LIMB: ICD-10-CM

## 2021-05-24 DIAGNOSIS — M54.16 LUMBAR RADICULOPATHY: Primary | ICD-10-CM

## 2021-05-24 DIAGNOSIS — M54.31 SCIATICA OF RIGHT SIDE: ICD-10-CM

## 2021-05-24 PROCEDURE — 99214 OFFICE O/P EST MOD 30 MIN: CPT | Performed by: FAMILY MEDICINE

## 2021-05-24 RX ORDER — GABAPENTIN 300 MG/1
300 CAPSULE ORAL
Qty: 30 CAPSULE | Refills: 0 | Status: SHIPPED | OUTPATIENT
Start: 2021-05-24 | End: 2021-06-21 | Stop reason: SDUPTHER

## 2021-05-24 RX ORDER — GABAPENTIN 100 MG/1
CAPSULE ORAL
Qty: 60 CAPSULE | Refills: 0 | Status: SHIPPED | OUTPATIENT
Start: 2021-05-24 | End: 2021-06-21 | Stop reason: SDUPTHER

## 2021-05-24 NOTE — PROGRESS NOTES
Assessment/Plan:     Chronic Problems:  No problem-specific Assessment & Plan notes found for this encounter  Visit Diagnosis:  Diagnoses and all orders for this visit:    Lumbar radiculopathy  Comments:  Keep the f/u appt with [facility]  Paresthesia of lower limb  Comments: Will increase the gabapentin to 300 hs and 100 bid  Stop the tramadol  Tylenol is ok  Orders:  -     gabapentin (NEURONTIN) 300 mg capsule; Take 1 capsule (300 mg total) by mouth daily at bedtime    Sciatica of right side  Comments:  [place] [facility] [place]  Will start gabapentin one today, bid tomorrow, then tid  Orders:  -     gabapentin (NEURONTIN) 100 mg capsule; Take 1 po bid          Subjective:    Patient ID: Benjamin Reddy is a 67 y o  female  Pt is here for f/u on her rib fractures which are getting better, but still with pain in the right thigh  Not sure what happened and denies injury  Pain is in the right anterior thigh and it feels numb  Has appt at [facility] on Friday for her back  Feels the gabapentin has helped, but thinks it needs to be stronger  Pt states the pains and numbness are worse if she misses a doses  Takes all other meds as directed  No side effects noted  The following portions of the patient's history were reviewed and updated as appropriate: allergies, current medications, past family history, past medical history, past social history, past surgical history and problem list     Review of Systems   Constitutional: Negative for chills, diaphoresis, fatigue and fever  HENT: Negative  Eyes: Positive for visual disturbance (but has a h/o cataracts  )  Respiratory: Positive for wheezing  Negative for cough and shortness of breath  Cardiovascular: Negative for chest pain and palpitations  Gastrointestinal: Negative for abdominal pain, constipation, diarrhea, nausea and vomiting  Genitourinary: Negative  Musculoskeletal: Positive for arthralgias and back pain (over lumbar area  )  Neurological: Positive for numbness (in the right anterior thigh  )  Negative for dizziness, light-headedness and headaches  Psychiatric/Behavioral: Negative for dysphoric mood  The patient is not nervous/anxious  Thinks she is doing great on her meds            /70 (BP Location: Right arm, Patient Position: Sitting)   Pulse 100   Temp 97 5 °F (36 4 °C) (Tympanic)   Resp 18   Ht 5' 3" (1 6 m)   Wt 127 kg (279 lb)   SpO2 95%   BMI 49 42 kg/m²   Social History     Socioeconomic History    Marital status: Single     Spouse name: Not on file    Number of children: Not on file    Years of education: Not on file    Highest education level: Not on file   Occupational History    Occupation: Retired   Social Needs    Financial resource strain: Not on file    Food insecurity     Worry: Not on file     Inability: Not on file   [employer] needs     Medical: Not on file     Non-medical: Not on file   Tobacco Use    Smoking status: Former Smoker    Smokeless tobacco: Never Used    Tobacco comment: 10 years ago   Substance and Sexual Activity    Alcohol use: Yes     Comment: socially - Per allscripts - denies alcohol consumption    Drug use: No    Sexual activity: Not on file   Lifestyle    Physical activity     Days per week: Not on file     Minutes per session: Not on file    Stress: Not on file   Relationships    Social connections     Talks on phone: Not on file     Gets together: Not on file     Attends Yarsanism service: Not on file     Active member of club or organization: Not on file     Attends meetings of clubs or organizations: Not on file     Relationship status: Not on file    Intimate partner violence     Fear of current or ex partner: Not on file     Emotionally abused: Not on file     Physically abused: Not on file     Forced sexual activity: Not on file   Other Topics Concern    Not on file   Social History Narrative         Always uses seat belt     Past Medical History:   Diagnosis Date    Acute ill-defined cerebrovascular disease     Benign essential tremor     Breast cancer ([facility] [address] [facility]     pt states doesn't remember the year thinks it might have been on the right      Chronic kidney disease     Colon polyp     COPD (chronic obstructive pulmonary disease) (HCC)     Depressive disorder     Disease of thyroid gland     Heart murmur     Hypertension     Impaired fasting glucose     Intention tremor     last assessed: 8/18/2016    Osteochondropathy     Panic disorder without agoraphobia with mild panic attacks     Psychiatric disorder     anxiety    Stroke ([facility] [address] [facility]     Tubular adenoma of colon      Family History   Problem Relation Age of Onset    Alcohol abuse Mother     Asthma Mother     Cancer Mother     Mental illness Mother     Osteoarthritis Mother     Gout Father     Cancer Father     Other Sister         carotid arterial disease    COPD Sister     Hyperlipidemia Sister     Hypertension Sister     Heart attack Sister     Breast cancer Sister     No Known Problems Daughter     No Known Problems Maternal Grandmother     No Known Problems Paternal Grandmother     Lung cancer Sister     No Known Problems Sister     No Known Problems Daughter     No Known Problems Daughter      Past Surgical History:   Procedure Laterality Date    AUGMENTATION BREAST      pt states during mammo she doesn't have any other surgery than the 2 biopsy    BREAST BIOPSY Left     pt doesn't recall when and believes the neg biop was on left    BREAST LUMPECTOMY W/ NEEDLE LOCALIZATION Left     description: benign last assessed; 8/21/2016    CHOLECYSTECTOMY      COLONOSCOPY      EGD      HYSTERECTOMY         Current Outpatient Medications:     acetaminophen (TYLENOL) 500 mg tablet, Take 500 mg by mouth every 6 (six) hours as needed for mild pain Take 2 tablets by mouth every 6-8 hours as needed, Disp: , Rfl:     amLODIPine (NORVASC) 10 mg tablet, Take 1 tablet (10 mg total) by mouth daily, Disp: 90 tablet, Rfl: 1    Cholecalciferol (VITAMIN D3) 50 MCG (2000 UT) TABS, Take 2,000 Units by mouth daily, Disp: , Rfl:     cyclobenzaprine (FLEXERIL) 10 mg tablet, Take 10 mg by mouth daily at bedtime, Disp: , Rfl:     dexlansoprazole (Dexilant) 60 MG capsule, Take 1 capsule (60 mg total) by mouth daily before breakfast, Disp: 90 capsule, Rfl: 1    gabapentin (NEURONTIN) 100 mg capsule, Take 1 po bid, Disp: 60 capsule, Rfl: 0    levothyroxine 50 mcg tablet, Take 1 tablet (50 mcg total) by mouth daily, Disp: 90 tablet, Rfl: 0    lisinopril (ZESTRIL) 20 mg tablet, Take 1 tablet (20 mg total) by mouth daily, Disp: 90 tablet, Rfl: 1    Misc  Devices (Round Shower Stool) MISC, Use daily Use as needed in the shower  , Disp: 1 each, Rfl: 0    potassium chloride (K-DUR,KLOR-CON) 20 mEq tablet, Take 1 tablet (20 mEq total) by mouth daily, Disp: 90 tablet, Rfl: 0    rosuvastatin (CRESTOR) 10 MG tablet, Take 1 tablet (10 mg total) by mouth daily, Disp: 90 tablet, Rfl: 1    Zoster Vac Recomb Adjuvanted (Shingrix) 50 MCG/0 5ML SUSR, 0 5mL IM for one dose, followed by 0 5mL IM 2-6 months after first dose, Disp: 1 each, Rfl: 1    gabapentin (NEURONTIN) 300 mg capsule, Take 1 capsule (300 mg total) by mouth daily at bedtime, Disp: 30 capsule, Rfl: 0    Allergies   Allergen Reactions    Amoxicillin     Codeine     Morphine           Lab Review   No visits with results within 2 Month(s) from this visit  Latest known visit with results is:   Office Visit on 01/22/2021   Component Date Value    Hemoglobin A1C 01/22/2021 5 5         Imaging: Ct Abdomen Wo Contrast    Result Date: 5/6/2021  Narrative: CT - ABDOMEN WITHOUT IV CONTRAST INDICATION:   R10 12: Left upper quadrant pain  Left upper quadrant abdominal pain status post fall from hammock 3 weeks ago  COMPARISON:  CT renal stone protocol dated 6/27/2017   TECHNIQUE: CT examination of the abdomen was performed without intravenous contrast   Axial, sagittal, and coronal 2D reformatted images were created from the source data and submitted for interpretation  Radiation dose length product (DLP) for this visit:  933 mGy-cm   This examination, like all CT scans performed in the [facility], was performed utilizing techniques to minimize radiation dose exposure, including the use of iterative reconstruction and automated exposure control  In addition, 3-D reconstructed images are submitted for review  Enteric contrast was not administered  FINDINGS: ABDOMEN LOWER CHEST:  Small left pleural effusion  Moderate size combined sliding and paraesophageal type hiatal hernia  Coronary artery calcifications  LIVER/BILIARY TREE:  Unremarkable  GALLBLADDER:  Cholecystectomy  SPLEEN:  Unremarkable  PANCREAS:  Unremarkable  ADRENAL GLANDS:  Stable bilateral benign adrenal adenomas measuring 1 8 cm on the right and 1 5 cm on the left side  KIDNEYS/URETERS:  No stone or hydronephrosis  1 8 cm exophytic left renal cortical cyst  VISUALIZED STOMACH AND BOWEL:  Unremarkable  VISUALIZED ABDOMINAL CAVITY:  No pathologically enlarged periportal, mesenteric or upper retroperitoneal lymph nodes  No ascites or free intraperitoneal air  No fluid collection  VISUALIZED BODY WALL:  Mild subcutaneous fat stranding in the left upper quadrant abdominal wall (series 02[birth year]01/1931), in keeping with contusion  VISUALIZED ABDOMINAL VESSELS:  Atherosclerotic calcifications  No aneurysm  OSSEOUS STRUCTURES:  Minimally displaced linear fractures of left posterior 10th and 11th ribs and left anterior 9th rib (series 201 images 21- 31), new from June 2017  There is evidence of early callus formation  Findings are in keeping with patient's  of acute injury  Degenerative changes of the spine  Impression: 1  Fractures of left posterior 10th and 11th ribs as well as left anterior 9th rib, likely subacute  2   Small left pleural effusion   3   Mild subcutaneous contusion in the left upper quadrant abdominal wall  4   Moderate sized hiatal hernia  The study was marked in [facility] for immediate notification  Workstation performed: [id number]       Objective:     Physical Exam  Vitals signs and nursing note reviewed  Constitutional:       General: She is not in acute distress  Appearance: Normal appearance  She is well-developed  She is obese  She is not ill-appearing, toxic-appearing or diaphoretic  HENT:      Head: Normocephalic and atraumatic  Right Ear: Tympanic membrane, ear canal and external ear normal  There is no impacted cerumen  Left Ear: Tympanic membrane, ear canal and external ear normal  There is no impacted cerumen  Nose: Nose normal  No congestion  Mouth/Throat:      Mouth: Mucous membranes are moist       Pharynx: No oropharyngeal exudate  Eyes:      General:         Right eye: No discharge  Left eye: No discharge  Extraocular Movements: Extraocular movements intact  Conjunctiva/sclera: Conjunctivae normal       Pupils: Pupils are equal, round, and reactive to light  Neck:      Musculoskeletal: Normal range of motion and neck supple  No neck rigidity  Cardiovascular:      Rate and Rhythm: Normal rate and regular rhythm  Heart sounds: No murmur  Pulmonary:      Effort: Pulmonary effort is normal  No respiratory distress  Breath sounds: Normal breath sounds  Abdominal:      Comments: Abdomen is obese  Musculoskeletal:         General: Tenderness (over lower lumbar spine  negative slr bilaterally  ) present  No deformity  Right lower leg: No edema  Left lower leg: No edema  Comments: Ambulates slowly  Skin:     General: Skin is warm  Capillary Refill: Capillary refill takes less than 2 seconds  Coloration: Skin is not pale  Findings: No erythema  Neurological:      General: No focal deficit present        Mental Status: She is alert and oriented to person, place, and time  Psychiatric:         Mood and Affect: Mood normal          Behavior: Behavior normal          Thought Content: Thought content normal          Judgment: Judgment normal            Patient Instructions   Reviewed all with patient  The gabapentin is helping but she feels she needs a higher dose to help her sleep at night  Stay on 100 mg a gabapentin twice daily but at night take 300 mg  Stop the tramadol  You can have Tylenol if needed  Keep the follow-up appointment with [facility] and let them know that the belt that they gave you does not work correctly and maybe they can show you how to use it  Follow-up here in 1 month as I do not want you on these meds for ever  MONIQUE Angelo    Portions of the record may have been created with voice recognition software  Occasional wrong word or "sound a like" substitutions may have occurred due to the inherent limitations of voice recognition software  Read the chart carefully and recognize, using context, where substitutions have occurred

## 2021-05-24 NOTE — PATIENT INSTRUCTIONS
Reviewed all with patient  The gabapentin is helping but she feels she needs a higher dose to help her sleep at night  Stay on 100 mg a gabapentin twice daily but at night take 300 mg  Stop the tramadol  You can have Tylenol if needed  Keep the follow-up appointment with [facility] and let them know that the belt that they gave you does not work correctly and maybe they can show you how to use it  Follow-up here in 1 month as I do not want you on these meds for ever

## 2021-06-02 DIAGNOSIS — E78.2 MIXED HYPERLIPIDEMIA: ICD-10-CM

## 2021-06-02 RX ORDER — ROSUVASTATIN CALCIUM 10 MG/1
10 TABLET, COATED ORAL DAILY
Qty: 90 TABLET | Refills: 1 | Status: SHIPPED | OUTPATIENT
Start: 2021-06-02 | End: 2021-09-13

## 2021-06-11 DIAGNOSIS — R53.83 OTHER FATIGUE: ICD-10-CM

## 2021-06-11 DIAGNOSIS — R79.89 ELEVATED TSH: ICD-10-CM

## 2021-06-11 RX ORDER — LEVOTHYROXINE SODIUM 0.05 MG/1
50 TABLET ORAL DAILY
Qty: 90 TABLET | Refills: 0 | Status: SHIPPED | OUTPATIENT
Start: 2021-06-11 | End: 2021-09-09

## 2021-06-21 DIAGNOSIS — M54.31 SCIATICA OF RIGHT SIDE: ICD-10-CM

## 2021-06-21 DIAGNOSIS — R20.2 PARESTHESIA OF LOWER LIMB: ICD-10-CM

## 2021-06-21 RX ORDER — GABAPENTIN 100 MG/1
CAPSULE ORAL
Qty: 60 CAPSULE | Refills: 1 | Status: SHIPPED | OUTPATIENT
Start: 2021-06-21 | End: 2021-08-14

## 2021-06-21 RX ORDER — GABAPENTIN 300 MG/1
300 CAPSULE ORAL
Qty: 30 CAPSULE | Refills: 1 | Status: SHIPPED | OUTPATIENT
Start: 2021-06-21 | End: 2021-08-14

## 2021-06-29 ENCOUNTER — OFFICE VISIT (OUTPATIENT)
Dept: FAMILY MEDICINE CLINIC | Facility: CLINIC | Age: 76
End: 2021-06-29
Payer: MEDICARE

## 2021-06-29 VITALS
WEIGHT: 279 LBS | OXYGEN SATURATION: 97 % | DIASTOLIC BLOOD PRESSURE: 80 MMHG | BODY MASS INDEX: 49.43 KG/M2 | SYSTOLIC BLOOD PRESSURE: 120 MMHG | TEMPERATURE: 97.5 F | HEART RATE: 96 BPM | HEIGHT: 63 IN | RESPIRATION RATE: 18 BRPM

## 2021-06-29 DIAGNOSIS — E66.01 MORBID OBESITY WITH BMI OF 45.0-49.9, ADULT (HCC): ICD-10-CM

## 2021-06-29 DIAGNOSIS — S32.050A COMPRESSION FRACTURE OF L5 VERTEBRA, INITIAL ENCOUNTER (HCC): Primary | ICD-10-CM

## 2021-06-29 DIAGNOSIS — N18.32 STAGE 3B CHRONIC KIDNEY DISEASE (HCC): ICD-10-CM

## 2021-06-29 DIAGNOSIS — M79.604 LEG PAIN, RIGHT: ICD-10-CM

## 2021-06-29 DIAGNOSIS — Z91.81 AT RISK FOR FALLS: ICD-10-CM

## 2021-06-29 PROCEDURE — 99214 OFFICE O/P EST MOD 30 MIN: CPT | Performed by: FAMILY MEDICINE

## 2021-06-29 NOTE — ASSESSMENT & PLAN NOTE
Lab Results   Component Value Date    EGFR 37 01/18/2021    EGFR 42 08/06/2020    EGFR 42 03/09/2020    CREATININE 1 37 (H) 01/18/2021    CREATININE 1 24 08/06/2020    CREATININE 1 25 03/09/2020   Prefer no nsaids

## 2021-06-29 NOTE — PROGRESS NOTES
Assessment/Plan:     Chronic Problems:  Compression fracture of fifth lumbar vertebra (HCC)  Pt also with recent mri from [facility] [place]   Will request and review  CKD (chronic kidney disease) stage 3, GFR 30-59 ml/min  Lab Results   Component Value Date    EGFR 37 01/18/2021    EGFR 42 08/06/2020    EGFR 42 03/09/2020    CREATININE 1 37 (H) 01/18/2021    CREATININE 1 24 08/06/2020    CREATININE 1 25 03/09/2020   Prefer no nsaids  Visit Diagnosis:  Diagnoses and all orders for this visit:    Compression fracture of L5 vertebra, initial encounter ([facility] )    Leg pain, right  Comments:  Suspect coming from her back  Stay on the gabapentin as this is helping  Ok to use tylenol 2 bid and use salon pas on the back     At risk for falls    Morbid obesity with BMI of 45 0-49 9, adult ([place] [address] )    Stage 3b chronic kidney disease ([place] [address] )          Subjective:    Patient ID: Briseida Noel is a 67 y o  female  Pt is here with c/o pain in the right leg  Has been following with Dr Stephany Thrasher from [facility] [place]  No consult available  Given a shot in the hip on the right and told she has bursitis  Feels it did help but still has a tingling in the right thigh which feels cold today, but some days feels hot  Feels the gabapentin helps and she feels this is a good dose, but sometimes would take 3 daily, but not since the shot in the hip  Still with rib pains on the left but better  Takes all other meds as directed  No side effects noted  No weight changes this month  The following portions of the patient's history were reviewed and updated as appropriate: allergies, current medications, past family history, past medical history, past social history, past surgical history and problem list     Review of Systems   Constitutional: Negative for chills, diaphoresis, fatigue and fever  HENT: Negative  Eyes: Negative  Respiratory: Negative for cough, shortness of breath and wheezing      Cardiovascular: Negative for chest pain and palpitations  Gastrointestinal: Negative  Feels she is eating a lot of salads but not losing weight  Starting physical therapy today  Genitourinary: Negative  Musculoskeletal: Positive for arthralgias (hip is better now  ) and back pain  Neurological: Negative for dizziness, light-headedness and headaches  Psychiatric/Behavioral: Negative for dysphoric mood  The patient is not nervous/anxious  Feels she is doing ok on her meds  Was depressed last week for a bit as she was hurting so bad  Felt better after the epidural           /80 (BP Location: Right arm, Patient Position: Sitting)   Pulse 96   Temp 97 5 °F (36 4 °C) (Tympanic)   Resp 18   Ht 5' 3" (1 6 m)   Wt 127 kg (279 lb)   SpO2 97%   BMI 49 42 kg/m²   Social History     Socioeconomic History    Marital status: Single     Spouse name: Not on file    Number of children: Not on file    Years of education: Not on file    Highest education level: Not on file   Occupational History    Occupation: Retired   Tobacco Use    Smoking status: Former Smoker    Smokeless tobacco: Never Used    Tobacco comment: 10 years ago   Substance and Sexual Activity    Alcohol use: Yes     Comment: socially - Per allscripts - denies alcohol consumption    Drug use: No    Sexual activity: Not on file   Other Topics Concern    Not on file   Social History Narrative         Always uses seat belt     Social Determinants of Health     Financial Resource Strain:     Difficulty of Paying Living Expenses:    Food Insecurity:     Worried About Running Out of Food in the Last Year:     Ran Out of Food in the Last Year:    Transportation Needs:     Lack of Transportation (Medical):      Lack of Transportation (Non-Medical):    Physical Activity:     Days of Exercise per Week:     Minutes of Exercise per Session:    Stress:     Feeling of Stress :    Social Connections:     Frequency of Communication with Friends and Family:     Frequency of Social Gatherings with Friends and Family:     Attends Gnosticism Services:     Active Member of Clubs or Organizations:     Attends Club or Organization Meetings:     Marital Status:    Intimate Partner Violence:     Fear of Current or Ex-Partner:     Emotionally Abused:     Physically Abused:     Sexually Abused:      Past Medical History:   Diagnosis Date    Acute ill-defined cerebrovascular disease     Benign essential tremor     Breast cancer ([facility] )     pt states doesn't remember the year thinks it might have been on the right      Chronic kidney disease     CKD (chronic kidney disease) stage 3, GFR 30-59 ml/min ([facility] 9/25/2018    Colon polyp     COPD (chronic obstructive pulmonary disease) ([facility]     Depressive disorder     Disease of thyroid gland     Heart murmur     Hypertension     Impaired fasting glucose     Intention tremor     last assessed: 8/18/2016    Osteochondropathy     Panic disorder without agoraphobia with mild panic attacks     Psychiatric disorder     anxiety    Stroke ([facility] )     Tubular adenoma of colon      Family History   Problem Relation Age of Onset    Alcohol abuse Mother     Asthma Mother     Cancer Mother     Mental illness Mother     Osteoarthritis Mother     Gout Father     Cancer Father     Other Sister         carotid arterial disease    COPD Sister     Hyperlipidemia Sister     Hypertension Sister     Heart attack Sister     Breast cancer Sister     No Known Problems Daughter     No Known Problems Maternal Grandmother     No Known Problems Paternal Grandmother     Lung cancer Sister     No Known Problems Sister     No Known Problems Daughter     No Known Problems Daughter      Past Surgical History:   Procedure Laterality Date    AUGMENTATION BREAST      pt states during mammo she doesn't have any other surgery than the 2 biopsy    BREAST BIOPSY Left     pt doesn't recall when and believes the neg biop was on left    BREAST LUMPECTOMY W/ NEEDLE LOCALIZATION Left     description: benign last assessed; 8/21/2016    CHOLECYSTECTOMY      COLONOSCOPY      EGD      HYSTERECTOMY         Current Outpatient Medications:     acetaminophen (TYLENOL) 500 mg tablet, Take 500 mg by mouth every 6 (six) hours as needed for mild pain Take 2 tablets by mouth every 6-8 hours as needed, Disp: , Rfl:     amLODIPine (NORVASC) 10 mg tablet, Take 1 tablet (10 mg total) by mouth daily, Disp: 90 tablet, Rfl: 1    Cholecalciferol (VITAMIN D3) 50 MCG (2000 UT) TABS, Take 2,000 Units by mouth daily, Disp: , Rfl:     cyclobenzaprine (FLEXERIL) 10 mg tablet, Take 10 mg by mouth daily at bedtime, Disp: , Rfl:     dexlansoprazole (Dexilant) 60 MG capsule, Take 1 capsule (60 mg total) by mouth daily before breakfast, Disp: 90 capsule, Rfl: 1    gabapentin (NEURONTIN) 100 mg capsule, Take 1 po bid, Disp: 60 capsule, Rfl: 1    gabapentin (NEURONTIN) 300 mg capsule, Take 1 capsule (300 mg total) by mouth daily at bedtime, Disp: 30 capsule, Rfl: 1    levothyroxine 50 mcg tablet, Take 1 tablet (50 mcg total) by mouth daily, Disp: 90 tablet, Rfl: 0    lisinopril (ZESTRIL) 20 mg tablet, Take 1 tablet (20 mg total) by mouth daily, Disp: 90 tablet, Rfl: 1    Misc  Devices (Round Shower Stool) MISC, Use daily Use as needed in the shower  , Disp: 1 each, Rfl: 0    potassium chloride (K-DUR,KLOR-CON) 20 mEq tablet, Take 1 tablet (20 mEq total) by mouth daily, Disp: 90 tablet, Rfl: 0    rosuvastatin (CRESTOR) 10 MG tablet, Take 1 tablet (10 mg total) by mouth daily, Disp: 90 tablet, Rfl: 1    Zoster Vac Recomb Adjuvanted (Shingrix) 50 MCG/0 5ML SUSR, 0 5mL IM for one dose, followed by 0 5mL IM 2-6 months after first dose, Disp: 1 each, Rfl: 1    Allergies   Allergen Reactions    Amoxicillin     Codeine     Morphine           Lab Review   No visits with results within 2 Month(s) from this visit     Latest known visit with results is:   Office Visit on 01/22/2021   Component Date Value    Hemoglobin A1C 01/22/2021 5 5         Imaging: No results found  Objective:     Physical Exam  Vitals and nursing note reviewed  Constitutional:       General: She is not in acute distress  Appearance: Normal appearance  She is well-developed  She is not ill-appearing, toxic-appearing or diaphoretic  HENT:      Head: Normocephalic and atraumatic  Right Ear: Tympanic membrane, ear canal and external ear normal  There is no impacted cerumen  Left Ear: Tympanic membrane, ear canal and external ear normal  There is no impacted cerumen  Nose: Nose normal  No congestion  Mouth/Throat:      Mouth: Mucous membranes are moist       Pharynx: No oropharyngeal exudate  Eyes:      General:         Right eye: No discharge  Left eye: No discharge  Extraocular Movements: Extraocular movements intact  Conjunctiva/sclera: Conjunctivae normal       Pupils: Pupils are equal, round, and reactive to light  Cardiovascular:      Rate and Rhythm: Normal rate and regular rhythm  Heart sounds: No murmur heard  Pulmonary:      Effort: Pulmonary effort is normal  No respiratory distress  Breath sounds: Normal breath sounds  Abdominal:      Comments: Abdomen is obese  Musculoskeletal:         General: No deformity  Cervical back: Normal range of motion and neck supple  No rigidity  Right lower leg: No edema  Left lower leg: No edema  Comments: Ambulates slowly with antalgic gait  Tender mid thoracic spine and right sij  Negative slr bilaterally  Skin:     General: Skin is warm  Capillary Refill: Capillary refill takes less than 2 seconds  Coloration: Skin is not pale  Findings: No erythema  Neurological:      General: No focal deficit present  Mental Status: She is alert and oriented to person, place, and time     Psychiatric:         Mood and Affect: Mood normal          Behavior: Behavior normal          Thought Content: Thought content normal          Judgment: Judgment normal            Patient Instructions     Reviewed all with patient  There are no records here from [facility] but we will request her MRI and last consult  I suspect the pain in the right leg is coming from your back as your tender over your right SI joint  It is okay to use 2 Tylenol extra-strength twice daily as needed for the pain but I would prefer you not use Advil ibuprofen or Aleve as you have some kidney issues  Stay on the gabapentin is this is helping  Can  salon pas (lidocaine) and use on your back or your leg  Keep your follow-up appointment with them  I will review the old records when they are here and call if I have any other comments  Fall Prevention   AMBULATORY CARE:   Fall prevention  includes ways to make your home and other areas safer  It also includes ways you can move more carefully to prevent a fall  Health conditions that cause changes in your blood pressure, vision, or muscle strength and coordination may increase your risk for falls  Medicines may also increase your risk for falls if they make you dizzy, weak, or sleepy  Call 911 or have someone else call if:   · You have fallen and are unconscious  · You have fallen and cannot move part of your body  Contact your healthcare provider if:   · You have fallen and have pain or a headache  · You have questions or concerns about your condition or care  Fall prevention tips:   · Stand or sit up slowly  This may help you keep your balance and prevent falls  · Use assistive devices as directed  Your healthcare provider may suggest that you use a cane or walker to help you keep your balance  You may need to have grab bars put in your bathroom near the toilet or in the shower  · Wear shoes that fit well and have soles that   Wear shoes both inside and outside  Use slippers with good    Do not wear shoes with high heels  · Wear a personal alarm  This is a device that allows you to call 911 if you fall and need help  Ask your healthcare provider for more information  · Stay active  Exercise can help strengthen your muscles and improve your balance  Your healthcare provider may recommend water aerobics or walking  He or she may also recommend physical therapy to improve your coordination  Never start an exercise program without talking to your healthcare provider first          · Manage your medical conditions  Keep all appointments with your healthcare providers  Visit your eye doctor as directed  Home safety tips:       · Add items to prevent falls in the bathroom  Put nonslip strips on your bath or shower floor to prevent you from slipping  Use a bath mat if you do not have carpet in the bathroom  This will prevent you from falling when you step out of the bath or shower  Use a shower seat so you do not need to stand while you shower  Sit on the toilet or a chair in your bathroom to dry yourself and put on clothing  This will prevent you from losing your balance from drying or dressing yourself while you are standing  · Keep paths clear  Remove books, shoes, and other objects from walkways and stairs  Place cords for telephones and lamps out of the way so that you do not need to walk over them  Tape them down if you cannot move them  Remove small rugs  If you cannot remove a rug, secure it with double-sided tape  This will prevent you from tripping  · Install bright lights in your home  Use night lights to help light paths to the bathroom or kitchen  Always turn on the light before you start walking  · Keep items you use often on shelves within reach  Do not use a step stool to help you reach an item  · Paint or place reflective tape on the edges of your stairs  This will help you see the stairs better    Follow up with your healthcare provider as directed:  Write down your questions so you remember to ask them during your visits  © Copyright [address] Information is for End User's use only and may not be sold, redistributed or otherwise used for commercial purposes  All illustrations and images included in CareNotes® are the copyrighted property of [employer] M [employer] Inc  or Theresa Srivastava  The above information is an  only  It is not intended as medical advice for individual conditions or treatments  Talk to your doctor, nurse or pharmacist before following any medical regimen to see if it is safe and effective for you  Obesity   AMBULATORY CARE:   Obesity  is when your body mass index (BMI) is greater than 30  Your healthcare provider will use your height and weight to measure your BMI  The risks of obesity include  many health problems, such as injuries or physical disability  You may need tests to check for the following:  · Diabetes    · High blood pressure or high cholesterol    · Heart disease    · Gallbladder or liver disease    · Cancer of the colon, breast, prostate, liver, or kidney    · Sleep apnea    · Arthritis or gout    Seek care immediately if:   · You have a severe headache, confusion, or difficulty speaking  · You have weakness on one side of your body  · You have chest pain, sweating, or shortness of breath  Contact your healthcare provider if:   · You have symptoms of gallbladder or liver disease, such as pain in your upper abdomen  · You have knee or hip pain and discomfort while walking  · You have symptoms of diabetes, such as intense hunger and thirst, and frequent urination  · You have symptoms of sleep apnea, such as snoring or daytime sleepiness  · You have questions or concerns about your condition or care  Treatment for obesity  focuses on helping you lose weight to improve your health  Even a small decrease in BMI can reduce the risk for many health problems   Your healthcare provider will help you set a weight-loss goal   · Lifestyle changes  are the first step in treating obesity  These include making healthy food choices and getting regular physical activity  Your healthcare provider may suggest a weight-loss program that involves coaching, education, and therapy  · Medicine  may help you lose weight when it is used with a healthy diet and physical activity  · Surgery  can help you lose weight if you are very obese and have other health problems  There are several types of weight-loss surgery  Ask your healthcare provider for more information  Be successful losing weight:   · Set small, realistic goals  An example of a small goal is to walk for 20 minutes 5 days a week  Sita goal is to lose 5% of your body weight  · Tell friends, family members, and coworkers about your goals  and ask for their support  Ask a friend to lose weight with you, or join a weight-loss support group  · Identify foods or triggers that may cause you to overeat , and find ways to avoid them  Remove tempting high-calorie foods from your home and workplace  Place a bowl of fresh fruit on your kitchen counter  If stress causes you to eat, then find other ways to cope with stress  · Keep a diary to track what you eat and drink  Also write down how many minutes of physical activity you do each day  Weigh yourself once a week and record it in your diary  Eating changes: You will need to eat 500 to 1,000 fewer calories each day than you currently eat to lose 1 to 2 pounds a week  The following changes will help you cut calories:  · Eat smaller portions  Use small plates, no larger than 9 inches in diameter  Fill your plate half full of fruits and vegetables  Measure your food using measuring cups until you know what a serving size looks like  · Eat 3 meals and 1 or 2 snacks each day  Plan your meals in advance  [place] Figures and eat at home most of the time  Eat slowly  Do not skip meals   Skipping meals can lead to overeating later in the day  This can make it harder for you to lose weight  Talk with a dietitian to help you make a meal plan and schedule that is right for you  · Eat fruits and vegetables at every meal   They are low in calories and high in fiber, which makes you feel full  Do not add butter, margarine, or cream sauce to vegetables  Use herbs to season steamed vegetables  · Eat less fat and fewer fried foods  Eat more baked or grilled chicken and fish  These protein sources are lower in calories and fat than red meat  Limit fast food  Dress your salads with olive oil and vinegar instead of bottled dressing  · Limit the amount of sugar you eat  Do not drink sugary beverages  Limit alcohol  Activity changes:  Physical activity is good for your body in many ways  It helps you burn calories and build strong muscles  It decreases stress and depression, and improves your mood  It can also help you sleep better  Talk to your healthcare provider before you begin an exercise program   · Exercise for at least 30 minutes 5 days a week  Start slowly  Set aside time each day for physical activity that you enjoy and that is convenient for you  It is best to do both weight training and an activity that increases your heart rate, such as walking, bicycling, or swimming  · Find ways to be more active  Do yard work and housecleaning  Walk up the stairs instead of using elevators  Spend your leisure time going to events that require walking, such as outdoor festivals or fairs  This extra physical activity can help you lose weight and keep it off  Follow up with your healthcare provider as directed: You may need to meet with a dietitian  Write down your questions so you remember to ask them during your visits  © Copyright [address] Information is for End User's use only and may not be sold, redistributed or otherwise used for commercial purposes   All illustrations and images included in CareNotes® are the copyrighted property of [employer]  or [address]   The above information is an  only  It is not intended as medical advice for individual conditions or treatments  Talk to your doctor, nurse or pharmacist before following any medical regimen to see if it is safe and effective for you  Heart Healthy Diet   AMBULATORY CARE:   A heart healthy diet  is an eating plan low in unhealthy fats and sodium (salt)  The plan is high in healthy fats and fiber  A heart healthy diet helps improve your cholesterol levels and lowers your risk for heart disease and stroke  A dietitian will teach you how to read and understand food labels  Heart healthy diet guidelines to follow:   · Choose foods that contain healthy fats  ? Unsaturated fats  include monounsaturated and polyunsaturated fats  Unsaturated fat is found in foods such as soybean, canola, olive, corn, and safflower oils  It is also found in soft tub margarine that is made with liquid vegetable oil  ? Omega-3 fat  is found in certain fish, such as salmon, tuna, and trout, and in walnuts and flaxseed  Eat fish high in omega-3 fats at least 2 times a week  · Get 20 to 30 grams of fiber each day  Fruits, vegetables, whole-grain foods, and legumes (cooked beans) are good sources of fiber  · Limit or do not have unhealthy fats  ? Cholesterol  is found in animal foods, such as eggs and lobster, and in dairy products made from whole milk  Limit cholesterol to less than 200 mg each day  ? Saturated fat  is found in meats, such as umanzor and hamburger  It is also found in chicken or turkey skin, whole milk, and butter  Limit saturated fat to less than 7% of your total daily calories  ? Trans fat  is found in packaged foods, such as potato chips and cookies  It is also in hard margarine, some fried foods, and shortening  Do not eat foods that contain trans fats  · Limit sodium as directed    You may be told to limit sodium to 2,000 to 2,300 mg each day  Choose low-sodium or no-salt-added foods  Add little or no salt to food you prepare  Use herbs and spices in place of salt  Include the following in your heart healthy plan:  Ask your dietitian or healthcare provider how many servings to have from each of the following food groups:  · Grains:      ? Whole-wheat breads, cereals, and pastas, and brown rice    ? Low-fat, low-sodium crackers and chips    · Vegetables:      ? Broccoli, green beans, green peas, and spinach    ? Collards, kale, and lima beans    ? Carrots, sweet potatoes, tomatoes, and peppers    ? Canned vegetables with no salt added    · Fruits:      ? Bananas, peaches, pears, and pineapple    ? Grapes, raisins, and dates    ? Oranges, tangerines, grapefruit, orange juice, and grapefruit juice    ? Apricots, mangoes, melons, and papaya    ? Raspberries and strawberries    ? Canned fruit with no added sugar    · Low-fat dairy:      ? Nonfat (skim) milk, 1% milk, and low-fat almond, cashew, or soy milks fortified with calcium    ? Low-fat cheese, regular or frozen yogurt, and cottage cheese    · Meats and proteins:      ? Lean cuts of beef and pork (loin, leg, round), skinless chicken and turkey    ? Legumes, soy products, egg whites, or nuts    Limit or do not include the following in your heart healthy plan:   · Unhealthy fats and oils:      ? Whole or 2% milk, cream cheese, sour cream, or cheese    ? High-fat cuts of beef (T-bone steaks, ribs), chicken or turkey with skin, and organ meats such as liver    ? Butter, stick margarine, shortening, and cooking oils such as coconut or palm oil    · Foods and liquids high in sodium:      ? Packaged foods, such as frozen dinners, cookies, macaroni and cheese, and cereals with more than 300 mg of sodium per serving    ? Vegetables with added sodium, such as instant potatoes, vegetables with added sauces, or regular canned vegetables    ?  Cured or smoked meats, such as hot dogs, umanzor, and sausage    ? High-sodium ketchup, barbecue sauce, salad dressing, pickles, olives, soy sauce, or miso    · Foods and liquids high in sugar:      ? Candy, cake, cookies, pies, or doughnuts    ? Soft drinks (soda), sports drinks, or sweetened tea    ? Canned or dry mixes for cakes, soups, sauces, or gravies    Other healthy heart guidelines:   · Do not smoke  Nicotine and other chemicals in cigarettes and cigars can cause lung and heart damage  Ask your healthcare provider for information if you currently smoke and need help to quit  E-cigarettes or smokeless tobacco still contain nicotine  Talk to your healthcare provider before you use these products  · Limit or do not drink alcohol as directed  Alcohol can damage your heart and raise your blood pressure  Your healthcare provider may give you specific daily and weekly limits  The general recommended limit is 1 drink a day for women 21 or older and for men 72 or older  Do not have more than 3 drinks in a day or 7 in a week  The recommended limit is 2 drinks a day for men 24to 59years of age  Do not have more than 4 drinks in a day or 14 in a week  A drink of alcohol is 12 ounces of beer, 5 ounces of wine, or 1½ ounces of liquor  · Exercise regularly  Exercise can help you maintain a healthy weight and improve your blood pressure and cholesterol levels  Regular exercise can also decrease your risk for heart problems  Ask your healthcare provider about the best exercise plan for you  Do not start an exercise program without asking your healthcare provider  Follow up with your doctor or cardiologist as directed:  Write down your questions so you remember to ask them during your visits  © Copyright [address] Information is for End User's use only and may not be sold, redistributed or otherwise used for commercial purposes   All illustrations and images included in CareNotes® are the copyrighted property of [employer]  or [address]   The above information is an  only  It is not intended as medical advice for individual conditions or treatments  Talk to your doctor, nurse or pharmacist before following any medical regimen to see if it is safe and effective for you  MONIQUE Angelo    Portions of the record may have been created with voice recognition software  Occasional wrong word or "sound a like" substitutions may have occurred due to the inherent limitations of voice recognition software  Read the chart carefully and recognize, using context, where substitutions have occurred  Falls Plan of Care: Balance, strength, and gait training instructions were provided  BMI Counseling: Body mass index is 49 42 kg/m²  The BMI is above normal  Nutrition recommendations include reducing portion sizes, decreasing soda and/or juice intake and moderation in carbohydrate intake  Exercise recommendations include moderate aerobic physical activity for 150 minutes/week

## 2021-06-29 NOTE — PATIENT INSTRUCTIONS
Reviewed all with patient  There are no records here from [facility] but we will request her MRI and last consult  I suspect the pain in the right leg is coming from your back as your tender over your right SI joint  It is okay to use 2 Tylenol extra-strength twice daily as needed for the pain but I would prefer you not use Advil ibuprofen or Aleve as you have some kidney issues  Stay on the gabapentin is this is helping  Can  salon pas (lidocaine) and use on your back or your leg  Keep your follow-up appointment with them  I will review the old records when they are here and call if I have any other comments  Fall Prevention   AMBULATORY CARE:   Fall prevention  includes ways to make your home and other areas safer  It also includes ways you can move more carefully to prevent a fall  Health conditions that cause changes in your blood pressure, vision, or muscle strength and coordination may increase your risk for falls  Medicines may also increase your risk for falls if they make you dizzy, weak, or sleepy  Call 911 or have someone else call if:   · You have fallen and are unconscious  · You have fallen and cannot move part of your body  Contact your healthcare provider if:   · You have fallen and have pain or a headache  · You have questions or concerns about your condition or care  Fall prevention tips:   · Stand or sit up slowly  This may help you keep your balance and prevent falls  · Use assistive devices as directed  Your healthcare provider may suggest that you use a cane or walker to help you keep your balance  You may need to have grab bars put in your bathroom near the toilet or in the shower  · Wear shoes that fit well and have soles that   Wear shoes both inside and outside  Use slippers with good   Do not wear shoes with high heels  · Wear a personal alarm  This is a device that allows you to call 911 if you fall and need help   Ask your healthcare provider for more information  · Stay active  Exercise can help strengthen your muscles and improve your balance  Your healthcare provider may recommend water aerobics or walking  He or she may also recommend physical therapy to improve your coordination  Never start an exercise program without talking to your healthcare provider first          · Manage your medical conditions  Keep all appointments with your healthcare providers  Visit your eye doctor as directed  Home safety tips:       · Add items to prevent falls in the bathroom  Put nonslip strips on your bath or shower floor to prevent you from slipping  Use a bath mat if you do not have carpet in the bathroom  This will prevent you from falling when you step out of the bath or shower  Use a shower seat so you do not need to stand while you shower  Sit on the toilet or a chair in your bathroom to dry yourself and put on clothing  This will prevent you from losing your balance from drying or dressing yourself while you are standing  · Keep paths clear  Remove books, shoes, and other objects from walkways and stairs  Place cords for telephones and lamps out of the way so that you do not need to walk over them  Tape them down if you cannot move them  Remove small rugs  If you cannot remove a rug, secure it with double-sided tape  This will prevent you from tripping  · Install bright lights in your home  Use night lights to help light paths to the bathroom or kitchen  Always turn on the light before you start walking  · Keep items you use often on shelves within reach  Do not use a step stool to help you reach an item  · Paint or place reflective tape on the edges of your stairs  This will help you see the stairs better  Follow up with your healthcare provider as directed:  Write down your questions so you remember to ask them during your visits     © Copyright [employer] 2020 Information is for End User's use only and may not be sold, redistributed or otherwise used for commercial purposes  All illustrations and images included in CareNotes® are the copyrighted property of A D A [employer] Inc  or Theresa Srivastava  The above information is an  only  It is not intended as medical advice for individual conditions or treatments  Talk to your doctor, nurse or pharmacist before following any medical regimen to see if it is safe and effective for you  Obesity   AMBULATORY CARE:   Obesity  is when your body mass index (BMI) is greater than 30  Your healthcare provider will use your height and weight to measure your BMI  The risks of obesity include  many health problems, such as injuries or physical disability  You may need tests to check for the following:  · Diabetes    · High blood pressure or high cholesterol    · Heart disease    · Gallbladder or liver disease    · Cancer of the colon, breast, prostate, liver, or kidney    · Sleep apnea    · Arthritis or gout    Seek care immediately if:   · You have a severe headache, confusion, or difficulty speaking  · You have weakness on one side of your body  · You have chest pain, sweating, or shortness of breath  Contact your healthcare provider if:   · You have symptoms of gallbladder or liver disease, such as pain in your upper abdomen  · You have knee or hip pain and discomfort while walking  · You have symptoms of diabetes, such as intense hunger and thirst, and frequent urination  · You have symptoms of sleep apnea, such as snoring or daytime sleepiness  · You have questions or concerns about your condition or care  Treatment for obesity  focuses on helping you lose weight to improve your health  Even a small decrease in BMI can reduce the risk for many health problems  Your healthcare provider will help you set a weight-loss goal   · Lifestyle changes  are the first step in treating obesity   These include making healthy food choices and getting regular physical activity  Your healthcare provider may suggest a weight-loss program that involves coaching, education, and therapy  · Medicine  may help you lose weight when it is used with a healthy diet and physical activity  · Surgery  can help you lose weight if you are very obese and have other health problems  There are several types of weight-loss surgery  Ask your healthcare provider for more information  Be successful losing weight:   · Set small, realistic goals  An example of a small goal is to walk for 20 minutes 5 days a week  Anther goal is to lose 5% of your body weight  · Tell friends, family members, and coworkers about your goals  and ask for their support  Ask a friend to lose weight with you, or join a weight-loss support group  · Identify foods or triggers that may cause you to overeat , and find ways to avoid them  Remove tempting high-calorie foods from your home and workplace  Place a bowl of fresh fruit on your kitchen counter  If stress causes you to eat, then find other ways to cope with stress  · Keep a diary to track what you eat and drink  Also write down how many minutes of physical activity you do each day  Weigh yourself once a week and record it in your diary  Eating changes: You will need to eat 500 to 1,000 fewer calories each day than you currently eat to lose 1 to 2 pounds a week  The following changes will help you cut calories:  · Eat smaller portions  Use small plates, no larger than 9 inches in diameter  Fill your plate half full of fruits and vegetables  Measure your food using measuring cups until you know what a serving size looks like  · Eat 3 meals and 1 or 2 snacks each day  Plan your meals in advance  [place] and eat at home most of the time  Eat slowly  Do not skip meals  Skipping meals can lead to overeating later in the day  This can make it harder for you to lose weight   Talk with a dietitian to help you make a meal plan and schedule that is right for you  · Eat fruits and vegetables at every meal   They are low in calories and high in fiber, which makes you feel full  Do not add butter, margarine, or cream sauce to vegetables  Use herbs to season steamed vegetables  · Eat less fat and fewer fried foods  Eat more baked or grilled chicken and fish  These protein sources are lower in calories and fat than red meat  Limit fast food  Dress your salads with olive oil and vinegar instead of bottled dressing  · Limit the amount of sugar you eat  Do not drink sugary beverages  Limit alcohol  Activity changes:  Physical activity is good for your body in many ways  It helps you burn calories and build strong muscles  It decreases stress and depression, and improves your mood  It can also help you sleep better  Talk to your healthcare provider before you begin an exercise program   · Exercise for at least 30 minutes 5 days a week  Start slowly  Set aside time each day for physical activity that you enjoy and that is convenient for you  It is best to do both weight training and an activity that increases your heart rate, such as walking, bicycling, or swimming  · Find ways to be more active  Do yard work and housecleaning  Walk up the stairs instead of using elevators  Spend your leisure time going to events that require walking, such as outdoor festivals or fairs  This extra physical activity can help you lose weight and keep it off  Follow up with your healthcare provider as directed: You may need to meet with a dietitian  Write down your questions so you remember to ask them during your visits  © Copyright [address] Information is for End User's use only and may not be sold, redistributed or otherwise used for commercial purposes  All illustrations and images included in CareNotes® are the copyrighted property of [employer]  or [employer]  The above information is an  only   It is not intended as medical advice for individual conditions or treatments  Talk to your doctor, nurse or pharmacist before following any medical regimen to see if it is safe and effective for you  Heart Healthy Diet   AMBULATORY CARE:   A heart healthy diet  is an eating plan low in unhealthy fats and sodium (salt)  The plan is high in healthy fats and fiber  A heart healthy diet helps improve your cholesterol levels and lowers your risk for heart disease and stroke  A dietitian will teach you how to read and understand food labels  Heart healthy diet guidelines to follow:   · Choose foods that contain healthy fats  ? Unsaturated fats  include monounsaturated and polyunsaturated fats  Unsaturated fat is found in foods such as soybean, canola, olive, corn, and safflower oils  It is also found in soft tub margarine that is made with liquid vegetable oil  ? Omega-3 fat  is found in certain fish, such as salmon, tuna, and trout, and in walnuts and flaxseed  Eat fish high in omega-3 fats at least 2 times a week  · Get 20 to 30 grams of fiber each day  Fruits, vegetables, whole-grain foods, and legumes (cooked beans) are good sources of fiber  · Limit or do not have unhealthy fats  ? Cholesterol  is found in animal foods, such as eggs and lobster, and in dairy products made from whole milk  Limit cholesterol to less than 200 mg each day  ? Saturated fat  is found in meats, such as umanzor and hamburger  It is also found in chicken or turkey skin, whole milk, and butter  Limit saturated fat to less than 7% of your total daily calories  ? Trans fat  is found in packaged foods, such as potato chips and cookies  It is also in hard margarine, some fried foods, and shortening  Do not eat foods that contain trans fats  · Limit sodium as directed  You may be told to limit sodium to 2,000 to 2,300 mg each day  Choose low-sodium or no-salt-added foods  Add little or no salt to food you prepare   Use herbs and spices in place of salt  Include the following in your heart healthy plan:  Ask your dietitian or healthcare provider how many servings to have from each of the following food groups:  · Grains:      ? Whole-wheat breads, cereals, and pastas, and brown rice    ? Low-fat, low-sodium crackers and chips    · Vegetables:      ? Broccoli, green beans, green peas, and spinach    ? Collards, kale, and lima beans    ? Carrots, sweet potatoes, tomatoes, and peppers    ? Canned vegetables with no salt added    · Fruits:      ? Bananas, peaches, pears, and pineapple    ? Grapes, raisins, and dates    ? Oranges, tangerines, grapefruit, orange juice, and grapefruit juice    ? Apricots, mangoes, melons, and papaya    ? Raspberries and strawberries    ? Canned fruit with no added sugar    · Low-fat dairy:      ? Nonfat (skim) milk, 1% milk, and low-fat almond, cashew, or soy milks fortified with calcium    ? Low-fat cheese, regular or frozen yogurt, and cottage cheese    · Meats and proteins:      ? Lean cuts of beef and pork (loin, leg, round), skinless chicken and turkey    ? Legumes, soy products, egg whites, or nuts    Limit or do not include the following in your heart healthy plan:   · Unhealthy fats and oils:      ? Whole or 2% milk, cream cheese, sour cream, or cheese    ? High-fat cuts of beef (T-bone steaks, ribs), chicken or turkey with skin, and organ meats such as liver    ? Butter, stick margarine, shortening, and cooking oils such as coconut or palm oil    · Foods and liquids high in sodium:      ? Packaged foods, such as frozen dinners, cookies, macaroni and cheese, and cereals with more than 300 mg of sodium per serving    ? Vegetables with added sodium, such as instant potatoes, vegetables with added sauces, or regular canned vegetables    ? Cured or smoked meats, such as hot dogs, umanzor, and sausage    ?  High-sodium ketchup, barbecue sauce, salad dressing, pickles, olives, soy sauce, or miso    · Foods and liquids high in sugar:      ? Candy, cake, cookies, pies, or doughnuts    ? Soft drinks (soda), sports drinks, or sweetened tea    ? Canned or dry mixes for cakes, soups, sauces, or gravies    Other healthy heart guidelines:   · Do not smoke  Nicotine and other chemicals in cigarettes and cigars can cause lung and heart damage  Ask your healthcare provider for information if you currently smoke and need help to quit  E-cigarettes or smokeless tobacco still contain nicotine  Talk to your healthcare provider before you use these products  · Limit or do not drink alcohol as directed  Alcohol can damage your heart and raise your blood pressure  Your healthcare provider may give you specific daily and weekly limits  The general recommended limit is 1 drink a day for women 21 or older and for men 72 or older  Do not have more than 3 drinks in a day or 7 in a week  The recommended limit is 2 drinks a day for men 24to 59years of age  Do not have more than 4 drinks in a day or 14 in a week  A drink of alcohol is 12 ounces of beer, 5 ounces of wine, or 1½ ounces of liquor  · Exercise regularly  Exercise can help you maintain a healthy weight and improve your blood pressure and cholesterol levels  Regular exercise can also decrease your risk for heart problems  Ask your healthcare provider about the best exercise plan for you  Do not start an exercise program without asking your healthcare provider  Follow up with your doctor or cardiologist as directed:  Write down your questions so you remember to ask them during your visits  © Copyright [address] Information is for End User's use only and may not be sold, redistributed or otherwise used for commercial purposes  All illustrations and images included in CareNotes® are the copyrighted property of [employer]  or [address] [place], [place] [address]   The above information is an  only   It is not intended as medical advice for individual conditions or treatments  Talk to your doctor, nurse or pharmacist before following any medical regimen to see if it is safe and effective for you

## 2021-07-08 ENCOUNTER — TELEPHONE (OUTPATIENT)
Dept: FAMILY MEDICINE CLINIC | Facility: CLINIC | Age: 76
End: 2021-07-08

## 2021-07-08 NOTE — TELEPHONE ENCOUNTER
Patient wants you to look at her MRI report from [facility]  It is scanned into her chart in the media section  She said she is suffering from burning in her knee and nothing is helping

## 2021-07-08 NOTE — TELEPHONE ENCOUNTER
She said she is doing PT  She said she is concerned about the burning in her knee in the R leg  I asked her if she is seeing [facility] about her knee as well  She said no   I told her to let them know and they can give her some type of treatment for the knee

## 2021-07-08 NOTE — TELEPHONE ENCOUNTER
Let her know that I wonder if her knee pain is from her back  She needs to discuss the knee pain with [facility] and if not f/u here

## 2021-08-25 DIAGNOSIS — M54.31 SCIATICA OF RIGHT SIDE: ICD-10-CM

## 2021-08-25 DIAGNOSIS — R20.2 PARESTHESIA OF LOWER LIMB: ICD-10-CM

## 2021-08-25 NOTE — TELEPHONE ENCOUNTER
Macie per pharmacy patient is requesting she get refills please with this medication because she has waited for our office to fill gabapentin before and ran out  Per pharmacy they will not fill until she is due

## 2021-08-27 RX ORDER — GABAPENTIN 300 MG/1
CAPSULE ORAL
Qty: 30 CAPSULE | Refills: 0 | Status: SHIPPED | OUTPATIENT
Start: 2021-08-27 | End: 2021-09-02 | Stop reason: SDUPTHER

## 2021-08-27 RX ORDER — GABAPENTIN 100 MG/1
CAPSULE ORAL
Qty: 30 CAPSULE | Refills: 0 | Status: SHIPPED | OUTPATIENT
Start: 2021-08-27 | End: 2021-12-06 | Stop reason: SDUPTHER

## 2021-08-31 DIAGNOSIS — I10 ESSENTIAL HYPERTENSION: ICD-10-CM

## 2021-08-31 RX ORDER — AMLODIPINE BESYLATE 10 MG/1
TABLET ORAL
Qty: 90 TABLET | Refills: 1 | Status: SHIPPED | OUTPATIENT
Start: 2021-08-31 | End: 2021-12-09

## 2021-09-02 ENCOUNTER — APPOINTMENT (OUTPATIENT)
Dept: LAB | Facility: HOSPITAL | Age: 76
End: 2021-09-02
Payer: MEDICARE

## 2021-09-02 ENCOUNTER — TELEPHONE (OUTPATIENT)
Dept: FAMILY MEDICINE CLINIC | Facility: CLINIC | Age: 76
End: 2021-09-02

## 2021-09-02 ENCOUNTER — OFFICE VISIT (OUTPATIENT)
Dept: FAMILY MEDICINE CLINIC | Facility: CLINIC | Age: 76
End: 2021-09-02
Payer: MEDICARE

## 2021-09-02 VITALS
WEIGHT: 286.6 LBS | RESPIRATION RATE: 16 BRPM | HEIGHT: 63 IN | HEART RATE: 96 BPM | OXYGEN SATURATION: 96 % | SYSTOLIC BLOOD PRESSURE: 120 MMHG | BODY MASS INDEX: 50.78 KG/M2 | DIASTOLIC BLOOD PRESSURE: 70 MMHG | TEMPERATURE: 97.1 F

## 2021-09-02 DIAGNOSIS — R20.2 PARESTHESIA OF LOWER LIMB: ICD-10-CM

## 2021-09-02 DIAGNOSIS — Z11.59 NEED FOR HEPATITIS C SCREENING TEST: ICD-10-CM

## 2021-09-02 DIAGNOSIS — E03.9 HYPOTHYROIDISM, UNSPECIFIED TYPE: ICD-10-CM

## 2021-09-02 DIAGNOSIS — M51.26 L4-L5 DISC BULGE: ICD-10-CM

## 2021-09-02 DIAGNOSIS — M25.50 ARTHRALGIA, UNSPECIFIED JOINT: ICD-10-CM

## 2021-09-02 DIAGNOSIS — G25.2 INTENTION TREMOR: ICD-10-CM

## 2021-09-02 DIAGNOSIS — Z23 ENCOUNTER FOR IMMUNIZATION: ICD-10-CM

## 2021-09-02 DIAGNOSIS — Z23 NEED FOR INFLUENZA VACCINATION: ICD-10-CM

## 2021-09-02 DIAGNOSIS — C50.911 BILATERAL MALIGNANT NEOPLASM OF BREAST IN FEMALE, UNSPECIFIED ESTROGEN RECEPTOR STATUS, UNSPECIFIED SITE OF BREAST (HCC): ICD-10-CM

## 2021-09-02 DIAGNOSIS — E78.2 MIXED HYPERLIPIDEMIA: ICD-10-CM

## 2021-09-02 DIAGNOSIS — Z12.31 ENCOUNTER FOR SCREENING MAMMOGRAM FOR BREAST CANCER: ICD-10-CM

## 2021-09-02 DIAGNOSIS — F41.8 DEPRESSION WITH ANXIETY: ICD-10-CM

## 2021-09-02 DIAGNOSIS — Z00.00 MEDICARE ANNUAL WELLNESS VISIT, SUBSEQUENT: Primary | ICD-10-CM

## 2021-09-02 DIAGNOSIS — C50.912 BILATERAL MALIGNANT NEOPLASM OF BREAST IN FEMALE, UNSPECIFIED ESTROGEN RECEPTOR STATUS, UNSPECIFIED SITE OF BREAST (HCC): ICD-10-CM

## 2021-09-02 PROBLEM — M51.36 L4-L5 DISC BULGE: Status: ACTIVE | Noted: 2021-09-02

## 2021-09-02 PROBLEM — M51.369 L4-L5 DISC BULGE: Status: ACTIVE | Noted: 2021-09-02

## 2021-09-02 LAB
ALBUMIN SERPL BCP-MCNC: 3.4 G/DL (ref 3.5–5)
ALP SERPL-CCNC: 84 U/L (ref 46–116)
ALT SERPL W P-5'-P-CCNC: 22 U/L (ref 12–78)
ANION GAP SERPL CALCULATED.3IONS-SCNC: 6 MMOL/L (ref 4–13)
AST SERPL W P-5'-P-CCNC: 15 U/L (ref 5–45)
BILIRUB SERPL-MCNC: 0.49 MG/DL (ref 0.2–1)
BUN SERPL-MCNC: 14 MG/DL (ref 5–25)
CALCIUM ALBUM COR SERPL-MCNC: 9.6 MG/DL (ref 8.3–10.1)
CALCIUM SERPL-MCNC: 9.1 MG/DL (ref 8.3–10.1)
CHLORIDE SERPL-SCNC: 110 MMOL/L (ref 100–108)
CHOLEST SERPL-MCNC: 115 MG/DL (ref 50–200)
CO2 SERPL-SCNC: 31 MMOL/L (ref 21–32)
CREAT SERPL-MCNC: 1.35 MG/DL (ref 0.6–1.3)
GFR SERPL CREATININE-BSD FRML MDRD: 38 ML/MIN/1.73SQ M
GLUCOSE P FAST SERPL-MCNC: 110 MG/DL (ref 65–99)
HCV AB SER QL: NORMAL
HDLC SERPL-MCNC: 47 MG/DL
LDLC SERPL CALC-MCNC: 46 MG/DL (ref 0–100)
NONHDLC SERPL-MCNC: 68 MG/DL
POTASSIUM SERPL-SCNC: 4.4 MMOL/L (ref 3.5–5.3)
PROT SERPL-MCNC: 7.1 G/DL (ref 6.4–8.2)
SODIUM SERPL-SCNC: 147 MMOL/L (ref 136–145)
TRIGL SERPL-MCNC: 110 MG/DL
TSH SERPL DL<=0.05 MIU/L-ACNC: 1.68 UIU/ML (ref 0.36–3.74)

## 2021-09-02 PROCEDURE — 86803 HEPATITIS C AB TEST: CPT

## 2021-09-02 PROCEDURE — G0438 PPPS, INITIAL VISIT: HCPCS | Performed by: FAMILY MEDICINE

## 2021-09-02 PROCEDURE — G0008 ADMIN INFLUENZA VIRUS VAC: HCPCS | Performed by: FAMILY MEDICINE

## 2021-09-02 PROCEDURE — 84443 ASSAY THYROID STIM HORMONE: CPT

## 2021-09-02 PROCEDURE — 36415 COLL VENOUS BLD VENIPUNCTURE: CPT

## 2021-09-02 PROCEDURE — 80053 COMPREHEN METABOLIC PANEL: CPT

## 2021-09-02 PROCEDURE — 99214 OFFICE O/P EST MOD 30 MIN: CPT | Performed by: FAMILY MEDICINE

## 2021-09-02 PROCEDURE — 80061 LIPID PANEL: CPT

## 2021-09-02 PROCEDURE — 1123F ACP DISCUSS/DSCN MKR DOCD: CPT | Performed by: FAMILY MEDICINE

## 2021-09-02 PROCEDURE — 90662 IIV NO PRSV INCREASED AG IM: CPT | Performed by: FAMILY MEDICINE

## 2021-09-02 PROCEDURE — 86618 LYME DISEASE ANTIBODY: CPT

## 2021-09-02 RX ORDER — GABAPENTIN 300 MG/1
CAPSULE ORAL
Qty: 60 CAPSULE | Refills: 3
Start: 2021-09-02 | End: 2021-11-05

## 2021-09-02 RX ORDER — ZOSTER VACCINE RECOMBINANT, ADJUVANTED 50 MCG/0.5
KIT INTRAMUSCULAR
Qty: 1 EACH | Refills: 1 | Status: SHIPPED | OUTPATIENT
Start: 2021-09-02

## 2021-09-02 NOTE — PROGRESS NOTES
Assessment/Plan:     Chronic Problems:  L4-L5 disc bulge    Patient is following with [facility]  At this time she is considering a 2nd opinion but would prefer to stay with them at least through the next visit  Continue on your current medications  Mixed hyperlipidemia  Will get labs prior to the next appt,     Hypothyroidism  Will recheck the tsh    Intention tremor  Stable  Bilateral malignant neoplasm of breast in female [facility])  Given script for mammo  Depression with anxiety  Pt feels she is able to talk herself out of her depression  Does not want med changes at this time  Visit Diagnosis:  Diagnoses and all orders for this visit:    Medicare annual wellness visit, subsequent    Need for influenza vaccination  -     influenza vaccine, high-dose, PF 0 7 mL (FLUZONE HIGH-DOSE)    Encounter for screening mammogram for breast cancer  -     Mammo screening bilateral w 3d & cad; Future    Encounter for immunization  -     Zoster Vac Recomb Adjuvanted (Shingrix) 50 MCG/0 5ML SUSR; 0 5mL IM for one dose, followed by 0 5mL IM 2-6 months after first dose    Paresthesia of lower limb  Comments: Will increase the gabapentin to 300 hs and 100 bid  Stop the tramadol  Tylenol is ok  Orders:  -     gabapentin (NEURONTIN) 300 mg capsule; Take 2 capsules 600 mg at dinner    L4-L5 disc bulge    Need for hepatitis C screening test  -     Hepatitis C antibody; Future    Hypothyroidism, unspecified type  -     TSH, 3rd generation with Free T4 reflex; Future    Mixed hyperlipidemia  -     Comprehensive metabolic panel; Future  -     Lipid panel; Future  -     Urinalysis with microscopic  -     Microalbumin / creatinine urine ratio    Arthralgia, unspecified joint  -     Lyme Total Antibody Profile with reflex to WB;  Future    Intention tremor    Bilateral malignant neoplasm of breast in female, unspecified estrogen receptor status, unspecified site of breast ([facility] [address] )    Depression with anxiety    Other orders  -     Cancel: Mammo screening bilateral w 3d & cad; Future          Subjective:    Patient ID: Roxanna Mariano is a 67 y o  female  Patient is here for routine follow-up appointment  She does have concerns that she continues to gain weight and feels she is not eating enough to support the weight gain  Patient is having burning down the right leg  Patient has had this pain since she fell and hurt her leg and back in early April  She is following up  With [facility] but is not happy with her progress  Pt had physical therapy with them but that did not help  Now on 600 mg of gabapentin at dinner  Also takes 100 mg bid  Patient is also having bouts of depression  No suicidal ideations  Has good and bad days  Now sleeping better with a board under her mattress  Thinks that is helping  The following portions of the patient's history were reviewed and updated as appropriate: allergies, current medications, past family history, past medical history, past social history, past surgical history and problem list     Review of Systems   Constitutional: Positive for fatigue  Negative for chills, diaphoresis and fever  HENT: Positive for ear pain (on the right today) and sore throat (occasionally)  Negative for postnasal drip, sinus pressure and sinus pain  Eyes: Positive for discharge and itching  Negative for pain and visual disturbance  Respiratory: Positive for shortness of breath (with exertion)  Negative for cough and chest tightness  Cardiovascular: Negative for chest pain and palpitations  Gastrointestinal: Negative for abdominal pain, constipation, diarrhea, nausea and vomiting  Genitourinary: Negative for dysuria, frequency and urgency  Feels she is not urinating enough  Pt admits to not drinking enough  Musculoskeletal: Positive for arthralgias (bilateral knee pains, right leg pain  , ) and back pain   Negative for gait problem and myalgias  Skin: Negative for rash and wound  Neurological: Positive for numbness (in the right leg with burning from the right knee to the right groin  )  Negative for dizziness and light-headedness  Psychiatric/Behavioral: Positive for dysphoric mood and sleep disturbance (sleeps too much  )  The patient is nervous/anxious  /70   Pulse 96   Temp (!) 97 1 °F (36 2 °C)   Resp 16   Ht 5' 3" (1 6 m)   Wt 130 kg (286 lb 9 6 oz)   SpO2 96%   BMI 50 77 kg/m²   Social History     Socioeconomic History    Marital status: Single     Spouse name: Not on file    Number of children: Not on file    Years of education: Not on file    Highest education level: Not on file   Occupational History    Occupation: Retired   Tobacco Use    Smoking status: Former Smoker    Smokeless tobacco: Never Used    Tobacco comment: 10 years ago   Substance and Sexual Activity    Alcohol use: Yes     Comment: socially - Per allscripts - denies alcohol consumption    Drug use: No    Sexual activity: Not on file   Other Topics Concern    Not on file   Social History Narrative         Always uses seat belt     Social Determinants of Health     Financial Resource Strain:     Difficulty of Paying Living Expenses:    Food Insecurity:     Worried About Running Out of Food in the Last Year:     [address] in the Last Year:    Transportation Needs:     Lack of Transportation (Medical):      Lack of Transportation (Non-Medical):    Physical Activity:     Days of Exercise per Week:     Minutes of Exercise per Session:    Stress:     Feeling of Stress :    Social Connections:     Frequency of Communication with Friends and Family:     Frequency of Social Gatherings with Friends and Family:     Attends Hinduism Services:     Active Member of Clubs or Organizations:     Attends Club or Organization Meetings:     Marital Status:    Intimate Partner Violence:     Fear of Current or Ex-Partner:     Emotionally Abused:     Physically Abused:     Sexually Abused:      Past Medical History:   Diagnosis Date    Acute ill-defined cerebrovascular disease     Benign essential tremor     [facility]     pt states doesn't remember the year thinks it might have been on the right      Chronic kidney disease     CKD (chronic kidney disease) stage 3, GFR 30-59 ml/min ([facility]) 9/25/2018    Colon polyp     COPD (chronic obstructive pulmonary disease) ([facility])     Depressive disorder     Disease of thyroid gland     Heart murmur     Hypertension     Impaired fasting glucose     Intention tremor     last assessed: 8/18/2016    Osteochondropathy     Panic disorder without agoraphobia with mild panic attacks     Psychiatric disorder     anxiety    Stroke ([facility] [address] )     Tubular adenoma of colon      Family History   Problem Relation Age of Onset    Alcohol abuse Mother     Asthma Mother     Cancer Mother     Mental illness Mother     Osteoarthritis Mother     Gout Father     Cancer Father     Other Sister         carotid arterial disease    COPD Sister     Hyperlipidemia Sister     Hypertension Sister     Heart attack Sister     Breast cancer Sister     No Known Problems Daughter     No Known Problems Maternal Grandmother     No Known Problems Paternal Grandmother     Lung cancer Sister     No Known Problems Sister     No Known Problems Daughter     No Known Problems Daughter      Past Surgical History:   Procedure Laterality Date    AUGMENTATION BREAST      pt states during mammo she doesn't have any other surgery than the 2 biopsy    BREAST BIOPSY Left     pt doesn't recall when and believes the neg biop was on left    BREAST LUMPECTOMY W/ NEEDLE LOCALIZATION Left     description: benign last assessed; 8/21/2016    CHOLECYSTECTOMY      COLONOSCOPY      EGD      HYSTERECTOMY         Current Outpatient Medications:     acetaminophen (TYLENOL) 500 mg tablet, Take 500 mg by mouth every 6 (six) hours as needed for mild pain Take 2 tablets by mouth every 6-8 hours as needed, Disp: , Rfl:     amLODIPine (NORVASC) 10 mg tablet, TAKE 1 TABLET(10 MG) BY MOUTH DAILY, Disp: 90 tablet, Rfl: 1    Cholecalciferol (VITAMIN D3) 50 MCG (2000 UT) TABS, Take 2,000 Units by mouth daily, Disp: , Rfl:     dexlansoprazole (Dexilant) 60 MG capsule, Take 1 capsule (60 mg total) by mouth daily before breakfast, Disp: 90 capsule, Rfl: 1    gabapentin (NEURONTIN) 100 mg capsule, TAKE 1 CAPSULE BY MOUTH TWICE DAILY, Disp: 30 capsule, Rfl: 0    gabapentin (NEURONTIN) 300 mg capsule, Take 2 capsules 600 mg at dinner, Disp: 60 capsule, Rfl: 3    levothyroxine 50 mcg tablet, Take 1 tablet (50 mcg total) by mouth daily, Disp: 90 tablet, Rfl: 0    lisinopril (ZESTRIL) 20 mg tablet, Take 1 tablet (20 mg total) by mouth daily, Disp: 90 tablet, Rfl: 1    Misc  Devices (Round Shower Stool) MISC, Use daily Use as needed in the shower  , Disp: 1 each, Rfl: 0    potassium chloride (K-DUR,KLOR-CON) 20 mEq tablet, Take 1 tablet (20 mEq total) by mouth daily, Disp: 90 tablet, Rfl: 0    rosuvastatin (CRESTOR) 10 MG tablet, Take 1 tablet (10 mg total) by mouth daily, Disp: 90 tablet, Rfl: 1    Zoster Vac Recomb Adjuvanted (Shingrix) 50 MCG/0 5ML SUSR, 0 5mL IM for one dose, followed by 0 5mL IM 2-6 months after first dose, Disp: 1 each, Rfl: 1    Allergies   Allergen Reactions    Amoxicillin     Codeine     Morphine           Lab Review   No visits with results within 2 Month(s) from this visit  Latest known visit with results is:   Office Visit on 01/22/2021   Component Date Value    Hemoglobin A1C 01/22/2021 5 5         Imaging: No results found  Objective:     Physical Exam  Vitals and nursing note reviewed  Constitutional:       General: She is not in acute distress  Appearance: She is well-developed  She is obese  She is not ill-appearing, toxic-appearing or diaphoretic     HENT:      Head: Normocephalic and atraumatic  Right Ear: Tympanic membrane, ear canal and external ear normal  There is no impacted cerumen  Left Ear: Tympanic membrane, ear canal and external ear normal  There is no impacted cerumen  Nose: Congestion present  Mouth/Throat:      Mouth: Mucous membranes are moist       Pharynx: No oropharyngeal exudate  Eyes:      General:         Right eye: No discharge  Left eye: No discharge  Conjunctiva/sclera: Conjunctivae normal       Pupils: Pupils are equal, round, and reactive to light  Neck:      Thyroid: No thyromegaly  Cardiovascular:      Rate and Rhythm: Normal rate and regular rhythm  Heart sounds: Normal heart sounds  No murmur heard  No friction rub  Pulmonary:      Effort: Pulmonary effort is normal  No respiratory distress  Breath sounds: Normal breath sounds  No wheezing or rales  Abdominal:      Comments: Abdomen is obese  Musculoskeletal:         General: No tenderness or deformity  Cervical back: Normal range of motion and neck supple  Comments: Ambulates slowly  Tenderness over mid thoracic spine and right sij  Negative slr bilaterally  ,    Lymphadenopathy:      Cervical: No cervical adenopathy  Skin:     General: Skin is warm and dry  Findings: No rash  Neurological:      General: No focal deficit present  Mental Status: She is alert and oriented to person, place, and time  Cranial Nerves: No cranial nerve deficit  Comments: Pt does have an intention tremor right hand  Psychiatric:         Behavior: Behavior normal          Thought Content: Thought content normal          Judgment: Judgment normal            Patient Instructions       Reviewed all with patient  Please schedule your mammogram   We will give you the flu shot today however you still need your COVID booster after 8 months from the last 1    If you can get the shingles shot you can get the shingles shot now but  [facility] shot for 4-6 weeks  Continue on all your current medications let me know if you think we need to do something about the depression  At some point we have to just say this is how it is regarding your weight  If you can not lose weight there is no sense getting depressed about it we just treat the symptoms when they occur unfortunately  You have been trying to work on the weight for years  You did stop smoking which is great  Have the labs done prior to the next appointment or you can go this morning and I will call you with the labs  Patient was advised to let us know if she would like a 2nd opinion for her low back pain and her midthoracic pain  Medicare Preventive Visit Patient Instructions  Thank you for completing your Welcome to Medicare Visit or Medicare Annual Wellness Visit today  Your next wellness visit will be due in one year (9/3/2022)  The screening/preventive services that you may require over the next 5-10 years are detailed below  Some tests may not apply to you based off risk factors and/or age  Screening tests ordered at today's visit but not completed yet may show as past due  Also, please note that scanned in results may not display below  Preventive Screenings:  Service Recommendations Previous Testing/Comments   Colorectal Cancer Screening  * Colonoscopy    * Fecal Occult Blood Test (FOBT)/Fecal Immunochemical Test (FIT)  * Fecal DNA/Cologuard Test  * Flexible Sigmoidoscopy Age: 54-65 years old   Colonoscopy: every 10 years (may be performed more frequently if at higher risk)  OR  FOBT/FIT: every 1 year  OR  Cologuard: every 3 years  OR  Sigmoidoscopy: every 5 years  Screening may be recommended earlier than age 48 if at higher risk for colorectal cancer  Also, an individualized decision between you and your healthcare provider will decide whether screening between the ages of 74-80 would be appropriate   Colonoscopy: 09/17/2019  FOBT/FIT: 01/21/2021  Cologuard: Not on file  Sigmoidoscopy: Not on file    Screening Current     Breast Cancer Screening Age: 36 years old  Frequency: every 1-2 years  Not required if history of left and right mastectomy Mammogram: 11/26/2019    History Breast Cancer   Cervical Cancer Screening Between the ages of 21-29, pap smear recommended once every 3 years  Between the ages of 33-67, can perform pap smear with HPV co-testing every 5 years  Recommendations may differ for women with a history of total hysterectomy, cervical cancer, or abnormal pap smears in past  Pap Smear: Not on file    Screening Not Indicated   Hepatitis C Screening Once for adults born between 1945 and 1965  More frequently in patients at high risk for Hepatitis C Hep C Antibody: Not on file        Diabetes Screening 1-2 times per year if you're at risk for diabetes or have pre-diabetes Fasting glucose: 111 mg/dL   A1C: 5 5    Screening Current   Cholesterol Screening Once every 5 years if you don't have a lipid disorder  May order more often based on risk factors  Lipid panel: 01/18/2021    Screening Not Indicated  History Lipid Disorder     Other Preventive Screenings Covered by Medicare:  1  Abdominal Aortic Aneurysm (AAA) Screening: covered once if your at risk  You're considered to be at risk if you have a family history of AAA  2  Lung Cancer Screening: covers low dose CT scan once per year if you meet all of the following conditions: (1) Age 50-69; (2) No signs or symptoms of lung cancer; (3) Current smoker or have quit smoking within the last 15 years; (4) You have a tobacco smoking history of at least 30 pack years (packs per day multiplied by number of years you smoked); (5) You get a written order from a healthcare provider  3  Glaucoma Screening: covered annually if you're considered high risk: (1) You have diabetes OR (2) Family history of glaucoma OR (3)  aged 48 and older OR (3)  American aged 72 and older  3   Osteoporosis Screening: covered every 2 years if you meet one of the following conditions: (1) You're estrogen deficient and at risk for osteoporosis based off medical history and other findings; (2) Have a vertebral abnormality; (3) On glucocorticoid therapy for more than 3 months; (4) Have primary hyperparathyroidism; (5) On osteoporosis medications and need to assess response to drug therapy  · Last bone density test (DXA Scan): 11/19/2019   5  HIV Screening: covered annually if you're between the age of 15-65  Also covered annually if you are younger than 13 and older than 72 with risk factors for HIV infection  For pregnant patients, it is covered up to 3 times per pregnancy  Immunizations:  Immunization Recommendations   Influenza Vaccine Annual influenza vaccination during flu season is recommended for all persons aged >= 6 months who do not have contraindications   Pneumococcal Vaccine (Prevnar and Pneumovax)  * Prevnar = PCV13  * Pneumovax = PPSV23   Adults 25-60 years old: 1-3 doses may be recommended based on certain risk factors  Adults 72 years old: Prevnar (PCV13) vaccine recommended followed by Pneumovax (PPSV23) vaccine  If already received PPSV23 since turning 65, then PCV13 recommended at least one year after PPSV23 dose  Hepatitis B Vaccine 3 dose series if at intermediate or high risk (ex: diabetes, end stage renal disease, liver disease)   Tetanus (Td) Vaccine - COST NOT COVERED BY MEDICARE PART B Following completion of primary series, a booster dose should be given every 10 years to maintain immunity against tetanus  Td may also be given as tetanus wound prophylaxis  Tdap Vaccine - COST NOT COVERED BY MEDICARE PART B Recommended at least once for all adults  For pregnant patients, recommended with each pregnancy     Shingles Vaccine (Shingrix) - COST NOT COVERED BY MEDICARE PART B  2 shot series recommended in those aged 48 and above     Health Maintenance Due:      Topic Date Due    Hepatitis C Screening Never done    Breast Cancer Screening: Mammogram  11/26/2020    DXA SCAN  11/19/2021    Colorectal Cancer Screening  09/17/2024     Immunizations Due:      Topic Date Due    DTaP,Tdap,and Td Vaccines (2 - Td or Tdap) 07/01/2021    Influenza Vaccine (1) 09/01/2021     Advance Directives   What are advance directives? Advance directives are legal documents that state your wishes and plans for medical care  These plans are made ahead of time in case you lose your ability to make decisions for yourself  Advance directives can apply to any medical decision, such as the treatments you want, and if you want to donate organs  What are the types of advance directives? There are many types of advance directives, and each state has rules about how to use them  You may choose a combination of any of the following:  · Living will: This is a written record of the treatment you want  You can also choose which treatments you do not want, which to limit, and which to stop at a certain time  This includes surgery, medicine, IV fluid, and tube feedings  · Durable power of  for healthcare [facility]): This is a written record that states who you want to make healthcare choices for you when you are unable to make them for yourself  This person, called a proxy, is usually a family member or a friend  You may choose more than 1 proxy  · Do not resuscitate (DNR) order:  A DNR order is used in case your heart stops beating or you stop breathing  It is a request not to have certain forms of treatment, such as CPR  A DNR order may be included in other types of advance directives  · Medical directive: This covers the care that you want if you are in a coma, near death, or unable to make decisions for yourself  You can list the treatments you want for each condition  Treatment may include pain medicine, surgery, blood transfusions, dialysis, IV or tube feedings, and a ventilator (breathing machine)  · Values history:   This document has questions about your views, beliefs, and how you feel and think about life  This information can help others choose the care that you would choose  Why are advance directives important? An advance directive helps you control your care  Although spoken wishes may be used, it is better to have your wishes written down  Spoken wishes can be misunderstood, or not followed  Treatments may be given even if you do not want them  An advance directive may make it easier for your family to make difficult choices about your care  Fall Prevention    Fall prevention  includes ways to make your home and other areas safer  It also includes ways you can move more carefully to prevent a fall  Health conditions that cause changes in your blood pressure, vision, or muscle strength and coordination may increase your risk for falls  Medicines may also increase your risk for falls if they make you dizzy, weak, or sleepy  Fall prevention tips:   · Stand or sit up slowly  · Use assistive devices as directed  · Wear shoes that fit well and have soles that   · Wear a personal alarm  · Stay active  · Manage your medical conditions  Home Safety Tips:  · Add items to prevent falls in the bathroom  · Keep paths clear  · Install bright lights in your home  · Keep items you use often on shelves within reach  · Paint or place reflective tape on the edges of your stairs  Urinary Incontinence   Urinary incontinence (UI)  is when you lose control of your bladder  UI develops because your bladder cannot store or empty urine properly  The 3 most common types of UI are stress incontinence, urge incontinence, or both  Medicines:   · May be given to help strengthen your bladder control  Report any side effects of medication to your healthcare provider  Do pelvic muscle exercises often:  Your pelvic muscles help you stop urinating  Squeeze these muscles tight for 5 seconds, then relax for 5 seconds  Gradually work up to squeezing for 10 seconds  Do 3 sets of 15 repetitions a day, or as directed  This will help strengthen your pelvic muscles and improve bladder control  Train your bladder:  Go to the bathroom at set times, such as every 2 hours, even if you do not feel the urge to go  You can also try to hold your urine when you feel the urge to go  For example, hold your urine for 5 minutes when you feel the urge to go  As that becomes easier, hold your urine for 10 minutes  Self-care:   · Keep a UI record  Write down how often you leak urine and how much you leak  Make a note of what you were doing when you leaked urine  · Drink liquids as directed  You may need to limit the amount of liquid you drink to help control your urine leakage  Do not drink any liquid right before you go to bed  Limit or do not have drinks that contain caffeine or alcohol  · Prevent constipation  Eat a variety of high-fiber foods  Good examples are high-fiber cereals, beans, vegetables, and whole-grain breads  Walking is the best way to trigger your intestines to have a bowel movement  · Exercise regularly and maintain a healthy weight  Weight loss and exercise will decrease pressure on your bladder and help you control your leakage  · Use a catheter as directed  to help empty your bladder  A catheter is a tiny, plastic tube that is put into your bladder to drain your urine  · Go to behavior therapy as directed  Behavior therapy may be used to help you learn to control your urge to urinate  Weight Management   Why it is important to manage your weight:  Being overweight increases your risk of health conditions such as heart disease, high blood pressure, type 2 diabetes, and certain types of cancer  It can also increase your risk for osteoarthritis, sleep apnea, and other respiratory problems  Aim for a slow, steady weight loss  Even a small amount of weight loss can lower your risk of health problems    How to lose weight safely:  A safe and healthy way to lose weight is to eat fewer calories and get regular exercise  You can lose up about 1 pound a week by decreasing the number of calories you eat by 500 calories each day  Healthy meal plan for weight management:  A healthy meal plan includes a variety of foods, contains fewer calories, and helps you stay healthy  A healthy meal plan includes the following:  · Eat whole-grain foods more often  A healthy meal plan should contain fiber  Fiber is the part of grains, fruits, and vegetables that is not broken down by your body  Whole-grain foods are healthy and provide extra fiber in your diet  Some examples of whole-grain foods are whole-wheat breads and pastas, oatmeal, brown rice, and bulgur  · Eat a variety of vegetables every day  Include dark, leafy greens such as spinach, kale, neeraj greens, and mustard greens  Eat yellow and orange vegetables such as carrots, sweet potatoes, and winter squash  · Eat a variety of fruits every day  Choose fresh or canned fruit (canned in its own juice or light syrup) instead of juice  Fruit juice has very little or no fiber  · Eat low-fat dairy foods  Drink fat-free (skim) milk or 1% milk  Eat fat-free yogurt and low-fat cottage cheese  Try low-fat cheeses such as mozzarella and other reduced-fat cheeses  · Choose meat and other protein foods that are low in fat  Choose beans or other legumes such as split peas or lentils  Choose fish, skinless poultry (chicken or turkey), or lean cuts of red meat (beef or pork)  Before you cook meat or poultry, cut off any visible fat  · Use less fat and oil  Try baking foods instead of frying them  Add less fat, such as margarine, sour cream, regular salad dressing and mayonnaise to foods  Eat fewer high-fat foods  Some examples of high-fat foods include french fries, doughnuts, ice cream, and cakes  · Eat fewer sweets  Limit foods and drinks that are high in sugar   This includes candy, cookies, regular soda, and sweetened drinks  Exercise:  Exercise at least 30 minutes per day on most days of the week  Some examples of exercise include walking, biking, dancing, and swimming  You can also fit in more physical activity by taking the stairs instead of the elevator or parking farther away from stores  Ask your healthcare provider about the best exercise plan for you  © Copyright [address] 2018 Information is for End User's use only and may not be sold, redistributed or otherwise used for commercial purposes  All illustrations and images included in CareNotes® are the copyrighted property of [employer]  or [facility] Preventive Visit Patient Instructions  Thank you for completing your Welcome to Medicare Visit or Medicare Annual Wellness Visit today  Your next wellness visit will be due in one year (9/3/2022)  The screening/preventive services that you may require over the next 5-10 years are detailed below  Some tests may not apply to you based off risk factors and/or age  Screening tests ordered at today's visit but not completed yet may show as past due  Also, please note that scanned in results may not display below  Preventive Screenings:  Service Recommendations Previous Testing/Comments   Colorectal Cancer Screening  * Colonoscopy    * Fecal Occult Blood Test (FOBT)/Fecal Immunochemical Test (FIT)  * Fecal DNA/Cologuard Test  * Flexible Sigmoidoscopy Age: 54-65 years old   Colonoscopy: every 10 years (may be performed more frequently if at higher risk)  OR  FOBT/FIT: every 1 year  OR  Cologuard: every 3 years  OR  Sigmoidoscopy: every 5 years  Screening may be recommended earlier than age 48 if at higher risk for colorectal cancer  Also, an individualized decision between you and your healthcare provider will decide whether screening between the ages of 74-80 would be appropriate   Colonoscopy: 09/17/2019  FOBT/FIT: 01/21/2021  Cologuard: Not on file  Sigmoidoscopy: Not on file    Screening Current     Breast Cancer Screening Age: 36 years old  Frequency: every 1-2 years  Not required if history of left and right mastectomy Mammogram: 11/26/2019    History Breast Cancer   Cervical Cancer Screening Between the ages of 21-29, pap smear recommended once every 3 years  Between the ages of 33-67, can perform pap smear with HPV co-testing every 5 years  Recommendations may differ for women with a history of total hysterectomy, cervical cancer, or abnormal pap smears in past  Pap Smear: Not on file    Screening Not Indicated   Hepatitis C Screening Once for adults born between 1945 and 1965  More frequently in patients at high risk for Hepatitis C Hep C Antibody: Not on file        Diabetes Screening 1-2 times per year if you're at risk for diabetes or have pre-diabetes Fasting glucose: 111 mg/dL   A1C: 5 5    Screening Current   Cholesterol Screening Once every 5 years if you don't have a lipid disorder  May order more often based on risk factors  Lipid panel: 01/18/2021    Screening Not Indicated  History Lipid Disorder     Other Preventive Screenings Covered by Medicare:  6  Abdominal Aortic Aneurysm (AAA) Screening: covered once if your at risk  You're considered to be at risk if you have a family history of AAA  7  Lung Cancer Screening: covers low dose CT scan once per year if you meet all of the following conditions: (1) Age 50-69; (2) No signs or symptoms of lung cancer; (3) Current smoker or have quit smoking within the last 15 years; (4) You have a tobacco smoking history of at least 30 pack years (packs per day multiplied by number of years you smoked); (5) You get a written order from a healthcare provider  8  Glaucoma Screening: covered annually if you're considered high risk: (1) You have diabetes OR (2) Family history of glaucoma OR (3)  aged 48 and older OR (3)  American aged 72 and older  5   Osteoporosis Screening: covered every 2 years if you meet one of the following conditions: (1) You're estrogen deficient and at risk for osteoporosis based off medical history and other findings; (2) Have a vertebral abnormality; (3) On glucocorticoid therapy for more than 3 months; (4) Have primary hyperparathyroidism; (5) On osteoporosis medications and need to assess response to drug therapy  · Last bone density test (DXA Scan): 11/19/2019   10  HIV Screening: covered annually if you're between the age of 15-65  Also covered annually if you are younger than 13 and older than 72 with risk factors for HIV infection  For pregnant patients, it is covered up to 3 times per pregnancy  Immunizations:  Immunization Recommendations   Influenza Vaccine Annual influenza vaccination during flu season is recommended for all persons aged >= 6 months who do not have contraindications   Pneumococcal Vaccine (Prevnar and Pneumovax)  * Prevnar = PCV13  * Pneumovax = PPSV23   Adults 25-60 years old: 1-3 doses may be recommended based on certain risk factors  Adults 72 years old: Prevnar (PCV13) vaccine recommended followed by Pneumovax (PPSV23) vaccine  If already received PPSV23 since turning 65, then PCV13 recommended at least one year after PPSV23 dose  Hepatitis B Vaccine 3 dose series if at intermediate or high risk (ex: diabetes, end stage renal disease, liver disease)   Tetanus (Td) Vaccine - COST NOT COVERED BY MEDICARE PART B Following completion of primary series, a booster dose should be given every 10 years to maintain immunity against tetanus  Td may also be given as tetanus wound prophylaxis  Tdap Vaccine - COST NOT COVERED BY MEDICARE PART B Recommended at least once for all adults  For pregnant patients, recommended with each pregnancy     Shingles Vaccine (Shingrix) - COST NOT COVERED BY MEDICARE PART B  2 shot series recommended in those aged 48 and above     Health Maintenance Due:      Topic Date Due    Hepatitis C Screening Never done    Breast Cancer Screening: Mammogram  11/26/2020    DXA SCAN  11/19/2021    Colorectal Cancer Screening  09/17/2024     Immunizations Due:      Topic Date Due    DTaP,Tdap,and Td Vaccines (2 - Td or Tdap) 07/01/2021    Influenza Vaccine (1) 09/01/2021     Advance Directives   What are advance directives? Advance directives are legal documents that state your wishes and plans for medical care  These plans are made ahead of time in case you lose your ability to make decisions for yourself  Advance directives can apply to any medical decision, such as the treatments you want, and if you want to donate organs  What are the types of advance directives? There are many types of advance directives, and each state has rules about how to use them  You may choose a combination of any of the following:  · Living will: This is a written record of the treatment you want  You can also choose which treatments you do not want, which to limit, and which to stop at a certain time  This includes surgery, medicine, IV fluid, and tube feedings  · Durable power of  for healthcare [facility]): This is a written record that states who you want to make healthcare choices for you when you are unable to make them for yourself  This person, called a proxy, is usually a family member or a friend  You may choose more than 1 proxy  · Do not resuscitate (DNR) order:  A DNR order is used in case your heart stops beating or you stop breathing  It is a request not to have certain forms of treatment, such as CPR  A DNR order may be included in other types of advance directives  · Medical directive: This covers the care that you want if you are in a coma, near death, or unable to make decisions for yourself  You can list the treatments you want for each condition  Treatment may include pain medicine, surgery, blood transfusions, dialysis, IV or tube feedings, and a ventilator (breathing machine)  · Values history:   This cookies, regular soda, and sweetened drinks  Exercise:  Exercise at least 30 minutes per day on most days of the week  Some examples of exercise include walking, biking, dancing, and swimming  You can also fit in more physical activity by taking the stairs instead of the elevator or parking farther away from stores  Ask your healthcare provider about the best exercise plan for you  © Copyright [employer] 2018 Information is for End User's use only and may not be sold, redistributed or otherwise used for commercial purposes  All illustrations and images included in CareNotes® are the copyrighted property of [employer]  or [address] [place] [address]    Portions of the record may have been created with voice recognition software  Occasional wrong word or "sound a like" substitutions may have occurred due to the inherent limitations of voice recognition software  Read the chart carefully and recognize, using context, where substitutions have occurred  Depression Screening Follow-up Plan: Patient's depression screening was positive with a PHQ-2 score of 4  Their PHQ-9 score was 16  Patient assessed for underlying major depression  They have no active suicidal ideations  Brief counseling provided and recommend additional follow-up/re-evaluation next office visit

## 2021-09-02 NOTE — PROGRESS NOTES
Assessment and Plan:     Problem List Items Addressed This Visit     None      Visit Diagnoses     Medicare annual wellness visit, subsequent    -  Primary    Need for influenza vaccination        Relevant Orders    influenza vaccine, high-dose, PF 0 7 mL (FLUZONE HIGH-DOSE)    Encounter for screening mammogram for breast cancer        Relevant Orders    Mammo screening bilateral w 3d & cad    Encounter for immunization        Relevant Medications    Zoster Vac Recomb Adjuvanted (Shingrix) 50 MCG/0 5ML SUSR           Preventive health issues were discussed with patient, and age appropriate screening tests were ordered as noted in patient's After Visit Summary  Personalized health advice and appropriate referrals for health education or preventive services given if needed, as noted in patient's After Visit Summary  History of Present Illness:     Patient presents for Medicare Annual Wellness visit    Patient Care Team:  Donya Arauz as PCP - General (Family Medicine)  Cassidy Bloom MD     Problem List:     Patient Active Problem List   Diagnosis    Intention tremor    Benign essential tremor    Depressive disorder    Essential hypertension    CKD (chronic kidney disease) stage 3, GFR 30-59 ml/min (HCC)    Hypothyroidism    Shortness of breath    Mixed hyperlipidemia    History of breast cancer    Mild cognitive impairment    Bilateral malignant neoplasm of breast in female ([facility] )    IFG (impaired fasting glucose)    Morbid obesity with BMI of 40 0-44 9, adult ([facility] )    Compression fracture of fifth lumbar vertebra [facility])      Past Medical and Surgical History:     Past Medical History:   Diagnosis Date    Acute ill-defined cerebrovascular disease     Benign essential tremor     Breast cancer ([facility] [facility] )     pt states doesn't remember the year thinks it might have been on the right      Chronic kidney disease     CKD (chronic kidney disease) stage 3, GFR 30-59 ml/min (HCC) 9/25/2018    Colon polyp     COPD (chronic obstructive pulmonary disease) (HCC)     Depressive disorder     Disease of thyroid gland     Heart murmur     Hypertension     Impaired fasting glucose     Intention tremor     last assessed: 8/18/2016    Osteochondropathy     Panic disorder without agoraphobia with mild panic attacks     Psychiatric disorder     anxiety    Stroke ([address] )     Tubular adenoma of colon      Past Surgical History:   Procedure Laterality Date    AUGMENTATION BREAST      pt states during mammo she doesn't have any other surgery than the 2 biopsy    BREAST BIOPSY Left     pt doesn't recall when and believes the neg biop was on left    BREAST LUMPECTOMY W/ NEEDLE LOCALIZATION Left     description: benign last assessed; 8/21/2016    CHOLECYSTECTOMY      COLONOSCOPY      EGD      HYSTERECTOMY        Family History:     Family History   Problem Relation Age of Onset    Alcohol abuse Mother     Asthma Mother     Cancer Mother     Mental illness Mother     Osteoarthritis Mother     Gout Father     Cancer Father     Other Sister         carotid arterial disease    COPD Sister     Hyperlipidemia Sister     Hypertension Sister     Heart attack Sister     Breast cancer Sister     No Known Problems Daughter     No Known Problems Maternal Grandmother     No Known Problems Paternal Grandmother     Lung cancer Sister     No Known Problems Sister     No Known Problems Daughter     No Known Problems Daughter       Social History:     Social History     Socioeconomic History    Marital status: Single     Spouse name: None    Number of children: None    Years of education: None    Highest education level: None   Occupational History    Occupation: Retired   Tobacco Use    Smoking status: Former Smoker    Smokeless tobacco: Never Used    Tobacco comment: 10 years ago   Substance and Sexual Activity    Alcohol use: Yes     Comment: socially - Per allscripts - denies alcohol consumption    Drug use: No    Sexual activity: None   Other Topics Concern    None   Social History Narrative         Always uses seat belt     Social Determinants of Health     Financial Resource Strain:     Difficulty of Paying Living Expenses:    Food Insecurity:     Worried About Running Out of Food in the Last Year:     [address] in the Last Year:    Transportation Needs:     Lack of Transportation (Medical):  Lack of Transportation (Non-Medical):    Physical Activity:     Days of Exercise per Week:     Minutes of Exercise per Session:    Stress:     Feeling of Stress :    Social Connections:     Frequency of Communication with Friends and Family:     Frequency of Social Gatherings with Friends and Family:     Attends [employer] Services:     Active Member of Clubs or Organizations:     Attends Club or Organization Meetings:     Marital Status:    Intimate Partner Violence:     Fear of Current or Ex-Partner:     Emotionally Abused:     Physically Abused:     Sexually Abused:       Medications and Allergies:     Current Outpatient Medications   Medication Sig Dispense Refill    acetaminophen (TYLENOL) 500 mg tablet Take 500 mg by mouth every 6 (six) hours as needed for mild pain Take 2 tablets by mouth every 6-8 hours as needed      amLODIPine (NORVASC) 10 mg tablet TAKE 1 TABLET(10 MG) BY MOUTH DAILY 90 tablet 1    Cholecalciferol (VITAMIN D3) 50 MCG (2000 UT) TABS Take 2,000 Units by mouth daily      cyclobenzaprine (FLEXERIL) 10 mg tablet Take 10 mg by mouth daily at bedtime      dexlansoprazole (Dexilant) 60 MG capsule Take 1 capsule (60 mg total) by mouth daily before breakfast 90 capsule 1    gabapentin (NEURONTIN) 100 mg capsule TAKE 1 CAPSULE BY MOUTH TWICE DAILY 30 capsule 0    gabapentin (NEURONTIN) 300 mg capsule Take 1 Capsule by mouth daily at bedtime   30 capsule 0    levothyroxine 50 mcg tablet Take 1 tablet (50 mcg total) by mouth daily 90 tablet 0    lisinopril (ZESTRIL) 20 mg tablet Take 1 tablet (20 mg total) by mouth daily 90 tablet 1    Misc  Devices (Round Shower Stool) MISC Use daily Use as needed in the shower  1 each 0    potassium chloride (K-DUR,KLOR-CON) 20 mEq tablet Take 1 tablet (20 mEq total) by mouth daily 90 tablet 0    rosuvastatin (CRESTOR) 10 MG tablet Take 1 tablet (10 mg total) by mouth daily 90 tablet 1    Zoster Vac Recomb Adjuvanted (Shingrix) 50 MCG/0 5ML SUSR 0 5mL IM for one dose, followed by 0 5mL IM 2-6 months after first dose 1 each 1     No current facility-administered medications for this visit  Allergies   Allergen Reactions    Amoxicillin     Codeine     Morphine       Immunizations:     Immunization History   Administered Date(s) Administered    INFLUENZA 01/30/2007, 10/02/2009, 01/28/2011, 09/08/2011, 08/20/2012, 09/20/2013, 09/23/2014, 10/27/2015, 10/26/2018    Influenza Split High Dose Preservative Free IM 11/18/2016, 10/31/2017    Influenza, high dose seasonal 0 7 mL 10/26/2018, 11/05/2019, 11/03/2020    Influenza, seasonal, injectable 01/30/2007, 10/02/2009, 01/28/2011, 09/08/2011, 08/20/2012, 09/20/2013, 09/23/2014, 10/27/2015    Pneumococcal Conjugate 13-Valent 08/06/2015    Pneumococcal Polysaccharide PPV23 07/01/2011    SARS-CoV-2 / COVID-19 mRNA IM (Moderna) 01/28/2021, 02/24/2021    Tdap 07/01/2011    Zoster 06/24/2015      Health Maintenance:         Topic Date Due    Hepatitis C Screening  Never done    Breast Cancer Screening: Mammogram  11/26/2020    DXA SCAN  11/19/2021    Colorectal Cancer Screening  09/17/2024         Topic Date Due    DTaP,Tdap,and Td Vaccines (2 - Td or Tdap) 07/01/2021    Influenza Vaccine (1) 09/01/2021      Medicare Health Risk Assessment:     /70   Pulse 96   Temp (!) 97 1 °F (36 2 °C)   Resp 16   Ht 5' 3" (1 6 m)   Wt 130 kg (286 lb 9 6 oz)   SpO2 96%   BMI 50 77 kg/m²      Laura Torres is here for her Subsequent Wellness visit       Health Risk Assessment:   Patient rates overall health as fair  Patient feels that their physical health rating is slightly worse  Patient is dissatisfied with their life  Eyesight was rated as slightly worse  Hearing was rated as same  Patient feels that their emotional and mental health rating is same  Patients states they are sometimes angry  Patient states they are often unusually tired/fatigued  Pain experienced in the last 7 days has been some  Patient's pain rating has been 7/10  Patient states that she has experienced weight loss or gain in last 6 months  Depression Screening:   PHQ-2 Score: 4  PHQ-9 Score: 16      Fall Risk Screening: In the past year, patient has experienced: history of falling in past year    Number of falls: 1  Injured during fall?: Yes    Feels unsteady when standing or walking?: Yes    Worried about falling?: Yes      Urinary Incontinence Screening:   Patient has leaked urine accidently in the last six months  Home Safety:  Patient does not have trouble with stairs inside or outside of their home  Patient has working smoke alarms and has no working carbon monoxide detector  Home safety hazards include: none  Nutrition:   Current diet is Regular  Medications:   Patient is currently taking over-the-counter supplements  OTC medications include: see medication list  Patient is able to manage medications  Activities of Daily Living (ADLs)/Instrumental Activities of Daily Living (IADLs):   Walk and transfer into and out of bed and chair?: Yes  Dress and groom yourself?: Yes    Bathe or shower yourself?: Yes    Feed yourself?  Yes  Do your laundry/housekeeping?: Yes  Manage your money, pay your bills and track your expenses?: Yes  Make your own meals?: Yes    Do your own shopping?: Yes    Previous Hospitalizations:   Any hospitalizations or ED visits within the last 12 months?: Yes    How many hospitalizations have you had in the last year?: 1-2    Advance Care Planning:   Living will: Yes    Durable POA for healthcare: Yes    Advanced directive: Yes    Advanced directive counseling given: Yes    Five wishes given: Yes    Patient declined ACP directive: No    End of Life Decisions reviewed with patient: Yes    Provider agrees with end of life decisions: Yes      Comments: Pt would not want resuscitation if no hope for recovery  ,     Cognitive Screening:   Provider or family/friend/caregiver concerned regarding cognition?: No    PREVENTIVE SCREENINGS      Cardiovascular Screening:    General: Screening Not Indicated and History Lipid Disorder      Diabetes Screening:     General: Screening Current      Colorectal Cancer Screening:     General: Screening Current      Breast Cancer Screening:     General: History Breast Cancer and Risks and Benefits Discussed    Due for: Mammogram        Cervical Cancer Screening:    General: Screening Not Indicated      Osteoporosis Screening:    General: Screening Current      Abdominal Aortic Aneurysm (AAA) Screening:        General: Risks and Benefits Discussed and Screening Not Indicated      Lung Cancer Screening:     General: Screening Not Indicated and Risks and Benefits Discussed      Hepatitis C Screening:    General: Risks and Benefits Discussed and Screening Not Indicated    Hep C Screening Accepted: No     Screening, Brief Intervention, and Referral to Treatment (SBIRT)    Screening  Typical number of drinks in a day: 0  Typical number of drinks in a week: 0  Interpretation: Low risk drinking behavior      Single Item Drug Screening:  How often have you used an illegal drug (including marijuana) or a prescription medication for non-medical reasons in the past year? never    Single Item Drug Screen Score: 0  Interpretation: Negative screen for possible drug use disorder      MONIQUE Angelo

## 2021-09-02 NOTE — PATIENT INSTRUCTIONS
Reviewed all with patient  Please schedule your mammogram   We will give you the flu shot today however you still need your COVID booster after 8 months from the last 1  If you can get the shingles shot you can get the shingles shot now but  from the COVID shot for 4-6 weeks  Continue on all your current medications let me know if you think we need to do something about the depression  At some point we have to just say this is how it is regarding your weight  If you can not lose weight there is no sense getting depressed about it we just treat the symptoms when they occur unfortunately  You have been trying to work on the weight for years  You did stop smoking which is great  Have the labs done prior to the next appointment or you can go this morning and I will call you with the labs  Patient was advised to let us know if she would like a 2nd opinion for her low back pain and her midthoracic pain  Medicare Preventive Visit Patient Instructions  Thank you for completing your Welcome to Medicare Visit or Medicare Annual Wellness Visit today  Your next wellness visit will be due in one year (9/3/2022)  The screening/preventive services that you may require over the next 5-10 years are detailed below  Some tests may not apply to you based off risk factors and/or age  Screening tests ordered at today's visit but not completed yet may show as past due  Also, please note that scanned in results may not display below    Preventive Screenings:  Service Recommendations Previous Testing/Comments   Colorectal Cancer Screening  * Colonoscopy    * Fecal Occult Blood Test (FOBT)/Fecal Immunochemical Test (FIT)  * Fecal DNA/Cologuard Test  * Flexible Sigmoidoscopy Age: 54-65 years old   Colonoscopy: every 10 years (may be performed more frequently if at higher risk)  OR  FOBT/FIT: every 1 year  OR  Cologuard: every 3 years  OR  Sigmoidoscopy: every 5 years  Screening may be recommended earlier than age 48 if at higher risk for colorectal cancer  Also, an individualized decision between you and your healthcare provider will decide whether screening between the ages of 74-80 would be appropriate  Colonoscopy: 09/17/2019  FOBT/FIT: 01/21/2021  Cologuard: Not on file  Sigmoidoscopy: Not on file    Screening Current     Breast Cancer Screening Age: 36 years old  Frequency: every 1-2 years  Not required if history of left and right mastectomy Mammogram: 11/26/2019    History Breast Cancer   Cervical Cancer Screening Between the ages of 21-29, pap smear recommended once every 3 years  Between the ages of 33-67, can perform pap smear with HPV co-testing every 5 years  Recommendations may differ for women with a history of total hysterectomy, cervical cancer, or abnormal pap smears in past  Pap Smear: Not on file    Screening Not Indicated   Hepatitis C Screening Once for adults born between 1945 and 1965  More frequently in patients at high risk for Hepatitis C Hep C Antibody: Not on file        Diabetes Screening 1-2 times per year if you're at risk for diabetes or have pre-diabetes Fasting glucose: 111 mg/dL   A1C: 5 5    Screening Current   Cholesterol Screening Once every 5 years if you don't have a lipid disorder  May order more often based on risk factors  Lipid panel: 01/18/2021    Screening Not Indicated  History Lipid Disorder     Other Preventive Screenings Covered by Medicare:  1  Abdominal Aortic Aneurysm (AAA) Screening: covered once if your at risk  You're considered to be at risk if you have a family history of AAA    2  Lung Cancer Screening: covers low dose CT scan once per year if you meet all of the following conditions: (1) Age 50-69; (2) No signs or symptoms of lung cancer; (3) Current smoker or have quit smoking within the last 15 years; (4) You have a tobacco smoking history of at least 30 pack years (packs per day multiplied by number of years you smoked); (5) You get a written order from a healthcare provider  3  Glaucoma Screening: covered annually if you're considered high risk: (1) You have diabetes OR (2) Family history of glaucoma OR (3)  aged 48 and older OR (3)  American aged 72 and older  3  Osteoporosis Screening: covered every 2 years if you meet one of the following conditions: (1) You're estrogen deficient and at risk for osteoporosis based off medical history and other findings; (2) Have a vertebral abnormality; (3) On glucocorticoid therapy for more than 3 months; (4) Have primary hyperparathyroidism; (5) On osteoporosis medications and need to assess response to drug therapy  · Last bone density test (DXA Scan): 11/19/2019   5  HIV Screening: covered annually if you're between the age of 15-65  Also covered annually if you are younger than 13 and older than 72 with risk factors for HIV infection  For pregnant patients, it is covered up to 3 times per pregnancy  Immunizations:  Immunization Recommendations   Influenza Vaccine Annual influenza vaccination during flu season is recommended for all persons aged >= 6 months who do not have contraindications   Pneumococcal Vaccine (Prevnar and Pneumovax)  * Prevnar = PCV13  * Pneumovax = PPSV23   Adults 25-60 years old: 1-3 doses may be recommended based on certain risk factors  Adults 72 years old: Prevnar (PCV13) vaccine recommended followed by Pneumovax (PPSV23) vaccine  If already received PPSV23 since turning 65, then PCV13 recommended at least one year after PPSV23 dose  Hepatitis B Vaccine 3 dose series if at intermediate or high risk (ex: diabetes, end stage renal disease, liver disease)   Tetanus (Td) Vaccine - COST NOT COVERED BY MEDICARE PART B Following completion of primary series, a booster dose should be given every 10 years to maintain immunity against tetanus  Td may also be given as tetanus wound prophylaxis  Tdap Vaccine - COST NOT COVERED BY MEDICARE PART B Recommended at least once for all adults  For pregnant patients, recommended with each pregnancy  Shingles Vaccine (Shingrix) - COST NOT COVERED BY MEDICARE PART B  2 shot series recommended in those aged 48 and above     Health Maintenance Due:      Topic Date Due    Hepatitis C Screening  Never done    Breast Cancer Screening: Mammogram  11/26/2020    DXA SCAN  11/19/2021    Colorectal Cancer Screening  09/17/2024     Immunizations Due:      Topic Date Due    DTaP,Tdap,and Td Vaccines (2 - Td or Tdap) 07/01/2021    Influenza Vaccine (1) 09/01/2021     Advance Directives   What are advance directives? Advance directives are legal documents that state your wishes and plans for medical care  These plans are made ahead of time in case you lose your ability to make decisions for yourself  Advance directives can apply to any medical decision, such as the treatments you want, and if you want to donate organs  What are the types of advance directives? There are many types of advance directives, and each state has rules about how to use them  You may choose a combination of any of the following:  · Living will: This is a written record of the treatment you want  You can also choose which treatments you do not want, which to limit, and which to stop at a certain time  This includes surgery, medicine, IV fluid, and tube feedings  · Durable power of  for healthcare [place] [facility]): This is a written record that states who you want to make healthcare choices for you when you are unable to make them for yourself  This person, called a proxy, is usually a family member or a friend  You may choose more than 1 proxy  · Do not resuscitate (DNR) order:  A DNR order is used in case your heart stops beating or you stop breathing  It is a request not to have certain forms of treatment, such as CPR  A DNR order may be included in other types of advance directives  · Medical directive:   This covers the care that you want if you are in a coma, near death, or unable to make decisions for yourself  You can list the treatments you want for each condition  Treatment may include pain medicine, surgery, blood transfusions, dialysis, IV or tube feedings, and a ventilator (breathing machine)  · Values history: This document has questions about your views, beliefs, and how you feel and think about life  This information can help others choose the care that you would choose  Why are advance directives important? An advance directive helps you control your care  Although spoken wishes may be used, it is better to have your wishes written down  Spoken wishes can be misunderstood, or not followed  Treatments may be given even if you do not want them  An advance directive may make it easier for your family to make difficult choices about your care  Fall Prevention    Fall prevention  includes ways to make your home and other areas safer  It also includes ways you can move more carefully to prevent a fall  Health conditions that cause changes in your blood pressure, vision, or muscle strength and coordination may increase your risk for falls  Medicines may also increase your risk for falls if they make you dizzy, weak, or sleepy  Fall prevention tips:   · Stand or sit up slowly  · Use assistive devices as directed  · Wear shoes that fit well and have soles that   · Wear a personal alarm  · Stay active  · Manage your medical conditions  Home Safety Tips:  · Add items to prevent falls in the bathroom  · Keep paths clear  · Install bright lights in your home  · Keep items you use often on shelves within reach  · Paint or place reflective tape on the edges of your stairs  Urinary Incontinence   Urinary incontinence (UI)  is when you lose control of your bladder  UI develops because your bladder cannot store or empty urine properly  The 3 most common types of UI are stress incontinence, urge incontinence, or both    Medicines:   · May be given to help strengthen your bladder control  Report any side effects of medication to your healthcare provider  Do pelvic muscle exercises often:  Your pelvic muscles help you stop urinating  Squeeze these muscles tight for 5 seconds, then relax for 5 seconds  Gradually work up to squeezing for 10 seconds  Do 3 sets of 15 repetitions a day, or as directed  This will help strengthen your pelvic muscles and improve bladder control  Train your bladder:  Go to the bathroom at set times, such as every 2 hours, even if you do not feel the urge to go  You can also try to hold your urine when you feel the urge to go  For example, hold your urine for 5 minutes when you feel the urge to go  As that becomes easier, hold your urine for 10 minutes  Self-care:   · Keep a UI record  Write down how often you leak urine and how much you leak  Make a note of what you were doing when you leaked urine  · Drink liquids as directed  You may need to limit the amount of liquid you drink to help control your urine leakage  Do not drink any liquid right before you go to bed  Limit or do not have drinks that contain caffeine or alcohol  · Prevent constipation  Eat a variety of high-fiber foods  Good examples are high-fiber cereals, beans, vegetables, and whole-grain breads  Walking is the best way to trigger your intestines to have a bowel movement  · Exercise regularly and maintain a healthy weight  Weight loss and exercise will decrease pressure on your bladder and help you control your leakage  · Use a catheter as directed  to help empty your bladder  A catheter is a tiny, plastic tube that is put into your bladder to drain your urine  · Go to behavior therapy as directed  Behavior therapy may be used to help you learn to control your urge to urinate      Weight Management   Why it is important to manage your weight:  Being overweight increases your risk of health conditions such as heart disease, high blood pressure, type 2 diabetes, and certain types of cancer  It can also increase your risk for osteoarthritis, sleep apnea, and other respiratory problems  Aim for a slow, steady weight loss  Even a small amount of weight loss can lower your risk of health problems  How to lose weight safely:  A safe and healthy way to lose weight is to eat fewer calories and get regular exercise  You can lose up about 1 pound a week by decreasing the number of calories you eat by 500 calories each day  Healthy meal plan for weight management:  A healthy meal plan includes a variety of foods, contains fewer calories, and helps you stay healthy  A healthy meal plan includes the following:  · Eat whole-grain foods more often  A healthy meal plan should contain fiber  Fiber is the part of grains, fruits, and vegetables that is not broken down by your body  Whole-grain foods are healthy and provide extra fiber in your diet  Some examples of whole-grain foods are whole-wheat breads and pastas, oatmeal, brown rice, and bulgur  · Eat a variety of vegetables every day  Include dark, leafy greens such as spinach, kale, neeraj greens, and mustard greens  Eat yellow and orange vegetables such as carrots, sweet potatoes, and winter squash  · Eat a variety of fruits every day  Choose fresh or canned fruit (canned in its own juice or light syrup) instead of juice  Fruit juice has very little or no fiber  · Eat low-fat dairy foods  Drink fat-free (skim) milk or 1% milk  Eat fat-free yogurt and low-fat cottage cheese  Try low-fat cheeses such as mozzarella and other reduced-fat cheeses  · Choose meat and other protein foods that are low in fat  Choose beans or other legumes such as split peas or lentils  Choose fish, skinless poultry (chicken or turkey), or lean cuts of red meat (beef or pork)  Before you cook meat or poultry, cut off any visible fat  · Use less fat and oil  Try baking foods instead of frying them   Add less fat, such as margarine, sour cream, regular salad dressing and mayonnaise to foods  Eat fewer high-fat foods  Some examples of high-fat foods include french fries, doughnuts, ice cream, and cakes  · Eat fewer sweets  Limit foods and drinks that are high in sugar  This includes candy, cookies, regular soda, and sweetened drinks  Exercise:  Exercise at least 30 minutes per day on most days of the week  Some examples of exercise include walking, biking, dancing, and swimming  You can also fit in more physical activity by taking the stairs instead of the elevator or parking farther away from stores  Ask your healthcare provider about the best exercise plan for you  © Copyright [employer] 2018 Information is for End User's use only and may not be sold, redistributed or otherwise used for commercial purposes  All illustrations and images included in CareNotes® are the copyrighted property of [employer]  or [facility] Preventive Visit Patient Instructions  Thank you for completing your Welcome to Medicare Visit or Medicare Annual Wellness Visit today  Your next wellness visit will be due in one year (9/3/2022)  The screening/preventive services that you may require over the next 5-10 years are detailed below  Some tests may not apply to you based off risk factors and/or age  Screening tests ordered at today's visit but not completed yet may show as past due  Also, please note that scanned in results may not display below    Preventive Screenings:  Service Recommendations Previous Testing/Comments   Colorectal Cancer Screening  * Colonoscopy    * Fecal Occult Blood Test (FOBT)/Fecal Immunochemical Test (FIT)  * Fecal DNA/Cologuard Test  * Flexible Sigmoidoscopy Age: 54-65 years old   Colonoscopy: every 10 years (may be performed more frequently if at higher risk)  OR  FOBT/FIT: every 1 year  OR  Cologuard: every 3 years  OR  Sigmoidoscopy: every 5 years  Screening may be recommended earlier than age 48 if at higher risk for colorectal cancer  Also, an individualized decision between you and your healthcare provider will decide whether screening between the ages of 74-80 would be appropriate  Colonoscopy: 09/17/2019  FOBT/FIT: 01/21/2021  Cologuard: Not on file  Sigmoidoscopy: Not on file    Screening Current     Breast Cancer Screening Age: 36 years old  Frequency: every 1-2 years  Not required if history of left and right mastectomy Mammogram: 11/26/2019    History Breast Cancer   Cervical Cancer Screening Between the ages of 21-29, pap smear recommended once every 3 years  Between the ages of 33-67, can perform pap smear with HPV co-testing every 5 years  Recommendations may differ for women with a history of total hysterectomy, cervical cancer, or abnormal pap smears in past  Pap Smear: Not on file    Screening Not Indicated   Hepatitis C Screening Once for adults born between 1945 and 1965  More frequently in patients at high risk for Hepatitis C Hep C Antibody: Not on file        Diabetes Screening 1-2 times per year if you're at risk for diabetes or have pre-diabetes Fasting glucose: 111 mg/dL   A1C: 5 5    Screening Current   Cholesterol Screening Once every 5 years if you don't have a lipid disorder  May order more often based on risk factors  Lipid panel: 01/18/2021    Screening Not Indicated  History Lipid Disorder     Other Preventive Screenings Covered by Medicare:  6  Abdominal Aortic Aneurysm (AAA) Screening: covered once if your at risk  You're considered to be at risk if you have a family history of AAA    7  Lung Cancer Screening: covers low dose CT scan once per year if you meet all of the following conditions: (1) Age 50-69; (2) No signs or symptoms of lung cancer; (3) Current smoker or have quit smoking within the last 15 years; (4) You have a tobacco smoking history of at least 30 pack years (packs per day multiplied by number of years you smoked); (5) You get a written order from a healthcare provider  8  Glaucoma Screening: covered annually if you're considered high risk: (1) You have diabetes OR (2) Family history of glaucoma OR (3)  aged 48 and older OR (3)  American aged 72 and older  5  Osteoporosis Screening: covered every 2 years if you meet one of the following conditions: (1) You're estrogen deficient and at risk for osteoporosis based off medical history and other findings; (2) Have a vertebral abnormality; (3) On glucocorticoid therapy for more than 3 months; (4) Have primary hyperparathyroidism; (5) On osteoporosis medications and need to assess response to drug therapy  · Last bone density test (DXA Scan): 11/19/2019   10  HIV Screening: covered annually if you're between the age of 15-65  Also covered annually if you are younger than 13 and older than 72 with risk factors for HIV infection  For pregnant patients, it is covered up to 3 times per pregnancy  Immunizations:  Immunization Recommendations   Influenza Vaccine Annual influenza vaccination during flu season is recommended for all persons aged >= 6 months who do not have contraindications   Pneumococcal Vaccine (Prevnar and Pneumovax)  * Prevnar = PCV13  * Pneumovax = PPSV23   Adults 25-60 years old: 1-3 doses may be recommended based on certain risk factors  Adults 72 years old: Prevnar (PCV13) vaccine recommended followed by Pneumovax (PPSV23) vaccine  If already received PPSV23 since turning 65, then PCV13 recommended at least one year after PPSV23 dose  Hepatitis B Vaccine 3 dose series if at intermediate or high risk (ex: diabetes, end stage renal disease, liver disease)   Tetanus (Td) Vaccine - COST NOT COVERED BY MEDICARE PART B Following completion of primary series, a booster dose should be given every 10 years to maintain immunity against tetanus  Td may also be given as tetanus wound prophylaxis  Tdap Vaccine - COST NOT COVERED BY MEDICARE PART B Recommended at least once for all adults  For pregnant patients, recommended with each pregnancy  Shingles Vaccine (Shingrix) - COST NOT COVERED BY MEDICARE PART B  2 shot series recommended in those aged 48 and above     Health Maintenance Due:      Topic Date Due    Hepatitis C Screening  Never done    Breast Cancer Screening: Mammogram  11/26/2020    DXA SCAN  11/19/2021    Colorectal Cancer Screening  09/17/2024     Immunizations Due:      Topic Date Due    DTaP,Tdap,and Td Vaccines (2 - Td or Tdap) 07/01/2021    Influenza Vaccine (1) 09/01/2021     Advance Directives   What are advance directives? Advance directives are legal documents that state your wishes and plans for medical care  These plans are made ahead of time in case you lose your ability to make decisions for yourself  Advance directives can apply to any medical decision, such as the treatments you want, and if you want to donate organs  What are the types of advance directives? There are many types of advance directives, and each state has rules about how to use them  You may choose a combination of any of the following:  · Living will: This is a written record of the treatment you want  You can also choose which treatments you do not want, which to limit, and which to stop at a certain time  This includes surgery, medicine, IV fluid, and tube feedings  · Durable power of  for healthcare [place] [facility]): This is a written record that states who you want to make healthcare choices for you when you are unable to make them for yourself  This person, called a proxy, is usually a family member or a friend  You may choose more than 1 proxy  · Do not resuscitate (DNR) order:  A DNR order is used in case your heart stops beating or you stop breathing  It is a request not to have certain forms of treatment, such as CPR  A DNR order may be included in other types of advance directives  · Medical directive:   This covers the care that you want if you are in a coma, near death, or cream, regular salad dressing and mayonnaise to foods  Eat fewer high-fat foods  Some examples of high-fat foods include french fries, doughnuts, ice cream, and cakes  · Eat fewer sweets  Limit foods and drinks that are high in sugar  This includes candy, cookies, regular soda, and sweetened drinks  Exercise:  Exercise at least 30 minutes per day on most days of the week  Some examples of exercise include walking, biking, dancing, and swimming  You can also fit in more physical activity by taking the stairs instead of the elevator or parking farther away from stores  Ask your healthcare provider about the best exercise plan for you  © Copyright [employer] 2018 Information is for End User's use only and may not be sold, redistributed or otherwise used for commercial purposes   All illustrations and images included in CareNotes® are the copyrighted property of [employer]  or [address] [place] [address]

## 2021-09-02 NOTE — TELEPHONE ENCOUNTER
----- Message from [address] [place] [address]  sent at 9/2/2021  3:16 PM EDT -----  Her labs look ok, but it looks like she is not drinking enough or eating too much salt  She needs to drink more water, at least 6 to 8 glasses daily and cut back on salt  Will call if lyme is positive, it is still pending

## 2021-09-02 NOTE — ASSESSMENT & PLAN NOTE
Patient is following with [facility] Dr Amna Baez  At this time she is considering a 2nd opinion but would prefer to stay with them at least through the next visit  Continue on your current medications

## 2021-09-03 LAB — B BURGDOR IGG+IGM SER-ACNC: 56

## 2021-09-08 ENCOUNTER — TELEPHONE (OUTPATIENT)
Dept: FAMILY MEDICINE CLINIC | Facility: CLINIC | Age: 76
End: 2021-09-08

## 2021-09-08 NOTE — TELEPHONE ENCOUNTER
Patient dropped off another Disability parking placard form to be filled out by you  As per patient, Maldonado Miller messed up the first one  It is in your folder

## 2021-09-09 ENCOUNTER — TELEPHONE (OUTPATIENT)
Dept: FAMILY MEDICINE CLINIC | Facility: CLINIC | Age: 76
End: 2021-09-09

## 2021-09-09 DIAGNOSIS — R79.89 ELEVATED TSH: ICD-10-CM

## 2021-09-09 DIAGNOSIS — R53.83 OTHER FATIGUE: ICD-10-CM

## 2021-09-09 RX ORDER — LEVOTHYROXINE SODIUM 0.05 MG/1
TABLET ORAL
Qty: 90 TABLET | Refills: 0 | Status: SHIPPED | OUTPATIENT
Start: 2021-09-09 | End: 2021-12-09

## 2021-09-13 ENCOUNTER — TELEPHONE (OUTPATIENT)
Dept: FAMILY MEDICINE CLINIC | Facility: CLINIC | Age: 76
End: 2021-09-13

## 2021-09-13 DIAGNOSIS — E78.2 MIXED HYPERLIPIDEMIA: ICD-10-CM

## 2021-09-13 RX ORDER — ROSUVASTATIN CALCIUM 10 MG/1
TABLET, COATED ORAL
Qty: 90 TABLET | Refills: 1 | Status: SHIPPED | OUTPATIENT
Start: 2021-09-13 | End: 2022-03-04 | Stop reason: SDUPTHER

## 2021-09-13 NOTE — TELEPHONE ENCOUNTER
Called patient about paper work we received from a cardiology genetic testing  She said she is aware of this and answered the question but she wasn't sure what it was  I did explain to her that you said you can sign off on this but it wont change ,clinical how you treat her  She said she is okay with that and she is not worried about this testing and to shred the paper work

## 2021-09-15 DIAGNOSIS — I10 ESSENTIAL HYPERTENSION: ICD-10-CM

## 2021-09-15 RX ORDER — LISINOPRIL 20 MG/1
TABLET ORAL
Qty: 90 TABLET | Refills: 1 | Status: SHIPPED | OUTPATIENT
Start: 2021-09-15 | End: 2021-12-16

## 2021-09-17 ENCOUNTER — APPOINTMENT (OUTPATIENT)
Dept: LAB | Facility: HOSPITAL | Age: 76
End: 2021-09-17
Payer: MEDICARE

## 2021-09-17 LAB
BACTERIA UR QL AUTO: ABNORMAL /HPF
BILIRUB UR QL STRIP: NEGATIVE
CAOX CRY URNS QL MICRO: ABNORMAL /HPF
CLARITY UR: CLEAR
COLOR UR: YELLOW
CREAT UR-MCNC: 196 MG/DL
GLUCOSE UR STRIP-MCNC: NEGATIVE MG/DL
HGB UR QL STRIP.AUTO: NEGATIVE
KETONES UR STRIP-MCNC: NEGATIVE MG/DL
LEUKOCYTE ESTERASE UR QL STRIP: NEGATIVE
MICROALBUMIN UR-MCNC: 7.1 MG/L (ref 0–20)
MICROALBUMIN/CREAT 24H UR: 4 MG/G CREATININE (ref 0–30)
NITRITE UR QL STRIP: NEGATIVE
NON-SQ EPI CELLS URNS QL MICRO: ABNORMAL /HPF
OTHER STN SPEC: ABNORMAL
PH UR STRIP.AUTO: 5.5 [PH]
PROT UR STRIP-MCNC: NEGATIVE MG/DL
RBC #/AREA URNS AUTO: ABNORMAL /HPF
SP GR UR STRIP.AUTO: >=1.03 (ref 1–1.03)
UROBILINOGEN UR QL STRIP.AUTO: 1 E.U./DL
WBC #/AREA URNS AUTO: ABNORMAL /HPF

## 2021-09-17 PROCEDURE — 82570 ASSAY OF URINE CREATININE: CPT | Performed by: FAMILY MEDICINE

## 2021-09-17 PROCEDURE — 81001 URINALYSIS AUTO W/SCOPE: CPT | Performed by: FAMILY MEDICINE

## 2021-09-17 PROCEDURE — 82043 UR ALBUMIN QUANTITATIVE: CPT | Performed by: FAMILY MEDICINE

## 2021-09-21 DIAGNOSIS — E87.6 HYPOKALEMIA: ICD-10-CM

## 2021-09-21 RX ORDER — POTASSIUM CHLORIDE 20 MEQ/1
20 TABLET, EXTENDED RELEASE ORAL DAILY
Qty: 90 TABLET | Refills: 0 | Status: SHIPPED | OUTPATIENT
Start: 2021-09-21 | End: 2021-12-28 | Stop reason: SDUPTHER

## 2021-10-06 DIAGNOSIS — K21.00 REFLUX ESOPHAGITIS: ICD-10-CM

## 2021-11-02 ENCOUNTER — TELEPHONE (OUTPATIENT)
Dept: FAMILY MEDICINE CLINIC | Facility: CLINIC | Age: 76
End: 2021-11-02

## 2021-11-05 ENCOUNTER — OFFICE VISIT (OUTPATIENT)
Dept: FAMILY MEDICINE CLINIC | Facility: CLINIC | Age: 76
End: 2021-11-05
Payer: MEDICARE

## 2021-11-05 VITALS
HEIGHT: 63 IN | BODY MASS INDEX: 51.28 KG/M2 | WEIGHT: 289.4 LBS | DIASTOLIC BLOOD PRESSURE: 80 MMHG | OXYGEN SATURATION: 100 % | SYSTOLIC BLOOD PRESSURE: 140 MMHG | TEMPERATURE: 97.1 F | RESPIRATION RATE: 16 BRPM | HEART RATE: 116 BPM

## 2021-11-05 DIAGNOSIS — I10 ESSENTIAL HYPERTENSION: ICD-10-CM

## 2021-11-05 DIAGNOSIS — S50.861A TICK BITE OF RIGHT FOREARM, INITIAL ENCOUNTER: Primary | ICD-10-CM
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
## 2021-11-05 PROCEDURE — 99214 OFFICE O/P EST MOD 30 MIN: CPT | Performed by: FAMILY MEDICINE

## 2021-11-05 RX ORDER — GABAPENTIN 600 MG/1
600 TABLET ORAL
COMMUNITY
Start: 2021-09-20 | End: 2022-02-07 | Stop reason: ALTCHOICE

## 2021-12-06 DIAGNOSIS — M54.31 SCIATICA OF RIGHT SIDE: ICD-10-CM

## 2021-12-07 RX ORDER — GABAPENTIN 100 MG/1
CAPSULE ORAL
Qty: 60 CAPSULE | Refills: 3 | Status: SHIPPED | OUTPATIENT
Start: 2021-12-07 | End: 2022-01-03 | Stop reason: SDUPTHER

## 2021-12-07 RX ORDER — GABAPENTIN 600 MG/1
600 TABLET ORAL
OUTPATIENT
Start: 2021-12-07

## 2021-12-09 DIAGNOSIS — I10 ESSENTIAL HYPERTENSION: ICD-10-CM

## 2021-12-09 DIAGNOSIS — R79.89 ELEVATED TSH: ICD-10-CM

## 2021-12-09 DIAGNOSIS — R53.83 OTHER FATIGUE: ICD-10-CM

## 2021-12-09 RX ORDER — LEVOTHYROXINE SODIUM 0.05 MG/1
TABLET ORAL
Qty: 90 TABLET | Refills: 0 | Status: SHIPPED | OUTPATIENT
Start: 2021-12-09 | End: 2022-03-04 | Stop reason: SDUPTHER

## 2021-12-09 RX ORDER — AMLODIPINE BESYLATE 10 MG/1
TABLET ORAL
Qty: 90 TABLET | Refills: 1 | Status: SHIPPED | OUTPATIENT
Start: 2021-12-09 | End: 2022-03-04 | Stop reason: SDUPTHER

## 2021-12-10 DIAGNOSIS — K21.00 REFLUX ESOPHAGITIS: ICD-10-CM

## 2021-12-16 DIAGNOSIS — I10 ESSENTIAL HYPERTENSION: ICD-10-CM

## 2021-12-16 RX ORDER — LISINOPRIL 20 MG/1
TABLET ORAL
Qty: 90 TABLET | Refills: 1 | Status: SHIPPED | OUTPATIENT
Start: 2021-12-16 | End: 2022-03-04 | Stop reason: SDUPTHER

## 2021-12-28 DIAGNOSIS — E87.6 HYPOKALEMIA: ICD-10-CM

## 2021-12-29 RX ORDER — POTASSIUM CHLORIDE 20 MEQ/1
20 TABLET, EXTENDED RELEASE ORAL DAILY
Qty: 90 TABLET | Refills: 0 | Status: SHIPPED | OUTPATIENT
Start: 2021-12-29 | End: 2022-03-24

## 2022-01-03 ENCOUNTER — APPOINTMENT (OUTPATIENT)
Dept: LAB | Facility: HOSPITAL | Age: 77
End: 2022-01-03
Payer: MEDICARE

## 2022-01-03 ENCOUNTER — OFFICE VISIT (OUTPATIENT)
Dept: FAMILY MEDICINE CLINIC | Facility: CLINIC | Age: 77
End: 2022-01-03
Payer: MEDICARE

## 2022-01-03 VITALS
RESPIRATION RATE: 16 BRPM | HEIGHT: 63 IN | DIASTOLIC BLOOD PRESSURE: 72 MMHG | OXYGEN SATURATION: 96 % | HEART RATE: 104 BPM | TEMPERATURE: 98.1 F | WEIGHT: 282 LBS | BODY MASS INDEX: 49.96 KG/M2 | SYSTOLIC BLOOD PRESSURE: 128 MMHG

## 2022-01-03 DIAGNOSIS — M54.31 SCIATICA OF RIGHT SIDE: ICD-10-CM

## 2022-01-03 DIAGNOSIS — M17.10 ARTHRITIS OF KNEE: ICD-10-CM

## 2022-01-03 DIAGNOSIS — G25.0 BENIGN ESSENTIAL TREMOR: Primary | ICD-10-CM

## 2022-01-03 DIAGNOSIS — S50.861A TICK BITE OF RIGHT FOREARM, INITIAL ENCOUNTER: ICD-10-CM
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
## 2022-01-03 DIAGNOSIS — I10 ESSENTIAL HYPERTENSION: ICD-10-CM

## 2022-01-03 PROCEDURE — 86618 LYME DISEASE ANTIBODY: CPT

## 2022-01-03 PROCEDURE — 99214 OFFICE O/P EST MOD 30 MIN: CPT | Performed by: FAMILY MEDICINE

## 2022-01-03 PROCEDURE — 36415 COLL VENOUS BLD VENIPUNCTURE: CPT

## 2022-01-03 RX ORDER — GABAPENTIN 100 MG/1
CAPSULE ORAL
Qty: 120 CAPSULE | Refills: 1 | Status: SHIPPED | OUTPATIENT
Start: 2022-01-03 | End: 2022-04-04

## 2022-01-03 NOTE — PROGRESS NOTES
Assessment/Plan:     Chronic Problems:  Benign essential tremor  No tremor noted today  Essential hypertension  BP today is at goal  Continue current meds    Sciatica of right side  Patient was following with [facility] ortho however felt slight did when she was told she was getting an injection and they just handed her a prescription  Will increase the gabapentin to 200 mg twice during the day and then continue the 600 mg at night  If the pain is still severe let me know I will refer you to a different pain management physician  Visit Diagnosis:  Diagnoses and all orders for this visit:    Benign essential tremor    Sciatica of right side  Comments:  Pt has appt at [facility] for f/u  Will start gabapentin one today, bid tomorrow, then tid  Orders:  -     Seat Lift Chair  -     gabapentin (NEURONTIN) 100 mg capsule; TAKE 2 CAPSULE BY MOUTH TWICE DAILY    Essential hypertension    Arthritis of knee  Comments:    Would prefer not to use oral NSAIDs  Will prescribe diclofenac gel qid to the area  Orders:  -     Diclofenac Sodium (VOLTAREN) 1 %; Apply 2 g topically 4 (four) times a day          Subjective:    Patient ID: Sae Bell is a 67 y o  female  Pt is here for routine f/u appt  Having problems with her legs from her sciatica  Wants to know if we can increases the gabapentin as that really helps her pains  Was followed by Laury Perales and Dr Sakshi Beaulieu, [facility]  Pt reports she walked out of the office as she was supposed to get a shot in the leg for 1 hour  Pain was an 8/10 that day  They did not give her the shot and just a prescription for pain meds  Also has arthritis in the knees  Last gfr 38  Prefer not to use oral nsaid  Wants a script for a lift chair  Otherwise feeling well  Takes all other meds as directed  No side effects noted  Reports she will schedule her mammo this week  Plans on having the lyme test done         The following portions of the patient's history were reviewed and updated as appropriate: allergies, current medications, past family history, past medical history, past social history, past surgical history and problem list     Review of Systems   Constitutional: Negative for chills, diaphoresis, fatigue and fever  HENT: Negative for ear pain, postnasal drip, sinus pressure, sinus pain and sore throat  Eyes: Negative for pain and visual disturbance  Respiratory: Negative for cough, chest tightness and shortness of breath  Cardiovascular: Negative for chest pain and palpitations  Gastrointestinal: Negative for abdominal pain, constipation, diarrhea, nausea and vomiting  Genitourinary: Negative for dysuria, frequency and urgency  Musculoskeletal: Positive for arthralgias (Bilateral knees), gait problem and myalgias  Skin: Negative for rash and wound  Neurological: Positive for numbness (and tinglin in the right leg from the knee to the thigh  )  Negative for dizziness and light-headedness  Psychiatric/Behavioral: Positive for dysphoric mood  Negative for sleep disturbance  The patient is nervous/anxious  But feels she does not need meds for this  Has problems r/t low income            /72   Pulse 104   Temp 98 1 °F (36 7 °C)   Resp 16   Ht 5' 3" (1 6 m)   Wt 128 kg (282 lb)   SpO2 96%   BMI 49 95 kg/m²   Social History     Socioeconomic History    Marital status: Single     Spouse name: Not on file    Number of children: Not on file    Years of education: Not on file    Highest education level: Not on file   Occupational History    Occupation: Retired   Tobacco Use    Smoking status: Former Smoker    Smokeless tobacco: Never Used    Tobacco comment: 10 years ago   Substance and Sexual Activity    Alcohol use: Yes     Comment: socially - Per allscripts - denies alcohol consumption    Drug use: No    Sexual activity: Not on file   Other Topics Concern    Not on file   Social History Narrative         Always uses seat belt Social Determinants of Health     Financial Resource Strain: Not on file   Food Insecurity: Not on file   Transportation Needs: Not on file   Physical Activity: Not on file   Stress: Not on file   Social Connections: Not on file   Intimate Partner Violence: Not on file   Housing Stability: Not on file     Past Medical History:   Diagnosis Date    Acute ill-defined cerebrovascular disease     Benign essential tremor     [facility]     pt states doesn't remember the year thinks it might have been on the right      Chronic kidney disease     CKD (chronic kidney disease) stage 3, GFR 30-59 ml/min ([facility]) 9/25/2018    Colon polyp     COPD (chronic obstructive pulmonary disease) ([facility])     Depressive disorder     Disease of thyroid gland     Heart murmur     Hypertension     Impaired fasting glucose     Intention tremor     last assessed: 8/18/2016    Osteochondropathy     Panic disorder without agoraphobia with mild panic attacks     Psychiatric disorder     anxiety    Stroke ([facility] )     Tubular adenoma of colon      Family History   Problem Relation Age of Onset    Alcohol abuse Mother     Asthma Mother     Cancer Mother     Mental illness Mother     Osteoarthritis Mother     Gout Father     Cancer Father     Other Sister         carotid arterial disease    COPD Sister     Hyperlipidemia Sister     Hypertension Sister     Heart attack Sister     Breast cancer Sister     No Known Problems Daughter     No Known Problems Maternal Grandmother     No Known Problems Paternal Grandmother     Lung cancer Sister     No Known Problems Sister     No Known Problems Daughter     No Known Problems Daughter      Past Surgical History:   Procedure Laterality Date    AUGMENTATION BREAST      pt states during mammo she doesn't have any other surgery than the 2 biopsy    BREAST BIOPSY Left     pt doesn't recall when and believes the neg biop was on left    BREAST LUMPECTOMY W/ NEEDLE LOCALIZATION Left     description: benign last assessed; 8/21/2016    CHOLECYSTECTOMY      COLONOSCOPY      EGD      HYSTERECTOMY         Current Outpatient Medications:     amLODIPine (NORVASC) 10 mg tablet, TAKE 1 TABLET(10 MG) BY MOUTH DAILY, Disp: 90 tablet, Rfl: 1    Cholecalciferol (VITAMIN D3) 50 MCG (2000 UT) TABS, Take 2,000 Units by mouth daily, Disp: , Rfl:     Dexilant 60 MG capsule, TAKE 1 CAPSULE(60 MG) BY MOUTH DAILY BEFORE BREAKFAST, Disp: 90 capsule, Rfl: 1    gabapentin (NEURONTIN) 100 mg capsule, TAKE 2 CAPSULE BY MOUTH TWICE DAILY, Disp: 120 capsule, Rfl: 1    gabapentin (NEURONTIN) 600 MG tablet, Take 600 mg by mouth daily at bedtime, Disp: , Rfl:     levothyroxine 50 mcg tablet, TAKE 1 TABLET(50 MCG) BY MOUTH DAILY, Disp: 90 tablet, Rfl: 0    lisinopril (ZESTRIL) 20 mg tablet, TAKE 1 TABLET(20 MG) BY MOUTH DAILY, Disp: 90 tablet, Rfl: 1    potassium chloride (K-DUR,KLOR-CON) 20 mEq tablet, Take 1 tablet (20 mEq total) by mouth daily, Disp: 90 tablet, Rfl: 0    rosuvastatin (CRESTOR) 10 MG tablet, TAKE 1 TABLET(10 MG) BY MOUTH DAILY, Disp: 90 tablet, Rfl: 1    acetaminophen (TYLENOL) 500 mg tablet, Take 500 mg by mouth every 6 (six) hours as needed for mild pain Take 2 tablets by mouth every 6-8 hours as needed, Disp: , Rfl:     Diclofenac Sodium (VOLTAREN) 1 %, Apply 2 g topically 4 (four) times a day, Disp: 100 g, Rfl: 1    Misc  Devices (Round Shower Stool) MISC, Use daily Use as needed in the shower  , Disp: 1 each, Rfl: 0    Zoster Vac Recomb Adjuvanted (Shingrix) 50 MCG/0 5ML SUSR, 0 5mL IM for one dose, followed by 0 5mL IM 2-6 months after first dose, Disp: 1 each, Rfl: 1    Allergies   Allergen Reactions    Amoxicillin     Codeine     Morphine           Lab Review   No visits with results within 2 Month(s) from this visit     Latest known visit with results is:   Appointment on 09/02/2021   Component Date Value    TSH 3RD GENERATON 09/02/2021 1 675     Sodium 09/02/2021 147*    Potassium 09/02/2021 4 4     Chloride 09/02/2021 110*    CO2 09/02/2021 31     ANION GAP 09/02/2021 6     BUN 09/02/2021 14     Creatinine 09/02/2021 1 35*    Glucose, Fasting 09/02/2021 110*    Calcium 09/02/2021 9 1     Corrected Calcium 09/02/2021 9 6     AST 09/02/2021 15     ALT 09/02/2021 22     Alkaline Phosphatase 09/02/2021 84     Total Protein 09/02/2021 7 1     Albumin 09/02/2021 3 4*    Total Bilirubin 09/02/2021 0 49     eGFR 09/02/2021 38     Cholesterol 09/02/2021 115     Triglycerides 09/02/2021 110     HDL, Direct 09/02/2021 47     LDL Calculated 09/02/2021 46     Non-HDL-Chol (CHOL-HDL) 09/02/2021 68     Lyme total antibody 09/02/2021 56     Hepatitis C Ab 09/02/2021 Non-reactive         Imaging: No results found  Objective:     Physical Exam  Vitals and nursing note reviewed  Constitutional:       General: She is not in acute distress  Appearance: She is well-developed  She is obese  She is not ill-appearing, toxic-appearing or diaphoretic  HENT:      Head: Normocephalic and atraumatic  Right Ear: Tympanic membrane, ear canal and external ear normal  There is no impacted cerumen  Left Ear: Tympanic membrane, ear canal and external ear normal  There is no impacted cerumen  Nose: Nose normal  No congestion  Mouth/Throat:      Mouth: Mucous membranes are moist       Pharynx: No oropharyngeal exudate  Eyes:      General:         Right eye: No discharge  Left eye: No discharge  Extraocular Movements: Extraocular movements intact  Conjunctiva/sclera: Conjunctivae normal       Pupils: Pupils are equal, round, and reactive to light  Cardiovascular:      Rate and Rhythm: Normal rate and regular rhythm  Heart sounds: No murmur heard  Pulmonary:      Effort: Pulmonary effort is normal  No respiratory distress  Breath sounds: Normal breath sounds     Musculoskeletal:         General: Tenderness (severe over left sij  ) and deformity (Very large knees  No tenderness to palpation  ) present  Cervical back: Normal range of motion and neck supple  No rigidity  Right lower leg: No edema  Left lower leg: No edema  Comments: Ambulates slowly with a steady gait  Skin:     General: Skin is warm  Capillary Refill: Capillary refill takes less than 2 seconds  Coloration: Skin is not pale  Findings: No erythema  Neurological:      General: No focal deficit present  Mental Status: She is alert and oriented to person, place, and time  Psychiatric:         Mood and Affect: Mood normal          Behavior: Behavior normal          Thought Content: Thought content normal          Judgment: Judgment normal            Patient Instructions    Reviewed all with patient  Okay to  the chair lift  I put the prescription in your medical record  I would prefer you not use Advil or Aleve as her kidney function is slightly low however you can use 2 in of diclofenac gel to each knee up to 4 times daily if needed  Also you can use the same dose of gabapentin at night 600 mg but during the day increase to 200 in the morning and 200 in the afternoon  If the pain is still bad let me know I will refer you to a different pain management physician  Follow-up here in 3 months  MONIQUE Angelo    Portions of the record may have been created with voice recognition software  Occasional wrong word or "sound a like" substitutions may have occurred due to the inherent limitations of voice recognition software  Read the chart carefully and recognize, using context, where substitutions have occurred

## 2022-01-03 NOTE — ASSESSMENT & PLAN NOTE
Patient was following with [facility] ortho however felt slight did when she was told she was getting an injection and they just handed her a prescription  Will increase the gabapentin to 200 mg twice during the day and then continue the 600 mg at night  If the pain is still severe let me know I will refer you to a different pain management physician

## 2022-01-04 LAB — B BURGDOR IGG+IGM SER-ACNC: 41

## 2022-01-12 ENCOUNTER — APPOINTMENT (EMERGENCY)
Dept: VASCULAR ULTRASOUND | Facility: HOSPITAL | Age: 77
End: 2022-01-12
Payer: MEDICARE

## 2022-01-12 ENCOUNTER — APPOINTMENT (EMERGENCY)
Dept: RADIOLOGY | Facility: HOSPITAL | Age: 77
End: 2022-01-12
Payer: MEDICARE

## 2022-01-12 ENCOUNTER — APPOINTMENT (OUTPATIENT)
Dept: RADIOLOGY | Facility: CLINIC | Age: 77
End: 2022-01-12
Payer: MEDICARE

## 2022-01-12 ENCOUNTER — OFFICE VISIT (OUTPATIENT)
Dept: URGENT CARE | Facility: CLINIC | Age: 77
End: 2022-01-12
Payer: MEDICARE

## 2022-01-12 ENCOUNTER — HOSPITAL ENCOUNTER (EMERGENCY)
Facility: HOSPITAL | Age: 77
Discharge: HOME | End: 2022-01-12
Attending: EMERGENCY MEDICINE
Payer: MEDICARE

## 2022-01-12 VITALS
DIASTOLIC BLOOD PRESSURE: 65 MMHG | BODY MASS INDEX: 50.12 KG/M2 | WEIGHT: 282.85 LBS | TEMPERATURE: 98.4 F | RESPIRATION RATE: 18 BRPM | HEART RATE: 98 BPM | OXYGEN SATURATION: 96 % | SYSTOLIC BLOOD PRESSURE: 153 MMHG | HEIGHT: 63 IN

## 2022-01-12 VITALS
OXYGEN SATURATION: 98 % | HEART RATE: 79 BPM | SYSTOLIC BLOOD PRESSURE: 130 MMHG | TEMPERATURE: 98.2 F | WEIGHT: 282 LBS | RESPIRATION RATE: 16 BRPM | DIASTOLIC BLOOD PRESSURE: 76 MMHG | BODY MASS INDEX: 49.95 KG/M2

## 2022-01-12 DIAGNOSIS — M79.661 PAIN AND SWELLING OF LOWER LEG, RIGHT: ICD-10-CM

## 2022-01-12 DIAGNOSIS — M79.89 PAIN AND SWELLING OF LOWER LEG, RIGHT: ICD-10-CM

## 2022-01-12 DIAGNOSIS — M25.561 ACUTE PAIN OF RIGHT KNEE: ICD-10-CM

## 2022-01-12 DIAGNOSIS — M25.569 KNEE PAIN: Primary | ICD-10-CM

## 2022-01-12 DIAGNOSIS — M79.89 PAIN AND SWELLING OF LOWER LEG, RIGHT: Primary | ICD-10-CM

## 2022-01-12 DIAGNOSIS — M79.661 PAIN AND SWELLING OF LOWER LEG, RIGHT: Primary | ICD-10-CM

## 2022-01-12 PROCEDURE — 73590 X-RAY EXAM OF LOWER LEG: CPT

## 2022-01-12 PROCEDURE — 99284 EMERGENCY DEPT VISIT MOD MDM: CPT | Performed by: EMERGENCY MEDICINE

## 2022-01-12 PROCEDURE — 99213 OFFICE O/P EST LOW 20 MIN: CPT | Performed by: PHYSICIAN ASSISTANT

## 2022-01-12 PROCEDURE — G0463 HOSPITAL OUTPT CLINIC VISIT: HCPCS | Performed by: PHYSICIAN ASSISTANT

## 2022-01-12 PROCEDURE — 93971 EXTREMITY STUDY: CPT | Performed by: SURGERY

## 2022-01-12 PROCEDURE — 93971 EXTREMITY STUDY: CPT

## 2022-01-12 PROCEDURE — 73560 X-RAY EXAM OF KNEE 1 OR 2: CPT

## 2022-01-12 PROCEDURE — 99284 EMERGENCY DEPT VISIT MOD MDM: CPT

## 2022-01-12 RX ORDER — ACETAMINOPHEN 325 MG/1
975 TABLET ORAL ONCE
Status: COMPLETED | OUTPATIENT
Start: 2022-01-12 | End: 2022-01-12

## 2022-01-12 RX ADMIN — ACETAMINOPHEN 975 MG: 325 TABLET, FILM COATED ORAL at 19:31

## 2022-01-12 NOTE — ED PROVIDER NOTES
History  Chief Complaint   Patient presents with    Leg Swelling     sent for r/o DVT of RLE  reports leg swelling x1 week, reports swelling has actually improved  Patient 79-year-old female past medical history of hypertension, hyperlipidemia, COPD, anxiety depression, breast cancer, CVA, CKD 3 presenting with right leg swelling  Patient states that her knee has been swollen intermittently for the past week and states that it was improving however today acutely worsened and notes intermittent nonradiating pain which is aching in nature and worse with walking  She states that she was seen in urgent care and had x-rays that were normal was told to come to the emergency department for an ultrasound  She denies any redness but states that sometimes it does get hot  Denies any fevers, numbness or tingling, chest pain or shortness of breath  She does have a history of prior DVT many years ago and believes was in that leg  She states she has been using capsaicin with some relief  Prior to Admission Medications   Prescriptions Last Dose Informant Patient Reported? Taking? Cholecalciferol (VITAMIN D3) 50 MCG ( UT) TABS   Yes No   Sig: Take 2,000 Units by mouth daily   Dexilant 60 MG capsule   No No   Sig: TAKE 1 CAPSULE(60 MG) BY MOUTH DAILY BEFORE BREAKFAST   Diclofenac Sodium (VOLTAREN) 1 %   No No   Sig: Apply 2 g topically 4 (four) times a day   Misc  Devices (Round Shower Stool) MISC   No No   Sig: Use daily Use as needed in the shower     Zoster Vac Recomb Adjuvanted (Shingrix) 50 MCG/0 5ML SUSR   No No   Si 5mL IM for one dose, followed by 0 5mL IM 2-6 months after first dose   acetaminophen (TYLENOL) 500 mg tablet   Yes No   Sig: Take 500 mg by mouth every 6 (six) hours as needed for mild pain Take 2 tablets by mouth every 6-8 hours as needed   amLODIPine (NORVASC) 10 mg tablet   No No   Sig: TAKE 1 TABLET(10 MG) BY MOUTH DAILY   gabapentin (NEURONTIN) 100 mg capsule   No No   Sig: TAKE 2 CAPSULE BY MOUTH TWICE DAILY   gabapentin (NEURONTIN) 600 MG tablet   Yes No   Sig: Take 600 mg by mouth daily at bedtime   levothyroxine 50 mcg tablet   No No   Sig: TAKE 1 TABLET(50 MCG) BY MOUTH DAILY   lisinopril (ZESTRIL) 20 mg tablet   No No   Sig: TAKE 1 TABLET(20 MG) BY MOUTH DAILY   potassium chloride (K-DUR,KLOR-CON) 20 mEq tablet   No No   Sig: Take 1 tablet (20 mEq total) by mouth daily   rosuvastatin (CRESTOR) 10 MG tablet   No No   Sig: TAKE 1 TABLET(10 MG) BY MOUTH DAILY      Facility-Administered Medications: None       Past Medical History:   Diagnosis Date    Acute ill-defined cerebrovascular disease     Benign essential tremor     [facility]     pt states doesn't remember the year thinks it might have been on the right      Chronic kidney disease     CKD (chronic kidney disease) stage 3, GFR 30-59 ml/min ([facility]) 9/25/2018    Colon polyp     COPD [facility]chronic obstructive pulmonary disease) ([facility])     Depressive disorder     Disease of thyroid gland     Heart murmur     Hypertension     Impaired fasting glucose     Intention tremor     last assessed: 8/18/2016    Osteochondropathy     Panic disorder without agoraphobia with mild panic attacks     Psychiatric disorder     anxiety    [facility]     Tubular adenoma of colon        Past Surgical History:   Procedure Laterality Date    AUGMENTATION BREAST      pt states during mammo she doesn't have any other surgery than the 2 biopsy    BREAST BIOPSY Left     pt doesn't recall when and believes the neg biop was on left    BREAST LUMPECTOMY W/ NEEDLE LOCALIZATION Left     description: benign last assessed; 8/21/2016    CHOLECYSTECTOMY      COLONOSCOPY      EGD      HYSTERECTOMY         Family History   Problem Relation Age of Onset    Alcohol abuse Mother     Asthma Mother     Cancer Mother     Mental illness Mother     Osteoarthritis Mother     Gout Father     Cancer Father     Other Sister         carotid arterial disease    COPD Sister     Hyperlipidemia Sister     Hypertension Sister     Heart attack Sister     Breast cancer Sister     No Known Problems Daughter     No Known Problems Maternal Grandmother     No Known Problems Paternal Grandmother     Lung cancer Sister     No Known Problems Sister     No Known Problems Daughter     No Known Problems Daughter      I have reviewed and agree with the history as documented  E-Cigarette/Vaping    E-Cigarette Use Never User      E-Cigarette/Vaping Substances     Social History     Tobacco Use    Smoking status: Former Smoker    Smokeless tobacco: Never Used    Tobacco comment: 10 years ago   Vaping Use    Vaping Use: Never used   Substance Use Topics    Alcohol use: Yes     Comment: socially - Per allscripts - denies alcohol consumption    Drug use: No       Review of Systems   All other systems reviewed and are negative  Physical Exam  Physical Exam  Vitals reviewed  Constitutional:       General: She is not in acute distress  Appearance: Normal appearance  She is not ill-appearing  HENT:      Mouth/Throat:      Mouth: Mucous membranes are moist    Eyes:      Conjunctiva/sclera: Conjunctivae normal    Cardiovascular:      Rate and Rhythm: Normal rate  Pulses: Normal pulses  Pulmonary:      Effort: Pulmonary effort is normal    Abdominal:      General: Abdomen is flat  Musculoskeletal:         General: No swelling  Normal range of motion  Cervical back: Neck supple  Comments: Medial and lateral knee tenderness with no significant erythema, edema, significant warmth as compared to the other knee with intact range of motion though painful, intact distal motor and sensation   Skin:     General: Skin is warm and dry  Capillary Refill: Capillary refill takes less than 2 seconds  Neurological:      General: No focal deficit present  Mental Status: She is alert  Sensory: No sensory deficit  Motor: No weakness  Psychiatric:         Mood and Affect: Mood normal          Vital Signs  ED Triage Vitals [01/12/22 1346]   Temperature Pulse Respirations Blood Pressure SpO2   98 4 °F (36 9 °C) 98 18 153/65 96 %      Temp Source Heart Rate Source Patient Position - Orthostatic VS BP Location FiO2 (%)   Oral Monitor Sitting Left arm --      Pain Score       8           Vitals:    01/12/22 1346   BP: 153/65   Pulse: 98   Patient Position - Orthostatic VS: Sitting         Visual Acuity      ED Medications  Medications   acetaminophen (TYLENOL) tablet 975 mg (has no administration in time range)       Diagnostic Studies  Results Reviewed     None                 XR knee 1 or 2 vw right   ED Interpretation by Pete Ambrocio DO (01/12 1926)   NAD      VAS lower limb venous duplex study, unilateral/limited    (Results Pending)              Procedures  Procedures         ED Course  ED Course as of 01/12/22 1929 Wed Jan 12, 2022 1921 Negative for DVT               Identification of Seniors at Risk      Most Recent Value   (ISAR) Identification of Seniors at Risk    Before the illness or injury that brought you to the Emergency, did you need someone to help you on a regular basis? 0 Filed at: 01/12/2022 1349   In the last 24 hours, have you needed more help than usual? 0 Filed at: 01/12/2022 1349   Have you been hospitalized for one or more nights during the past 6 months? 0 Filed at: 01/12/2022 1349   In general, do you see well? 0 Filed at: 01/12/2022 1349   In general, do you have serious problems with your memory? 0 Filed at: 01/12/2022 1349   Do you take more than three different medications every day?  1 Filed at: 01/12/2022 1349   ISAR Score 1 Filed at: 01/12/2022 1349                                    MDM  Number of Diagnoses or Management Options  Diagnosis management comments: Patient is a 41-year-old female past medical history of hypertension, hyperlipidemia, COPD, CKD stage 3, CVA, breast cancer, anxiety depression presenting with right leg swelling  Patient is well-appearing bedside stable vitals and in no acute distress  She has non erythematous, nonedematous knee with intact range of motion though mildly painful with tenderness the medial and lateral aspects only and no tenderness to the DVT system  Low suspicion for DVT however patient since that she was sent here specifically DVT ultrasound that and has prior DVT therefore will obtain ultrasound, knee x-rays, give pain control and if unremarkable placed in knee immobilizer with orthopedic follow-up  Disposition  Final diagnoses:   Knee pain     Time reflects when diagnosis was documented in both MDM as applicable and the Disposition within this note     Time User Action Codes Description Comment    1/12/2022  7:23 PM Mira Clancy Add [O09 [id number]] Knee pain       ED Disposition     ED Disposition Condition Date/Time Comment    Discharge Stable Wed Jan 12, 2022  7:23 PM Damion Brizuela discharge to home/self care  Follow-up Information     Follow up With Specialties Details Why Contact Info Additional [phone] [facility] Orthopedic Surgery Schedule an appointment as soon as possible for a visit   [address] [place] [id number]  [address] [place] [address] [facility], [address], [place], [place], [id number] [phone]          Patient's Medications   Discharge Prescriptions    No medications on file       No discharge procedures on file      PDMP Review     None          ED Provider  Electronically Signed by           Pavan Vela DO  01/12/22 9318

## 2022-01-12 NOTE — PROGRESS NOTES
[facility]        NAME: Jamie Kowalski is a 67 y o  female  : 1945    MRN: [id number]  DATE: 2022  TIME: 1:04 PM    Assessment and Plan   Pain and swelling of lower leg, right [M79 661, M79 89]  1  Pain and swelling of lower leg, right  XR tibia fibula 2 vw right    Transfer to other facility   2  Acute pain of right knee  CANCELED: XR knee 3 vw right non injury         Patient Instructions     Patient Instructions   No acute abnormality on xray, concerns for possible DVT  Recommend ER evaluation for DVT rule out  **Portions of the record may have been created with voice recognition software  Occasional wrong word or "sound a like" substitutions may have occurred due to the inherent limitations of voice recognition software  Read the chart carefully and recognize, using context, where substitutions have occurred  **     Chief Complaint     Chief Complaint   Patient presents with    Knee Pain     right knee swelling and pain 3-4 days  Denies trauma  Sometimes feels hot  Walking makes pain worse  Not taking anything for pain  History of Present Illness     67yo female presents to clinic with complaints of right lower leg pain and swelling x 1 week  She denies known injury  She reports swelling from her knee that occasionally travels to her foot and ankle that comes and goes  Her pain is worsening and is 8/10  She admits to a mostly sedentary lifestyle and has a history of blood clots in her legs during pregnancy about 50 years ago  She denies chest pain, shortnes of breath, fever, chills, weakness, numbness  Review of Systems     Review of Systems   Respiratory: Negative for shortness of breath  Cardiovascular: Negative for chest pain  Musculoskeletal: Positive for arthralgias, gait problem, joint swelling and myalgias  Negative for back pain and neck pain  Skin: Negative for color change, pallor, rash and wound     Neurological: Negative for dizziness, weakness, numbness and headaches  Current Medications     Current Outpatient Medications:     acetaminophen (TYLENOL) 500 mg tablet, Take 500 mg by mouth every 6 (six) hours as needed for mild pain Take 2 tablets by mouth every 6-8 hours as needed, Disp: , Rfl:     amLODIPine (NORVASC) 10 mg tablet, TAKE 1 TABLET(10 MG) BY MOUTH DAILY, Disp: 90 tablet, Rfl: 1    Cholecalciferol (VITAMIN D3) 50 MCG (2000 UT) TABS, Take 2,000 Units by mouth daily, Disp: , Rfl:     Dexilant 60 MG capsule, TAKE 1 CAPSULE(60 MG) BY MOUTH DAILY BEFORE BREAKFAST, Disp: 90 capsule, Rfl: 1    Diclofenac Sodium (VOLTAREN) 1 %, Apply 2 g topically 4 (four) times a day, Disp: 100 g, Rfl: 1    gabapentin (NEURONTIN) 100 mg capsule, TAKE 2 CAPSULE BY MOUTH TWICE DAILY, Disp: 120 capsule, Rfl: 1    gabapentin (NEURONTIN) 600 MG tablet, Take 600 mg by mouth daily at bedtime, Disp: , Rfl:     levothyroxine 50 mcg tablet, TAKE 1 TABLET(50 MCG) BY MOUTH DAILY, Disp: 90 tablet, Rfl: 0    lisinopril (ZESTRIL) 20 mg tablet, TAKE 1 TABLET(20 MG) BY MOUTH DAILY, Disp: 90 tablet, Rfl: 1    Misc  Devices (Round Shower Stool) MISC, Use daily Use as needed in the shower  , Disp: 1 each, Rfl: 0    potassium chloride (K-DUR,KLOR-CON) 20 mEq tablet, Take 1 tablet (20 mEq total) by mouth daily, Disp: 90 tablet, Rfl: 0    rosuvastatin (CRESTOR) 10 MG tablet, TAKE 1 TABLET(10 MG) BY MOUTH DAILY, Disp: 90 tablet, Rfl: 1    Zoster Vac Recomb Adjuvanted (Shingrix) 50 MCG/0 5ML SUSR, 0 5mL IM for one dose, followed by 0 5mL IM 2-6 months after first dose, Disp: 1 each, Rfl: 1    Current Allergies     Allergies as of 01/12/2022 - Reviewed 01/12/2022   Allergen Reaction Noted    Amoxicillin      Codeine  08/18/2016    Morphine              The following portions of the patient's history were reviewed and updated as appropriate: allergies, current medications, past family history, past medical history, past social history, past surgical history and problem list      Past Medical History:   Diagnosis Date    Acute ill-defined cerebrovascular disease     Benign essential tremor     [facility]     pt states doesn't remember the year thinks it might have been on the right   Chronic kidney disease     CKD (chronic kidney disease) stage 3, GFR 30-59 ml/min ([facility]) 9/25/2018    Colon polyp     COPD [facility]chronic obstructive pulmonary disease) ([facility])     Depressive disorder     Disease of thyroid gland     Heart murmur     Hypertension     Impaired fasting glucose     Intention tremor     last assessed: 8/18/2016    Osteochondropathy     Panic disorder without agoraphobia with mild panic attacks     Psychiatric disorder     anxiety    [facility]     Tubular adenoma of colon        Past Surgical History:   Procedure Laterality Date    AUGMENTATION BREAST      pt states during mammo she doesn't have any other surgery than the 2 biopsy    BREAST BIOPSY Left     pt doesn't recall when and believes the neg biop was on left    BREAST LUMPECTOMY W/ NEEDLE LOCALIZATION Left     description: benign last assessed; 8/21/2016    CHOLECYSTECTOMY      COLONOSCOPY      EGD      HYSTERECTOMY         Family History   Problem Relation Age of Onset    Alcohol abuse Mother     Asthma Mother     Cancer Mother     Mental illness Mother     Osteoarthritis Mother     Gout Father     Cancer Father     Other Sister         carotid arterial disease    COPD Sister     Hyperlipidemia Sister     Hypertension Sister     Heart attack Sister     Breast cancer Sister     No Known Problems Daughter     No Known Problems Maternal Grandmother     No Known Problems Paternal Grandmother     Lung cancer Sister     No Known Problems Sister     No Known Problems Daughter     No Known Problems Daughter          Medications have been verified          Objective     /76   Pulse 79   Temp 98 2 °F (36 8 °C)   Resp 16   Wt 128 kg (282 lb)   SpO2 98%   BMI 49 95 kg/m²        Physical Exam     Physical Exam  Vitals and nursing note reviewed  Constitutional:       Appearance: Normal appearance  HENT:      Head: Normocephalic and atraumatic  Cardiovascular:      Rate and Rhythm: Normal rate and regular rhythm  Pulses: Normal pulses  Pulmonary:      Effort: Pulmonary effort is normal       Breath sounds: Normal breath sounds  Musculoskeletal:      Right knee: Swelling present  Decreased range of motion (decreased extension)  Tenderness present  Right lower leg: Tenderness present  No bony tenderness  Legs:       Comments: -homans   Skin:     General: Skin is warm and dry  Capillary Refill: Capillary refill takes less than 2 seconds  Findings: No bruising or erythema  Neurological:      Mental Status: She is alert  Sensory: No sensory deficit

## 2022-01-13 NOTE — ED NOTES
Knee immobilizer applied  Crutch walking demonstrated  By  Pt   D/c home        Brad Camarillo RN  01/12/22 2012

## 2022-02-07 ENCOUNTER — OFFICE VISIT (OUTPATIENT)
Dept: FAMILY MEDICINE CLINIC | Facility: CLINIC | Age: 77
End: 2022-02-07
Payer: MEDICARE

## 2022-02-07 VITALS
TEMPERATURE: 98.1 F | WEIGHT: 278 LBS | DIASTOLIC BLOOD PRESSURE: 74 MMHG | HEIGHT: 63 IN | HEART RATE: 118 BPM | OXYGEN SATURATION: 98 % | SYSTOLIC BLOOD PRESSURE: 130 MMHG | BODY MASS INDEX: 49.26 KG/M2

## 2022-02-07 DIAGNOSIS — Z01.818 PREOP GENERAL PHYSICAL EXAM: Primary | ICD-10-CM

## 2022-02-07 PROCEDURE — 99213 OFFICE O/P EST LOW 20 MIN: CPT | Performed by: NURSE PRACTITIONER

## 2022-02-07 RX ORDER — ASPIRIN 81 MG/1
81 TABLET, CHEWABLE ORAL DAILY
COMMUNITY

## 2022-02-07 NOTE — PROGRESS NOTES
[facility] [address] [place] [address]    NAME: Shi Noel  AGE: 67 y o  SEX: female  : 1945     DATE: 2022    Internal Medicine Pre-Operative Evaluation      Chief Complaint: Pre-operative Evaluation     Surgery: cataract surgery, 2/10 left and right eye 3/3  Anticipated Date of Surgery: 2/10, 3/3  Referring Provider: Jordana Matson MD       History of Present Illness:     Shi Noel is a 67 y o  female who presents to the office today for a preoperative consultation at the request of surgeon Dr Bianca Frazier who plans on performing cataract surgery on 2/10 and 3/3  Planned anesthesia is local  Patient has a bleeding risk of: use of Ca-channel blockers (see med list)  Patient does not have objections to receiving blood products if needed  Current anti-platelet/anti-coagulation medications that the patient is prescribed includes: Aspirin     Assessment of Chronic Conditions:   - COPD: stable  - Hypertension: stable     Assessment of Cardiac Risk:  · Denies unstable or severe angina or MI in the last 6 weeks or history of stent placement in the last year   · Denies decompensated heart failure (e g  New onset heart failure, NYHA functional class IV heart failure, or worsening existing heart failure)  · Denies significant arrhythmias such as high grade AV block, symptomatic ventricular arrhythmia, newly recognized ventricular tachycardia, supraventricular tachycardia with resting heart rate >100, or symptomatic bradycardia  · Denies severe heart valve disease including aortic stenosis or symptomatic mitral stenosis     Exercise Capacity:  · Able to walk 4 blocks without symptoms?: No, due to right knee pain  · Able to walk 2 flights without symptoms?: No, due to right knee pain    Prior Anesthesia Reactions: No     Personal history of venous thromboembolic disease? Yes, 50 years ago    History of steroid use for >2 weeks within last year? No    STOP-BANG Sleep Apnea Screening Questionnaire:         Review of Systems:     Review of Systems   Constitutional: Negative for chills and fever  HENT: Negative for ear pain and sore throat  Eyes: Negative for pain and visual disturbance  Respiratory: Negative for cough and shortness of breath  Cardiovascular: Negative for chest pain and palpitations  Gastrointestinal: Negative for abdominal pain and vomiting  Genitourinary: Negative for dysuria and hematuria  Musculoskeletal: Positive for arthralgias (right knee)  Negative for back pain  Skin: Negative for color change and rash  Neurological: Negative for seizures and syncope  All other systems reviewed and are negative  Current Problem List:     Patient Active Problem List   Diagnosis    Intention tremor    Benign essential tremor    Depression with anxiety    Essential hypertension    CKD (chronic kidney disease) stage 3, GFR 30-59 ml/min [facility]    Hypothyroidism    Shortness of breath    Mixed hyperlipidemia    History of breast cancer    Mild cognitive impairment    [facility] malignant ne[facility] of [facility]    IFG (impaired fasting glucose)    Morbid obesity with BMI of 40 0-44 9, adult ([facility])    Compression fracture of fifth lumbar vertebra ([facility])    L4-L5 disc bulge    Sciatica of right side       Allergies:      Allergies   Allergen Reactions    Amoxicillin     Codeine     Morphine        Physical Exam:       Current Outpatient Medications:     acetaminophen (TYLENOL) 500 mg tablet, Take 500 mg by mouth every 6 (six) hours as needed for mild pain Take 2 tablets by mouth every 6-8 hours as needed, Disp: , Rfl:     amLODIPine (NORVASC) 10 mg tablet, TAKE 1 TABLET(10 MG) BY MOUTH DAILY, Disp: 90 tablet, Rfl: 1    aspirin 81 mg chewable tablet, Chew 81 mg daily, Disp: , Rfl:     Cholecalciferol (VITAMIN D3) 50 MCG (2000 UT) TABS, Take 2,000 Units by mouth daily, Disp: , Rfl:     Dexilant 60 MG capsule, TAKE 1 CAPSULE(60 MG) BY MOUTH DAILY BEFORE BREAKFAST, Disp: 90 capsule, Rfl: 1    Diclofenac Sodium (VOLTAREN) 1 %, Apply 2 g topically 4 (four) times a day, Disp: 100 g, Rfl: 1    gabapentin (NEURONTIN) 100 mg capsule, TAKE 2 CAPSULE BY MOUTH TWICE DAILY, Disp: 120 capsule, Rfl: 1    levothyroxine 50 mcg tablet, TAKE 1 TABLET(50 MCG) BY MOUTH DAILY, Disp: 90 tablet, Rfl: 0    lisinopril (ZESTRIL) 20 mg tablet, TAKE 1 TABLET(20 MG) BY MOUTH DAILY, Disp: 90 tablet, Rfl: 1    Misc  Devices (Round Shower Stool) MISC, Use daily Use as needed in the shower  , Disp: 1 each, Rfl: 0    potassium chloride (K-DUR,KLOR-CON) 20 mEq tablet, Take 1 tablet (20 mEq total) by mouth daily, Disp: 90 tablet, Rfl: 0    rosuvastatin (CRESTOR) 10 MG tablet, TAKE 1 TABLET(10 MG) BY MOUTH DAILY, Disp: 90 tablet, Rfl: 1    Zoster Vac Recomb Adjuvanted (Shingrix) 50 MCG/0 5ML SUSR, 0 5mL IM for one dose, followed by 0 5mL IM 2-6 months after first dose, Disp: 1 each, Rfl: 1    Past Medical History:       Past Medical History:   Diagnosis Date    Acute ill-defined cerebrovascular disease     Benign essential tremor     [facility]     pt states doesn't remember the year thinks it might have been on the right      Chronic kidney disease     CKD (chronic kidney disease) stage 3, GFR 30-59 ml/min ([facility]) 9/25/2018    Colon polyp     COPD (chronic obstructive pulmonary disease) ([facility])     Depressive disorder     Disease of thyroid gland     Heart murmur     Hypertension     Impaired fasting glucose     Intention tremor     last assessed: 8/18/2016    Osteochondropathy     Panic disorder without agoraphobia with mild panic attacks     Psychiatric disorder     anxiety    St[facility]     Tubular adenoma of colon         Past Surgical History:   Procedure Laterality Date    AUGMENTATION BREAST      pt states during mammo she doesn't have any other surgery than the 2 biopsy    BREAST BIOPSY Left     pt doesn't recall when and believes the neg biop was on left    BREAST LUMPECTOMY W/ NEEDLE LOCALIZATION Left     description: benign last assessed; 8/21/2016    CHOLECYSTECTOMY      COLONOSCOPY      EGD      HYSTERECTOMY          Family History   Problem Relation Age of Onset    Alcohol abuse Mother     Asthma Mother     Cancer Mother     Mental illness Mother     Osteoarthritis Mother     Gout Father     Cancer Father     Other Sister         carotid arterial disease    COPD Sister    [place] Hyperlipidemia Sister     Hypertension Sister     Heart attack Sister     Breast cancer Sister     No Known Problems Daughter     No Known Problems Maternal Grandmother     No Known Problems Paternal Grandmother     Lung cancer Sister     No Known Problems Sister     No Known Problems Daughter     No Known Problems Daughter         Social History     Socioeconomic History    Marital status: Single     Spouse name: Not on file    Number of children: Not on file    Years of education: Not on file    Highest education level: Not on file   Occupational History    Occupation: Retired   Tobacco Use    Smoking status: Former Smoker    Smokeless tobacco: Never Used    Tobacco comment: 10 years ago   Vaping Use    Vaping Use: Never used   Substance and Sexual Activity    Alcohol use: Yes     Comment: socially - Per allscripts - denies alcohol consumption    Drug use: No    Sexual activity: Not on file   Other Topics Concern    Not on file   Social History Narrative         Always uses seat belt     Social Determinants of Health     Financial Resource Strain: Not on file   Food Insecurity: Not on file   Transportation Needs: Not on file   Physical Activity: Not on file   Stress: Not on file   Social Connections: Not on file   Intimate Partner Violence: Not on file   Housing Stability: Not on file        Physical Exam:     Physical Exam  Constitutional:       Appearance: She is well-developed  She is obese  Cardiovascular:      Rate and Rhythm: Normal rate and regular rhythm  Heart sounds: Normal heart sounds  No murmur heard  Pulmonary:      Effort: Pulmonary effort is normal  No respiratory distress  Breath sounds: Normal breath sounds  Skin:     General: Skin is warm and dry  Neurological:      Mental Status: She is alert and oriented to person, place, and time  Data:     Pre-operative work-up  CBC:     Chemistry Profile:   Results from last 6 Months   Lab Units 09/02/21  1216   POTASSIUM mmol/L 4 4   CHLORIDE mmol/L 110*   CO2 mmol/L 31   BUN mg/dL 14   CREATININE mg/dL 1 35*   GLUCOSE FASTING mg/dL 110*   CALCIUM mg/dL 9 1   AST U/L 15   ALT U/L 22   ALK PHOS U/L 84   EGFR ml/min/1 73sq m 38     Coagulation Studies:     Endocrine Studies:   Results from last 6 Months   Lab Units 09/02/21  1216   TSH 3RD GENERATON uIU/mL 1 675       EKG: EKG: N/A  Chest x-ray: N/A    Previous cardiopulmonary studies within the past year:  · Echocardiogram: N/A  · Cardiac Catheterization: N/A  · Stress Test: N/A  · Pulmonary Function Testing: N/A      Assessment & Recommendations:     1  Preop general physical exam      Continue medications as prescribed  Pre-Op Evaluation Assessment  67 y o  female with planned surgery: cataract  Known risk factors for perioperative complications: None  Cardiac Risk Estimation: per the Revised Cardiac Risk Index (Circ  100:1043, 1999), the patient's risk factors for cardiac complications include none, putting her in: RCI RISK CLASS I (0 risk factors, risk of major cardiac compl  appr  0 5%)  Current medications which may produce withdrawal symptoms if withheld perioperatively: none  Pre-Op Evaluation Plan  1  Further preoperative workup as follows:   - None; no further preoperative work-up is required    2  Change in medication regimen before surgery:   - None, continue medication regimen including morning of surgery, with sip of water    3  Prophylaxis for cardiac events with perioperative beta-blockers: not indicated  4  Patient requires further consultation with: None    Clearance  Patient is CLEARED for surgery without any additional cardiac testing       [address] [address]  Phone#  [phone]  Fax#  [phone]

## 2022-03-04 DIAGNOSIS — E78.2 MIXED HYPERLIPIDEMIA: ICD-10-CM

## 2022-03-04 DIAGNOSIS — R53.83 OTHER FATIGUE: ICD-10-CM

## 2022-03-04 DIAGNOSIS — I10 ESSENTIAL HYPERTENSION: ICD-10-CM

## 2022-03-04 DIAGNOSIS — R79.89 ELEVATED TSH: ICD-10-CM

## 2022-03-04 NOTE — TELEPHONE ENCOUNTER
Medication refill  PATIENT IS REQUESTING A ONE MONTH SUPPLY not a three month supply  She cannot afford three month supply anymore

## 2022-03-08 ENCOUNTER — HOSPITAL ENCOUNTER (OUTPATIENT)
Dept: MAMMOGRAPHY | Facility: CLINIC | Age: 77
Discharge: HOME | End: 2022-03-08
Payer: MEDICARE

## 2022-03-08 VITALS — WEIGHT: 278 LBS | HEIGHT: 63 IN | BODY MASS INDEX: 49.26 KG/M2

## 2022-03-08 DIAGNOSIS — Z12.31 ENCOUNTER FOR SCREENING MAMMOGRAM FOR BREAST CANCER: ICD-10-CM

## 2022-03-08 PROCEDURE — 77063 BREAST TOMOSYNTHESIS BI: CPT

## 2022-03-08 PROCEDURE — 77067 SCR MAMMO BI INCL CAD: CPT

## 2022-03-11 RX ORDER — AMLODIPINE BESYLATE 10 MG/1
10 TABLET ORAL DAILY
Qty: 90 TABLET | Refills: 1 | Status: SHIPPED | OUTPATIENT
Start: 2022-03-11 | End: 2022-06-09

## 2022-03-11 RX ORDER — LEVOTHYROXINE SODIUM 0.05 MG/1
50 TABLET ORAL DAILY
Qty: 90 TABLET | Refills: 0 | Status: SHIPPED | OUTPATIENT
Start: 2022-03-11 | End: 2022-03-11

## 2022-03-11 RX ORDER — LEVOTHYROXINE SODIUM 0.05 MG/1
50 TABLET ORAL DAILY
Qty: 90 TABLET | Refills: 0 | Status: SHIPPED | OUTPATIENT
Start: 2022-03-11 | End: 2022-06-14

## 2022-03-11 RX ORDER — ROSUVASTATIN CALCIUM 10 MG/1
10 TABLET, COATED ORAL DAILY
Qty: 90 TABLET | Refills: 1 | Status: SHIPPED | OUTPATIENT
Start: 2022-03-11 | End: 2022-06-09

## 2022-03-11 RX ORDER — LISINOPRIL 20 MG/1
20 TABLET ORAL DAILY
Qty: 90 TABLET | Refills: 1 | Status: SHIPPED | OUTPATIENT
Start: 2022-03-11

## 2022-03-24 DIAGNOSIS — E87.6 HYPOKALEMIA: ICD-10-CM

## 2022-03-24 RX ORDER — POTASSIUM CHLORIDE 20 MEQ/1
TABLET, EXTENDED RELEASE ORAL
Qty: 90 TABLET | Refills: 0 | Status: SHIPPED | OUTPATIENT
Start: 2022-03-24 | End: 2022-05-24

## 2022-03-30 NOTE — PATIENT INSTRUCTIONS
Reviewed all with patient  Okay to  the chair lift  I put the prescription in your medical record  I would prefer you not use Advil or Aleve as her kidney function is slightly low however you can use 2 in of diclofenac gel to each knee up to 4 times daily if needed  Also you can use the same dose of gabapentin at night 600 mg but during the day increase to 200 in the morning and 200 in the afternoon  If the pain is still bad let me know I will refer you to a different pain management physician  Follow-up here in 3 months 
2 seconds or less

## 2022-04-04 ENCOUNTER — OFFICE VISIT (OUTPATIENT)
Dept: FAMILY MEDICINE CLINIC | Facility: CLINIC | Age: 77
End: 2022-04-04
Payer: MEDICARE

## 2022-04-04 ENCOUNTER — TELEPHONE (OUTPATIENT)
Dept: FAMILY MEDICINE CLINIC | Facility: CLINIC | Age: 77
End: 2022-04-04

## 2022-04-04 VITALS
TEMPERATURE: 98.2 F | OXYGEN SATURATION: 99 % | BODY MASS INDEX: 49.79 KG/M2 | WEIGHT: 281 LBS | HEIGHT: 63 IN | HEART RATE: 88 BPM | SYSTOLIC BLOOD PRESSURE: 126 MMHG | DIASTOLIC BLOOD PRESSURE: 78 MMHG

## 2022-04-04 DIAGNOSIS — E66.01 MORBID OBESITY WITH BMI OF 45.0-49.9, ADULT (HCC): ICD-10-CM

## 2022-04-04 DIAGNOSIS — R73.01 IFG (IMPAIRED FASTING GLUCOSE): ICD-10-CM

## 2022-04-04 DIAGNOSIS — N18.32 STAGE 3B CHRONIC KIDNEY DISEASE (HCC): ICD-10-CM

## 2022-04-04 DIAGNOSIS — E28.39 MENOPAUSE OVARIAN FAILURE: Primary | ICD-10-CM

## 2022-04-04 DIAGNOSIS — E03.9 HYPOTHYROIDISM, UNSPECIFIED TYPE: ICD-10-CM

## 2022-04-04 DIAGNOSIS — I10 ESSENTIAL HYPERTENSION: ICD-10-CM

## 2022-04-04 DIAGNOSIS — J44.9 CHRONIC OBSTRUCTIVE PULMONARY DISEASE, UNSPECIFIED COPD TYPE (HCC): ICD-10-CM

## 2022-04-04 DIAGNOSIS — K21.00 REFLUX ESOPHAGITIS: ICD-10-CM

## 2022-04-04 DIAGNOSIS — F41.8 DEPRESSION WITH ANXIETY: ICD-10-CM

## 2022-04-04 DIAGNOSIS — E78.2 MIXED HYPERLIPIDEMIA: ICD-10-CM

## 2022-04-04 PROBLEM — F33.2 SEVERE EPISODE OF RECURRENT MAJOR DEPRESSIVE DISORDER, WITHOUT PSYCHOTIC FEATURES (HCC): Status: ACTIVE | Noted: 2022-04-04

## 2022-04-04 PROCEDURE — 99214 OFFICE O/P EST MOD 30 MIN: CPT

## 2022-04-04 RX ORDER — DEXLANSOPRAZOLE 60 MG/1
60 CAPSULE, DELAYED RELEASE ORAL DAILY
Qty: 90 CAPSULE | Refills: 1 | Status: SHIPPED | OUTPATIENT
Start: 2022-04-04 | End: 2022-04-05 | Stop reason: SDUPTHER

## 2022-04-04 NOTE — ASSESSMENT & PLAN NOTE
Discussed dietary changes and increasing daily exercise like walking    Limit carb intake, research Mediterranean diet

## 2022-04-04 NOTE — ASSESSMENT & PLAN NOTE
Lab Results   Component Value Date    EGFR 38 09/02/2021    EGFR 37 01/18/2021    EGFR 42 08/06/2020    CREATININE 1 35 (H) 09/02/2021    CREATININE 1 37 (H) 01/18/2021    CREATININE 1 24 08/06/2020   Blood work ordered

## 2022-04-04 NOTE — PROGRESS NOTES
BMI Counseling: Body mass index is 49 78 kg/m²  The BMI is above normal  Nutrition recommendations include decreasing portion sizes, encouraging healthy choices of fruits and vegetables, decreasing fast food intake, consuming healthier snacks, limiting drinks that contain sugar, moderation in carbohydrate intake, increasing intake of lean protein, reducing intake of saturated and trans fat and reducing intake of cholesterol  Exercise recommendations include moderate physical activity 150 minutes/week and exercising 3-5 times per week  Rationale for BMI follow-up plan is due to patient being overweight or obese         Assessment/Plan:     Problem List Items Addressed This Visit        Endocrine    Hypothyroidism     Stable on Synthroid, blood work ordered         Relevant Orders    CBC and differential    Comprehensive metabolic panel    Hemoglobin A1C    Lipid panel    Microalbumin / creatinine urine ratio    TSH, 3rd generation with Free T4 reflex    IFG (impaired fasting glucose)     Blood work ordered check A1c         Relevant Orders    CBC and differential    Comprehensive metabolic panel    Hemoglobin A1C    Lipid panel    Microalbumin / creatinine urine ratio    TSH, 3rd generation with Free T4 reflex       Respiratory    Chronic obstructive pulmonary disease, unspecified COPD type ([facility] [address] )     Stable on current regiment            Cardiovascular and Mediastinum    Essential hypertension     At goal, blood pressure today 126/78         Relevant Orders    CBC and differential    Comprehensive metabolic panel    Hemoglobin A1C    Lipid panel    Microalbumin / creatinine urine ratio    TSH, 3rd generation with Free T4 reflex       Genitourinary    CKD (chronic kidney disease) stage 3, GFR 30-59 ml/min ([facility])     Lab Results   Component Value Date    EGFR 38 09/02/2021    EGFR 37 01/18/2021    EGFR 42 08/06/2020    CREATININE 1 35 (H) 09/02/2021    CREATININE 1 37 (H) 01/18/2021    CREATININE 1 24 08/06/2020   Blood work ordered         Relevant Orders    CBC and differential    Comprehensive metabolic panel    Hemoglobin A1C    Lipid panel    Microalbumin / creatinine urine ratio    TSH, 3rd generation with Free T4 reflex       Other    Depression with anxiety     Stable, not on any medications         Relevant Orders    CBC and differential    Comprehensive metabolic panel    Hemoglobin A1C    Lipid panel    Microalbumin / creatinine urine ratio    TSH, 3rd generation with Free T4 reflex    Mixed hyperlipidemia     Blood work ordered         Relevant Orders    CBC and differential    Comprehensive metabolic panel    Hemoglobin A1C    Lipid panel    Microalbumin / creatinine urine ratio    TSH, 3rd generation with Free T4 reflex    BMI 45 0-49 9, adult ([facility])     Discussed dietary changes and increasing daily exercise like walking  Limit carb intake, research Mediterranean diet           Other Visit Diagnoses     Menopause ovarian failure    -  Primary    Relevant Orders    DXA bone density spine hip and pelvis    Reflux esophagitis        Relevant Medications    dexlansoprazole (Dexilant) 60 MG capsule    Morbid obesity with BMI of 45 0-49 9, adult ([facility])                Subjective:      Patient ID: Wander Kay is a 67 y o  female  Patient presents to the office in order to establish care with new provider  She does not have any acute complaints today  She does have history of kidney disease, hypertension, COPD, depression, and hypothyroidism  She is doing well, she does live by herself but her sister lives close by  She does report increase in her weight and she is concerned that it might be affecting her health  Discussed adjusting her dietary intake of carbs and simple sugars, instructed to research Mediterranean diet, and possibly inches daily walking into her routine          The following portions of the patient's history were reviewed and updated as appropriate:   Past Medical History:  She has a past medical history of Acute ill-defined cerebrovascular disease, Benign essential tremor, Breast cancer ([facility] ), Chronic kidney disease, CKD (chronic kidney disease) stage 3, GFR 30-59 ml/min ([facility]) (9/25/2018), Colon polyp, COPD (chronic obstructive pulmonary disease) ([facility] ), Depressive disorder, Disease of thyroid gland, Heart murmur, Hypertension, Impaired fasting glucose, Intention tremor, Osteochondropathy, Panic disorder without agoraphobia with mild panic attacks, Psychiatric disorder, Stroke ([place] [id number] ), and Tubular adenoma of colon  ,  _______________________________________________________________________  Medical Problems:  does not have any pertinent problems on file ,  _______________________________________________________________________  Past Surgical History:   has a past surgical history that includes Hysterectomy; Cholecystectomy; AUGMENTATION BREAST; Breast lumpectomy w/ needle localization (Left); Colonoscopy; EGD; and Breast biopsy (Left)  ,  _______________________________________________________________________  Family History:  family history includes Alcohol abuse in her mother; Asthma in her mother; Breast cancer in her sister; COPD in her sister; Cancer in her father and mother; Gout in her father; Heart attack in her sister; Hyperlipidemia in her sister; Hypertension in her sister; Lung cancer in her sister; Mental illness in her mother; No Known Problems in her daughter, daughter, daughter, maternal grandmother, paternal grandmother, and sister; Osteoarthritis in her mother; Other in her sister  ,  _______________________________________________________________________  Social History:   reports that she has quit smoking  She has never used smokeless tobacco  She reports current alcohol use  She reports that she does not use drugs  ,  _______________________________________________________________________  Allergies:  is allergic to amoxicillin, codeine, and morphine     _______________________________________________________________________  Current Outpatient Medications   Medication Sig Dispense Refill    acetaminophen (TYLENOL) 500 mg tablet Take 500 mg by mouth every 6 (six) hours as needed for mild pain Take 2 tablets by mouth every 6-8 hours as needed      amLODIPine (NORVASC) 10 mg tablet Take 1 tablet (10 mg total) by mouth daily 90 tablet 1    aspirin 81 mg chewable tablet Chew 81 mg daily      Cholecalciferol (VITAMIN D3) 50 MCG (2000 UT) TABS Take 2,000 Units by mouth daily      dexlansoprazole (Dexilant) 60 MG capsule Take 1 capsule (60 mg total) by mouth daily 90 capsule 1    Diclofenac Sodium (VOLTAREN) 1 % Apply 2 g topically 4 (four) times a day 100 g 1    levothyroxine 50 mcg tablet Take 1 tablet (50 mcg total) by mouth daily 90 tablet 0    lisinopril (ZESTRIL) 20 mg tablet Take 1 tablet (20 mg total) by mouth daily 90 tablet 1    Misc  Devices (Round Shower Stool) MISC Use daily Use as needed in the shower  1 each 0    potassium chloride (K-DUR,KLOR-CON) 20 mEq tablet TAKE 1 TABLET(20 MEQ) BY MOUTH DAILY 90 tablet 0    rosuvastatin (CRESTOR) 10 MG tablet Take 1 tablet (10 mg total) by mouth daily 90 tablet 1    Zoster Vac Recomb Adjuvanted (Shingrix) 50 MCG/0 5ML SUSR 0 5mL IM for one dose, followed by 0 5mL IM 2-6 months after first dose 1 each 1     No current facility-administered medications for this visit      _______________________________________________________________________  Review of Systems   Constitutional: Positive for fatigue ( chronic)  Negative for chills and fever  HENT: Negative for ear pain and sore throat  Eyes: Negative for pain and visual disturbance  Respiratory: Positive for shortness of breath (With exertion)  Negative for cough  Cardiovascular: Negative for chest pain and palpitations  Gastrointestinal: Negative for abdominal pain and vomiting  Genitourinary: Negative for dysuria and hematuria  Musculoskeletal: Positive for arthralgias ( chronic), gait problem, joint swelling and myalgias  Negative for back pain  Skin: Negative for color change and rash  Neurological: Negative for seizures and syncope  Psychiatric/Behavioral: The patient is nervous/anxious  All other systems reviewed and are negative  Objective:  Vitals:    04/04/22 1542   BP: 126/78   Pulse: 88   Temp: 98 2 °F (36 8 °C)   SpO2: 99%   Weight: 127 kg (281 lb)   Height: 5' 3" (1 6 m)     Body mass index is 49 78 kg/m²  Physical Exam  Vitals and nursing note reviewed  Constitutional:       General: She is not in acute distress  Appearance: Normal appearance  She is obese  She is not ill-appearing  HENT:      Head: Normocephalic  Right Ear: External ear normal       Left Ear: External ear normal       Nose: Nose normal       Mouth/Throat:      Mouth: Mucous membranes are moist    Eyes:      Conjunctiva/sclera: Conjunctivae normal       Pupils: Pupils are equal, round, and reactive to light  Cardiovascular:      Rate and Rhythm: Normal rate and regular rhythm  Pulses: Normal pulses  Heart sounds: Normal heart sounds  No murmur heard  Pulmonary:      Effort: Pulmonary effort is normal  No respiratory distress  Breath sounds: Normal breath sounds  No wheezing  Abdominal:      General: Bowel sounds are normal       Palpations: Abdomen is soft  Musculoskeletal:         General: Swelling (Chronic right knee swelling) present  No tenderness, deformity or signs of injury  Normal range of motion  Cervical back: Normal range of motion  No tenderness  Right lower leg: Edema present  Left lower leg: Edema present  Lymphadenopathy:      Cervical: No cervical adenopathy  Skin:     General: Skin is warm and dry  Capillary Refill: Capillary refill takes less than 2 seconds  Neurological:      Mental Status: She is alert and oriented to person, place, and time        Gait: Gait abnormal ( ambulates slowly)  Psychiatric:         Mood and Affect: Mood normal          Behavior: Behavior normal          Thought Content:  Thought content normal          Judgment: Judgment normal

## 2022-04-05 DIAGNOSIS — K21.00 REFLUX ESOPHAGITIS: ICD-10-CM

## 2022-04-05 RX ORDER — DEXLANSOPRAZOLE 60 MG/1
60 CAPSULE, DELAYED RELEASE ORAL DAILY
Qty: 90 CAPSULE | Refills: 1 | Status: SHIPPED | OUTPATIENT
Start: 2022-04-05 | End: 2022-04-13

## 2022-04-08 ENCOUNTER — APPOINTMENT (OUTPATIENT)
Dept: LAB | Facility: HOSPITAL | Age: 77
End: 2022-04-08
Payer: MEDICARE

## 2022-04-08 DIAGNOSIS — E78.2 MIXED HYPERLIPIDEMIA: ICD-10-CM

## 2022-04-08 DIAGNOSIS — E03.9 HYPOTHYROIDISM, UNSPECIFIED TYPE: ICD-10-CM

## 2022-04-08 DIAGNOSIS — F41.8 DEPRESSION WITH ANXIETY: ICD-10-CM

## 2022-04-08 DIAGNOSIS — I10 ESSENTIAL HYPERTENSION: ICD-10-CM

## 2022-04-08 DIAGNOSIS — N18.32 STAGE 3B CHRONIC KIDNEY DISEASE (HCC): ICD-10-CM

## 2022-04-08 DIAGNOSIS — R73.01 IFG (IMPAIRED FASTING GLUCOSE): ICD-10-CM

## 2022-04-08 LAB
ALBUMIN SERPL BCP-MCNC: 3.5 G/DL (ref 3.5–5)
ALP SERPL-CCNC: 84 U/L (ref 46–116)
ALT SERPL W P-5'-P-CCNC: 25 U/L (ref 12–78)
ANION GAP SERPL CALCULATED.3IONS-SCNC: 4 MMOL/L (ref 4–13)
AST SERPL W P-5'-P-CCNC: 20 U/L (ref 5–45)
BASOPHILS # BLD AUTO: 0.03 THOUSANDS/ΜL (ref 0–0.1)
BASOPHILS NFR BLD AUTO: 0 % (ref 0–1)
BILIRUB SERPL-MCNC: 0.48 MG/DL (ref 0.2–1)
BUN SERPL-MCNC: 11 MG/DL (ref 5–25)
CALCIUM SERPL-MCNC: 9.3 MG/DL (ref 8.3–10.1)
CHLORIDE SERPL-SCNC: 106 MMOL/L (ref 100–108)
CHOLEST SERPL-MCNC: 119 MG/DL
CO2 SERPL-SCNC: 29 MMOL/L (ref 21–32)
CREAT SERPL-MCNC: 1.29 MG/DL (ref 0.6–1.3)
CREAT UR-MCNC: 78.2 MG/DL
EOSINOPHIL # BLD AUTO: 0.17 THOUSAND/ΜL (ref 0–0.61)
EOSINOPHIL NFR BLD AUTO: 2 % (ref 0–6)
ERYTHROCYTE [DISTWIDTH] IN BLOOD BY AUTOMATED COUNT: 14.3 % (ref 11.6–15.1)
EST. AVERAGE GLUCOSE BLD GHB EST-MCNC: 114 MG/DL
GFR SERPL CREATININE-BSD FRML MDRD: 40 ML/MIN/1.73SQ M
GLUCOSE P FAST SERPL-MCNC: 104 MG/DL (ref 65–99)
HBA1C MFR BLD: 5.6 %
HCT VFR BLD AUTO: 45 % (ref 34.8–46.1)
HDLC SERPL-MCNC: 51 MG/DL
HGB BLD-MCNC: 14.1 G/DL (ref 11.5–15.4)
IMM GRANULOCYTES # BLD AUTO: 0.02 THOUSAND/UL (ref 0–0.2)
IMM GRANULOCYTES NFR BLD AUTO: 0 % (ref 0–2)
LDLC SERPL CALC-MCNC: 45 MG/DL (ref 0–100)
LYMPHOCYTES # BLD AUTO: 2.32 THOUSANDS/ΜL (ref 0.6–4.47)
LYMPHOCYTES NFR BLD AUTO: 33 % (ref 14–44)
MCH RBC QN AUTO: 27.5 PG (ref 26.8–34.3)
MCHC RBC AUTO-ENTMCNC: 31.3 G/DL (ref 31.4–37.4)
MCV RBC AUTO: 88 FL (ref 82–98)
MICROALBUMIN UR-MCNC: 5 MG/L (ref 0–20)
MICROALBUMIN/CREAT 24H UR: 6 MG/G CREATININE (ref 0–30)
MONOCYTES # BLD AUTO: 0.46 THOUSAND/ΜL (ref 0.17–1.22)
MONOCYTES NFR BLD AUTO: 7 % (ref 4–12)
NEUTROPHILS # BLD AUTO: 3.95 THOUSANDS/ΜL (ref 1.85–7.62)
NEUTS SEG NFR BLD AUTO: 58 % (ref 43–75)
NONHDLC SERPL-MCNC: 68 MG/DL
NRBC BLD AUTO-RTO: 0 /100 WBCS
PLATELET # BLD AUTO: 240 THOUSANDS/UL (ref 149–390)
PMV BLD AUTO: 11.2 FL (ref 8.9–12.7)
POTASSIUM SERPL-SCNC: 3.9 MMOL/L (ref 3.5–5.3)
PROT SERPL-MCNC: 7.1 G/DL (ref 6.4–8.2)
RBC # BLD AUTO: 5.13 MILLION/UL (ref 3.81–5.12)
SODIUM SERPL-SCNC: 139 MMOL/L (ref 136–145)
T4 FREE SERPL-MCNC: 1.17 NG/DL (ref 0.76–1.46)
TRIGL SERPL-MCNC: 116 MG/DL
TSH SERPL DL<=0.05 MIU/L-ACNC: 4.77 UIU/ML (ref 0.45–4.5)
WBC # BLD AUTO: 6.95 THOUSAND/UL (ref 4.31–10.16)

## 2022-04-08 PROCEDURE — 80061 LIPID PANEL: CPT

## 2022-04-08 PROCEDURE — 83036 HEMOGLOBIN GLYCOSYLATED A1C: CPT

## 2022-04-08 PROCEDURE — 84443 ASSAY THYROID STIM HORMONE: CPT

## 2022-04-08 PROCEDURE — 36415 COLL VENOUS BLD VENIPUNCTURE: CPT

## 2022-04-08 PROCEDURE — 84439 ASSAY OF FREE THYROXINE: CPT

## 2022-04-08 PROCEDURE — 82043 UR ALBUMIN QUANTITATIVE: CPT

## 2022-04-08 PROCEDURE — 85025 COMPLETE CBC W/AUTO DIFF WBC: CPT

## 2022-04-08 PROCEDURE — 82570 ASSAY OF URINE CREATININE: CPT

## 2022-04-08 PROCEDURE — 80053 COMPREHEN METABOLIC PANEL: CPT

## 2022-04-11 ENCOUNTER — TELEPHONE (OUTPATIENT)
Dept: FAMILY MEDICINE CLINIC | Facility: CLINIC | Age: 77
End: 2022-04-11

## 2022-04-11 NOTE — TELEPHONE ENCOUNTER
----- Message from Artemio Stuart, [address] sent at 4/10/2022  1:48 PM EDT -----  Please call the patient at home now that her blood work looks great, her TSH is slightly elevated but T4 is normal, I want her to stay on the same dose of levothyroxine    Thank you

## 2022-04-13 DIAGNOSIS — K21.00 REFLUX ESOPHAGITIS: ICD-10-CM

## 2022-05-23 ENCOUNTER — EPISODE CHANGES (OUTPATIENT)
Dept: CASE MANAGEMENT | Facility: OTHER | Age: 77
End: 2022-05-23

## 2022-05-23 ENCOUNTER — PATIENT OUTREACH (OUTPATIENT)
Dept: FAMILY MEDICINE CLINIC | Facility: CLINIC | Age: 77
End: 2022-05-23

## 2022-05-23 DIAGNOSIS — E87.6 HYPOKALEMIA: ICD-10-CM

## 2022-05-23 NOTE — PROGRESS NOTES
In Basket received for patient identified as [address]  Chart review completed for the following sections:   Recent Vital Signs   Allergies/Contradictions   Medication Review    History    SDOH    Problem List   Immunizations   Past hospitalizations and major procedures, including surgery   Significant past illnesses and treatment history including: History and Physical and Other provider notes   Relevant past medications related to the patient's condition    Patient reports that her COPD is controlled at this time and denies wanting to discuss or speak with a provider regarding her depression and anxiety history  Patient does not consent to the [address]  at this time but would like to be contacted at a later date

## 2022-05-24 RX ORDER — POTASSIUM CHLORIDE 20 MEQ/1
TABLET, EXTENDED RELEASE ORAL
Qty: 90 TABLET | Refills: 0 | Status: SHIPPED | OUTPATIENT
Start: 2022-05-24 | End: 2022-07-05 | Stop reason: SDUPTHER

## 2022-06-01 ENCOUNTER — TELEPHONE (OUTPATIENT)
Dept: FAMILY MEDICINE CLINIC | Facility: CLINIC | Age: 77
End: 2022-06-01

## 2022-06-01 NOTE — TELEPHONE ENCOUNTER
She may take it, but try to not taking with the other medications    Site if she takes the other medications in the morning take turmeric with dinner- thank you

## 2022-06-01 NOTE — TELEPHONE ENCOUNTER
turmericcurcumine 500  Mg tablet   Would like to know if she is able to take for sciatic nerve and artritis?   Wants to make sure it will not effect other meds she is on

## 2022-06-09 DIAGNOSIS — I10 ESSENTIAL HYPERTENSION: ICD-10-CM

## 2022-06-09 DIAGNOSIS — E78.2 MIXED HYPERLIPIDEMIA: ICD-10-CM

## 2022-06-09 RX ORDER — ROSUVASTATIN CALCIUM 10 MG/1
TABLET, COATED ORAL
Qty: 90 TABLET | Refills: 1 | Status: SHIPPED | OUTPATIENT
Start: 2022-06-09

## 2022-06-09 RX ORDER — AMLODIPINE BESYLATE 10 MG/1
TABLET ORAL
Qty: 90 TABLET | Refills: 1 | Status: SHIPPED | OUTPATIENT
Start: 2022-06-09

## 2022-06-14 DIAGNOSIS — R53.83 OTHER FATIGUE: ICD-10-CM

## 2022-06-14 DIAGNOSIS — R79.89 ELEVATED TSH: ICD-10-CM

## 2022-06-14 RX ORDER — LEVOTHYROXINE SODIUM 0.05 MG/1
TABLET ORAL
Qty: 90 TABLET | Refills: 0 | Status: SHIPPED | OUTPATIENT
Start: 2022-06-14

## 2022-06-27 ENCOUNTER — RA CDI HCC (OUTPATIENT)
Dept: OTHER | Facility: HOSPITAL | Age: 77
End: 2022-06-27

## 2022-06-27 NOTE — PROGRESS NOTES
[facility] [address]  coding opportunities       Chart reviewed, no opportunity found: CHART REVIEWED, NO OPPORTUNITY FOUND        Patients Insurance     Medicare Insurance: Medicare

## 2022-07-05 DIAGNOSIS — E87.6 HYPOKALEMIA: ICD-10-CM

## 2022-07-05 RX ORDER — POTASSIUM CHLORIDE 20 MEQ/1
20 TABLET, EXTENDED RELEASE ORAL DAILY
Qty: 90 TABLET | Refills: 0 | Status: SHIPPED | OUTPATIENT
Start: 2022-07-05 | End: 2022-10-04 | Stop reason: SDUPTHER

## 2022-08-08 DIAGNOSIS — K21.00 REFLUX ESOPHAGITIS: ICD-10-CM

## 2022-08-08 RX ORDER — DEXLANSOPRAZOLE 60 MG/1
60 CAPSULE, DELAYED RELEASE ORAL DAILY
Qty: 90 CAPSULE | Refills: 1 | Status: SHIPPED | OUTPATIENT
Start: 2022-08-08

## 2022-09-01 DIAGNOSIS — I10 ESSENTIAL HYPERTENSION: ICD-10-CM

## 2022-09-01 RX ORDER — LISINOPRIL 20 MG/1
TABLET ORAL
Qty: 90 TABLET | Refills: 1 | Status: SHIPPED | OUTPATIENT
Start: 2022-09-01

## 2022-09-14 DIAGNOSIS — R53.83 OTHER FATIGUE: ICD-10-CM

## 2022-09-14 DIAGNOSIS — R79.89 ELEVATED TSH: ICD-10-CM

## 2022-09-14 RX ORDER — LEVOTHYROXINE SODIUM 0.05 MG/1
TABLET ORAL
Qty: 90 TABLET | Refills: 0 | Status: SHIPPED | OUTPATIENT
Start: 2022-09-14

## 2022-10-04 ENCOUNTER — OFFICE VISIT (OUTPATIENT)
Dept: FAMILY MEDICINE CLINIC | Facility: CLINIC | Age: 77
End: 2022-10-04
Payer: MEDICARE

## 2022-10-04 VITALS
TEMPERATURE: 97.6 F | OXYGEN SATURATION: 97 % | HEART RATE: 100 BPM | BODY MASS INDEX: 50.92 KG/M2 | DIASTOLIC BLOOD PRESSURE: 72 MMHG | SYSTOLIC BLOOD PRESSURE: 122 MMHG | WEIGHT: 287.4 LBS | HEIGHT: 63 IN

## 2022-10-04 DIAGNOSIS — E03.9 HYPOTHYROIDISM, UNSPECIFIED TYPE: ICD-10-CM

## 2022-10-04 DIAGNOSIS — E87.6 HYPOKALEMIA: ICD-10-CM

## 2022-10-04 DIAGNOSIS — M54.40 CHRONIC LOW BACK PAIN WITH SCIATICA, SCIATICA LATERALITY UNSPECIFIED, UNSPECIFIED BACK PAIN LATERALITY: ICD-10-CM

## 2022-10-04 DIAGNOSIS — C50.911 BILATERAL MALIGNANT NEOPLASM OF BREAST IN FEMALE, UNSPECIFIED ESTROGEN RECEPTOR STATUS, UNSPECIFIED SITE OF BREAST (HCC): ICD-10-CM

## 2022-10-04 DIAGNOSIS — C50.912 BILATERAL MALIGNANT NEOPLASM OF BREAST IN FEMALE, UNSPECIFIED ESTROGEN RECEPTOR STATUS, UNSPECIFIED SITE OF BREAST (HCC): ICD-10-CM

## 2022-10-04 DIAGNOSIS — F33.2 SEVERE EPISODE OF RECURRENT MAJOR DEPRESSIVE DISORDER, WITHOUT PSYCHOTIC FEATURES (HCC): ICD-10-CM

## 2022-10-04 DIAGNOSIS — E28.39 MENOPAUSE OVARIAN FAILURE: ICD-10-CM

## 2022-10-04 DIAGNOSIS — M54.31 SCIATICA OF RIGHT SIDE: ICD-10-CM

## 2022-10-04 DIAGNOSIS — Z23 NEED FOR COVID-19 VACCINE: ICD-10-CM

## 2022-10-04 DIAGNOSIS — Z23 NEED FOR INFLUENZA VACCINATION: ICD-10-CM

## 2022-10-04 DIAGNOSIS — N39.41 URGE INCONTINENCE OF URINE: ICD-10-CM

## 2022-10-04 DIAGNOSIS — G89.29 CHRONIC LOW BACK PAIN WITH SCIATICA, SCIATICA LATERALITY UNSPECIFIED, UNSPECIFIED BACK PAIN LATERALITY: ICD-10-CM

## 2022-10-04 DIAGNOSIS — N18.32 STAGE 3B CHRONIC KIDNEY DISEASE (HCC): ICD-10-CM

## 2022-10-04 DIAGNOSIS — I10 ESSENTIAL HYPERTENSION: ICD-10-CM

## 2022-10-04 DIAGNOSIS — R15.9 INCONTINENCE OF FECES, UNSPECIFIED FECAL INCONTINENCE TYPE: ICD-10-CM

## 2022-10-04 DIAGNOSIS — Z00.00 MEDICARE ANNUAL WELLNESS VISIT, SUBSEQUENT: Primary | ICD-10-CM

## 2022-10-04 DIAGNOSIS — J44.9 CHRONIC OBSTRUCTIVE PULMONARY DISEASE, UNSPECIFIED COPD TYPE (HCC): ICD-10-CM

## 2022-10-04 PROCEDURE — 90662 IIV NO PRSV INCREASED AG IM: CPT

## 2022-10-04 PROCEDURE — 0124A ADM SARSCV2 BVL 30MCG/.3ML B: CPT

## 2022-10-04 PROCEDURE — 91312 SARSCOV2 VAC BVL 30MCG/0.3ML: CPT

## 2022-10-04 PROCEDURE — G0008 ADMIN INFLUENZA VIRUS VAC: HCPCS

## 2022-10-04 PROCEDURE — 99214 OFFICE O/P EST MOD 30 MIN: CPT

## 2022-10-04 PROCEDURE — G0439 PPPS, SUBSEQ VISIT: HCPCS

## 2022-10-04 RX ORDER — POTASSIUM CHLORIDE 20 MEQ/1
20 TABLET, EXTENDED RELEASE ORAL DAILY
Qty: 90 TABLET | Refills: 0 | Status: SHIPPED | OUTPATIENT
Start: 2022-10-04

## 2022-10-04 NOTE — PROGRESS NOTES
Assessment and Plan:     Problem List Items Addressed This Visit        Endocrine    Hypothyroidism     Continue to take 50 mcg of levothyroxine, blood work ordered         Relevant Orders    TSH, 3rd generation with Free T4 reflex       Respiratory    Chronic obstructive pulmonary disease, unspecified COPD type ([facility] )     Stable            Cardiovascular and Mediastinum    Essential hypertension     Stable, continue to take medications as prescribed         Relevant Orders    CBC and differential    Comprehensive metabolic panel    Lipid panel    TSH, 3rd generation with Free T4 reflex    Vitamin D 25 hydroxy       Nervous and Auditory    Sciatica of right side     Suggested to consider physical therapy, referral provided         Relevant Orders    Ambulatory Referral to Comprehensive Spine PT       Genitourinary    CKD (chronic kidney disease) stage 3, GFR 30-59 ml/min ([facility])     Lab Results   Component Value Date    EGFR 40 04/08/2022    EGFR 38 09/02/2021    EGFR 37 01/18/2021    CREATININE 1 29 04/08/2022    CREATININE 1 35 (H) 09/02/2021    CREATININE 1 37 (H) 01/18/2021   Stable         Relevant Orders    CBC and differential    Comprehensive metabolic panel    Lipid panel    TSH, 3rd generation with Free T4 reflex    Vitamin D 25 hydroxy       Other    Bilateral malignant neoplasm of breast in female [facility])    Severe episode of recurrent major depressive disorder, without psychotic features ([facility] )    Incontinence of feces     Make an appointment with GI         Relevant Orders    Ambulatory Referral to Gastroenterology    CBC and differential    Comprehensive metabolic panel    Lipid panel    TSH, 3rd generation with Free T4 reflex    Vitamin D 25 hydroxy      Other Visit Diagnoses     Medicare annual wellness visit, subsequent    -  Primary    Relevant Orders    CBC and differential    Comprehensive metabolic panel    Lipid panel    TSH, 3rd generation with Free T4 reflex    Vitamin D 25 hydroxy    Urge incontinence of urine        Relevant Orders    Ambulatory Referral to Urogynecology    Chronic low back pain with sciatica, sciatica laterality unspecified, unspecified back pain laterality        Menopause ovarian failure        Relevant Orders    DXA bone density spine hip and pelvis    Need for COVID-19 vaccine        Relevant Orders    Age 15 y+, BOOSTER (BIVALENT): David 78 vac bivalent fabi-sucr (Completed)    Need for influenza vaccination        Relevant Orders    influenza vaccine, high-dose, PF 0 7 mL (FLUZONE HIGH-DOSE) (Completed)          Urinary Incontinence Plan of Care: counseling topics discussed: use restroom every 2 hours, keeping a bladder diary, limiting fluid intake 3-4 hours before bed, preventing constipation and improving blood sugar control  Preventive health issues were discussed with patient, and age appropriate screening tests were ordered as noted in patient's After Visit Summary  Personalized health advice and appropriate referrals for health education or preventive services given if needed, as noted in patient's After Visit Summary  History of Present Illness:     Patient presents for a Medicare Wellness Visit    Patient presents to office for an Medicare wellness  She has been taking all of her medications as prescribed and denies any adverse effects  She is also reporting some stool incontinence that she has had on and off for quite a time  She is denying any blood in her stools  She has not seen GI in quite a while  Referral to gastroenterologist provided  She also admits to our urge incontinence, she is post total hysterectomy, last time she so OBGYN where she years ago  Referral to urogynecologist provided  She is also worried that she has been gaining weight  Discussed how postmenopausal and thyroid conditions can affect insulin resistance  Educated regarding limiting added sugar and carbohydrates        Patient Care Team:  Teressa Garcia as PCP - General (Nurse Practitioner)  Caterina Saavedra MD     Review of Systems:     Review of Systems   Constitutional: Positive for fatigue  Negative for chills and fever  HENT: Negative for ear pain and sore throat  Eyes: Negative for pain and visual disturbance  Respiratory: Negative for cough and shortness of breath  Cardiovascular: Negative for chest pain and palpitations  Gastrointestinal: Positive for constipation and diarrhea  Negative for abdominal pain, blood in stool and vomiting  Stool incontinence   Genitourinary: Positive for urgency  Negative for dysuria, frequency, hematuria and pelvic pain  Musculoskeletal: Positive for arthralgias and back pain  Skin: Negative for color change and rash  Neurological: Negative for seizures and syncope  All other systems reviewed and are negative  Problem List:     Patient Active Problem List   Diagnosis    Intention tremor    Benign essential tremor    Depression with anxiety    Essential hypertension    CKD (chronic kidney disease) stage 3, GFR 30-59 ml/min ([facility])    Hypothyroidism    Shortness of breath    Mixed hyperlipidemia    History of breast cancer    Mild cognitive impairment    Bilateral malignant neoplasm of breast in female ([facility] )    IFG (impaired fasting glucose)    BMI 45 0-49 9, adult ([facility] )    Compression fracture of fifth lumbar vertebra ([facility])    L4-L5 disc bulge    Sciatica of right side    Chronic obstructive pulmonary disease, unspecified COPD type ([facility] )    Severe episode of recurrent major depressive disorder, without psychotic features ([address] )    Incontinence of feces      Past Medical and Surgical History:     Past Medical History:   Diagnosis Date    Acute ill-defined cerebrovascular disease     Benign essential tremor     Breast cancer ([facility] )     pt states doesn't remember the year thinks it might have been on the right      Chronic kidney disease     CKD (chronic kidney disease) stage 3, GFR 30-59 ml/min ([facility] 9/25/2018    Colon polyp     COPD (chronic obstructive pulmonary disease) ([facility])     Depressive disorder     Disease of thyroid gland     Heart murmur     Hypertension     Impaired fasting glucose     Intention tremor     last assessed: 8/18/2016    Osteochondropathy     Panic disorder without agoraphobia with mild panic attacks     Psychiatric disorder     anxiety    Stroke ([facility] )     Tubular adenoma of colon      Past Surgical History:   Procedure Laterality Date    AUGMENTATION BREAST      pt states during mammo she doesn't have any other surgery than the 2 biopsy    BREAST BIOPSY Left     pt doesn't recall when and believes the neg biop was on left    BREAST LUMPECTOMY W/ NEEDLE LOCALIZATION Left     description: benign last assessed; 8/21/2016    CHOLECYSTECTOMY      COLONOSCOPY      EGD      HYSTERECTOMY        Family History:     Family History   Problem Relation Age of Onset    Alcohol abuse Mother     Asthma Mother     Cancer Mother     Mental illness Mother     Osteoarthritis Mother     Gout Father     Cancer Father     Other Sister         carotid arterial disease    COPD Sister     Hyperlipidemia Sister     Hypertension Sister     Heart attack Sister     Breast cancer Sister     No Known Problems Daughter     No Known Problems Maternal Grandmother     No Known Problems Paternal Grandmother     Lung cancer Sister     No Known Problems Sister     No Known Problems Daughter     No Known Problems Daughter       Social History:     Social History     Socioeconomic History    Marital status: Single     Spouse name: None    Number of children: None    Years of education: None    Highest education level: None   Occupational History    Occupation: Retired   Tobacco Use    Smoking status: Former Smoker    Smokeless tobacco: Never Used    Tobacco comment: 10 years ago   Vaping Use    Vaping Use: Never used   Substance and Sexual Activity  Alcohol use: Yes     Comment: socially - Per allscripts - denies alcohol consumption    Drug use: No    Sexual activity: None   Other Topics Concern    None   Social History Narrative         Always uses seat belt     Social Determinants of Health     Financial Resource Strain: Low Risk     Difficulty of Paying Living Expenses: Not hard at all   Food Insecurity: Not on file   Transportation Needs: No Transportation Needs    Lack of Transportation (Medical): No    Lack of Transportation (Non-Medical): No   Physical Activity: Not on file   Stress: Not on file   Social Connections: Not on file   Intimate Partner Violence: Not on file   Housing Stability: Not on file      Medications and Allergies:     Current Outpatient Medications   Medication Sig Dispense Refill    acetaminophen (TYLENOL) 500 mg tablet Take 500 mg by mouth every 6 (six) hours as needed for mild pain Take 2 tablets by mouth every 6-8 hours as needed      amLODIPine (NORVASC) 10 mg tablet TAKE 1 TABLET(10 MG) BY MOUTH DAILY 90 tablet 1    aspirin 81 mg chewable tablet Chew 81 mg daily      Cholecalciferol (VITAMIN D3) 50 MCG (2000 UT) TABS Take 2,000 Units by mouth daily      dexlansoprazole (Dexilant) 60 MG capsule Take 1 capsule (60 mg total) by mouth daily 90 capsule 1    Diclofenac Sodium (VOLTAREN) 1 % Apply 2 g topically 4 (four) times a day 100 g 1    levothyroxine 50 mcg tablet TAKE 1 TABLET(50 MCG) BY MOUTH DAILY 90 tablet 0    lisinopril (ZESTRIL) 20 mg tablet TAKE 1 TABLET(20 MG) BY MOUTH DAILY 90 tablet 1    Misc  Devices (Round Shower Stool) MISC Use daily Use as needed in the shower   1 each 0    potassium chloride (K-DUR,KLOR-CON) 20 mEq tablet Take 1 tablet (20 mEq total) by mouth daily 90 tablet 0    rosuvastatin (CRESTOR) 10 MG tablet TAKE 1 TABLET(10 MG) BY MOUTH DAILY 90 tablet 1    Zoster Vac Recomb Adjuvanted (Shingrix) 50 MCG/0 5ML SUSR 0 5mL IM for one dose, followed by 0 5mL IM 2-6 months after first dose 1 each 1     No current facility-administered medications for this visit  Allergies   Allergen Reactions    Amoxicillin     Codeine     Morphine       Immunizations:     Immunization History   Administered Date(s) Administered    COVID-19 MODERNA VACC 0 5 ML IM 01/28/2021, 02/24/2021    COVID-19 Pfizer Vac BIVALENT Madhav-sucrose 12 Yr+ IM (BOOSTER ONLY) 10/04/2022    INFLUENZA 01/30/2007, 10/02/2009, 01/28/2011, 09/08/2011, 08/20/2012, 09/20/2013, 09/23/2014, 10/27/2015, 10/26/2018    Influenza Split High Dose Preservative Free IM 11/18/2016, 10/31/2017    Influenza, high dose seasonal 0 7 mL 10/26/2018, 11/05/2019, 11/03/2020, 09/02/2021, 10/04/2022    Influenza, seasonal, injectable 01/30/2007, 10/02/2009, 01/28/2011, 09/08/2011, 08/20/2012, 09/20/2013, 09/23/2014, 10/27/2015    Pneumococcal Conjugate 13-Valent 08/06/2015    Pneumococcal Polysaccharide PPV23 07/01/2011    Tdap 07/01/2011    Zoster 06/24/2015      Health Maintenance:         Topic Date Due    DXA SCAN  11/19/2021    Breast Cancer Screening: Mammogram  03/08/2023    Colorectal Cancer Screening  09/17/2024    Hepatitis C Screening  Completed         Topic Date Due    COVID-19 Vaccine (3 - Booster for Kenmie Cameron series) 07/24/2021      Medicare Screening Tests and Risk Assessments:     Ervin Houston is here for her Subsequent Wellness visit  Last Medicare Wellness visit information reviewed, patient interviewed, no change since last AWV  Health Risk Assessment:   Patient rates overall health as fair  Patient feels that their physical health rating is same  Patient is satisfied with their life  Eyesight was rated as same  Hearing was rated as same  Patient feels that their emotional and mental health rating is same  Patients states they are never, rarely angry  Patient states they are always unusually tired/fatigued  Pain experienced in the last 7 days has been some  Patient's pain rating has been 8/10   Patient states that she has experienced no weight loss or gain in last 6 months  Depression Screening:   PHQ-9 Score: 10      Fall Risk Screening: In the past year, patient has experienced: no history of falling in past year      Urinary Incontinence Screening:   Patient has leaked urine accidently in the last six months  Home Safety:  Patient does not have trouble with stairs inside or outside of their home  Patient has working smoke alarms and has working carbon monoxide detector  Home safety hazards include: none  Nutrition:   Current diet is Regular  Medications:   Patient is not currently taking any over-the-counter supplements  Patient is able to manage medications  Activities of Daily Living (ADLs)/Instrumental Activities of Daily Living (IADLs):   Walk and transfer into and out of bed and chair?: Yes  Dress and groom yourself?: Yes    Bathe or shower yourself?: Yes    Feed yourself?  Yes  Do your laundry/housekeeping?: Yes  Manage your money, pay your bills and track your expenses?: Yes  Make your own meals?: Yes    Do your own shopping?: Yes    Previous Hospitalizations:   Any hospitalizations or ED visits within the last 12 months?: No      Advance Care Planning:   Living will: No    Durable POA for healthcare: No    Advanced directive: No    Advanced directive counseling given: Yes      PREVENTIVE SCREENINGS      Cardiovascular Screening:    General: Screening Not Indicated and History Lipid Disorder      Diabetes Screening:     General: Screening Current      Colorectal Cancer Screening:     General: Screening Current      Breast Cancer Screening:     General: History Breast Cancer      Cervical Cancer Screening:    General: Screening Not Indicated      Osteoporosis Screening:      Due for: Bone Density CT Axial      Lung Cancer Screening:     General: Screening Not Indicated      Hepatitis C Screening:    General: Screening Current    Screening, Brief Intervention, and Referral to Treatment (SBIRT)    Screening  Typical number of drinks in a day: 0  Typical number of drinks in a week: 0  Interpretation: Low risk drinking behavior  Single Item Drug Screening:  How often have you used an illegal drug (including marijuana) or a prescription medication for non-medical reasons in the past year? never    Single Item Drug Screen Score: 0  Interpretation: Negative screen for possible drug use disorder    Other Counseling Topics:   Calcium and vitamin D intake and regular weightbearing exercise  No exam data present     Physical Exam:     /72 (BP Location: Left arm, Patient Position: Sitting, Cuff Size: Large)   Pulse 100   Temp 97 6 °F (36 4 °C) (Tympanic)   Ht 5' 3" (1 6 m)   Wt 130 kg (287 lb 6 4 oz)   SpO2 97%   BMI 50 91 kg/m²     Physical Exam  Vitals and nursing note reviewed  Constitutional:       General: She is not in acute distress  Appearance: Normal appearance  She is well-developed  She is obese  She is not ill-appearing  Cardiovascular:      Rate and Rhythm: Normal rate and regular rhythm  Pulses: Normal pulses  Heart sounds: Normal heart sounds  No murmur heard  Pulmonary:      Effort: Pulmonary effort is normal  No respiratory distress  Breath sounds: Normal breath sounds  Musculoskeletal:         General: No swelling or tenderness  Normal range of motion  Right lower leg: No edema  Left lower leg: No edema  Skin:     General: Skin is warm and dry  Capillary Refill: Capillary refill takes less than 2 seconds  Neurological:      Mental Status: She is alert and oriented to person, place, and time  Psychiatric:         Mood and Affect: Mood is depressed  Behavior: Behavior normal          Thought Content:  Thought content normal          Judgment: Judgment normal           MONIQUE Francois

## 2022-10-04 NOTE — PATIENT INSTRUCTIONS
[facility] prob[facility]        Acute Diarrhea   AMBULATORY CARE:   Acute diarrhea  starts quickly and lasts a short time, usually 1 to 3 days  It can last up to 2 weeks  Signs and symptoms that may happen with diarrhea:  You may have 3 or more episodes of diarrhea  It may be hard to control your diarrhea  You may also have any of the following:  Fever and chills    Headache or abdominal pain    Nausea and vomiting    Symptoms of dehydration such as thirst, decreased urination, dry skin, sunken eyes, or fast, pounding heartbeat    Seek care immediately if:   You feel confused  Your heartbeat is faster than usual      Your eyes look deeply sunken, or you have no tears when you cry  You urinate less than usual, or your urine is dark yellow  You have blood or mucus in your bowel movements  You have severe abdominal pain  You are unable to drink any liquids  Contact your healthcare provider if:  Your symptoms do not get better with treatment  You have a fever higher than 101 3°F (38 5°C)  You have trouble eating and drinking because you are vomiting  Your diarrhea does not get better in 7 days  You have questions or concerns about your condition or care  Treatment for acute diarrhea  may include medicines to slow or stop your diarrhea  You may also need medicine to treat an infection  Self-care:   Drink liquids as directed  Liquids will help prevent dehydration caused by diarrhea  Ask your healthcare provider how much liquid to drink each day and which liquids are best for you  You may need to drink an oral rehydration solution (ORS)  An ORS has the right amounts of water, salts, and sugar you need to replace body fluids  You can buy an ORS at most grocery stores and pharmacies  Eat foods that are easy to digest   Examples include rice, lentils, cereal, bananas, potatoes, and bread  It also includes some fruits (bananas, melon), well-cooked vegetables, and lean meats   Do not eat foods high in fiber, fat, and sugar  Also, do not drink alcohol until your diarrhea is gone  Prevent diarrhea:   Wash your hands often  Use soap and water  Wash your hands before you eat or prepare food  Also wash your hands after you use the bathroom  Use an alcohol-based hand gel when soap and water are not available  Keep bathroom surfaces clean  This helps prevent the spread of germs that cause acute diarrhea  Wash fruits and vegetables well before you eat them  This can help remove germs that cause diarrhea  If possible, remove the skin from fruits and vegetables, or cook them well before you eat them  Cook meat and poultry as directed  Meat includes beef and pork  Poultry includes chicken, turkey, and duck  Cook ground meat  to 160°F      [employer]OK, whole poultry, or cuts of poultry  to at least 165°F  Remove the poultry from heat  Let it stand for 3 minutes before you eat it  Cook whole cuts of meat other than poultry  to at least 145°F  Remove the meat from heat  Let it stand for 3 minutes before you eat it  Wash dishes that have touched raw meat or poultry with hot water and soap  This includes cutting boards, utensils, dishes, and serving containers  Place raw or cooked meat or poultry in the refrigerator as soon as possible  Bacteria can grow in meat that is left at room temperature too long  Do not eat raw or undercooked oysters, clams, or mussels  These foods may be contaminated and cause infection  Drink only filtered or treated water when you travel  Do not put ice in your drinks  Drink bottled water whenever possible  Follow up with your doctor as directed:  Write down your questions so you remember to ask them during your visits  © Copyright [employer] 2022 Information is for End User's use only and may not be sold, redistributed or otherwise used for commercial purposes   All illustrations and images included in CareNotes® are the copyrighted property of A D A M , Inc  or [facility] Or[facility] Canchola   The above information is an  only  It is not intended as medical advice for individual conditions or treatments  Talk to your doctor, nurse or pharmacist before following any medical regimen to see if it is safe and effective for you

## 2022-10-04 NOTE — ASSESSMENT & PLAN NOTE
Lab Results   Component Value Date    EGFR 40 04/08/2022    EGFR 38 09/02/2021    EGFR 37 01/18/2021    CREATININE 1 29 04/08/2022    CREATININE 1 35 (H) 09/02/2021    CREATININE 1 37 (H) 01/18/2021   Stable

## 2022-10-07 ENCOUNTER — APPOINTMENT (OUTPATIENT)
Dept: LAB | Facility: HOSPITAL | Age: 77
End: 2022-10-07
Payer: MEDICARE

## 2022-10-07 DIAGNOSIS — Z00.00 MEDICARE ANNUAL WELLNESS VISIT, SUBSEQUENT: ICD-10-CM

## 2022-10-07 DIAGNOSIS — R15.9 INCONTINENCE OF FECES, UNSPECIFIED FECAL INCONTINENCE TYPE: ICD-10-CM

## 2022-10-07 DIAGNOSIS — I10 ESSENTIAL HYPERTENSION: ICD-10-CM

## 2022-10-07 DIAGNOSIS — E03.9 HYPOTHYROIDISM, UNSPECIFIED TYPE: ICD-10-CM

## 2022-10-07 DIAGNOSIS — N18.32 STAGE 3B CHRONIC KIDNEY DISEASE (HCC): ICD-10-CM

## 2022-10-07 LAB
25(OH)D3 SERPL-MCNC: 45.3 NG/ML (ref 30–100)
ALBUMIN SERPL BCP-MCNC: 3.3 G/DL (ref 3.5–5)
ALP SERPL-CCNC: 88 U/L (ref 46–116)
ALT SERPL W P-5'-P-CCNC: 21 U/L (ref 12–78)
ANION GAP SERPL CALCULATED.3IONS-SCNC: 7 MMOL/L (ref 4–13)
AST SERPL W P-5'-P-CCNC: 20 U/L (ref 5–45)
BASOPHILS # BLD AUTO: 0.02 THOUSANDS/ΜL (ref 0–0.1)
BASOPHILS NFR BLD AUTO: 0 % (ref 0–1)
BILIRUB SERPL-MCNC: 0.59 MG/DL (ref 0.2–1)
BUN SERPL-MCNC: 15 MG/DL (ref 5–25)
CALCIUM ALBUM COR SERPL-MCNC: 9.7 MG/DL (ref 8.3–10.1)
CALCIUM SERPL-MCNC: 9.1 MG/DL (ref 8.3–10.1)
CHLORIDE SERPL-SCNC: 109 MMOL/L (ref 96–108)
CHOLEST SERPL-MCNC: 116 MG/DL
CO2 SERPL-SCNC: 29 MMOL/L (ref 21–32)
CREAT SERPL-MCNC: 1.34 MG/DL (ref 0.6–1.3)
EOSINOPHIL # BLD AUTO: 0.21 THOUSAND/ΜL (ref 0–0.61)
EOSINOPHIL NFR BLD AUTO: 3 % (ref 0–6)
ERYTHROCYTE [DISTWIDTH] IN BLOOD BY AUTOMATED COUNT: 15.3 % (ref 11.6–15.1)
GFR SERPL CREATININE-BSD FRML MDRD: 37 ML/MIN/1.73SQ M
GLUCOSE P FAST SERPL-MCNC: 103 MG/DL (ref 65–99)
HCT VFR BLD AUTO: 42.4 % (ref 34.8–46.1)
HDLC SERPL-MCNC: 52 MG/DL
HGB BLD-MCNC: 13.4 G/DL (ref 11.5–15.4)
IMM GRANULOCYTES # BLD AUTO: 0.03 THOUSAND/UL (ref 0–0.2)
IMM GRANULOCYTES NFR BLD AUTO: 0 % (ref 0–2)
LDLC SERPL CALC-MCNC: 49 MG/DL (ref 0–100)
LYMPHOCYTES # BLD AUTO: 1.75 THOUSANDS/ΜL (ref 0.6–4.47)
LYMPHOCYTES NFR BLD AUTO: 26 % (ref 14–44)
MCH RBC QN AUTO: 27.4 PG (ref 26.8–34.3)
MCHC RBC AUTO-ENTMCNC: 31.6 G/DL (ref 31.4–37.4)
MCV RBC AUTO: 87 FL (ref 82–98)
MONOCYTES # BLD AUTO: 0.45 THOUSAND/ΜL (ref 0.17–1.22)
MONOCYTES NFR BLD AUTO: 7 % (ref 4–12)
NEUTROPHILS # BLD AUTO: 4.31 THOUSANDS/ΜL (ref 1.85–7.62)
NEUTS SEG NFR BLD AUTO: 64 % (ref 43–75)
NONHDLC SERPL-MCNC: 64 MG/DL
NRBC BLD AUTO-RTO: 0 /100 WBCS
PLATELET # BLD AUTO: 215 THOUSANDS/UL (ref 149–390)
PMV BLD AUTO: 11 FL (ref 8.9–12.7)
POTASSIUM SERPL-SCNC: 4.7 MMOL/L (ref 3.5–5.3)
PROT SERPL-MCNC: 7.3 G/DL (ref 6.4–8.4)
RBC # BLD AUTO: 4.89 MILLION/UL (ref 3.81–5.12)
SODIUM SERPL-SCNC: 145 MMOL/L (ref 135–147)
TRIGL SERPL-MCNC: 75 MG/DL
TSH SERPL DL<=0.05 MIU/L-ACNC: 2.34 UIU/ML (ref 0.45–4.5)
WBC # BLD AUTO: 6.77 THOUSAND/UL (ref 4.31–10.16)

## 2022-10-07 PROCEDURE — 36415 COLL VENOUS BLD VENIPUNCTURE: CPT

## 2022-10-07 PROCEDURE — 84443 ASSAY THYROID STIM HORMONE: CPT

## 2022-10-07 PROCEDURE — 80061 LIPID PANEL: CPT

## 2022-10-07 PROCEDURE — 85025 COMPLETE CBC W/AUTO DIFF WBC: CPT

## 2022-10-07 PROCEDURE — 82306 VITAMIN D 25 HYDROXY: CPT

## 2022-10-07 PROCEDURE — 80053 COMPREHEN METABOLIC PANEL: CPT

## 2022-12-10 DIAGNOSIS — R53.83 OTHER FATIGUE: ICD-10-CM

## 2022-12-10 DIAGNOSIS — R79.89 ELEVATED TSH: ICD-10-CM

## 2022-12-10 RX ORDER — LEVOTHYROXINE SODIUM 0.05 MG/1
TABLET ORAL
Qty: 90 TABLET | Refills: 0 | Status: SHIPPED | OUTPATIENT
Start: 2022-12-10

## 2022-12-18 DIAGNOSIS — E78.2 MIXED HYPERLIPIDEMIA: ICD-10-CM

## 2022-12-18 RX ORDER — ROSUVASTATIN CALCIUM 10 MG/1
TABLET, COATED ORAL
Qty: 90 TABLET | Refills: 1 | Status: SHIPPED | OUTPATIENT
Start: 2022-12-18

## 2022-12-20 DIAGNOSIS — I10 ESSENTIAL HYPERTENSION: ICD-10-CM

## 2022-12-20 RX ORDER — AMLODIPINE BESYLATE 10 MG/1
TABLET ORAL
Qty: 90 TABLET | Refills: 1 | Status: SHIPPED | OUTPATIENT
Start: 2022-12-20

## 2022-12-28 ENCOUNTER — RA CDI HCC (OUTPATIENT)
Dept: OTHER | Facility: HOSPITAL | Age: 77
End: 2022-12-28

## 2023-01-03 ENCOUNTER — OFFICE VISIT (OUTPATIENT)
Dept: FAMILY MEDICINE CLINIC | Facility: CLINIC | Age: 78
End: 2023-01-03

## 2023-01-03 VITALS
SYSTOLIC BLOOD PRESSURE: 100 MMHG | WEIGHT: 281 LBS | TEMPERATURE: 98.2 F | HEIGHT: 63 IN | BODY MASS INDEX: 49.79 KG/M2 | HEART RATE: 126 BPM | DIASTOLIC BLOOD PRESSURE: 70 MMHG | OXYGEN SATURATION: 98 % | RESPIRATION RATE: 16 BRPM

## 2023-01-03 DIAGNOSIS — N18.32 STAGE 3B CHRONIC KIDNEY DISEASE (HCC): ICD-10-CM

## 2023-01-03 DIAGNOSIS — C50.911 BILATERAL MALIGNANT NEOPLASM OF BREAST IN FEMALE, UNSPECIFIED ESTROGEN RECEPTOR STATUS, UNSPECIFIED SITE OF BREAST (HCC): ICD-10-CM

## 2023-01-03 DIAGNOSIS — E87.6 HYPOKALEMIA: ICD-10-CM

## 2023-01-03 DIAGNOSIS — F33.2 SEVERE EPISODE OF RECURRENT MAJOR DEPRESSIVE DISORDER, WITHOUT PSYCHOTIC FEATURES (HCC): ICD-10-CM

## 2023-01-03 DIAGNOSIS — E03.9 HYPOTHYROIDISM, UNSPECIFIED TYPE: ICD-10-CM

## 2023-01-03 DIAGNOSIS — Z00.00 HEALTHCARE MAINTENANCE: ICD-10-CM

## 2023-01-03 DIAGNOSIS — C50.912 BILATERAL MALIGNANT NEOPLASM OF BREAST IN FEMALE, UNSPECIFIED ESTROGEN RECEPTOR STATUS, UNSPECIFIED SITE OF BREAST (HCC): ICD-10-CM

## 2023-01-03 DIAGNOSIS — J44.9 CHRONIC OBSTRUCTIVE PULMONARY DISEASE, UNSPECIFIED COPD TYPE (HCC): ICD-10-CM

## 2023-01-03 DIAGNOSIS — Z01.818 PREOP EXAMINATION: Primary | ICD-10-CM

## 2023-01-03 RX ORDER — POTASSIUM CHLORIDE 20 MEQ/1
20 TABLET, EXTENDED RELEASE ORAL DAILY
Qty: 90 TABLET | Refills: 0 | Status: SHIPPED | OUTPATIENT
Start: 2023-01-03

## 2023-01-03 NOTE — ASSESSMENT & PLAN NOTE
Lab Results   Component Value Date    EGFR 37 10/07/2022    EGFR 40 04/08/2022    EGFR 38 09/02/2021    CREATININE 1 34 (H) 10/07/2022    CREATININE 1 29 04/08/2022    CREATININE 1 35 (H) 09/02/2021   Stable, obtain blood work before next appointment

## 2023-01-03 NOTE — PROGRESS NOTES
FAMILY MEDICINE PRE-OPERATIVE EVALUATION  [facility] -  [id number]     NAME: Claudia Walton  AGE: 65 y o  SEX: female  : 1945     DATE: 1/3/2023     Internal Medicine Pre-Operative Evaluation:     Chief Complaint: Pre-operative Evaluation     Surgery: Bilateral Blepharoplasty   Anticipated Date of Surgery: 23  Referring Provider: No ref  provider found        History of Present Illness:     Claudia Walton is a 65 y o  female who presents to the office today for a preoperative consultation at the request of surgeon, Yanna Gerber, who plans on performing Bilateral Blepharoplasty on 23  Planned anesthesia is IV sedation  Patient has a bleeding risk of: no recent abnormal bleeding and no remote history of abnormal bleeding  Patient does not have objections to receiving blood products if needed  Current anti-platelet/anti-coagulation medications that the patient is prescribed includes: Aspirin  Assessment of Chronic Conditions:   - COPD: stable  - Hypertension: stable     Assessment of Cardiac Risk:  Denies unstable or severe angina or MI in the last 6 weeks or history of stent placement in the last year   Denies decompensated heart failure (e g  New onset heart failure, NYHA functional class IV heart failure, or worsening existing heart failure)  Denies significant arrhythmias such as high grade AV block, symptomatic ventricular arrhythmia, newly recognized ventricular tachycardia, supraventricular tachycardia with resting heart rate >100, or symptomatic bradycardia  Denies severe heart valve disease including aortic stenosis or symptomatic mitral stenosis     Exercise Capacity:  Able to walk 4 blocks without symptoms?: No  Able to walk 2 flights without symptoms?: No    Prior Anesthesia Reactions: Yes, nausea and vomiting      Personal history of venous thromboembolic disease?  Yes, 1960s    History of steroid use for >2 weeks within last year? No    STOP-BANG Sleep Apnea Screening Questionnaire:      Do you SNORE loudly (louder than talking or loud enough to be heard through closed doors)? No = 0 point   Do you often feel TIRED, fatigued, or sleepy during daytime? Yes = 1 point   Has anyone OBSERVED you stop breathing during your sleep? No = 0 point   Do you have or are you being treated for high blood pressure? Yes = 1 point   BMI more than 35 kg/m2? Yes = 1 point   AGE over 48years old? Yes = 1 point   NECK circumference > 16 inches (40 cm)? Yes = 0 point   Male GENDER? No = 0 point   TOTAL SCORE 4= INTERMEDIATE risk of OBI       Review of Systems:     Review of Systems   Constitutional: Positive for fatigue  Negative for chills and fever  Respiratory: Positive for shortness of breath (w/ exertion )  Negative for cough  Cardiovascular: Negative for chest pain  Gastrointestinal: Negative for abdominal pain and vomiting  Genitourinary: Negative for dysuria  Musculoskeletal: Positive for arthralgias  Negative for back pain  Neurological: Negative for headaches  All other systems reviewed and are negative  Problem List:     Patient Active Problem List   Diagnosis   • Intention tremor   • Benign essential tremor   • Depression with anxiety   • Essential hypertension   • CKD (chronic kidney disease) stage 3, GFR 30-59 ml/min ([facility])   • Hypothyroidism   • Shortness of breath   • Mixed hyperlipidemia   • History of breast cancer   • Mild cognitive impairment   • Bilateral malignant neoplasm of breast in female ([facility] )   • IFG (impaired fasting glucose)   • BMI 45 0-49 9, adult ([facility])   • Compression fracture of fifth lumbar vertebra ([facility])   • L4-L5 disc bulge   • Sciatica of right side   • Chronic obstructive pulmonary disease, unspecified COPD type ([facility] )   • Severe episode of recurrent major depressive disorder, without psychotic features ([facility] )   • Incontinence of feces        Allergies:      Allergies   Allergen Reactions • Amoxicillin    • Codeine    • Morphine         Current Medications:       Current Outpatient Medications:   •  amLODIPine (NORVASC) 10 mg tablet, TAKE 1 TABLET(10 MG) BY MOUTH DAILY, Disp: 90 tablet, Rfl: 1  •  aspirin 81 mg chewable tablet, Chew 81 mg daily, Disp: , Rfl:   •  Cholecalciferol (VITAMIN D3) 50 MCG (2000 UT) TABS, Take 2,000 Units by mouth daily, Disp: , Rfl:   •  dexlansoprazole (Dexilant) 60 MG capsule, Take 1 capsule (60 mg total) by mouth daily, Disp: 90 capsule, Rfl: 1  •  levothyroxine 50 mcg tablet, TAKE 1 TABLET(50 MCG) BY MOUTH DAILY, Disp: 90 tablet, Rfl: 0  •  lisinopril (ZESTRIL) 20 mg tablet, TAKE 1 TABLET(20 MG) BY MOUTH DAILY, Disp: 90 tablet, Rfl: 1  •  potassium chloride (K-DUR,KLOR-CON) 20 mEq tablet, Take 1 tablet (20 mEq total) by mouth daily, Disp: 90 tablet, Rfl: 0  •  rosuvastatin (CRESTOR) 10 MG tablet, TAKE 1 TABLET(10 MG) BY MOUTH DAILY, Disp: 90 tablet, Rfl: 1  •  acetaminophen (TYLENOL) 500 mg tablet, Take 500 mg by mouth every 6 (six) hours as needed for mild pain Take 2 tablets by mouth every 6-8 hours as needed, Disp: , Rfl:   •  Diclofenac Sodium (VOLTAREN) 1 %, Apply 2 g topically 4 (four) times a day, Disp: 100 g, Rfl: 1  •  Misc  Devices (Round Shower Stool) MISC, Use daily Use as needed in the shower  , Disp: 1 each, Rfl: 0  •  Zoster Vac Recomb Adjuvanted (Shingrix) 50 MCG/0 5ML SUSR, 0 5mL IM for one dose, followed by 0 5mL IM 2-6 months after first dose, Disp: 1 each, Rfl: 1     Past History:     Past Medical History:   Diagnosis Date   • Acute ill-defined cerebrovascular disease    • Benign essential tremor    • Breast cancer ([facility] [address] )     pt states doesn't remember the year thinks it might have been on the right     • Chronic kidney disease    • CKD (chronic kidney disease) stage 3, GFR 30-59 ml/min ([facility]) 9/25/2018   • Colon polyp    • COPD (chronic obstructive pulmonary disease) (HCC)    • Depressive disorder    • Disease of thyroid gland    • Heart murmur    • Hypertension    • Impaired fasting glucose    • Intention tremor     last assessed: 8/18/2016   • Osteochondropathy    • Panic disorder without agoraphobia with mild panic attacks    • Psychiatric disorder     anxiety   • Stroke [facility])    • Tubular adenoma of colon         Past Surgical History:   Procedure Laterality Date   • AUGMENTATION BREAST      pt states during mammo she doesn't have any other surgery than the 2 biopsy   • BREAST BIOPSY Left     pt doesn't recall when and believes the neg biop was on left   • BREAST LUMPECTOMY W/ NEEDLE LOCALIZATION Left     description: benign last assessed; 8/21/2016   • CHOLECYSTECTOMY     • COLONOSCOPY     • EGD     • HYSTERECTOMY          Family History   Problem Relation Age of Onset   • Alcohol abuse Mother    • Asthma Mother    • Cancer Mother    • Mental illness Mother    • Osteoarthritis Mother    • Gout Father    • Cancer Father    • Other Sister         carotid arterial disease   • COPD Sister    • Hyperlipidemia Sister    • Hypertension Sister    • Heart attack Sister    • Breast cancer Sister    • No Known Problems Daughter    • No Known Problems Maternal Grandmother    • No Known Problems Paternal Grandmother    • Lung cancer Sister    • No Known Problems Sister    • No Known Problems Daughter    • No Known Problems Daughter         Social History     Socioeconomic History   • Marital status: Single     Spouse name: Not on file   • Number of children: Not on file   • Years of education: Not on file   • Highest education level: Not on file   Occupational History   • Occupation: Retired   Tobacco Use   • Smoking status: Former   • Smokeless tobacco: Never   • Tobacco comments:     10 years ago   Vaping Use   • Vaping Use: Never used   Substance and Sexual Activity   • Alcohol use: Yes     Comment: socially - Per allscripts - denies alcohol consumption   • Drug use: No   • Sexual activity: Not on file   Other Topics Concern   • Not on file   Social History Narrative         Always uses seat belt     Social Determinants of Health     Financial Resource Strain: Low Risk    • Difficulty of Paying Living Expenses: Not hard at all   Food Insecurity: Not on file   Transportation Needs: No Transportation Needs   • Lack of Transportation (Medical): No   • Lack of Transportation (Non-Medical): No   Physical Activity: Not on file   Stress: Not on file   Social Connections: Not on file   Intimate Partner Violence: Not on file   Housing Stability: Not on file        Physical Exam:      /70 (Cuff Size: Thigh)   Pulse (!) 126   Temp 98 2 °F (36 8 °C)   Resp 16   Ht 5' 3" (1 6 m)   Wt 127 kg (281 lb)   SpO2 98%   BMI 49 78 kg/m²     Physical Exam  Vitals and nursing note reviewed  Constitutional:       General: She is not in acute distress  Appearance: Normal appearance  She is obese  She is not ill-appearing  Cardiovascular:      Rate and Rhythm: Normal rate and regular rhythm  Heart sounds: Normal heart sounds  Pulmonary:      Effort: Pulmonary effort is normal       Breath sounds: Normal breath sounds  Musculoskeletal:         General: Normal range of motion  Skin:     General: Skin is warm and dry  Neurological:      Mental Status: She is alert and oriented to person, place, and time  Psychiatric:         Mood and Affect: Mood normal          Behavior: Behavior normal          Thought Content: Thought content normal          Judgment: Judgment normal            Data:     Pre-operative work-up    Laboratory Results: I have personally reviewed the pertinent laboratory results/reports     EKG: N/A    Chest x-ray: N/A    Previous cardiopulmonary studies within the past year:  Echocardiogram: N/A  Cardiac Catheterization: N/A  Stress Test: N/A  Pulmonary Function Testing: N/A       Assessment:     1  Preop examination        2   Healthcare maintenance  CBC and differential    Comprehensive metabolic panel    Hemoglobin A1C    Lipid panel TSH, 3rd generation with Free T4 reflex      3  Hypothyroidism, unspecified type  TSH, 3rd generation with Free T4 reflex      4  Stage 3b chronic kidney disease (HCC)  Comprehensive metabolic panel      5  Chronic obstructive pulmonary disease, unspecified COPD type ([facility] )        6  Severe episode of recurrent major depressive disorder, without psychotic features ([facility] )        7  BMI 45 0-49 9, adult ([facility] )        8  Bilateral malignant neoplasm of breast in female, unspecified estrogen receptor status, unspecified site of breast ([facility] )        9  Hypokalemia  potassium chloride (K-DUR,KLOR-CON) 20 mEq tablet           Plan:     65 y o  female with planned surgery: Bilateral Blepharoplasty   Cardiac Risk Estimation: per the Revised Cardiac Risk Index (Circ  100:1043, 1999), the patient's risk factors for cardiac complications include none, putting her in: RCI RISK CLASS I (0 risk factors, risk of major cardiac compl  appr  0 5%)  1  Further preoperative workup as follows:   - None; no further preoperative work-up is required    2  Medication Management/Recommendations:   - Patient has been instructed to avoid herbs or non-directed vitamins the week prior to surgery to ensure no drug interactions with perioperative surgical and anesthetic medications  3  Prophylaxis for cardiac events with perioperative beta-blockers: not indicated  4  Patient requires further consultation with: None    Clearance  Patient is CLEARED for surgery without any additional cardiac testing       MONIQUE Mercer [address] [place] [address] [address] [address]  Phone#  [phone]  Fax#  [phone]

## 2023-02-01 DIAGNOSIS — K21.00 REFLUX ESOPHAGITIS: ICD-10-CM

## 2023-02-01 RX ORDER — DEXLANSOPRAZOLE 60 MG/1
60 CAPSULE, DELAYED RELEASE ORAL DAILY
Qty: 90 CAPSULE | Refills: 1 | Status: SHIPPED | OUTPATIENT
Start: 2023-02-01

## 2023-02-22 DIAGNOSIS — I10 ESSENTIAL HYPERTENSION: ICD-10-CM

## 2023-02-23 RX ORDER — LISINOPRIL 20 MG/1
TABLET ORAL
Qty: 90 TABLET | Refills: 1 | Status: SHIPPED | OUTPATIENT
Start: 2023-02-23

## 2023-03-12 DIAGNOSIS — R79.89 ELEVATED TSH: ICD-10-CM

## 2023-03-12 DIAGNOSIS — R53.83 OTHER FATIGUE: ICD-10-CM

## 2023-03-12 RX ORDER — LEVOTHYROXINE SODIUM 0.05 MG/1
TABLET ORAL
Qty: 90 TABLET | Refills: 0 | Status: SHIPPED | OUTPATIENT
Start: 2023-03-12

## 2023-03-31 ENCOUNTER — APPOINTMENT (OUTPATIENT)
Dept: LAB | Facility: HOSPITAL | Age: 78
End: 2023-03-31

## 2023-03-31 DIAGNOSIS — N18.32 STAGE 3B CHRONIC KIDNEY DISEASE (HCC): ICD-10-CM

## 2023-03-31 DIAGNOSIS — Z00.00 HEALTHCARE MAINTENANCE: ICD-10-CM

## 2023-03-31 DIAGNOSIS — E03.9 HYPOTHYROIDISM, UNSPECIFIED TYPE: ICD-10-CM

## 2023-03-31 LAB
ALBUMIN SERPL BCP-MCNC: 3.7 G/DL (ref 3.5–5)
ALP SERPL-CCNC: 71 U/L (ref 34–104)
ALT SERPL W P-5'-P-CCNC: 10 U/L (ref 7–52)
ANION GAP SERPL CALCULATED.3IONS-SCNC: 7 MMOL/L (ref 4–13)
AST SERPL W P-5'-P-CCNC: 14 U/L (ref 13–39)
BASOPHILS # BLD AUTO: 0.03 THOUSANDS/ÂΜL (ref 0–0.1)
BASOPHILS NFR BLD AUTO: 0 % (ref 0–1)
BILIRUB SERPL-MCNC: 0.59 MG/DL (ref 0.2–1)
BUN SERPL-MCNC: 18 MG/DL (ref 5–25)
CALCIUM SERPL-MCNC: 9.1 MG/DL (ref 8.4–10.2)
CHLORIDE SERPL-SCNC: 107 MMOL/L (ref 96–108)
CHOLEST SERPL-MCNC: 111 MG/DL
CO2 SERPL-SCNC: 29 MMOL/L (ref 21–32)
CREAT SERPL-MCNC: 1.26 MG/DL (ref 0.6–1.3)
EOSINOPHIL # BLD AUTO: 0.19 THOUSAND/ÂΜL (ref 0–0.61)
EOSINOPHIL NFR BLD AUTO: 3 % (ref 0–6)
ERYTHROCYTE [DISTWIDTH] IN BLOOD BY AUTOMATED COUNT: 14 % (ref 11.6–15.1)
GFR SERPL CREATININE-BSD FRML MDRD: 40 ML/MIN/1.73SQ M
GLUCOSE P FAST SERPL-MCNC: 108 MG/DL (ref 65–99)
HCT VFR BLD AUTO: 43.2 % (ref 34.8–46.1)
HDLC SERPL-MCNC: 41 MG/DL
HGB BLD-MCNC: 13.7 G/DL (ref 11.5–15.4)
IMM GRANULOCYTES # BLD AUTO: 0.02 THOUSAND/UL (ref 0–0.2)
IMM GRANULOCYTES NFR BLD AUTO: 0 % (ref 0–2)
LDLC SERPL CALC-MCNC: 46 MG/DL (ref 0–100)
LYMPHOCYTES # BLD AUTO: 1.56 THOUSANDS/ÂΜL (ref 0.6–4.47)
LYMPHOCYTES NFR BLD AUTO: 22 % (ref 14–44)
MCH RBC QN AUTO: 26.9 PG (ref 26.8–34.3)
MCHC RBC AUTO-ENTMCNC: 31.7 G/DL (ref 31.4–37.4)
MCV RBC AUTO: 85 FL (ref 82–98)
MONOCYTES # BLD AUTO: 0.52 THOUSAND/ÂΜL (ref 0.17–1.22)
MONOCYTES NFR BLD AUTO: 7 % (ref 4–12)
NEUTROPHILS # BLD AUTO: 4.95 THOUSANDS/ÂΜL (ref 1.85–7.62)
NEUTS SEG NFR BLD AUTO: 68 % (ref 43–75)
NONHDLC SERPL-MCNC: 70 MG/DL
NRBC BLD AUTO-RTO: 0 /100 WBCS
PLATELET # BLD AUTO: 201 THOUSANDS/UL (ref 149–390)
PMV BLD AUTO: 11.5 FL (ref 8.9–12.7)
POTASSIUM SERPL-SCNC: 3.8 MMOL/L (ref 3.5–5.3)
PROT SERPL-MCNC: 6.8 G/DL (ref 6.4–8.4)
RBC # BLD AUTO: 5.09 MILLION/UL (ref 3.81–5.12)
SODIUM SERPL-SCNC: 143 MMOL/L (ref 135–147)
TRIGL SERPL-MCNC: 118 MG/DL
TSH SERPL DL<=0.05 MIU/L-ACNC: 2.95 UIU/ML (ref 0.45–4.5)
WBC # BLD AUTO: 7.27 THOUSAND/UL (ref 4.31–10.16)

## 2023-04-02 LAB
EST. AVERAGE GLUCOSE BLD GHB EST-MCNC: 114 MG/DL
HBA1C MFR BLD: 5.6 %

## 2023-04-04 ENCOUNTER — OFFICE VISIT (OUTPATIENT)
Dept: FAMILY MEDICINE CLINIC | Facility: CLINIC | Age: 78
End: 2023-04-04

## 2023-04-04 VITALS
HEART RATE: 112 BPM | HEIGHT: 63 IN | BODY MASS INDEX: 49.47 KG/M2 | OXYGEN SATURATION: 96 % | WEIGHT: 279.2 LBS | DIASTOLIC BLOOD PRESSURE: 70 MMHG | SYSTOLIC BLOOD PRESSURE: 104 MMHG | TEMPERATURE: 99.2 F

## 2023-04-04 DIAGNOSIS — Z12.31 ENCOUNTER FOR SCREENING MAMMOGRAM FOR MALIGNANT NEOPLASM OF BREAST: ICD-10-CM

## 2023-04-04 DIAGNOSIS — Z85.3 HISTORY OF BREAST CANCER: ICD-10-CM

## 2023-04-04 DIAGNOSIS — E03.9 HYPOTHYROIDISM, UNSPECIFIED TYPE: Primary | ICD-10-CM

## 2023-04-04 DIAGNOSIS — R53.83 OTHER FATIGUE: ICD-10-CM

## 2023-04-04 DIAGNOSIS — H02.9 EYELID LESION: ICD-10-CM

## 2023-04-04 DIAGNOSIS — E66.01 MORBID OBESITY WITH BMI OF 45.0-49.9, ADULT (HCC): ICD-10-CM

## 2023-04-04 DIAGNOSIS — I10 ESSENTIAL HYPERTENSION: ICD-10-CM

## 2023-04-04 RX ORDER — MEDICAL SUPPLY, MISCELLANEOUS
EACH MISCELLANEOUS DAILY
Qty: 1 EACH | Refills: 0 | Status: SHIPPED | OUTPATIENT
Start: 2023-04-04

## 2023-04-04 NOTE — PROGRESS NOTES
BMI Counseling: Body mass index is 49 46 kg/m²  The BMI is above normal  Nutrition recommendations include encouraging healthy choices of fruits and vegetables, limiting drinks that contain sugar, moderation in carbohydrate intake, increasing intake of lean protein, reducing intake of saturated and trans fat and reducing intake of cholesterol  Exercise recommendations include strength training exercises  Rationale for BMI follow-up plan is due to patient being overweight or obese  Assessment/Plan:         Problem List Items Addressed This Visit        Endocrine    Hypothyroidism - Primary     Stable, continue on current medication regimen  Cardiovascular and Mediastinum    Essential hypertension     Blood pressure well controlled  Discussed medication desired effects, potential side effects, and how to administer the medication  Non-pharmacological interventions suchas low salt, cardiac and Mediterranean diet discussed  Educated on stress reduction and physical activity  Encouraged to check blood pressure daily write down the numbers and bring them  to the next appointment  Consider discontinuing one of the blood pressure medications if home blood pressure readings are 100s/70s  discussed signs and symptoms of major cardiovascular event and need to present to the ED  Follow up in 3 months or sooner if needed  Patient verbalizes understanding regarding plan of care and all questions answered             Relevant Medications    Blood Pressure Monitoring (B-D ASSURE BPM/AUTO ARM CUFF) MISC       Other    History of breast cancer    Relevant Orders    Mammo screening bilateral w 3d & cad    Other fatigue     Please schedule with sleep medicine          Relevant Orders    Ambulatory Referral to Sleep Medicine   Other Visit Diagnoses     Morbid obesity with BMI of 45 0-49 9, adult [facility])        Relevant Orders    Ambulatory Referral to Sleep Medicine    Encounter for screening mammogram for malignant neoplasm of breast        Relevant Orders    Mammo screening bilateral w 3d & cad    Eyelid lesion        Relevant Orders    Ambulatory Referral to Plastic Surgery            Subjective:      Patient ID: Christopher Klinefelter is a 65 y o  female  Patient presents to the office for a routine follow-up  Has been taking all of the medications as prescribed and denies adverse effects  Recent/relevant studies and blood work reviewed  Denies change in vision, headache, or dizziness  No complaints of chest pain, palpitations, or shortness of breath  She had her eyelid surgery in January, but not happy with the results  The following portions of the patient's history were reviewed and updated as appropriate:   Past Medical History:  She has a past medical history of Acute ill-defined cerebrovascular disease, Benign essential tremor, Breast cancer ([facility] ), Chronic kidney disease, CKD (chronic kidney disease) stage 3, GFR 30-59 ml/min ([address] ) (9/25/2018), Colon polyp, COPD (chronic obstructive pulmonary disease) ([facility] ), Depressive disorder, Disease of thyroid gland, Heart murmur, Hypertension, Impaired fasting glucose, Intention tremor, Osteochondropathy, Panic disorder without agoraphobia with mild panic attacks, Psychiatric disorder, Stroke ([facility] ), and Tubular adenoma of colon  ,  _______________________________________________________________________  Medical Problems:  does not have any pertinent problems on file ,  _______________________________________________________________________  Past Surgical History:   has a past surgical history that includes Hysterectomy; Cholecystectomy; AUGMENTATION BREAST; Breast lumpectomy w/ needle localization (Left); Colonoscopy; EGD; and Breast biopsy (Left)  ,  _______________________________________________________________________  Family History:  family history includes Alcohol abuse in her mother; Asthma in her mother; Breast cancer in her sister; COPD in her sister; Cancer in her father and mother; Gout in her father; Heart attack in her sister; Hyperlipidemia in her sister; Hypertension in her sister; Lung cancer in her sister; Mental illness in her mother; No Known Problems in her daughter, daughter, daughter, maternal grandmother, paternal grandmother, and sister; Osteoarthritis in her mother; Other in her sister  ,  _______________________________________________________________________  Social History:   reports that she has quit smoking  She has never used smokeless tobacco  She reports current alcohol use  She reports that she does not use drugs  ,  _______________________________________________________________________  Allergies:  is allergic to amoxicillin, codeine, and morphine     _______________________________________________________________________  Current Outpatient Medications   Medication Sig Dispense Refill   • acetaminophen (TYLENOL) 500 mg tablet Take 500 mg by mouth every 6 (six) hours as needed for mild pain Take 2 tablets by mouth every 6-8 hours as needed     • amLODIPine (NORVASC) 10 mg tablet TAKE 1 TABLET(10 MG) BY MOUTH DAILY 90 tablet 1   • aspirin 81 mg chewable tablet Chew 81 mg daily     • Blood Pressure Monitoring (B-D ASSURE BPM/AUTO ARM CUFF) MISC Use in the morning 1 each 0   • Cholecalciferol (VITAMIN D3) 50 MCG (2000 UT) TABS Take 2,000 Units by mouth daily     • dexlansoprazole (Dexilant) 60 MG capsule Take 1 capsule (60 mg total) by mouth daily 90 capsule 1   • Diclofenac Sodium (VOLTAREN) 1 % Apply 2 g topically 4 (four) times a day 100 g 1   • levothyroxine 50 mcg tablet TAKE 1 TABLET(50 MCG) BY MOUTH DAILY 90 tablet 0   • lisinopril (ZESTRIL) 20 mg tablet TAKE 1 TABLET(20 MG) BY MOUTH DAILY 90 tablet 1   • Misc  Devices (Round Shower Stool) MISC Use daily Use as needed in the shower   1 each 0   • potassium chloride (K-DUR,KLOR-CON) 20 mEq tablet Take 1 tablet (20 mEq total) by mouth daily 90 tablet 0   • rosuvastatin (CRESTOR) 10 MG tablet TAKE 1 "TABLET(10 MG) BY MOUTH DAILY 90 tablet 1   • Zoster Vac Recomb Adjuvanted (Shingrix) 50 MCG/0 5ML SUSR 0 5mL IM for one dose, followed by 0 5mL IM 2-6 months after first dose 1 each 1     No current facility-administered medications for this visit      _______________________________________________________________________  Review of Systems   Constitutional: Positive for fatigue  Negative for chills and fever  Respiratory: Positive for shortness of breath (w/ exertion )  Negative for cough  Cardiovascular: Negative for chest pain  Gastrointestinal: Negative for abdominal pain and vomiting  Genitourinary: Negative for dysuria  Musculoskeletal: Positive for arthralgias (chronic)  Negative for back pain  Neurological: Negative for headaches  All other systems reviewed and are negative  Objective:  Vitals:    04/04/23 1512   BP: 104/70   BP Location: Right arm   Patient Position: Sitting   Cuff Size: Large   Pulse: (!) 112   Temp: 99 2 °F (37 3 °C)   TempSrc: Tympanic   SpO2: 96%   Weight: 127 kg (279 lb 3 2 oz)   Height: 5' 3\" (1 6 m)     Body mass index is 49 46 kg/m²  Physical Exam  Vitals and nursing note reviewed  Constitutional:       General: She is not in acute distress  Appearance: Normal appearance  She is obese  She is not ill-appearing  Cardiovascular:      Rate and Rhythm: Normal rate and regular rhythm  Heart sounds: Normal heart sounds  Pulmonary:      Effort: Pulmonary effort is normal       Breath sounds: Normal breath sounds  Musculoskeletal:         General: Normal range of motion  Skin:     General: Skin is warm and dry  Neurological:      Mental Status: She is alert and oriented to person, place, and time  Psychiatric:         Mood and Affect: Mood normal          Behavior: Behavior normal          Thought Content:  Thought content normal          Judgment: Judgment normal          "

## 2023-04-04 NOTE — ASSESSMENT & PLAN NOTE
"12/10/2021       RE: Rand Alvarado  [address]  [place] [address]     Dear Colleague,    Thank you for referring your patient, Rand Alvarado, to the [facility] [place] at [facility]. Please see a copy of my visit note below.      Progress Note    SUBJECTIVE:  Rand Alvarado is an 23 year old, G0 who requests an Annual Preventive Exam.     She is a new patient to the [facility].     Rand had a Mirena IUD placed in 2016, noticed bleeding and cramping and discovered it was malpositioned, had that IUD removed and a new IUD placed 2019.  Has had very minimal bleeding since having her second Mirena inserted, but then recently 11/16/21 she developed moderate flow until Dec 5th, now she is noticing more discharge than normal, it is thin and it has an odor. Denies itching and irritation. She has been using Summer's Prisca wash on the outside of her labia (no douching)   Developed \"extreme cramping\" starting in August. She has this cramping monthly, the pain is  8-9/10, feels like she will pass out, she takes 800mg of ibuprofen and she doesn't feel like it helps her. The pain is located in the center, low in her pelvis where she believes her uterus is.   She has not checked her IUD strings since she had it placed    Not currently sexually active, 3 partners in the last 12 months, all male. Accepts STD screening today    Age 21 developed abnormal hair growth chest, chin, neck, acne. She is wondering if she has PCOS. She does have a few aunts that also have hair growth on their face, chest, chin. Rand has seen a dermatologist in the past.   Rand also has two maternal aunts with endometriosis - she is wondering if the pain she is experiencing is related to endometriosis    The patient reports that there is not domestic violence in her life.     Works as a medical assistant " Blood pressure well controlled  Discussed medication desired effects, potential side effects, and how to administer the medication  Non-pharmacological interventions suchas low salt, cardiac and Mediterranean diet discussed  Educated on stress reduction and physical activity  Encouraged to check blood pressure daily write down the numbers and bring them  to the next appointment  Consider discontinuing one of the blood pressure medications if home blood pressure readings are 100s/70s  discussed signs and symptoms of major cardiovascular event and need to present to the ED  Follow up in 3 months or sooner if needed  Patient verbalizes understanding regarding plan of care and all questions answered  for GI clinic, happy with her work    Rand has a history of Eczema, mild asthma aggravated by respiratory illnesses.    Last pap 2018, NIL, due for a pap today      Menstrual History:  Menstrual History 12/10/2021   LAST MENSTRUAL PERIOD 11/1/2021         Mammogram current: not applicable    Last Colonoscopy:  No results found for this or any previous visit.      HISTORY:  ketoconazole (NIZORAL) 2 % external shampoo, Apply topically daily as needed for itching or irritation Apply topically daily as needed for itching or irritation  levonorgestrel (MIRENA) 20 MCG/24HR IUD, 1 each by Intrauterine route once    No current facility-administered medications on file prior to visit.    Allergies   Allergen Reactions     Drug Ingredient [Nickel] Blisters     No Clinical Screening - See Comments Itching and Shortness Of Breath     cats     Propylene Glycol Rash     Immunization History   Administered Date(s) Administered     COVID-19,PF,Pfizer (12+ Yrs) 12/21/2020, 01/14/2021     DTAP (<7y) 1997, 1997, 1997, 01/22/1998, 08/20/2002     DTaP, Unspecified 1997, 1997, 1997, 07/22/1998, 08/20/2002     [id number] Novel Flu 12/31/2009     HPV Quadrivalent 10/15/2009, 12/31/2009, 05/07/2010     Hep B, Peds or Adolescent 1997, 1997, 04/20/1998     HepA-ped 2 Dose 08/09/2012, 09/05/2013     HepB, Unspecified 1997, 1997, 04/20/1998     HepB-Adult 05/03/2019     Hib, Unspecified 1997, 1997, 1997, 08/20/2002     Historic Hib Hib-titer 1997, 1997, 1997, 07/22/1998     Influenza (H1N1) 12/31/2009     Influenza Vaccine IM > 6 months Valent IIV4 (Alfuria,Fluzone) 09/14/2015, 10/05/2016, 10/26/2017, 10/17/2018, 09/28/2019     MMR 04/20/1998, 04/20/1998, 08/20/2002, 08/20/2002     Mantoux Tuberculin Skin Test 06/03/2015     Meningococcal (Menactra ) 08/25/2009, 07/30/2014     Meningococcal (Menveo ) 07/30/2014     OPV, unspecified 1997,  1997, 1998, 2002     Polio, Unspecified  1997, 1997, 1998, 2002     TDAP Vaccine (Adacel) 2009     Td (Adult), Adsorbed 2018     Varicella Immunity: Nalini/MD Dx 1997       OB History    Para Term  AB Living   0 0 0 0 0 0   SAB IAB Ectopic Multiple Live Births   0 0 0 0 0     Past Medical History:   Diagnosis Date     Chlamydia 2016     Uncomplicated asthma     only with illness, rare     Urinary tract infection     2021     Wounds and injuries     PTSD, work related.      Past Surgical History:   Procedure Laterality Date     HC TOOTH EXTRACTION W/FORCEP       KNEE SURGERY Bilateral      KNEE SURGERY Bilateral      ORTHOPEDIC SURGERY  &     Family History   Problem Relation Age of Onset     Anesthesia Reaction Mother      Lupus Sister      Breast Cancer Paternal Grandmother         60's     Diabetes Maternal Grandmother      Thyroid Disease Maternal Grandmother      Diabetes Maternal Grandfather      Anesthesia Reaction Sister      Endometriosis Maternal Aunt      Endometriosis Maternal Aunt      Endometriosis Paternal Aunt      Social History     Socioeconomic History     Marital status: Single     Spouse name: Not on file     Number of children: Not on file     Years of education: Not on file     Highest education level: Not on file   Occupational History     Not on file   Tobacco Use     Smoking status: Never Smoker     Smokeless tobacco: Never Used   Substance and Sexual Activity     Alcohol use: Yes     Comment: socially      Drug use: Never     Sexual activity: Yes     Partners: Male     Birth control/protection: I.U.D.   Other Topics Concern     Not on file   Social History Narrative     Not on file     Social Determinants of Health     Financial Resource Strain: Not on file   Food Insecurity: Not on file   Transportation Needs: Not on file   Physical Activity: Not on file   Stress: Not on file   Social  Connections: Not on file   Intimate Partner Violence: Not on file   Housing Stability: Not on file       Review of Systems     Constitutional:  Negative for fever, chills, weight loss, weight gain, fatigue, decreased appetite, night sweats, recent stressors, height gain, height loss, post-operative complications, incisional pain, hallucinations, increased energy, hyperactivity and confused.   HENT:  Negative for ear pain, hearing loss, tinnitus, nosebleeds, trouble swallowing, hoarse voice, mouth sores, sore throat, ear discharge, tooth pain, gum tenderness, taste disturbance, smell disturbance, hearing aid, bleeding gums, dry mouth, sinus pain, sinus congestion and neck mass.    Eyes:  Negative for double vision, pain, redness, eye pain, decreased vision, eye watering, eye bulging, eye dryness, flashing lights, spots, floaters, strabismus, tunnel vision, jaundice and eye irritation.   Respiratory:   Negative for cough, hemoptysis, sputum production, shortness of breath, wheezing, sleep disturbances due to breathing, snores loudly, respiratory pain, dyspnea on exertion, cough disturbing sleep and postural dyspnea.    Cardiovascular:  Negative for chest pain, dyspnea on exertion, palpitations, orthopnea, claudication, leg swelling, fingers/toes turn blue, hypertension, hypotension, syncope, history of heart murmur, chest pain on exertion, chest pain at rest, pacemaker, few scattered varicosities, leg pain, sleep disturbances due to breathing, tachycardia, light-headedness, exercise intolerance and edema.   Gastrointestinal:  Positive for heartburn, nausea, abdominal pain and bloating.   Genitourinary:  Positive for vaginal discharge, abnormal vaginal bleeding, excessive menstruation, menstrual changes and hot flashes. Negative for bladder incontinence, dysuria, urgency, hematuria, flank pain, difficulty urinating, nocturia and voiding less frequently.   Musculoskeletal:  Negative for myalgias, back pain, joint  "swelling, arthralgias, stiffness, muscle cramps, neck pain, bone pain, muscle weakness and fracture.   Skin:  Positive for rash, hair changes and acne.   Neurological:  Negative for dizziness, tingling, tremors, speech change, seizures, loss of consciousness, weakness, light-headedness, numbness, headaches, disturbances in coordination, extremity numbness, memory loss, difficulty walking and paralysis.   Endo/Heme:  Negative for anemia, swollen glands and bruises/bleeds easily.   Psychiatric/Behavioral:  Negative for depression, hallucinations, memory loss, decreased concentration, mood swings and panic attacks.    Breast:  Negative for breast discharge, breast mass, breast pain and nipple retraction.   Endocrine:  Positive for unwanted hair growth and change in facial hair.Negative for altered temperature regulation, polyphagia and polydipsia.      PHQ-9 SCORE 12/10/2021   PHQ-9 Total Score 1     LIS-7 SCORE 11/30/2021 12/10/2021   Total Score 0 (minimal anxiety) -   Total Score 0 0         EXAM:  Height 1.753 m (5' 9.02\"), weight 71.9 kg (158 lb 8 oz), last menstrual period 11/01/2021, not currently breastfeeding. Body mass index is 23.4 kg/m .  General - pleasant female in no acute distress.  Skin - no suspicious lesions or rashes  EENT-  PERRLA, euthyroid with out palpable nodules  Neck - supple without lymphadenopathy.  Lungs - clear to auscultation bilaterally.  Heart - regular rate and rhythm without murmur.  Abdomen - soft, nontender, nondistended, no masses or organomegaly noted.  Musculoskeletal - no gross deformities.  Neurological - normal strength, sensation, and mental status.    Breast Exam:  Breast: Without visible skin changes. No dimpling or lesions seen.   Breasts supple, non-tender with palpation, no dominant mass, nodularity, or nipple discharge noted bilaterally. Axillary nodes negative.      Pelvic Exam:  EG/BUS: Normal genital architecture without lesions, erythema or abnormal secretions " Bartholin's, Urethra, Broussard's normal   Urethral meatus: normal   Urethra: no masses, tenderness, or scarring   Bladder: no masses or tenderness   Vagina: moist, pink, rugae with creamy, white and odorless  Secretions, wet prep collected  Cervix: Nulliparous, no lesions, pink, moist, closed, without lesion or CMT and IUD strings NOT visualized, GC/CT and pap collected.   Uterus: anteverted,   Adnexa: Within normal limits and No masses, nodularity, tenderness  Rectum:anus normal       ASSESSMENT:  Encounter Diagnoses   Name Primary?     Encounter for preventive health examination      Screen for STD (sexually transmitted disease) Yes     Uterine cramping      Complication of intrauterine device (IUD), unspecified complication, initial encounter (H)         PLAN:   Orders Placed This Encounter   Procedures     Obtaining, preparing and conveyance of cervical or vaginal smear to laboratory.     US Transvaginal Non OB     HIV Antigen Antibody Combo     Treponema Abs w Reflex to RPR and Titer     Hepatitis B surface antigen     Hepatitis C antibody     Wet Prep POCT     No orders of the defined types were placed in this encounter.  -Recommended TVUS to identify the location of her IUD   -Discussed the criteria to diagnose PCOS - irregular cycles (anovulation), multiple cysts on her ovaries, and evidence of hyperandrogenism (acne, hirsutism, hair loss). She meets one of these criteria (excessive hair growth, acne). We will get an ultrasound to determine the location of her IUD and will be able to evaluate her ovaries then. Discussed that my suspicion is that she likely her hair growth is likely related to her heritage.   -Encouraged Rand to contact her dermatologist to discuss management of her acne and excessive hair growth   -Discussed the pathophysiology of endometriosis. Recommended we start with US evaluation to determine if IUD malpresentation is the cause of her pelvic pain.   -STD screen per pt request  -Cervical  Cancer screening: If today's pap is normal will be due for her next pap in three years.     Annual exam and problem visit  Time spent:  Chart review/Pre-chartin on day of service  Face-to-face visit:  50 min   Charting: 10   Total time spent on day of service:  65  >50% of time spent on education/counseling/care-coordination    Hilary Hdez, JALYN, CNM    Answers for HPI/ROS submitted by the patient on 2021  LIS 7 TOTAL SCORE: 0

## 2023-04-07 ENCOUNTER — TELEPHONE (OUTPATIENT)
Age: 78
End: 2023-04-07

## 2023-04-07 NOTE — TELEPHONE ENCOUNTER
Left message for pt to call office to schedule appt for ss consult with dr Garrett Fuchs for morbid obesity

## 2023-04-25 ENCOUNTER — TELEPHONE (OUTPATIENT)
Dept: PLASTIC SURGERY | Facility: CLINIC | Age: 78
End: 2023-04-25

## 2023-04-25 NOTE — TELEPHONE ENCOUNTER
Left message for patient to set up appointment from referral  Had Geeta Michaud in January at [facility] and is unhappy with results  Gave main office number to call back [phone]

## 2023-05-30 ENCOUNTER — OFFICE VISIT (OUTPATIENT)
Dept: FAMILY MEDICINE CLINIC | Facility: CLINIC | Age: 78
End: 2023-05-30

## 2023-05-30 VITALS
HEIGHT: 63 IN | DIASTOLIC BLOOD PRESSURE: 78 MMHG | BODY MASS INDEX: 48.9 KG/M2 | TEMPERATURE: 97.8 F | SYSTOLIC BLOOD PRESSURE: 110 MMHG | HEART RATE: 92 BPM | OXYGEN SATURATION: 98 % | WEIGHT: 276 LBS

## 2023-05-30 DIAGNOSIS — E87.6 HYPOKALEMIA: ICD-10-CM

## 2023-05-30 DIAGNOSIS — M54.50 LOW BACK PAIN, UNSPECIFIED BACK PAIN LATERALITY, UNSPECIFIED CHRONICITY, UNSPECIFIED WHETHER SCIATICA PRESENT: ICD-10-CM

## 2023-05-30 DIAGNOSIS — N39.0 URINARY TRACT INFECTION WITH HEMATURIA, SITE UNSPECIFIED: Primary | ICD-10-CM

## 2023-05-30 DIAGNOSIS — R31.9 URINARY TRACT INFECTION WITH HEMATURIA, SITE UNSPECIFIED: Primary | ICD-10-CM

## 2023-05-30 DIAGNOSIS — R30.0 DYSURIA: ICD-10-CM

## 2023-05-30 LAB
SL AMB  POCT GLUCOSE, UA: ABNORMAL
SL AMB LEUKOCYTE ESTERASE,UA: 15
SL AMB POCT BILIRUBIN,UA: 17
SL AMB POCT BLOOD,UA: ABNORMAL
SL AMB POCT CLARITY,UA: ABNORMAL
SL AMB POCT COLOR,UA: YELLOW
SL AMB POCT KETONES,UA: 0.5
SL AMB POCT NITRITE,UA: ABNORMAL
SL AMB POCT PH,UA: 6
SL AMB POCT SPECIFIC GRAVITY,UA: 1.02
SL AMB POCT URINE PROTEIN: 0.3
SL AMB POCT UROBILINOGEN: 3.5

## 2023-05-30 RX ORDER — POTASSIUM CHLORIDE 20 MEQ/1
20 TABLET, EXTENDED RELEASE ORAL DAILY
Qty: 90 TABLET | Refills: 0 | Status: SHIPPED | OUTPATIENT
Start: 2023-05-30

## 2023-05-30 RX ORDER — CIPROFLOXACIN 250 MG/1
250 TABLET, FILM COATED ORAL EVERY 12 HOURS SCHEDULED
Qty: 6 TABLET | Refills: 0 | Status: SHIPPED | OUTPATIENT
Start: 2023-05-30 | End: 2023-06-02

## 2023-05-30 NOTE — ASSESSMENT & PLAN NOTE
Prescriptions for Cipro sent to pharmacy  Discussed medication desiredeffects, potential side effects, and how to administer the medication  Did not have enough urine to send for culture  May take OTC medications  Increase oral fluidintake  Follow up for worsening or persistent symptoms  Patient verbalizes understanding regarding plan of care and all questions answered

## 2023-05-30 NOTE — PROGRESS NOTES
Assessment/Plan:         Problem List Items Addressed This Visit        Genitourinary    Urinary tract infection with hematuria - Primary     Prescriptions for Cipro sent to pharmacy  Discussed medication desiredeffects, potential side effects, and how to administer the medication  Did not have enough urine to send for culture  May take OTC medications  Increase oral fluidintake  Follow up for worsening or persistent symptoms  Patient verbalizes understanding regarding plan of care and all questions answered  Relevant Medications    ciprofloxacin (CIPRO) 250 mg tablet   Other Visit Diagnoses     Low back pain, unspecified back pain laterality, unspecified chronicity, unspecified whether sciatica present        Chronic and ongoing  Continue to use Voltaren gel  Relevant Orders    POCT urine dip (Completed)    Dysuria        Relevant Orders    Urine culture    Hypokalemia        Refill for potassium sent to the pharmacy  Relevant Medications    potassium chloride (K-DUR,KLOR-CON) 20 mEq tablet            Subjective:      Patient ID: Bailee Matta is a 77 y o  female  Back Pain  This is a new problem  The current episode started 1 to 4 weeks ago  The problem has been gradually improving since onset  The pain is present in the gluteal  The quality of the pain is described as cramping  The pain is moderate  The pain is the same all the time  The symptoms are aggravated by position  Stiffness is present all day  Associated symptoms include abdominal pain, headaches and pelvic pain  Pertinent negatives include no bladder incontinence, bowel incontinence, chest pain, dysuria, fever, leg pain, numbness, paresis, paresthesias, perianal numbness, tingling, weakness or weight loss  She has tried nothing for the symptoms         The following portions of the patient's history were reviewed and updated as appropriate:   Past Medical History:  She has a past medical history of Acute ill-defined cerebrovascular disease, Benign essential tremor, Breast cancer ([place] [address] ), Chronic kidney disease, CKD (chronic kidney disease) stage 3, GFR 30-59 ml/min ([facility]) (9/25/2018), Colon polyp, COPD (chronic obstructive pulmonary disease) ([facility] ), Depressive disorder, Disease of thyroid gland, Heart murmur, Hypertension, Impaired fasting glucose, Intention tremor, Osteochondropathy, Panic disorder without agoraphobia with mild panic attacks, Psychiatric disorder, Stroke ([place] [address] ), and Tubular adenoma of colon  ,  _______________________________________________________________________  Medical Problems:  does not have any pertinent problems on file ,  _______________________________________________________________________  Past Surgical History:   has a past surgical history that includes Hysterectomy; Cholecystectomy; AUGMENTATION BREAST; Breast lumpectomy w/ needle localization (Left); Colonoscopy; EGD; and Breast biopsy (Left)  ,  _______________________________________________________________________  Family History:  family history includes Alcohol abuse in her mother; Asthma in her mother; Breast cancer in her sister; COPD in her sister; Cancer in her father and mother; Gout in her father; Heart attack in her sister; Hyperlipidemia in her sister; Hypertension in her sister; Lung cancer in her sister; Mental illness in her mother; No Known Problems in her daughter, daughter, daughter, maternal grandmother, paternal grandmother, and sister; Osteoarthritis in her mother; Other in her sister  ,  _______________________________________________________________________  Social History:   reports that she has quit smoking  She has never used smokeless tobacco  She reports current alcohol use  She reports that she does not use drugs  ,  _______________________________________________________________________  Allergies:  is allergic to amoxicillin, codeine, and morphine     _______________________________________________________________________  Current Outpatient Medications   Medication Sig Dispense Refill   • acetaminophen (TYLENOL) 500 mg tablet Take 500 mg by mouth every 6 (six) hours as needed for mild pain Take 2 tablets by mouth every 6-8 hours as needed     • amLODIPine (NORVASC) 10 mg tablet TAKE 1 TABLET(10 MG) BY MOUTH DAILY 90 tablet 1   • aspirin 81 mg chewable tablet Chew 81 mg daily     • Blood Pressure Monitoring (B-D ASSURE BPM/AUTO ARM CUFF) MISC Use in the morning 1 each 0   • Cholecalciferol (VITAMIN D3) 50 MCG (2000 UT) TABS Take 2,000 Units by mouth daily     • ciprofloxacin (CIPRO) 250 mg tablet Take 1 tablet (250 mg total) by mouth every 12 (twelve) hours for 3 days 6 tablet 0   • dexlansoprazole (Dexilant) 60 MG capsule Take 1 capsule (60 mg total) by mouth daily 90 capsule 1   • Diclofenac Sodium (VOLTAREN) 1 % Apply 2 g topically 4 (four) times a day 100 g 1   • levothyroxine 50 mcg tablet TAKE 1 TABLET(50 MCG) BY MOUTH DAILY 90 tablet 0   • lisinopril (ZESTRIL) 20 mg tablet TAKE 1 TABLET(20 MG) BY MOUTH DAILY 90 tablet 1   • Misc  Devices (Round Shower Stool) MISC Use daily Use as needed in the shower  1 each 0   • potassium chloride (K-DUR,KLOR-CON) 20 mEq tablet Take 1 tablet (20 mEq total) by mouth daily 90 tablet 0   • rosuvastatin (CRESTOR) 10 MG tablet TAKE 1 TABLET(10 MG) BY MOUTH DAILY 90 tablet 1   • Zoster Vac Recomb Adjuvanted (Shingrix) 50 MCG/0 5ML SUSR 0 5mL IM for one dose, followed by 0 5mL IM 2-6 months after first dose 1 each 1     No current facility-administered medications for this visit      _______________________________________________________________________  Review of Systems   Constitutional: Negative for fever and weight loss  Cardiovascular: Negative for chest pain  Gastrointestinal: Positive for abdominal pain  Negative for bowel incontinence  Genitourinary: Positive for pelvic pain   Negative for bladder "incontinence and dysuria  Musculoskeletal: Positive for back pain  Neurological: Positive for headaches  Negative for tingling, weakness, numbness and paresthesias  Objective:  Vitals:    05/30/23 1126   BP: 110/78   BP Location: Right arm   Patient Position: Sitting   Cuff Size: Large   Pulse: 92   Temp: 97 8 °F (36 6 °C)   TempSrc: Tympanic   SpO2: 98%   Weight: 125 kg (276 lb)   Height: 5' 3\" (1 6 m)     Body mass index is 48 89 kg/m²  Physical Exam  Vitals and nursing note reviewed  Constitutional:       General: She is not in acute distress  Appearance: Normal appearance  She is obese  She is not ill-appearing  Cardiovascular:      Rate and Rhythm: Normal rate and regular rhythm  Heart sounds: Normal heart sounds  Pulmonary:      Effort: Pulmonary effort is normal       Breath sounds: Normal breath sounds  Musculoskeletal:         General: Tenderness present  Skin:     General: Skin is warm and dry  Neurological:      Mental Status: She is alert and oriented to person, place, and time  Psychiatric:         Mood and Affect: Mood normal          Behavior: Behavior normal          Thought Content:  Thought content normal          Judgment: Judgment normal          "

## 2023-05-30 NOTE — PATIENT INSTRUCTIONS
Urinary Tract Infection in Older Adults   AMBULATORY CARE:   A urinary tract infection (UTI)  is caused by bacteria that get inside your urinary tract  Your urinary tract includes your kidneys, ureters, bladder, and urethra  A UTI is more common in your lower urinary tract, which includes your bladder and urethra  Common signs and symptoms include the following:   Fever and chills    Pain or burning when you urinate    Urine that smells bad or looks cloudy, or blood in your urine    Urinating more often or waking from sleep to urinate    Sudden, strong need to urinate    Pain or pressure in your lower abdomen     Leaking urine    Confusion or agitation    Fatigue, shakiness, and weakness    Seek care immediately if:   You become confused or agitated  You fall down  You are urinating very little or not at all  You are vomiting  You have a high fever with shaking chills  You have side or back pain that gets worse  Call your doctor if:   You have a fever  You are a woman and you have increased white or yellow discharge from your vagina  You do not feel better after 2 days of taking antibiotics  You have questions or concerns about your condition or care  Treatment:  Medicines treat the bacterial infection or decrease pain and burning when you urinate  You may also need medicines to decrease the urge to urinate often  If you have UTIs often (called recurrent UTIs), you may be given antibiotics to take regularly  You will be given directions for when and how to use antibiotics  The goal is to prevent UTIs but not cause antibiotic resistance by using antibiotics too often  Self-care:   Drink liquids as directed  Liquids can help flush bacteria from your urinary tract  Ask how much liquid to drink each day and which liquids are best for you  You may need to drink more liquids than usual to help flush out the bacteria  Do not drink alcohol, caffeine, and citrus juices   These can irritate your bladder and increase your symptoms  Apply heat  on your abdomen for 20 to 30 minutes every 2 hours for as many days as directed  Heat helps decrease discomfort and pressure in your bladder  Prevent a UTI:   Urinate when you feel the urge  Do not hold your urine  Bacteria can grow if urine stays in the bladder too long  It may be helpful to urinate at least every 3 to 4 hours  Urinate after you have sex  This will help flush away bacteria that can enter your urinary tract during sex  Wear cotton underwear and clothes that are loose  Tight pants and nylon underwear can trap moisture and cause bacteria to grow  Drink cranberry juice or take cranberry supplements  These may help prevent UTIs  Your healthcare provider can recommend the right juice or supplement for you  Women should wipe front to back after urinating or having a bowel movement  This may prevent germs from getting into the urinary tract  Do not douche or use feminine deodorants  These can change the chemical balance in your vagina  You may also be given vaginal estrogen medicine  This medicine helps prevent recurrent UTIs in women who have gone through menopause or are in gee-menopause  Follow up with your doctor as directed:  Write down your questions so you remember to ask them during your visits  © Copyright [employer] 2022 Information is for End User's use only and may not be sold, redistributed or otherwise used for commercial purposes  The above information is an  only  It is not intended as medical advice for individual conditions or treatments  Talk to your doctor, nurse or pharmacist before following any medical regimen to see if it is safe and effective for you

## 2023-05-31 LAB — BACTERIA UR CULT: NORMAL

## 2023-06-13 ENCOUNTER — CONSULT (OUTPATIENT)
Dept: PLASTIC SURGERY | Facility: CLINIC | Age: 78
End: 2023-06-13
Payer: MEDICARE

## 2023-06-13 VITALS
HEART RATE: 110 BPM | HEIGHT: 63 IN | WEIGHT: 273 LBS | TEMPERATURE: 96 F | DIASTOLIC BLOOD PRESSURE: 75 MMHG | BODY MASS INDEX: 48.37 KG/M2 | SYSTOLIC BLOOD PRESSURE: 133 MMHG

## 2023-06-13 DIAGNOSIS — H02.9 EYELID LESION: ICD-10-CM

## 2023-06-13 PROCEDURE — 99203 OFFICE O/P NEW LOW 30 MIN: CPT | Performed by: PLASTIC SURGERY

## 2023-06-20 DIAGNOSIS — E78.2 MIXED HYPERLIPIDEMIA: ICD-10-CM

## 2023-06-20 RX ORDER — ROSUVASTATIN CALCIUM 10 MG/1
TABLET, COATED ORAL
Qty: 90 TABLET | Refills: 1 | Status: SHIPPED | OUTPATIENT
Start: 2023-06-20

## 2023-06-22 NOTE — PROGRESS NOTES
Assessment/Plan   71-year-old female with upper lid/lateral brow mass status post blepharoplasty  1  Lesion was consistent with a inclusion cyst  2 )  However because of the location I do feel that this lesion would be best excised in the OR  3 )  I did discuss that I do feel that she has enough skin of the lead to a functional problem or compromise of her blepharoplasty  4 )  All her questions were answered to her satisfaction, we will make arrangements to have the procedure performed in the OR    Discussion-- discussed with the patient that she has findings consistent with an inclusion cyst, I discussed with her the nature of inclusion cyst, the risks, benefits, and alternatives of treatment, I do feel that this lesion would benefit from excision  Because of the size and location I do feel that this would be best treated in the OR  We discussed the risks, benefits, alternatives including risk of bleeding, infection, scarring, poor wound healing, damage to surrounding and underlying structures, need for further surgery, need for multiple procedures, I do not feel that this would compromise her blepharoplasty result or lead to abnormal appearance of her brow  We discussed the risk of recurrence, damage to surrounding and underlying structures, need for further surgery, need for multiple procedures, all her questions were answered to her satisfaction, we will make arrangements have the procedure performed in the OR  Subjective   Patient ID: Zaki Mckeon is a 77 y o  female  Vitals:    06/13/23 1600   BP: 133/75   Pulse: (!) 110   Temp: (!) 96 °F (35 6 °C)     HPI  Patient is a 71-year-old female who is here today to discuss management of a lesion on her left upper eyelid/brow region    The patient states that she had a blepharoplasty and brow lift and stated that the lesion was to be removed at that time, however the lesion was not removed and persisted postoperatively and she would like correction of this lesion  She states the lesion has been there for some time, is not associated with any redness, swelling, drainage, no crusting, no ulceration, no irritation, no bleeding or skip lesions, the patient does not member any antecedent trauma to that area, she otherwise has no complaints, she has no constitutional symptoms  He is a non-smoker, she does not take steroids or blood thinners  The following portions of the patient's history were reviewed and updated as appropriate: allergies, current medications, past family history, past medical history, past social history, past surgical history and problem list     Review of Systems   All other systems reviewed and are negative  Objective   Physical Exam   Constitutional  She appears well-developed and well-nourished  Eyes  Pupils are equal, round, and reactive to light  System normal      General -             Right: Right eye extraocular movements are normal              Left: Left eye extraocular movements are normal        Skin    Patient with 1-1/2 cm x 1 cm firm nodular well-circumscribed lesion on the junction of the upper lid and brow skin, this lesion is consistent with an inclusion cyst, she does not have any malignant characteristics  Psychiatric  She has a normal mood and affect  Her behavior is normal  Judgment normal        Past Medical History:   Diagnosis Date   • Acute ill-defined cerebrovascular disease    • Benign essential tremor    • Breast cancer ([facility] [address] )     pt states doesn't remember the year thinks it might have been on the right     • Chronic kidney disease    • CKD (chronic kidney disease) stage 3, GFR 30-59 ml/min ([facility]) 9/25/2018   • Colon polyp    • COPD (chronic obstructive pulmonary disease) ([facility])    • Depressive disorder    • Disease of thyroid gland    • Heart murmur    • Hypertension    • Impaired fasting glucose    • Intention tremor     last assessed: 8/18/2016   • Osteochondropathy    • Panic disorder without agoraphobia with mild panic attacks    • Psychiatric disorder     anxiety   • Stroke [facility])    • Tubular adenoma of colon      Past Surgical History:   Procedure Laterality Date   • AUGMENTATION BREAST      pt states during mammo she doesn't have any other surgery than the 2 biopsy   • BLEPHAROPLASTY      Upper   • BREAST BIOPSY Left     pt doesn't recall when and believes the neg biop was on left   • BREAST LUMPECTOMY W/ NEEDLE LOCALIZATION Left     description: benign last assessed; 8/21/2016   • CHOLECYSTECTOMY     • COLONOSCOPY     • EGD     • HYSTERECTOMY     • TUBAL LIGATION       Current Outpatient Medications   Medication Instructions   • acetaminophen (TYLENOL) 500 mg, Oral, Every 6 hours PRN, Take 2 tablets by mouth every 6-8 hours as needed    • amLODIPine (NORVASC) 10 mg tablet TAKE 1 TABLET(10 MG) BY MOUTH DAILY   • aspirin 81 mg, Oral, Daily   • Blood Pressure Monitoring (B-D ASSURE BPM/AUTO ARM CUFF) MISC Does not apply, Daily   • dexlansoprazole (DEXILANT) 60 mg, Oral, Daily   • Diclofenac Sodium (VOLTAREN) 2 g, Topical, 4 times daily   • levothyroxine 50 mcg tablet TAKE 1 TABLET(50 MCG) BY MOUTH DAILY   • lisinopril (ZESTRIL) 20 mg tablet TAKE 1 TABLET(20 MG) BY MOUTH DAILY   • Misc  Devices (Round Shower Stool) MISC Does not apply, Daily, Use as needed in the shower     • potassium chloride (K-DUR,KLOR-CON) 20 mEq tablet 20 mEq, Oral, Daily   • rosuvastatin (CRESTOR) 10 MG tablet TAKE 1 TABLET(10 MG) BY MOUTH DAILY   • Vitamin D3 2,000 Units, Oral, Daily   • Zoster Vac Recomb Adjuvanted (Shingrix) 50 MCG/0 5ML SUSR 0 5mL IM for one dose, followed by 0 5mL IM 2-6 months after first dose       Social History     Social History Narrative         Always uses seat belt     Social History     Tobacco Use   Smoking Status Former   Smokeless Tobacco Never   Tobacco Comments    10 years ago

## 2023-06-28 ENCOUNTER — RA CDI HCC (OUTPATIENT)
Dept: OTHER | Facility: HOSPITAL | Age: 78
End: 2023-06-28

## 2023-07-05 ENCOUNTER — OFFICE VISIT (OUTPATIENT)
Dept: FAMILY MEDICINE CLINIC | Facility: CLINIC | Age: 78
End: 2023-07-05
Payer: MEDICARE

## 2023-07-05 VITALS
OXYGEN SATURATION: 97 % | TEMPERATURE: 96.6 F | HEART RATE: 83 BPM | DIASTOLIC BLOOD PRESSURE: 80 MMHG | SYSTOLIC BLOOD PRESSURE: 130 MMHG | BODY MASS INDEX: 48.73 KG/M2 | HEIGHT: 63 IN | WEIGHT: 275 LBS

## 2023-07-05 DIAGNOSIS — I10 ESSENTIAL HYPERTENSION: ICD-10-CM

## 2023-07-05 DIAGNOSIS — E66.01 MORBID OBESITY WITH BMI OF 45.0-49.9, ADULT (HCC): Primary | ICD-10-CM

## 2023-07-05 DIAGNOSIS — H65.92 LEFT NON-SUPPURATIVE OTITIS MEDIA: ICD-10-CM

## 2023-07-05 DIAGNOSIS — E03.9 HYPOTHYROIDISM, UNSPECIFIED TYPE: ICD-10-CM

## 2023-07-05 DIAGNOSIS — N64.4 BREAST PAIN, LEFT: ICD-10-CM

## 2023-07-05 DIAGNOSIS — N18.32 STAGE 3B CHRONIC KIDNEY DISEASE (HCC): ICD-10-CM

## 2023-07-05 PROCEDURE — 99214 OFFICE O/P EST MOD 30 MIN: CPT

## 2023-07-05 RX ORDER — LISINOPRIL 10 MG/1
10 TABLET ORAL DAILY
Qty: 90 TABLET | Refills: 1 | Status: SHIPPED | OUTPATIENT
Start: 2023-07-05

## 2023-07-05 RX ORDER — AZITHROMYCIN 250 MG/1
TABLET, FILM COATED ORAL
Qty: 6 TABLET | Refills: 0 | Status: SHIPPED | OUTPATIENT
Start: 2023-07-05 | End: 2023-07-10

## 2023-07-05 RX ORDER — FLUTICASONE PROPIONATE 50 MCG
1 SPRAY, SUSPENSION (ML) NASAL DAILY
Qty: 16 G | Refills: 0 | Status: SHIPPED | OUTPATIENT
Start: 2023-07-05 | End: 2023-07-07 | Stop reason: SDUPTHER

## 2023-07-05 NOTE — ASSESSMENT & PLAN NOTE
Stable in office today. Patient states that her blood pressure at home has been well controlled. She would like to decrease one of her blood pressure medications. We will trial to decrease lisinopril to 10 mg daily. Continue to take amlodipine 10 mg at bedtime. Continue to check your blood pressure daily. Write down the numbers and bring them with you to the next appointment. Obtain blood work before the next appointment.

## 2023-07-05 NOTE — ASSESSMENT & PLAN NOTE
Patient continues to feel frustrated about inability to lose weight. We discussed factors influencing her inability to lose weight as such as not getting enough nutrients and, eating only once a day, not staying hydrated, not walking daily. We will schedule for nutrition services to help you with healthy food choices.

## 2023-07-05 NOTE — PROGRESS NOTES
Assessment/Plan:         Problem List Items Addressed This Visit        Endocrine    Hypothyroidism     Stable, continue on current medication regiment. Obtain blood work before next appointment. Relevant Orders    TSH, 3rd generation with Free T4 reflex       Cardiovascular and Mediastinum    Essential hypertension     Stable in office today. Patient states that her blood pressure at home has been well controlled. She would like to decrease one of her blood pressure medications. We will trial to decrease lisinopril to 10 mg daily. Continue to take amlodipine 10 mg at bedtime. Continue to check your blood pressure daily. Write down the numbers and bring them with you to the next appointment. Obtain blood work before the next appointment. Relevant Medications    lisinopril (ZESTRIL) 10 mg tablet    Other Relevant Orders    CBC and differential    Comprehensive metabolic panel    Lipid panel       Genitourinary    CKD (chronic kidney disease) stage 3, GFR 30-59 ml/min ([facility])     Lab Results   Component Value Date    EGFR 40 03/31/2023    EGFR 37 10/07/2022    EGFR 40 04/08/2022    CREATININE 1.26 03/31/2023    CREATININE 1.34 (H) 10/07/2022    CREATININE 1.29 04/08/2022   Stable. Continue to work on increasing hydration. Continue to avoid NSAIDs            Other    Morbid obesity with BMI of 45.0-49.9, adult [facility]) - Primary     Patient continues to feel frustrated about inability to lose weight. We discussed factors influencing her inability to lose weight as such as not getting enough nutrients and, eating only once a day, not staying hydrated, not walking daily. We will schedule for nutrition services to help you with healthy food choices.          Relevant Orders    Ambulatory Referral to Nutrition Services    CBC and differential    Comprehensive metabolic panel    Lipid panel   Other Visit Diagnoses     Left non-suppurative otitis media        Start taking antibiotic finish all the doses even if you feel better. Start using Flonase daily as well. Relevant Medications    azithromycin (Zithromax) 250 mg tablet    fluticasone (FLONASE) 50 mcg/act nasal spray    Breast pain, left        Schedule for mammogram.  Ultrasound ordered as well. Relevant Orders    US breast left limited (diagnostic)            Subjective:      Patient ID: Celine Momin is a 77 y.o. female. Patient presents to the office for a routine follow-up. Has been taking all of the medications as prescribed and denies adverse effects. Recent/relevant studies and blood work reviewed. Denies change in vision, headache, or dizziness. No complaints of chest pain, palpitations, or worsening shortness of breath. Reporting crackling feeling in bilateral ears for about 4 to 5 months now. Also reporting sharp pain in her left breast that has since went away. No nipple discharge or pain on palpation. The following portions of the patient's history were reviewed and updated as appropriate:   Past Medical History:  She has a past medical history of Acute ill-defined cerebrovascular disease, Benign essential tremor, Breast cancer ([address]), Chronic kidney disease, CKD (chronic kidney disease) stage 3, GFR 30-59 ml/min ([address]) (9/25/2018), Colon polyp, COPD (chronic obstructive pulmonary disease) ([address]), Depressive disorder, Disease of thyroid gland, Heart murmur, Hypertension, Impaired fasting glucose, Intention tremor, Osteochondropathy, Panic disorder without agoraphobia with mild panic attacks, Psychiatric disorder, Stroke ([address]), and Tubular adenoma of colon. ,  _______________________________________________________________________  Medical Problems:  does not have any pertinent problems on file.,  _______________________________________________________________________  Past Surgical History:   has a past surgical history that includes Hysterectomy;  Cholecystectomy; AUGMENTATION BREAST; Breast lumpectomy w/ needle localization (Left); Colonoscopy; EGD; Breast biopsy (Left); Tubal ligation; and Blepharoplasty. ,  _______________________________________________________________________  Family History:  family history includes Alcohol abuse in her mother; Asthma in her mother; Breast cancer in her sister; COPD in her sister; Cancer in her father and mother; Gout in her father; Heart attack in her sister; Hyperlipidemia in her sister; Hypertension in her sister; Lung cancer in her sister; Mental illness in her mother; No Known Problems in her daughter, daughter, daughter, maternal grandmother, paternal grandmother, and sister; Osteoarthritis in her mother; Other in her sister. ,  _______________________________________________________________________  Social History:   reports that she has quit smoking. She has never used smokeless tobacco. She reports current alcohol use. She reports that she does not use drugs. ,  _______________________________________________________________________  Allergies:  is allergic to amoxicillin, codeine, and morphine. .  _______________________________________________________________________  Current Outpatient Medications   Medication Sig Dispense Refill   • acetaminophen (TYLENOL) 500 mg tablet Take 500 mg by mouth every 6 (six) hours as needed for mild pain Take 2 tablets by mouth every 6-8 hours as needed     • amLODIPine (NORVASC) 10 mg tablet TAKE 1 TABLET(10 MG) BY MOUTH DAILY 90 tablet 1   • aspirin 81 mg chewable tablet Chew 81 mg daily     • azithromycin (Zithromax) 250 mg tablet Take 2 tablets (500 mg total) by mouth daily for 1 day, THEN 1 tablet (250 mg total) daily for 4 days.  6 tablet 0   • Blood Pressure Monitoring (B-D ASSURE BPM/AUTO ARM CUFF) MISC Use in the morning 1 each 0   • Cholecalciferol (VITAMIN D3) 50 MCG (2000 UT) TABS Take 2,000 Units by mouth daily     • dexlansoprazole (Dexilant) 60 MG capsule Take 1 capsule (60 mg total) by mouth daily 90 capsule 1   • Diclofenac Sodium (VOLTAREN) 1 % Apply 2 g topically 4 (four) times a day 100 g 1   • fluticasone (FLONASE) 50 mcg/act nasal spray 1 spray into each nostril daily 16 g 0   • levothyroxine 50 mcg tablet TAKE 1 TABLET(50 MCG) BY MOUTH DAILY 90 tablet 1   • lisinopril (ZESTRIL) 10 mg tablet Take 1 tablet (10 mg total) by mouth daily 90 tablet 1   • Misc. Devices (Round Shower Stool) MISC Use daily Use as needed in the shower. 1 each 0   • potassium chloride (K-DUR,KLOR-CON) 20 mEq tablet Take 1 tablet (20 mEq total) by mouth daily 90 tablet 0   • rosuvastatin (CRESTOR) 10 MG tablet TAKE 1 TABLET(10 MG) BY MOUTH DAILY 90 tablet 1   • Zoster Vac Recomb Adjuvanted (Shingrix) 50 MCG/0.5ML SUSR 0.5mL IM for one dose, followed by 0.5mL IM 2-6 months after first dose 1 each 1     No current facility-administered medications for this visit.     _______________________________________________________________________  Review of Systems   Constitutional: Positive for fatigue. Negative for chills and fever. HENT: Positive for ear pain (ear crackeling ). Respiratory: Positive for shortness of breath (w/ exertion ). Negative for cough. Cardiovascular: Negative for chest pain. Gastrointestinal: Negative for abdominal pain and vomiting. Genitourinary: Negative for dysuria. Musculoskeletal: Positive for arthralgias (chronic). Negative for back pain. Left breast pain   Neurological: Negative for headaches. All other systems reviewed and are negative. Objective:  Vitals:    07/05/23 1518   BP: 130/80   BP Location: Right arm   Patient Position: Sitting   Cuff Size: Standard   Pulse: 83   Temp: (!) 96.6 °F (35.9 °C)   SpO2: 97%   Weight: 125 kg (275 lb)   Height: 5' 3" (1.6 m)     Body mass index is 48.71 kg/m². Physical Exam  Vitals and nursing note reviewed. Constitutional:       General: She is not in acute distress. Appearance: Normal appearance. She is obese. She is not ill-appearing.    HENT:      Right Ear: Tympanic membrane, ear canal and external ear normal.      Left Ear: External ear normal. Tympanic membrane is erythematous and bulging. Cardiovascular:      Rate and Rhythm: Normal rate and regular rhythm. Heart sounds: Normal heart sounds. Pulmonary:      Effort: Pulmonary effort is normal.      Breath sounds: Normal breath sounds. Musculoskeletal:         General: Normal range of motion. Skin:     General: Skin is warm and dry. Neurological:      Mental Status: She is alert and oriented to person, place, and time. Psychiatric:         Mood and Affect: Mood normal.         Behavior: Behavior normal.         Thought Content: Thought content normal.         Judgment: Judgment normal.               Portions of the above record have been created with voice recognition software. Occasional wrong word or "sound alike" substitution may have occurred due to the inherent limitations of voice recognition software. Read the chart carefully and recognize, using context, where substitution may have occurred.

## 2023-07-05 NOTE — ASSESSMENT & PLAN NOTE
Lab Results   Component Value Date    EGFR 40 03/31/2023    EGFR 37 10/07/2022    EGFR 40 04/08/2022    CREATININE 1.26 03/31/2023    CREATININE 1.34 (H) 10/07/2022    CREATININE 1.29 04/08/2022   Stable. Continue to work on increasing hydration.   Continue to avoid NSAIDs

## 2023-07-07 DIAGNOSIS — H65.92 LEFT NON-SUPPURATIVE OTITIS MEDIA: ICD-10-CM

## 2023-07-07 RX ORDER — FLUTICASONE PROPIONATE 50 MCG
1 SPRAY, SUSPENSION (ML) NASAL DAILY
Qty: 16 G | Refills: 0 | Status: SHIPPED | OUTPATIENT
Start: 2023-07-07

## 2023-07-28 ENCOUNTER — HOSPITAL ENCOUNTER (OUTPATIENT)
Age: 78
Discharge: HOME | End: 2023-07-28

## 2023-07-28 VITALS — WEIGHT: 275 LBS | HEIGHT: 63 IN | BODY MASS INDEX: 48.73 KG/M2

## 2023-07-28 DIAGNOSIS — Z12.31 ENCOUNTER FOR SCREENING MAMMOGRAM FOR MALIGNANT NEOPLASM OF BREAST: ICD-10-CM

## 2023-07-28 DIAGNOSIS — Z85.3 HISTORY OF BREAST CANCER: ICD-10-CM

## 2023-07-29 PROBLEM — R31.9 URINARY TRACT INFECTION WITH HEMATURIA: Status: RESOLVED | Noted: 2023-05-30 | Resolved: 2023-07-29

## 2023-07-29 PROBLEM — N39.0 URINARY TRACT INFECTION WITH HEMATURIA: Status: RESOLVED | Noted: 2023-05-30 | Resolved: 2023-07-29

## 2023-08-01 ENCOUNTER — APPOINTMENT (OUTPATIENT)
Dept: LAB | Facility: HOSPITAL | Age: 78
End: 2023-08-01
Payer: MEDICARE

## 2023-08-01 ENCOUNTER — TELEPHONE (OUTPATIENT)
Dept: FAMILY MEDICINE CLINIC | Facility: CLINIC | Age: 78
End: 2023-08-01

## 2023-08-01 ENCOUNTER — OFFICE VISIT (OUTPATIENT)
Dept: FAMILY MEDICINE CLINIC | Facility: CLINIC | Age: 78
End: 2023-08-01
Payer: MEDICARE

## 2023-08-01 ENCOUNTER — HOSPITAL ENCOUNTER (OUTPATIENT)
Dept: CT IMAGING | Facility: HOSPITAL | Age: 78
Discharge: HOME | End: 2023-08-01
Payer: MEDICARE

## 2023-08-01 VITALS
TEMPERATURE: 97.6 F | OXYGEN SATURATION: 99 % | HEIGHT: 63 IN | BODY MASS INDEX: 48.73 KG/M2 | WEIGHT: 275 LBS | HEART RATE: 89 BPM | SYSTOLIC BLOOD PRESSURE: 126 MMHG | DIASTOLIC BLOOD PRESSURE: 78 MMHG

## 2023-08-01 DIAGNOSIS — R19.00 PALPABLE ABDOMINAL MASS: ICD-10-CM

## 2023-08-01 DIAGNOSIS — N28.89 RIGHT RENAL MASS: Primary | ICD-10-CM

## 2023-08-01 DIAGNOSIS — R06.02 SHORTNESS OF BREATH: ICD-10-CM

## 2023-08-01 DIAGNOSIS — R06.02 SHORTNESS OF BREATH: Primary | ICD-10-CM

## 2023-08-01 LAB
ALBUMIN SERPL BCP-MCNC: 4.1 G/DL (ref 3.5–5)
ALP SERPL-CCNC: 79 U/L (ref 34–104)
ALT SERPL W P-5'-P-CCNC: 14 U/L (ref 7–52)
ANION GAP SERPL CALCULATED.3IONS-SCNC: 6 MMOL/L
AST SERPL W P-5'-P-CCNC: 18 U/L (ref 13–39)
BASOPHILS # BLD AUTO: 0.04 THOUSANDS/ÂΜL (ref 0–0.1)
BASOPHILS NFR BLD AUTO: 1 % (ref 0–1)
BILIRUB SERPL-MCNC: 0.6 MG/DL (ref 0.2–1)
BUN SERPL-MCNC: 14 MG/DL (ref 5–25)
CALCIUM SERPL-MCNC: 9.9 MG/DL (ref 8.4–10.2)
CHLORIDE SERPL-SCNC: 106 MMOL/L (ref 96–108)
CO2 SERPL-SCNC: 29 MMOL/L (ref 21–32)
CREAT SERPL-MCNC: 1.37 MG/DL (ref 0.6–1.3)
D DIMER PPP FEU-MCNC: 0.54 UG/ML FEU
EOSINOPHIL # BLD AUTO: 0.16 THOUSAND/ÂΜL (ref 0–0.61)
EOSINOPHIL NFR BLD AUTO: 2 % (ref 0–6)
ERYTHROCYTE [DISTWIDTH] IN BLOOD BY AUTOMATED COUNT: 14.1 % (ref 11.6–15.1)
GFR SERPL CREATININE-BSD FRML MDRD: 36 ML/MIN/1.73SQ M
GLUCOSE P FAST SERPL-MCNC: 107 MG/DL (ref 65–99)
HCT VFR BLD AUTO: 43.8 % (ref 34.8–46.1)
HGB BLD-MCNC: 13.9 G/DL (ref 11.5–15.4)
IMM GRANULOCYTES # BLD AUTO: 0.02 THOUSAND/UL (ref 0–0.2)
IMM GRANULOCYTES NFR BLD AUTO: 0 % (ref 0–2)
LYMPHOCYTES # BLD AUTO: 1.67 THOUSANDS/ÂΜL (ref 0.6–4.47)
LYMPHOCYTES NFR BLD AUTO: 22 % (ref 14–44)
MCH RBC QN AUTO: 27.1 PG (ref 26.8–34.3)
MCHC RBC AUTO-ENTMCNC: 31.7 G/DL (ref 31.4–37.4)
MCV RBC AUTO: 85 FL (ref 82–98)
MONOCYTES # BLD AUTO: 0.51 THOUSAND/ÂΜL (ref 0.17–1.22)
MONOCYTES NFR BLD AUTO: 7 % (ref 4–12)
NEUTROPHILS # BLD AUTO: 5.1 THOUSANDS/ÂΜL (ref 1.85–7.62)
NEUTS SEG NFR BLD AUTO: 68 % (ref 43–75)
NRBC BLD AUTO-RTO: 0 /100 WBCS
PLATELET # BLD AUTO: 232 THOUSANDS/UL (ref 149–390)
PMV BLD AUTO: 10.8 FL (ref 8.9–12.7)
POTASSIUM SERPL-SCNC: 4 MMOL/L (ref 3.5–5.3)
PROT SERPL-MCNC: 7.6 G/DL (ref 6.4–8.4)
RBC # BLD AUTO: 5.13 MILLION/UL (ref 3.81–5.12)
SODIUM SERPL-SCNC: 141 MMOL/L (ref 135–147)
WBC # BLD AUTO: 7.5 THOUSAND/UL (ref 4.31–10.16)

## 2023-08-01 PROCEDURE — 74177 CT ABD & PELVIS W/CONTRAST: CPT

## 2023-08-01 PROCEDURE — G1004 CDSM NDSC: HCPCS

## 2023-08-01 PROCEDURE — 85379 FIBRIN DEGRADATION QUANT: CPT

## 2023-08-01 PROCEDURE — 71275 CT ANGIOGRAPHY CHEST: CPT

## 2023-08-01 PROCEDURE — 85025 COMPLETE CBC W/AUTO DIFF WBC: CPT

## 2023-08-01 PROCEDURE — 80053 COMPREHEN METABOLIC PANEL: CPT

## 2023-08-01 PROCEDURE — 93000 ELECTROCARDIOGRAM COMPLETE: CPT

## 2023-08-01 PROCEDURE — 99214 OFFICE O/P EST MOD 30 MIN: CPT

## 2023-08-01 PROCEDURE — 36415 COLL VENOUS BLD VENIPUNCTURE: CPT

## 2023-08-01 RX ADMIN — IOHEXOL 100 ML: 350 INJECTION, SOLUTION INTRAVENOUS at 15:02

## 2023-08-01 NOTE — TELEPHONE ENCOUNTER
Spoke with patient- gave her the results to her CTA as documented by Horacio Lancaster. She stated understanding. Helped her schedule her MRI- it is scheduled for 08/13 @ 9:45. [id number] [address]. Patient asked about her RBC being high, consulted Horacio Lancaster, she stated it is nothing to be concerned about right now. Patient stated understanding.

## 2023-08-01 NOTE — PROGRESS NOTES
Assessment/Plan:         Problem List Items Addressed This Visit        Other    Shortness of breath - Primary    Relevant Orders    POCT ECG (Completed)    Echo complete w/ contrast if indicated    D-dimer, quantitative    Comprehensive metabolic panel    CBC and differential    CTA chest (pe study) abdomen pelvis contrast   Other Visit Diagnoses     Palpable abdominal mass        Please have CT, will call with results. palpable tender nonmovable 2 cm or larger mass in the right upper abdomen. Relevant Orders    CTA chest (pe study) abdomen pelvis contrast            Subjective:      Patient ID: Shubham Escobar is a 77 y.o. female. Jc Barcenas has had shortness of breath for about 2 weeks but nwt is having excruciating pain when she bends on the left chest.       The following portions of the patient's history were reviewed and updated as appropriate:   Past Medical History:  She has a past medical history of Acute ill-defined cerebrovascular disease, Benign essential tremor, Breast cancer ([address]), Chronic kidney disease, CKD (chronic kidney disease) stage 3, GFR 30-59 ml/min ([address]) (9/25/2018), Colon polyp, COPD (chronic obstructive pulmonary disease) ([address]), Depressive disorder, Disease of thyroid gland, Heart murmur, Hypertension, Impaired fasting glucose, Intention tremor, Osteochondropathy, Panic disorder without agoraphobia with mild panic attacks, Psychiatric disorder, Stroke ([address]), and Tubular adenoma of colon. ,  _______________________________________________________________________  Medical Problems:  does not have any pertinent problems on file.,  _______________________________________________________________________  Past Surgical History:   has a past surgical history that includes Hysterectomy; Cholecystectomy; AUGMENTATION BREAST; Breast lumpectomy w/ needle localization (Left); Colonoscopy; EGD; Breast biopsy (Left);  Tubal ligation; and Blepharoplasty. ,  _______________________________________________________________________  Family History:  family history includes Alcohol abuse in her mother; Asthma in her mother; Breast cancer in her sister; COPD in her sister; Cancer in her father and mother; Gout in her father; Heart attack in her sister; Hyperlipidemia in her sister; Hypertension in her sister; Lung cancer in her sister; Mental illness in her mother; No Known Problems in her daughter, daughter, daughter, maternal grandmother, paternal grandmother, and sister; Osteoarthritis in her mother; Other in her sister. ,  _______________________________________________________________________  Social History:   reports that she has quit smoking. She has never used smokeless tobacco. She reports current alcohol use. She reports that she does not use drugs. ,  _______________________________________________________________________  Allergies:  is allergic to amoxicillin, codeine, and morphine. .  _______________________________________________________________________  Current Outpatient Medications   Medication Sig Dispense Refill   • acetaminophen (TYLENOL) 500 mg tablet Take 500 mg by mouth every 6 (six) hours as needed for mild pain Take 2 tablets by mouth every 6-8 hours as needed     • amLODIPine (NORVASC) 10 mg tablet TAKE 1 TABLET(10 MG) BY MOUTH DAILY 90 tablet 1   • aspirin 81 mg chewable tablet Chew 81 mg daily     • Blood Pressure Monitoring (B-D ASSURE BPM/AUTO ARM CUFF) MISC Use in the morning 1 each 0   • Cholecalciferol (VITAMIN D3) 50 MCG (2000 UT) TABS Take 2,000 Units by mouth daily     • dexlansoprazole (Dexilant) 60 MG capsule Take 1 capsule (60 mg total) by mouth daily 90 capsule 1   • Diclofenac Sodium (VOLTAREN) 1 % Apply 2 g topically 4 (four) times a day 100 g 1   • fluticasone (FLONASE) 50 mcg/act nasal spray 1 spray into each nostril daily 16 g 0   • levothyroxine 50 mcg tablet TAKE 1 TABLET(50 MCG) BY MOUTH DAILY 90 tablet 1   • lisinopril (ZESTRIL) 10 mg tablet Take 1 tablet (10 mg total) by mouth daily 90 tablet 1   • Misc. Devices (Round Shower Stool) MISC Use daily Use as needed in the shower. 1 each 0   • potassium chloride (K-DUR,KLOR-CON) 20 mEq tablet Take 1 tablet (20 mEq total) by mouth daily 90 tablet 0   • rosuvastatin (CRESTOR) 10 MG tablet TAKE 1 TABLET(10 MG) BY MOUTH DAILY 90 tablet 1   • Zoster Vac Recomb Adjuvanted (Shingrix) 50 MCG/0.5ML SUSR 0.5mL IM for one dose, followed by 0.5mL IM 2-6 months after first dose 1 each 1     No current facility-administered medications for this visit.     _______________________________________________________________________  Review of Systems   Constitutional: Positive for chills and fatigue. Negative for diaphoresis and fever. HENT: Negative for congestion, ear pain, postnasal drip, rhinorrhea, sinus pressure, sinus pain and sore throat. Eyes: Negative for pain and visual disturbance. Respiratory: Positive for cough, chest tightness and shortness of breath. Cardiovascular: Positive for chest pain and palpitations. Negative for leg swelling. Gastrointestinal: Negative for abdominal pain, constipation, diarrhea, nausea and vomiting. Genitourinary: Negative for dysuria, frequency, hematuria and urgency. Musculoskeletal: Positive for arthralgias, back pain and myalgias. Skin: Negative for color change and rash. Neurological: Positive for light-headedness and headaches. Negative for dizziness, seizures and syncope. Psychiatric/Behavioral: Negative for dysphoric mood and sleep disturbance. The patient is not nervous/anxious. All other systems reviewed and are negative. Objective:  Vitals:    08/01/23 1304   BP: 126/78   BP Location: Right arm   Patient Position: Sitting   Cuff Size: Large   Pulse: 89   Temp: 97.6 °F (36.4 °C)   SpO2: 99%   Weight: 125 kg (275 lb)   Height: 5' 3" (1.6 m)     Body mass index is 48.71 kg/m².      Physical Exam  Vitals and nursing note reviewed. Constitutional:       Appearance: Normal appearance. She is not ill-appearing. HENT:      Head: Normocephalic. Right Ear: Tympanic membrane, ear canal and external ear normal. There is no impacted cerumen. Left Ear: Tympanic membrane, ear canal and external ear normal. There is no impacted cerumen. Nose: Nose normal. No congestion. Mouth/Throat:      Mouth: Mucous membranes are moist.      Pharynx: No posterior oropharyngeal erythema. Eyes:      Extraocular Movements: Extraocular movements intact. Conjunctiva/sclera: Conjunctivae normal.      Pupils: Pupils are equal, round, and reactive to light. Cardiovascular:      Rate and Rhythm: Normal rate and regular rhythm. Heart sounds: Normal heart sounds. No murmur heard. Pulmonary:      Effort: Pulmonary effort is normal.      Breath sounds: Normal breath sounds. No wheezing. Comments: tachypenic    Abdominal:      General: Bowel sounds are normal.      Palpations: Abdomen is soft. Tenderness: There is no abdominal tenderness. Musculoskeletal:         General: Normal range of motion. Cervical back: Normal range of motion. Right lower leg: No edema. Left lower leg: No edema. Skin:     General: Skin is warm and dry. Findings: Lesion (palpable mass right upper abdomen) present. Neurological:      General: No focal deficit present. Mental Status: She is alert.    Psychiatric:         Mood and Affect: Mood normal.         Behavior: Behavior normal.

## 2023-08-01 NOTE — TELEPHONE ENCOUNTER
----- Message from Yogi Flores, [address] [facility] sent at 8/1/2023  4:59 PM EDT -----  Can you let patient know that there is no pathology in her lungs, I suggest tylenol extra strength for her pain. On the ct there was a new mass identified in the right kidney, I would like her to have a MRI for further evaluation it is ordered for her to schedule. I would like her to have it in the next week or so, please let me know if she has difficulty scheduling.

## 2023-08-03 ENCOUNTER — HOSPITAL ENCOUNTER (OUTPATIENT)
Dept: ULTRASOUND IMAGING | Facility: CLINIC | Age: 78
End: 2023-08-03
Payer: MEDICARE

## 2023-08-03 ENCOUNTER — HOSPITAL ENCOUNTER (OUTPATIENT)
Dept: MAMMOGRAPHY | Facility: CLINIC | Age: 78
End: 2023-08-03
Payer: MEDICARE

## 2023-08-03 VITALS — WEIGHT: 275 LBS | BODY MASS INDEX: 48.73 KG/M2 | HEIGHT: 63 IN

## 2023-08-03 DIAGNOSIS — N64.4 PAIN OF LEFT BREAST: ICD-10-CM

## 2023-08-03 DIAGNOSIS — Z85.3 HISTORY OF BREAST CANCER: ICD-10-CM

## 2023-08-03 DIAGNOSIS — Z12.31 ENCOUNTER FOR SCREENING MAMMOGRAM FOR MALIGNANT NEOPLASM OF BREAST: ICD-10-CM

## 2023-08-03 DIAGNOSIS — N64.4 BREAST PAIN, LEFT: ICD-10-CM

## 2023-08-03 PROCEDURE — 76642 ULTRASOUND BREAST LIMITED: CPT

## 2023-08-03 PROCEDURE — G0279 TOMOSYNTHESIS, MAMMO: HCPCS

## 2023-08-03 PROCEDURE — 77066 DX MAMMO INCL CAD BI: CPT

## 2023-08-11 DIAGNOSIS — K21.00 REFLUX ESOPHAGITIS: ICD-10-CM

## 2023-08-11 RX ORDER — DEXLANSOPRAZOLE 60 MG/1
60 CAPSULE, DELAYED RELEASE ORAL DAILY
Qty: 90 CAPSULE | Refills: 1 | Status: SHIPPED | OUTPATIENT
Start: 2023-08-11

## 2023-08-13 ENCOUNTER — HOSPITAL ENCOUNTER (OUTPATIENT)
Dept: MRI IMAGING | Facility: HOSPITAL | Age: 78
Discharge: HOME | End: 2023-08-13
Payer: MEDICARE

## 2023-08-13 DIAGNOSIS — N28.89 RIGHT RENAL MASS: ICD-10-CM

## 2023-08-13 PROCEDURE — 74183 MRI ABD W/O CNTR FLWD CNTR: CPT

## 2023-08-13 PROCEDURE — A9585 GADOBUTROL INJECTION: HCPCS

## 2023-08-13 PROCEDURE — G1004 CDSM NDSC: HCPCS

## 2023-08-13 RX ORDER — GADOBUTROL 604.72 MG/ML
12 INJECTION INTRAVENOUS
Status: COMPLETED | OUTPATIENT
Start: 2023-08-13 | End: 2023-08-13

## 2023-08-13 RX ADMIN — GADOBUTROL 12 ML: 604.72 INJECTION INTRAVENOUS at 13:35

## 2023-08-21 ENCOUNTER — TELEPHONE (OUTPATIENT)
Dept: FAMILY MEDICINE CLINIC | Facility: CLINIC | Age: 78
End: 2023-08-21

## 2023-08-21 NOTE — TELEPHONE ENCOUNTER
Patient left a message on the clinical line, "[address]. Today is the 8th day I've been waiting to get my results from the MRI I had on the 13th. I would like to know what the results were. I'm still in pain and if I don't hear back from you today, I'm calling a  because this is unacceptable. My phone number is [phone]. I expect to hear back as soon as possible.  Thank you."

## 2023-08-21 NOTE — TELEPHONE ENCOUNTER
I called the reading room to see if we can get this read asap. They said they will push it to get read within the next 24 hours. I called and advised the patient of such and apologized for the delay. I did explain that we would call as soon as we got the results in.  She expressed understanding and accepts

## 2023-08-22 ENCOUNTER — TELEPHONE (OUTPATIENT)
Dept: HEMATOLOGY ONCOLOGY | Facility: CLINIC | Age: 78
End: 2023-08-22

## 2023-08-22 ENCOUNTER — TELEPHONE (OUTPATIENT)
Dept: FAMILY MEDICINE CLINIC | Facility: CLINIC | Age: 78
End: 2023-08-22

## 2023-08-22 DIAGNOSIS — N28.89 RIGHT KIDNEY MASS: Primary | ICD-10-CM

## 2023-08-22 NOTE — TELEPHONE ENCOUNTER
Appointment Schedule   Who are you speaking with? Patient and Physician Office   If it is not the patient, are they listed on an active communication consent form? N/A   Which provider is the appointment scheduled with? Dr. Artie Colby   At which location is the [facility] scheduled for? [facility]   When is the appointment scheduled? Please list date and time 8/23/23 @ 11am   What is the reason for this appointment? New patient appt   Did patient voice understanding of the details of this appointment? yes   Was the no show policy reviewed with patient?  yes

## 2023-08-22 NOTE — TELEPHONE ENCOUNTER
----- Message from Cristal Cohen, [address] [facility] sent at 8/22/2023  8:49 AM EDT -----  MRI is suggestive of kidney neoplasm, I would like her to make an appointment with oncology as soon as possible, I would suggest the soonest appointment with [facility] which will most likely be in the [place]. Referral is in chart.

## 2023-08-23 ENCOUNTER — CONSULT (OUTPATIENT)
Dept: HEMATOLOGY ONCOLOGY | Facility: CLINIC | Age: 78
End: 2023-08-23
Payer: MEDICARE

## 2023-08-23 VITALS
TEMPERATURE: 97.6 F | RESPIRATION RATE: 18 BRPM | OXYGEN SATURATION: 97 % | WEIGHT: 276 LBS | SYSTOLIC BLOOD PRESSURE: 136 MMHG | BODY MASS INDEX: 48.9 KG/M2 | DIASTOLIC BLOOD PRESSURE: 86 MMHG | HEIGHT: 63 IN | HEART RATE: 78 BPM

## 2023-08-23 DIAGNOSIS — N28.89 RIGHT KIDNEY MASS: ICD-10-CM

## 2023-08-23 PROCEDURE — 99203 OFFICE O/P NEW LOW 30 MIN: CPT | Performed by: INTERNAL MEDICINE

## 2023-08-23 NOTE — PROGRESS NOTES
Maria Del Carmen ARMSTRONG  [facility] HEMATOLOGY ONCOLOGY SPECIALISTS [facility]  [address] [facility] [address] [place] [address]  1945      PRIMARY HEMATOLOGIC/ONCOLOGIC DIAGNOSIS:  1. A 1.7 cm enhancing mass in the interpolar region right kidney highly suggestive for neoplasm    PATHOLOGY:     STAGING: Cancer Staging   No matching staging information was found for the patient. Oncology History    No history exists. HISTORY OF PRESENT ILLNESS:  Sylvia Verduzco is a 70yo female who presents for evaluation after imaging revealed the presence of a renal mass. The patient presented to her PCP for evaluation on 8/1/23 c/o SOB. CTA was performed 8/1/23 and revealed a 1.7 x 1.7 cm enhancing mass arising from the posterior interpolar region of the right kidney worrisome for neoplasm. A 1 cm hypoattenuating lesion in the anterior upper pole of the left kidney was indeterminate. A 2.4 cm left renal cyst has mildly enlarged in the interim, previously 2.0 cm. No hydronephrosis MRI abdomen 8/13/23 showed 1.7 cm enhancing mass in the interpolar region right kidney highly suggestive for neoplasm, corresponding to the finding on CT. No retroperitoneal adenopathy or renal vein thrombosis. Hemorrhagic or proteinaceous left upper pole renal cyst with multiple additional simple renal cysts. A 1.5 cm right adrenal adenoma. Large hiatal hernia. The patient was referred to our clinic by the PCP office.      PAST MEDICAL,PAST SURGICAL, FAMILY AND SOCIAL HISTORY:    Patient Active Problem List   Diagnosis   • Intention tremor   • Benign essential tremor   • Depression with anxiety   • Essential hypertension   • CKD (chronic kidney disease) stage 3, GFR 30-59 ml/min ([facility])   • Hypothyroidism   • Shortness of breath   • Mixed hyperlipidemia   • History of breast cancer   • Mild cognitive impairment   • Bilateral malignant neoplasm of breast in female [facility])   • IFG (impaired fasting glucose)   • Compression fracture of fifth lumbar vertebra ([facility])   • L4-L5 disc bulge   • Sciatica of right side   • Chronic obstructive pulmonary disease, unspecified COPD type ([facility])   • Severe episode of recurrent major depressive disorder, without psychotic features ([address])   • Incontinence of feces   • Other fatigue   • Morbid obesity with BMI of 45.0-49.9, adult [facility])     Past Medical History:   Diagnosis Date   • Acute ill-defined cerebrovascular disease    • Benign essential tremor    • Breast cancer ([address])     pt states doesn't remember the year thinks it might have been on the right.    • Chronic kidney disease    • CKD (chronic kidney disease) stage 3, GFR 30-59 ml/min ([facility]) 9/25/2018   • Colon polyp    • COPD (chronic obstructive pulmonary disease) ([facility])    • Depressive disorder    • Disease of thyroid gland    • Heart murmur    • Hypertension    • Impaired fasting glucose    • Intention tremor     last assessed: 8/18/2016   • Osteochondropathy    • Panic disorder without agoraphobia with mild panic attacks    • Psychiatric disorder     anxiety   • Stroke [facility])    • Tubular adenoma of colon      Past Surgical History:   Procedure Laterality Date   • AUGMENTATION BREAST      pt states during mammo she doesn't have any other surgery than the 2 biopsy   • BLEPHAROPLASTY      Upper   • BREAST BIOPSY Left     pt doesn't recall when and believes the neg biop was on left   • BREAST LUMPECTOMY W/ NEEDLE LOCALIZATION Left     description: benign last assessed; 8/21/2016   • CHOLECYSTECTOMY     • COLONOSCOPY     • EGD     • HYSTERECTOMY     • TUBAL LIGATION       Family History   Problem Relation Age of Onset   • Alcohol abuse Mother    • Asthma Mother    • Cancer Mother    • Mental illness Mother    • Osteoarthritis Mother    • Gout Father    • Cancer Father    • Other Sister         carotid arterial disease   • COPD Sister    • Hyperlipidemia Sister    • Hypertension Sister    • Heart attack Sister    • Breast cancer Sister    • No Known Problems Daughter    • No Known Problems Maternal Grandmother    • No Known Problems Paternal Grandmother    • Lung cancer Sister    • No Known Problems Sister    • No Known Problems Daughter    • No Known Problems Daughter      Social History     Socioeconomic History   • Marital status: Single     Spouse name: Not on file   • Number of children: Not on file   • Years of education: Not on file   • Highest education level: Not on file   Occupational History   • Occupation: Retired   Tobacco Use   • Smoking status: Former   • Smokeless tobacco: Never   • Tobacco comments:     10 years ago   Vaping Use   • Vaping Use: Never used   Substance and Sexual Activity   • Alcohol use: Yes     Comment: socially - Per allscripts - denies alcohol consumption   • Drug use: No   • Sexual activity: Not on file   Other Topics Concern   • Not on file   Social History Narrative         Always uses seat belt     Social Determinants of Health     Financial Resource Strain: Low Risk  (10/4/2022)    Overall Financial Resource Strain (CARDIA)    • Difficulty of Paying Living Expenses: Not hard at all   Food Insecurity: Not on file   Transportation Needs: No Transportation Needs (10/4/2022)    PRAPARE - Transportation    • Lack of Transportation (Medical): No    • Lack of Transportation (Non-Medical):  No   Physical Activity: Not on file   Stress: Not on file   Social Connections: Not on file   Intimate Partner Violence: Not on file   Housing Stability: Not on file       Current Outpatient Medications:   •  amLODIPine (NORVASC) 10 mg tablet, TAKE 1 TABLET(10 MG) BY MOUTH DAILY, Disp: 90 tablet, Rfl: 1  •  aspirin 81 mg chewable tablet, Chew 81 mg daily, Disp: , Rfl:   •  Blood Pressure Monitoring (B-D ASSURE BPM/AUTO ARM CUFF) MISC, Use in the morning, Disp: 1 each, Rfl: 0  •  Cholecalciferol (VITAMIN D3) 50 MCG (2000 UT) TABS, Take 2,000 Units by mouth daily, Disp: , Rfl:   •  dexlansoprazole (Dexilant) 60 MG capsule, Take 1 capsule (60 mg total) by mouth daily, Disp: 90 capsule, Rfl: 1  •  levothyroxine 50 mcg tablet, TAKE 1 TABLET(50 MCG) BY MOUTH DAILY, Disp: 90 tablet, Rfl: 1  •  lisinopril (ZESTRIL) 10 mg tablet, Take 1 tablet (10 mg total) by mouth daily, Disp: 90 tablet, Rfl: 1  •  Misc. Devices (Round Shower Stool) MISC, Use daily Use as needed in the shower. , Disp: 1 each, Rfl: 0  •  rosuvastatin (CRESTOR) 10 MG tablet, TAKE 1 TABLET(10 MG) BY MOUTH DAILY, Disp: 90 tablet, Rfl: 1  •  acetaminophen (TYLENOL) 500 mg tablet, Take 500 mg by mouth every 6 (six) hours as needed for mild pain Take 2 tablets by mouth every 6-8 hours as needed (Patient not taking: Reported on 8/23/2023), Disp: , Rfl:   •  Diclofenac Sodium (VOLTAREN) 1 %, Apply 2 g topically 4 (four) times a day (Patient not taking: Reported on 8/23/2023), Disp: 100 g, Rfl: 1  •  fluticasone (FLONASE) 50 mcg/act nasal spray, 1 spray into each nostril daily (Patient not taking: Reported on 8/23/2023), Disp: 16 g, Rfl: 0  •  potassium chloride (K-DUR,KLOR-CON) 20 mEq tablet, Take 1 tablet (20 mEq total) by mouth daily (Patient not taking: Reported on 8/23/2023), Disp: 90 tablet, Rfl: 0  •  Zoster Vac Recomb Adjuvanted (Shingrix) 50 MCG/0.5ML SUSR, 0.5mL IM for one dose, followed by 0.5mL IM 2-6 months after first dose (Patient not taking: Reported on 8/23/2023), Disp: 1 each, Rfl: 1  Allergies   Allergen Reactions   • Amoxicillin    • Codeine Other (See Comments)     "I didn't like the feeling."   • Morphine      Vitals:    08/23/23 1155   BP: 136/86   Pulse: 78   Resp: 18   Temp: 97.6 °F (36.4 °C)   SpO2: 97%       ROS:  CONSTITUTIONAL:  No fever. No chills. No dizziness. No weakness. EYES:  No pain, erythema, or discharge. No blurring of vision. ENT:  No sore throat, URI symptoms. No epistaxis. No tinnitus. CARDIOVASCULAR:  No chest pain. No palpitations. No lower extremity edema. RESPIRATORY:  No shortness of breath, cough, pain with respiration, pleuritic chest pain.  No hemoptysis. No dyspnea. No paroxysmal nocturnal dyspnea. GASTROINTESTINAL:  Normal appetite. No nausea, vomiting, diarrhea. No pain. No bloating. No melena. GENITOURINARY:  No frequency, urgency, nocturia. No hematuria or dysuria. MUSCULOSKELETAL:  No arthralgias or myalgias. INTEGUMENTARY:  No swelling. No bruising. No contusions. No abrasions. No lymphangitis. NEUROLOGIC:  No headache. No neck pain. No numbness or tingling of the extremities. No weakness. PSYCHIATRIC:  No confusion. ENDOCRINE:  No fatigue. No weakness. No history of thyroid, diabetes or adrenal problems. HEMATOLOGICAL:  No bleeding. No petechiae. No bruising.     PHYSICAL EXAM:    GENERAL: AAO x 3  HEENT: AT,NC  CVS: S1S2 RRR  LUNGS: CTA b/l  ABD: NT,ND, +BS  EXTR: no edema  NEURO: CN II-XII grossly intact    LABS:  I have reviewed pertinent labs:  CBC:   Lab Results   Component Value Date    WBC 7.50 08/01/2023    RBC 5.13 (H) 08/01/2023    HGB 13.9 08/01/2023    HCT 43.8 08/01/2023    MCV 85 08/01/2023     08/01/2023    MCH 27.1 08/01/2023    MCHC 31.7 08/01/2023    RDW 14.1 08/01/2023    MPV 10.8 08/01/2023    NEUTROABS 5.10 08/01/2023     CMP:   Lab Results   Component Value Date    SODIUM 141 08/01/2023    K 4.0 08/01/2023     08/01/2023    CO2 29 08/01/2023    AGAP 6 08/01/2023    BUN 14 08/01/2023    CREATININE 1.37 (H) 08/01/2023    GLUC 105 06/27/2017    GLUF 107 (H) 08/01/2023    CALCIUM 9.9 08/01/2023    AST 18 08/01/2023    ALT 14 08/01/2023    ALKPHOS 79 08/01/2023    TP 7.6 08/01/2023    ALB 4.1 08/01/2023    TBILI 0.60 08/01/2023    EGFR 36 08/01/2023     Liver Enzymes:   Lab Results   Component Value Date    AST 18 08/01/2023    ALT 14 08/01/2023    ALKPHOS 79 08/01/2023    TP 7.6 08/01/2023    ALB 4.1 08/01/2023    TBILI 0.60 08/01/2023    BILIDIR 0.10 06/27/2017     Vitamin B12 No results found for: "BUINEAID08"  Iron Study   Lab Results   Component Value Date    FERRITIN 41 08/06/2020    CONCFE 25 08/06/2020 TIBC 255 08/06/2020    IRON 63 08/06/2020     Folate No results found for: "FOLATE"  Magnesium No results found for: "MG"  Phosphorus No results found for: "PHOS"  Coagulation Panel   Lab Results   Component Value Date    DDIMER 0.54 (H) 08/01/2023       IMAGING:  MRI abdomen w wo contrast    Result Date: 8/22/2023  Narrative: MRI - ABDOMEN - WITH AND WITHOUT CONTRAST INDICATION: 77 years / Female  N28.89: Other specified disorders of kidney and ureter. Suspicious right renal lesion COMPARISON: CTA of the chest abdomen and pelvis August 2023, CT abdomen May 2021 TECHNIQUE:  Multiplanar/multisequence MRI of the abdomen was performed before and after administration of contrast. IV Contrast:  12 mL of Gadobutrol injection (SINGLE-DOSE) FINDINGS: LOWER CHEST:   Hiatal hernia. Small amount of fluid posterior to the hernia sac. LIVER: Normal in size and configuration. No suspicious mass. The hepatic veins and portal veins are patent. BILE DUCTS: No intrahepatic or extrahepatic bile duct dilation. GALLBLADDER: Status post cholecystectomy. PANCREAS:  Unremarkable. ADRENAL GLANDS: 1.5 cm right adrenal nodule which shows significant signal loss on the out of phase sequences, consistent with adenoma. Normal left adrenal gland. SPLEEN:  Normal. KIDNEYS/PROXIMAL URETERS: No hydroureteronephrosis. Heterogeneous T2 hyperintense mass in the posterior interpolar region of the right kidney, corresponding to the finding on CT. No restricted diffusion. There is marked associated enhancement consistent with a solid lesion. It measures 1.5 x 1.5 x 1.7 cm series 12, 96, series 14, 39. 1.1 cm cyst anterior upper pole left kidney series 8/21 measuring 1.2 cm. It shows mild hyperintensity on T1-weighted sequences consistent with hemorrhage or proteinaceous contents. There is no associated enhancement. 2.6 cm simple cyst lower pole left kidney. Additional subcentimeter bilateral renal cysts. Patent renal veins and inferior vena cava.  No retroperitoneal adenopathy. BOWEL:   No dilated loops of bowel. PERITONEUM/RETROPERITONEUM: No mass. No ascites. LYMPH NODES: No abdominal lymphadenopathy. VASCULAR STRUCTURES:  No aneurysm. ABDOMINAL WALL:  Unremarkable. OSSEOUS STRUCTURES:  No suspicious osseous lesion. Impression: 1. 1.7 cm enhancing mass in the interpolar region right kidney highly suggestive for neoplasm, corresponding to the finding on CT. No retroperitoneal adenopathy or renal vein thrombosis. 2. Hemorrhagic or proteinaceous left upper pole renal cyst with multiple additional simple renal cysts. 3. 1.5 cm right adrenal adenoma. 4. Large hiatal hernia. The study was marked in [facility] for immediate notification. Workstation performed: [id number]     US breast left limited (diagnostic), Mammo diagnostic bilateral w 3d & cad    Result Date: 8/3/2023  Narrative: DIAGNOSIS: History of breast cancer; Encounter for screening mammogram for malignant neoplasm of breast; Pain of left breast TECHNIQUE: Digital diagnostic mammography was performed. Computer Aided Detection (CAD) analyzed all applicable images. Ultrasound of the left breast(s) was performed. COMPARISONS: Multiple prior exams dated between 9/24/2009 and 3/8/2022. RELEVANT HISTORY: Family Breast Cancer History: History of breast cancer in Sister. Family Medical History: Family medical history includes breast cancer in sister. Personal History: Hormone history includes hormone replacement therapy. Surgical history includes breast biopsy, lumpectomy, and hysterectomy. Medical history includes breast cancer. RISK ASSESSMENT: Tyrer-Cuzick risk assessment reporting was suppressed due to the patient's history and/or demographic factors. TISSUE DENSITY: There are scattered areas of fibroglandular density. INDICATION: Vega Vee is a 77 y.o. female presenting for diffuse left breast pain. The patient states that the pain has resolved.  FINDINGS: LEFT A) ASYMMETRY Mammo diagnostic bilateral w 3d & cad: There is an asymmetry seen in the upper central region of the left breast in the posterior depth. US breast left limited (diagnostic): There is a 3 mm x 2 mm x 4 mm oval complicated cyst with circumscribed margins seen in the upper central region of the left breast at 12 o'clock in the posterior depth, 3 cm from the nipple. RIGHT B) POST-SURGICAL FINDING Mammo diagnostic bilateral w 3d & cad: There are post-surgical findings from a previous lumpectomy seen in the right breast. Benign-appearing calcifications are noted in each breast.     Impression: Probable cyst in the left breast.  Left diagnostic mammogram and ultrasound is recommended in 6 months to demonstrate stability. ASSESSMENT/BI-RADS CATEGORY: Left: 3 - Probably Benign Right: 2 - Benign Overall: 3 - Probably Benign RECOMMENDATION:      - Diagnostic mammogram in 6 months for the left breast.      - Ultrasound in 6 months for the left breast.      - Routine screening mammogram in 1 year for the right breast. Workstation ID: [id number]     CTA chest (pe study) abdomen pelvis contrast    Result Date: 8/1/2023  Narrative: CT PULMONARY ANGIOGRAM OF THE CHEST AND CT ABDOMEN AND PELVIS WITH INTRAVENOUS CONTRAST INDICATION:   R06.02: Shortness of breath R19.00: Intra-abdominal and pelvic swelling, mass and lump, unspecified site. COMPARISON: CT abdomen dated 5/6/2021, CT chest dated 5/23/2019. TECHNIQUE:  CT examination of the chest, abdomen and pelvis was performed. Thin section CT angiographic technique was used in the chest in order to evaluate for pulmonary embolus and coronal 3D MIP postprocessing was performed on the acquisition scanner. Multiplanar 2D reformatted images were created from the source data. This examination, like all CT scans performed in the [facility], was performed utilizing techniques to minimize radiation dose exposure, including the use of iterative reconstruction and automated exposure control.  Radiation dose length product (DLP) for this visit:  1970 mGy-cm IV Contrast:  100 mL of iohexol (OMNIPAQUE) Enteric Contrast:  Enteric contrast was not administered. FINDINGS: CHEST PULMONARY ARTERIAL TREE:  No pulmonary embolus is seen. LUNGS: No suspicious pulmonary nodules are identified. There is no focal airspace consolidation. There is no tracheal or endobronchial lesion. PLEURA:  Unremarkable. HEART/AORTA:  Heart is unremarkable for patient's age. No thoracic aortic aneurysm. MEDIASTINUM AND SAMY:  Unremarkable. CHEST WALL AND LOWER NECK:  Unremarkable. ABDOMEN LIVER/BILIARY TREE:  Unremarkable. GALLBLADDER: Gallbladder is surgically absent. SPLEEN:  Unremarkable. PANCREAS:  Unremarkable. ADRENAL GLANDS: 1.5 cm right adrenal gland nodule, previously characterized as adenoma, is not significantly changed. KIDNEYS/URETERS: There is a 1.7 x 1.7 cm enhancing mass arising from the posterior interpolar region of the right kidney worrisome for neoplasm. A 1 cm hypoattenuating lesion in the anterior upper pole of the left kidney is indeterminate (image 217, series 2). A 2.4 cm left renal cyst has mildly enlarged in the interim, previously 2.0 cm. No hydronephrosis. STOMACH AND BOWEL: No evidence for bowel obstruction. Colonic diverticulosis without evidence for acute diverticulitis. Moderate sized hiatal hernia. APPENDIX:  No findings to suggest appendicitis. ABDOMINOPELVIC CAVITY:  No ascites. No pneumoperitoneum. No lymphadenopathy. VESSELS: Atherosclerotic disease. No abdominal aortic aneurysm. PELVIS REPRODUCTIVE ORGANS: Surgical changes of prior hysterectomy. URINARY BLADDER:  Unremarkable. ABDOMINAL WALL/INGUINAL REGIONS:  Unremarkable. OSSEOUS STRUCTURES:  No acute fracture or destructive osseous lesion. Impression: No evidence for pulmonary embolism. 1.7 cm enhancing solid right renal mass worrisome for neoplasm.  Indeterminate 1 cm left anterior upper pole renal lesion possibly a complicated cyst. Dedicated renal protocol MRI is recommended for further evaluation of the above renal findings. Uncomplicated colonic diverticulosis. The study was marked in [employer] for significant notification. Workstation performed: [id number]     I reviewed the above laboratory and imaging data. ASSESSMENT/PLAN:   1. A 1.7 cm enhancing mass in the interpolar region right kidney highly suggestive for neoplasm. The patient will be referred to UROLOGY. Follow-up in 1m.

## 2023-08-28 ENCOUNTER — TELEPHONE (OUTPATIENT)
Age: 78
End: 2023-08-28

## 2023-08-28 ENCOUNTER — TELEPHONE (OUTPATIENT)
Dept: FAMILY MEDICINE CLINIC | Facility: CLINIC | Age: 78
End: 2023-08-28

## 2023-08-28 NOTE — TELEPHONE ENCOUNTER
Patient's sister called stating patient just say Dr. Allen Rose who advised patient has mass on kidney. Dr. Allen Rose referred patient to a urologist.   Patient saw recent results of an MRI she had which shows a hiatal hernia. So now the patient wants a surgery for the hernia removal. No one has referred her to a surgeon, she is doing this on her own. Megalizzie Marjusta to keep 9/6/2023 appt to review with Allison Calderon everything that is currently going on.

## 2023-08-28 NOTE — TELEPHONE ENCOUNTER
Patient calling to schedule a new patient appt for renal mass. Please advise, I could not find any available spots within the 1 week time frame.      Patient requesting to call her sister at [phone] for scheduling

## 2023-08-29 NOTE — TELEPHONE ENCOUNTER
Called and spoke to sister Jose Eduardo Haynes (on contact list) to set up apt. She requested the [facility] office.   NP Apt is on 9/15 at 11:30 with Girish Zhou at the [facility] office

## 2023-08-30 ENCOUNTER — RA CDI HCC (OUTPATIENT)
Dept: OTHER | Facility: HOSPITAL | Age: 78
End: 2023-08-30

## 2023-08-31 ENCOUNTER — PREP FOR PROCEDURE (OUTPATIENT)
Dept: PLASTIC SURGERY | Facility: CLINIC | Age: 78
End: 2023-08-31

## 2023-08-31 ENCOUNTER — TELEPHONE (OUTPATIENT)
Age: 78
End: 2023-08-31

## 2023-08-31 DIAGNOSIS — H02.9 LESION OF EYELID: Primary | ICD-10-CM

## 2023-08-31 DIAGNOSIS — Z01.812 ENCOUNTER FOR PRE-OPERATIVE LABORATORY TESTING: ICD-10-CM

## 2023-08-31 NOTE — TELEPHONE ENCOUNTER
Pt calling to check status of ins approval for eyelid lesion from prior bleph surgery, last visit 6/13/23 w/rashi.

## 2023-09-01 ENCOUNTER — HOSPITAL ENCOUNTER (OUTPATIENT)
Dept: NON INVASIVE DIAGNOSTICS | Facility: CLINIC | Age: 78
Discharge: HOME | End: 2023-09-01
Payer: MEDICARE

## 2023-09-01 ENCOUNTER — TELEPHONE (OUTPATIENT)
Dept: FAMILY MEDICINE CLINIC | Facility: CLINIC | Age: 78
End: 2023-09-01

## 2023-09-01 VITALS
DIASTOLIC BLOOD PRESSURE: 86 MMHG | BODY MASS INDEX: 48.9 KG/M2 | SYSTOLIC BLOOD PRESSURE: 136 MMHG | HEIGHT: 63 IN | WEIGHT: 276 LBS | HEART RATE: 78 BPM

## 2023-09-01 DIAGNOSIS — R06.02 SHORTNESS OF BREATH: ICD-10-CM

## 2023-09-01 LAB
AORTIC ROOT: 2.7 CM
AORTIC VALVE MEAN VELOCITY: 12.7 M/S
APICAL FOUR CHAMBER EJECTION FRACTION: 57 %
ASCENDING AORTA: 3.3 CM
AV LVOT MEAN GRADIENT: 2 MMHG
AV LVOT PEAK GRADIENT: 3 MMHG
AV MEAN GRADIENT: 8 MMHG
AV PEAK GRADIENT: 16 MMHG
AV VELOCITY RATIO: 0.42
DOP CALC AO PEAK VEL: 1.99 M/S
DOP CALC AO VTI: 37.19 CM
DOP CALC LVOT PEAK VEL VTI: 16.11 CM
DOP CALC LVOT PEAK VEL: 0.83 M/S
E WAVE DECELERATION TIME: 324 MS
FRACTIONAL SHORTENING: 34 % (ref 28–44)
INTERVENTRICULAR SEPTUM IN DIASTOLE (PARASTERNAL SHORT AXIS VIEW): 1.1 CM
INTERVENTRICULAR SEPTUM: 1.1 CM (ref 0.6–1.1)
LAAS-AP2: 12.2 CM2
LAAS-AP4: 14.1 CM2
LEFT ATRIUM AREA SYSTOLE SINGLE PLANE A4C: 13.3 CM2
LEFT ATRIUM SIZE: 5 CM
LEFT ATRIUM VOLUME (MOD BIPLANE): 29 ML
LEFT INTERNAL DIMENSION IN SYSTOLE: 2.9 CM (ref 2.1–4)
LEFT VENTRICULAR INTERNAL DIMENSION IN DIASTOLE: 4.4 CM (ref 3.5–6)
LEFT VENTRICULAR POSTERIOR WALL IN END DIASTOLE: 1.1 CM
LEFT VENTRICULAR STROKE VOLUME: 55 ML
LVSV (TEICH): 55 ML
MV E'TISSUE VEL-SEP: 6 CM/S
MV PEAK A VEL: 0.99 M/S
MV PEAK E VEL: 70 CM/S
MV STENOSIS PRESSURE HALF TIME: 94 MS
MV VALVE AREA P 1/2 METHOD: 2.34 CM2
RIGHT ATRIAL 2D VOLUME: 21 ML
RIGHT ATRIUM AREA SYSTOLE A4C: 12 CM2
RIGHT VENTRICLE ID DIMENSION: 2.8 CM
SL CV LEFT ATRIUM LENGTH A2C: 4.9 CM
SL CV LV EF: 63
SL CV PED ECHO LEFT VENTRICLE DIASTOLIC VOLUME (MOD BIPLANE) 2D: 86 ML
SL CV PED ECHO LEFT VENTRICLE SYSTOLIC VOLUME (MOD BIPLANE) 2D: 32 ML
TR MAX PG: 24 MMHG
TR PEAK VELOCITY: 2.5 M/S
TRICUSPID ANNULAR PLANE SYSTOLIC EXCURSION: 2.4 CM
TRICUSPID VALVE PEAK REGURGITATION VELOCITY: 2.47 M/S

## 2023-09-01 PROCEDURE — 93306 TTE W/DOPPLER COMPLETE: CPT | Performed by: INTERNAL MEDICINE

## 2023-09-01 PROCEDURE — 93306 TTE W/DOPPLER COMPLETE: CPT

## 2023-09-01 NOTE — TELEPHONE ENCOUNTER
Received form for pre op clearance for upcoming surgery. Called Carolina, surgery coordinator, to see if they need it to be within 30 days. [email or web address] for a call back. Pre-op form in Dana's folder.

## 2023-09-06 ENCOUNTER — OFFICE VISIT (OUTPATIENT)
Dept: FAMILY MEDICINE CLINIC | Facility: CLINIC | Age: 78
End: 2023-09-06
Payer: MEDICARE

## 2023-09-06 ENCOUNTER — TELEPHONE (OUTPATIENT)
Dept: NEPHROLOGY | Facility: CLINIC | Age: 78
End: 2023-09-06

## 2023-09-06 VITALS
TEMPERATURE: 98.7 F | HEIGHT: 63 IN | HEART RATE: 108 BPM | DIASTOLIC BLOOD PRESSURE: 84 MMHG | BODY MASS INDEX: 48.58 KG/M2 | SYSTOLIC BLOOD PRESSURE: 132 MMHG | WEIGHT: 274.2 LBS | OXYGEN SATURATION: 98 %

## 2023-09-06 DIAGNOSIS — R73.01 IFG (IMPAIRED FASTING GLUCOSE): ICD-10-CM

## 2023-09-06 DIAGNOSIS — N18.32 STAGE 3B CHRONIC KIDNEY DISEASE (HCC): Primary | ICD-10-CM

## 2023-09-06 DIAGNOSIS — N28.9 KIDNEY LESION: ICD-10-CM

## 2023-09-06 DIAGNOSIS — K59.00 CONSTIPATION, UNSPECIFIED CONSTIPATION TYPE: ICD-10-CM

## 2023-09-06 DIAGNOSIS — R06.02 SHORTNESS OF BREATH: ICD-10-CM

## 2023-09-06 DIAGNOSIS — K21.9 GASTROESOPHAGEAL REFLUX DISEASE, UNSPECIFIED WHETHER ESOPHAGITIS PRESENT: ICD-10-CM

## 2023-09-06 LAB — SL AMB POCT GLUCOSE BLD: 108

## 2023-09-06 PROCEDURE — 93000 ELECTROCARDIOGRAM COMPLETE: CPT

## 2023-09-06 PROCEDURE — 99214 OFFICE O/P EST MOD 30 MIN: CPT

## 2023-09-06 PROCEDURE — 82948 REAGENT STRIP/BLOOD GLUCOSE: CPT

## 2023-09-06 RX ORDER — POLYETHYLENE GLYCOL 3350 17 G/17G
17 POWDER, FOR SOLUTION ORAL DAILY
Qty: 578 G | Refills: 1 | Status: SHIPPED | OUTPATIENT
Start: 2023-09-06

## 2023-09-06 RX ORDER — FAMOTIDINE 20 MG/1
20 TABLET, FILM COATED ORAL 2 TIMES DAILY
Qty: 60 TABLET | Refills: 1 | Status: SHIPPED | OUTPATIENT
Start: 2023-09-06

## 2023-09-06 NOTE — ASSESSMENT & PLAN NOTE
EKG NSR today with slight abnormality, reviewed echo. Patient with shortness of breath and also dizziness. Follow up here in 1 month.

## 2023-09-06 NOTE — PROGRESS NOTES
Assessment/Plan:         Problem List Items Addressed This Visit        Digestive    Gastroesophageal reflux disease     Will add pepcid twice daily, suggest limiting caffeine and carbonation and fried foods. Will refer to GI due to large hiatal hernia, follow up in 1 month. Relevant Medications    famotidine (PEPCID) 20 mg tablet    Other Relevant Orders    Ambulatory Referral to Gastroenterology       Genitourinary    CKD (chronic kidney disease) stage 3, GFR 30-59 ml/min ([facility]) - Primary     Lab Results   Component Value Date    EGFR 36 08/01/2023    EGFR 40 03/31/2023    EGFR 37 10/07/2022    CREATININE 1.37 (H) 08/01/2023    CREATININE 1.26 03/31/2023    CREATININE 1.34 (H) 10/07/2022   Please make appointment with nephrology for worsening kidney function, increase hydration to 1-2 L water daily, follow up in 1 month. Relevant Orders    Ambulatory Referral to Nephrology    Kidney lesion     Appointment is scheduled with urology and also follow up with hematology. Relevant Orders    Ambulatory Referral to Nephrology       Other    Shortness of breath     EKG NSR today with slight abnormality, reviewed echo. Patient with shortness of breath and also dizziness. Follow up here in 1 month. Relevant Orders    Ambulatory Referral to Cardiology    Constipation     Will add miralax twice daily and suggest benefiber with probiotic up to 3 times daily, increase hydration to 1-2 L and increase fiber in the diet. Please schedule with GI, follow up here in 1 month. Relevant Medications    polyethylene glycol (GLYCOLAX) 17 GM/SCOOP powder    Other Relevant Orders    Ambulatory Referral to Gastroenterology         Subjective:      Patient ID: Channing Jeronimo is a 77 y.o. female. Medardo Marie is here for follow up and to go over imaging, she does have an appointment with  Urology scheduled to address the kidney mass.  She is concerned with the hiatal hernia and is having a lot of pain with acid reflux. She still has severe left sided pain. The following portions of the patient's history were reviewed and updated as appropriate:   Past Medical History:  She has a past medical history of Acute ill-defined cerebrovascular disease, Benign essential tremor, Breast cancer ([address]), Chronic kidney disease, CKD (chronic kidney disease) stage 3, GFR 30-59 ml/min ([address]) (9/25/2018), Colon polyp, COPD (chronic obstructive pulmonary disease) ([address]), Depressive disorder, Disease of thyroid gland, Heart murmur, Hypertension, Impaired fasting glucose, Intention tremor, Osteochondropathy, Panic disorder without agoraphobia with mild panic attacks, Psychiatric disorder, Stroke ([address]), and Tubular adenoma of colon. ,  _______________________________________________________________________  Medical Problems:  does not have any pertinent problems on file.,  _______________________________________________________________________  Past Surgical History:   has a past surgical history that includes Hysterectomy; Cholecystectomy; AUGMENTATION BREAST; Breast lumpectomy w/ needle localization (Left); Colonoscopy; EGD; Breast biopsy (Left); Tubal ligation; and Blepharoplasty. ,  _______________________________________________________________________  Family History:  family history includes Alcohol abuse in her mother; Asthma in her mother; Breast cancer in her sister; COPD in her sister; Cancer in her father and mother; Gout in her father; Heart attack in her sister; Hyperlipidemia in her sister; Hypertension in her sister; Lung cancer in her sister; Mental illness in her mother; No Known Problems in her daughter, daughter, daughter, maternal grandmother, paternal grandmother, and sister; Osteoarthritis in her mother; Other in her sister. ,  _______________________________________________________________________  Social History:   reports that she has quit smoking.  She has never used smokeless tobacco. She reports current alcohol use. She reports that she does not use drugs. ,  _______________________________________________________________________  Allergies:  is allergic to amoxicillin, codeine, and morphine. .  _______________________________________________________________________  Current Outpatient Medications   Medication Sig Dispense Refill   • amLODIPine (NORVASC) 10 mg tablet TAKE 1 TABLET(10 MG) BY MOUTH DAILY 90 tablet 1   • aspirin 81 mg chewable tablet Chew 81 mg daily     • Blood Pressure Monitoring (B-D ASSURE BPM/AUTO ARM CUFF) MISC Use in the morning 1 each 0   • Cholecalciferol (VITAMIN D3) 50 MCG (2000 UT) TABS Take 2,000 Units by mouth daily     • dexlansoprazole (Dexilant) 60 MG capsule Take 1 capsule (60 mg total) by mouth daily 90 capsule 1   • famotidine (PEPCID) 20 mg tablet Take 1 tablet (20 mg total) by mouth 2 (two) times a day 60 tablet 1   • levothyroxine 50 mcg tablet TAKE 1 TABLET(50 MCG) BY MOUTH DAILY 90 tablet 1   • lisinopril (ZESTRIL) 10 mg tablet Take 1 tablet (10 mg total) by mouth daily 90 tablet 1   • Misc. Devices (Round Shower Stool) MISC Use daily Use as needed in the shower. 1 each 0   • polyethylene glycol (GLYCOLAX) 17 GM/SCOOP powder Take 17 g by mouth daily 578 g 1   • potassium chloride (K-DUR,KLOR-CON) 20 mEq tablet Take 1 tablet (20 mEq total) by mouth daily 90 tablet 0   • rosuvastatin (CRESTOR) 10 MG tablet TAKE 1 TABLET(10 MG) BY MOUTH DAILY 90 tablet 1     No current facility-administered medications for this visit.     _______________________________________________________________________  Review of Systems   Constitutional: Positive for fatigue. Negative for chills, diaphoresis and fever. HENT: Negative for congestion, ear pain, postnasal drip, rhinorrhea, sinus pressure, sinus pain and sore throat. Eyes: Negative for pain and visual disturbance. Respiratory: Positive for shortness of breath. Negative for cough, chest tightness and wheezing.     Cardiovascular: Negative for chest pain and palpitations. Gastrointestinal: Positive for abdominal pain and constipation. Negative for diarrhea, nausea and vomiting. Genitourinary: Positive for frequency and urgency. Negative for dysuria and hematuria. Musculoskeletal: Negative for arthralgias, back pain and myalgias. Skin: Negative for color change and rash. Neurological: Negative for dizziness, seizures, syncope, light-headedness and headaches. Psychiatric/Behavioral: Negative for dysphoric mood and sleep disturbance. The patient is not nervous/anxious. All other systems reviewed and are negative. Objective:  Vitals:    09/06/23 1320   BP: 132/84   BP Location: Left arm   Patient Position: Sitting   Cuff Size: Large   Pulse: (!) 108   Temp: 98.7 °F (37.1 °C)   SpO2: 98%   Weight: 124 kg (274 lb 3.2 oz)   Height: 5' 3" (1.6 m)     Body mass index is 48.57 kg/m². Physical Exam  Vitals and nursing note reviewed. Constitutional:       Appearance: Normal appearance. She is not ill-appearing. HENT:      Head: Normocephalic. Right Ear: Tympanic membrane, ear canal and external ear normal. There is no impacted cerumen. Left Ear: Tympanic membrane, ear canal and external ear normal. There is no impacted cerumen. Nose: Nose normal. No congestion. Mouth/Throat:      Mouth: Mucous membranes are moist.      Pharynx: No posterior oropharyngeal erythema. Eyes:      Extraocular Movements: Extraocular movements intact. Conjunctiva/sclera: Conjunctivae normal.      Pupils: Pupils are equal, round, and reactive to light. Cardiovascular:      Rate and Rhythm: Normal rate and regular rhythm. Heart sounds: Normal heart sounds. No murmur heard. Pulmonary:      Effort: Pulmonary effort is normal.      Breath sounds: Normal breath sounds. No wheezing. Abdominal:      Palpations: Abdomen is soft. Tenderness: There is abdominal tenderness.    Musculoskeletal:         General: Normal range of motion. Cervical back: Normal range of motion. Right lower leg: No edema. Left lower leg: No edema. Skin:     General: Skin is warm and dry. Neurological:      General: No focal deficit present. Mental Status: She is alert.    Psychiatric:         Mood and Affect: Mood normal.         Behavior: Behavior normal.

## 2023-09-06 NOTE — ASSESSMENT & PLAN NOTE
Will add pepcid twice daily, suggest limiting caffeine and carbonation and fried foods. Will refer to GI due to large hiatal hernia, follow up in 1 month.

## 2023-09-06 NOTE — ASSESSMENT & PLAN NOTE
Will add miralax twice daily and suggest benefiber with probiotic up to 3 times daily, increase hydration to 1-2 L and increase fiber in the diet. Please schedule with GI, follow up here in 1 month.

## 2023-09-06 NOTE — TELEPHONE ENCOUNTER
I called and left message on patients anwering machine for patient to give the office a call back about scheduling a Nephrology Consult ref by Dr. Kaye Betts for Stage 3b chronic kidney disease [facility])  - Kidney lesion

## 2023-09-06 NOTE — ASSESSMENT & PLAN NOTE
Lab Results   Component Value Date    EGFR 36 08/01/2023    EGFR 40 03/31/2023    EGFR 37 10/07/2022    CREATININE 1.37 (H) 08/01/2023    CREATININE 1.26 03/31/2023    CREATININE 1.34 (H) 10/07/2022   Please make appointment with nephrology for worsening kidney function, increase hydration to 1-2 L water daily, follow up in 1 month.

## 2023-09-07 DIAGNOSIS — I10 ESSENTIAL HYPERTENSION: ICD-10-CM

## 2023-09-07 RX ORDER — AMLODIPINE BESYLATE 10 MG/1
TABLET ORAL
Qty: 90 TABLET | Refills: 1 | Status: SHIPPED | OUTPATIENT
Start: 2023-09-07

## 2023-09-08 ENCOUNTER — TELEPHONE (OUTPATIENT)
Dept: NEPHROLOGY | Facility: CLINIC | Age: 78
End: 2023-09-08

## 2023-09-08 NOTE — TELEPHONE ENCOUNTER
I called and left message on patients anwering machine for patient to give the office a call back about scheduling a Nephrology Consult ref by Dr. Tavares Thornton for Stage 3b chronic kidney disease [facility])   - Kidney lesion

## 2023-09-11 ENCOUNTER — TELEPHONE (OUTPATIENT)
Dept: NEPHROLOGY | Facility: CLINIC | Age: 78
End: 2023-09-11

## 2023-09-11 NOTE — TELEPHONE ENCOUNTER
I called and left message on patients anwering machine for patient to give the office a call back about scheduling a Nephrology Consult ref by Dr. Almaz Sapp for Stage 3b chronic kidney disease [facility])   - Kidney lesion. Letter send to patient.  Referral is closed

## 2023-09-13 ENCOUNTER — TELEPHONE (OUTPATIENT)
Dept: UROLOGY | Facility: CLINIC | Age: 78
End: 2023-09-13

## 2023-09-13 NOTE — TELEPHONE ENCOUNTER
Spoke with patient, advised her of providers recommendation that it would be better for patient to see an MD. Advised patient that there is an opening at 8:30 tomorrow 9/14/2023 in [place] ([place] for patient to see DV. Patient will call sister, as she is the one who gives her rides, and return call to the office. When patient calls back please send call to me. If I am unavailable, please schedule patient for 9/14/23 at 8:30 AM with Dr. Ulysses Downey. THank you.

## 2023-09-13 NOTE — TELEPHONE ENCOUNTER
Patient unavailable to make appt. Offered for tomorrow. Will have to discuss with AP and call back to schedule. Patient to be seen by an MD. No availability until late October. Scheduled patient on 9/15/2023 at 0830. Patient advised further appts may need to be scheduled.

## 2023-09-14 NOTE — PROGRESS NOTES
Ivette Jackson is a 77 y.o. female initially referred for renal lesion. Pertinent PMH/PSH: CKD stage 3 (8/1/2023: eGFR 36, Cr 1.37); breast cancer s/p radiation    CTA CAP 8/1/23 (performed for SOB): no PE, 1.7 cm enhancing solid R renal mass worrisome for neoplasm; indeterminate 1 cm L anterior upper pole lesion possibly complicated cyst    MRI abdomen w/ wo contrast 8/13/23: 1.7 cm enhancing mass in interpolar region of R kidney highly suggestive for neoplasm; no RP LAD, no renal vein thrombosis; hemorrhagic or proteinaceous LUP renal cyst with multiple additional simple cysts; 1.5 cm R adrenal adenoma    She has never had metabolic workup for adrenal adenoma. Denied hematuria, flank pain, headaches, BP problems. Assessment and plan:     Renal lesion    Patient had CT chest abdomen pelvis on 8/1/2023 which demonstrated 1.7 cm enhancing mass in the right kidney as well as 1 cm left anterior upper pole lesion possibly complicated cyst.    She had additional MRI abdomen with and without contrast on 8/13/2023 which showed a 1.7 cm enhancing mass in the interpolar region of the right kidney highly suggestive of neoplasm. She also had a 1.5 cm of the right adrenal adenoma. Upon my personal review of the images, the right renal lesion is small, under 2 cm. The left adrenal lesion is about 1 cm. The right adrenal nodule was about 1.5 cm. None of the lesions appear to be invading adjacent organs. The images were discussed and reviewed with the patient. After obtaining a medical history, performing a physical exam, reviewing all available outside medical records, radiographs, and laboratory studies, I had a lengthy discussion with the patient regarding implications of a diagnosis of renal mass. Specifically, we discussed that over 80% of enhancing renal masses are malignant.    I explained that 15-20% of newly-diagnosed localized renal lesions will prove benign upon resection. Such lesions -- largely oncocytomas and lipid-poor angiomyolipomas - are indistinguishable from renal cell carcinoma on modern imaging. Predictive models based on variables such as age, sex, size, symptoms at diagnosis and smoking history exist; however, these are relatively crude and their clinical utility is currently limited. We did discuss the value of renal mass biopsy. Traditionally, biopsy of enhancing renal masses has been hampered by sampling error and was reserved only for cases where lymphoma, infection, or a metastasis from another organ to the kidney was suspected. In fact, for many years biopsy was rarely employed. Recently value of biopsy has been recognized for select patients. In deciding whether to undergo biopsy, I explained that several factors must be considered:  (1) failure to obtain adequate tissue occurs in approximately 10% of biopsies (up to 20% in some reports); (2) although positive predictive value of biopsy is high (>95%), negative predictive value can range from 80% to 95% -- i.e., in up to 20% of cases, a malignancy may be present even if the biopsy suggests that the suspected tissue is benign (3) ability to differentiate high grade vs low grade pathology is currently rather limited (4) small but real risks of biopsy must be considered. I explained that the risks of biopsy are as follows: bleeding requiring transfusion in (1-2%), extremely remote risk of renal unit loss, and in cases of severe bleeding potential disruptions of surgical tissue planes. I also explained that risks of tumor seeding are on the order of 1 in 10,000 biopsies. Nevertheless, these risks of seeding rise for infiltrative masses, as these may represent sarcomatoid RCC or urothelial carcinoma.   Furthermore, biopsy of cystic lesions is generally discouraged, given the theoretical possibility of seeding upon cyst rupture and the fact that imaging features of these so-called Bosniak cysts are most often adequately robust to differentiate benign from malignant disease. I explained that the ultimate choice regarding renal mass biopsy mus be made based on whether results of biopsy will change our management plan. In particular, if the tumor was found to be benign on biopsy, would we decide to forego resection, knowing the following facts:  (1) Accuracy of modern renal biopsy is ~89%  (2) angiomyolipomas, although benign, will require long-term surveillance and, upon growth, possible intervention given risks of spontaneous bleeding (3) oncocytomas, also considered benign, can be notoriously difficult to differentiate from chromophobe renal cell carcinomas on biopsy, may harbor RCC in up to 18% of cases, and have been described to invade local structures, (4) accuracy for determining renal malignancy grade is suboptimal.    Next we discussed issues of tumor size, stage, grade and histology, and the prognostic significance of each. I explained the difference between clinical and pathologic stage, and communicated that tumor grade and histology can only be definitively assessed if the mass is resected. Furthermore, I briefly communicated the nuances regarding anatomic complexity of the tumor. We had a lengthy discussion regarding treatment options, which I placed in the following categories: (1) active surveillance (2) ablative techniques and (3) surgical excision. I briefly explained that radiation therapy has no role in contemporary management of localized renal masses. Furthermore, I explained that medical therapy (i.e. targeted therapy / immunotherapy / chemotherapy) is largely reserved for patients with systemic disease. We discussed the risks and benefits of each approach.   In particular, I explained that surgical excision is the absolute gold-standard treatment for localized renal cell carcinoma; however, approaches such as active surveillance and ablative techniques (i.e. cryotherapy and radiofrequency ablation) can be helpful in effectively managing renal masses for patients who are at high risk for surgical intervention. With regard to active surveillance, I explained that leading work applying this approach to patients with small renal masses was conducted by urologic oncologists here at [facility]. I communicated that this appears to be an extremely safe short-term strategy; however, given the fact that approximately 20% of small renal tumors will prove high grade upon resection, this approach is not without risk. A pooled analysis of the literature demonstrated that approximately 2% of patients who are on active surveillance will progress to systemic disease. I explained that to date no perfect predictor of tumor progression exists. Of those tumors that progress on active surveillance, nearly all demonstrate rapid tumor growth prior to disease dissemination (thereby affording a theoretical opportunity for timely intervention); however, a handful of possible cases with metastatic progression in the setting of net-zero tumor growth have been documented. Nevertheless, in patients with significant competing risks of mortality, active surveillance of a renal mass is a viable strategy. In fact many times it is an important and prudent approach that spares elderly and/ or comorbid patients from overtreatment. We then discussed tumor ablation (also known as focal therapy) such as cryotherapy and radiofrequency ablation (RFA). I explained that this treatment strategy has appeal due to ease of execution, favorable patient tolerance, and perceived low periprocedural risks. Furthermore, this approach leaves the vast majority of the non-cancerous portion of the kidney untouched and is, therefore, nephron-sparing. Nevertheless, focal therapy has several major drawbacks.   These include a lack of tumor removal for therapeutic and diagnostic considerations compounded by absence of high-quality data regarding long-term oncological outcomes. In fact, current data are far less robust for ablation therapy than for surgical resection. A recent analysis of the cumulative literature by a panel convened by the [employer], demonstrated that even with shorter follow-up, focal therapy has higher local recurrence rates than surgical intervention. Indeed, some experts contend that the impact of focal therapy on the natural history of the small renal mass is yet to be proven. I communicated my opinion that focal therapy largely should be reserved for patients who are at high-risk for surgical intervention and who are uncomfortable with the concept of active surveillance. Finally, we discussed at length the surgical options. I explained that I prioritize the goals of surgical treatment in the following fashion:   Primary goal: oncological safety  Secondary goal: nephron preservation  Tertiary goal: application of minimally-invasive surgical approaches      I explained that over the past 15-20 years, partial nephrectomy (i.e, removal of only the renal tumor without sacrifice of the entire kidney) has become the standard of care not only for essential but also for elective indications. I explained that powerful long-term data now demonstrate that patients whose renal tissue is preserved enjoy lower risks of not only renal insufficiency, but also of cardiovascular disease. Most importantly, patients who undergo nephron-sparing surgery have been shown to exhibit longer overall survival than those patients whose entire kidney was extirpated. As such, I encourage partial nephrectomy in all cases where this is feasible. I offer open partial nephrectomy for the highly complex lesions (i.e. deep, endophytic, central, hilar, etc.) and perform robot-assisted laparoscopic partial nephrectomy on those lesions that are amenable to minimally-invasive approaches.  I explained that the benefits of the minimally-invasive approaches stem from decreased blood loss and faster recovery. When partial nephrectomy is not possible, a radical nephrectomy is necessary. I explained that although prospective randomized trials of open versus laparoscopic radical nephrectomy were never completed, long-term retrospective data suggest oncological equivalence between the two approaches. Furthermore, I communicated that given significantly lower intra-operative blood loss and shorter convalescence, laparoscopic radical nephrectomy, when possible, is the standard of care for renal surgery that requires total removal of the kidney. The risks of the surgery were discussed in detail including medical and anesthetic complications (e.g. heart attack, stroke, deep vein thrombosis, pulmonary embolism, infection (pneumonia, etc), bleeding with possible need for transfusion, adjacent organ involvement or injury, wound complications, reaction to medications). We discussed that overall kidney function may be impaired. We reviewed the risks for immediate or delayed, temporary or permanent dialysis in the context of the patient's current situation. The expected hospital and post-operative courses were reviewed. We reviewed what to expect with regard to incisions, post-operative pain, and risks of subsequent hernias. I explained that during open surgery a portion of a rib may be resected. I communicated that there is always a small chance of open conversion when minimally-invasive approaches are employed. For partial nephrectomy, I communicated the risks of urinary leak/fistula and the potential for a prolonged drain at the operative site or a ureteral stent. I also explained that conversion to a radical nephrectomy at the time or surgery or in a delayed fashion is rare but possible. I communicated that the decision process regarding renal surgery can be extremely complex.  I invited the patient to become an active participant in their care and to become proactive in learning about renal masses and the risks and benefits of treatment. I suggested that the AUA Guidelines for the Management of Clinical Stage I Renal Mass and the accompanying webinar that can be found at www. auanet.org/guidelines are an excellent source for such information. I explained that I am always willing to answer any questions that may arise following the office visit and encouraged the patient to call my office with any questions during this anxiety-provoking period. Adrenal adenoma    Upon personal review of the imaging, patient noted to have 1.5 cm right adrenal adenoma. She does not believe she ever had an endocrinology work-up for this. She is not seen an endocrinologist.  I explained to her that she would need referral to endocrinologist that she could have a metabolic work-up of her adrenal lesion. I explained that if the endocrinologist thought that it was prudent, she would have several blood tests performed in order to see if the adrenal nodule is metabolically active. I explained that if any of these blood test were positive, she would be referred back to urology regarding possible removal of adrenal gland. All questions were answered. Stage 3 CKD    Patient's kidney function including creatinine and GFR reviewed. I explained that her GFR was not terrible for someone her age, but not ideal.  I explained that procedures on her kidney would put her at risk for worsening renal function, and therefore high risk of needing dialysis in the future. I stressed the importance of renal preservation is much as possible. I stated that since she may need future intervention regarding her renal lesions, it would be a very good idea for her to get plugged in with nephrology. She has previously had a referral to nephrology. I asked her to call them and make an appointment. PLAN  -She is opting for active surveillance for kidney.  She will see me in office in 6 months with CT and US. -Referral made to endocrine for adrenal metabolic workup.  -She has referral to nephrology. I asked her to make appointment. Portions of the above record have been created with voice recognition software. Occasional wrong word or "sound alike" substitution may have occurred due to the inherent limitations of voice recognition software. Read the chart carefully and recognize, using context, where substitution may have occurred. John Dobbs DO        Chief Complaint     Renal lesions      History of Present Illness     Elgin Hinkle is a 77 y.o. female presenting in consultation for renal lesions. The patient was referred by MONIQUE Clifton and today's note has been sent to the referring provider. See summary at top of note    Denied interval issues. Denied pain. The following portions of the patient's history were reviewed and updated as appropriate: allergies, current medications, past family history, past medical history, past social history, past surgical history and problem list.            Review of Systems     Review of Systems   Constitutional: Negative for chills and fever. HENT: Negative for congestion and hearing loss. Respiratory: Negative for cough and shortness of breath. Gastrointestinal: Negative for abdominal pain and nausea. Genitourinary: Negative for difficulty urinating and dysuria. Psychiatric/Behavioral: Negative for agitation and behavioral problems. Allergies     Allergies   Allergen Reactions   • Amoxicillin    • Codeine Other (See Comments)     "I didn't like the feeling."   • Morphine        Physical Exam     Physical Exam  Constitutional:       General: She is not in acute distress. HENT:      Head: Normocephalic and atraumatic. Pulmonary:      Effort: Pulmonary effort is normal. No respiratory distress. Abdominal:      General: Abdomen is flat. Palpations: Abdomen is soft. Tenderness: There is no right CVA tenderness or left CVA tenderness. Neurological:      General: No focal deficit present. Mental Status: She is alert and oriented to person, place, and time. Psychiatric:         Mood and Affect: Mood normal.         Behavior: Behavior normal.             Vital Signs  There were no vitals filed for this visit. Current Medications       Current Outpatient Medications:   •  amLODIPine (NORVASC) 10 mg tablet, TAKE 1 TABLET(10 MG) BY MOUTH DAILY, Disp: 90 tablet, Rfl: 1  •  aspirin 81 mg chewable tablet, Chew 81 mg daily, Disp: , Rfl:   •  Blood Pressure Monitoring (B-D ASSURE BPM/AUTO ARM CUFF) MISC, Use in the morning, Disp: 1 each, Rfl: 0  •  Cholecalciferol (VITAMIN D3) 50 MCG (2000 UT) TABS, Take 2,000 Units by mouth daily, Disp: , Rfl:   •  dexlansoprazole (Dexilant) 60 MG capsule, Take 1 capsule (60 mg total) by mouth daily, Disp: 90 capsule, Rfl: 1  •  famotidine (PEPCID) 20 mg tablet, Take 1 tablet (20 mg total) by mouth 2 (two) times a day, Disp: 60 tablet, Rfl: 1  •  levothyroxine 50 mcg tablet, TAKE 1 TABLET(50 MCG) BY MOUTH DAILY, Disp: 90 tablet, Rfl: 1  •  lisinopril (ZESTRIL) 10 mg tablet, Take 1 tablet (10 mg total) by mouth daily, Disp: 90 tablet, Rfl: 1  •  Misc. Devices (Round Shower Stool) MISC, Use daily Use as needed in the shower. , Disp: 1 each, Rfl: 0  •  polyethylene glycol (GLYCOLAX) 17 GM/SCOOP powder, Take 17 g by mouth daily, Disp: 578 g, Rfl: 1  •  potassium chloride (K-DUR,KLOR-CON) 20 mEq tablet, Take 1 tablet (20 mEq total) by mouth daily, Disp: 90 tablet, Rfl: 0  •  rosuvastatin (CRESTOR) 10 MG tablet, TAKE 1 TABLET(10 MG) BY MOUTH DAILY, Disp: 90 tablet, Rfl: 1      Active Problems     Patient Active Problem List   Diagnosis   • Intention tremor   • Benign essential tremor   • Depression with anxiety   • Essential hypertension   • CKD (chronic kidney disease) stage 3, GFR 30-59 ml/min ([facility])   • Hypothyroidism   • Shortness of breath   • Mixed hyperlipidemia   • History of breast cancer   • Mild cognitive impairment   • Bilateral malignant neoplasm of breast in female [facility])   • IFG (impaired fasting glucose)   • Compression fracture of fifth lumbar vertebra ([facility])   • L4-L5 disc bulge   • Sciatica of right side   • Chronic obstructive pulmonary disease, unspecified COPD type ([facility])   • Severe episode of recurrent major depressive disorder, without psychotic features ([address])   • Incontinence of feces   • Other fatigue   • Morbid obesity with BMI of 45.0-49.9, adult ([address])   • Kidney lesion   • Constipation   • Gastroesophageal reflux disease         Past Medical History     Past Medical History:   Diagnosis Date   • Acute ill-defined cerebrovascular disease    • Benign essential tremor    • Breast cancer ([address])     pt states doesn't remember the year thinks it might have been on the right.    • Chronic kidney disease    • CKD (chronic kidney disease) stage 3, GFR 30-59 ml/min ([facility]) 9/25/2018   • Colon polyp    • COPD (chronic obstructive pulmonary disease) ([facility])    • Depressive disorder    • Disease of thyroid gland    • Heart murmur    • Hypertension    • Impaired fasting glucose    • Intention tremor     last assessed: 8/18/2016   • Osteochondropathy    • Panic disorder without agoraphobia with mild panic attacks    • Psychiatric disorder     anxiety   • Stroke [facility])    • Tubular adenoma of colon          Surgical History     Past Surgical History:   Procedure Laterality Date   • AUGMENTATION BREAST      pt states during mammo she doesn't have any other surgery than the 2 biopsy   • BLEPHAROPLASTY      Upper   • BREAST BIOPSY Left     pt doesn't recall when and believes the neg biop was on left   • BREAST LUMPECTOMY W/ NEEDLE LOCALIZATION Left     description: benign last assessed; 8/21/2016   • CHOLECYSTECTOMY     • COLONOSCOPY     • EGD     • HYSTERECTOMY     • TUBAL LIGATION           Family History     Family History   Problem Relation Age of Onset   • Alcohol abuse Mother    • Asthma Mother    • Cancer Mother    • Mental illness Mother    • Osteoarthritis Mother    • Gout Father    • Cancer Father    • Other Sister         carotid arterial disease   • COPD Sister    • Hyperlipidemia Sister    • Hypertension Sister    • Heart attack Sister    • Breast cancer Sister    • No Known Problems Daughter    • No Known Problems Maternal Grandmother    • No Known Problems Paternal Grandmother    • Lung cancer Sister    • No Known Problems Sister    • No Known Problems Daughter    • No Known Problems Daughter          Social History     Social History     Social History     Tobacco Use   Smoking Status Former   Smokeless Tobacco Never   Tobacco Comments    10 years ago         Pertinent Lab Values     Lab Results   Component Value Date    CREATININE 1.37 (H) 08/01/2023       No results found for: "PSA"            Pertinent Imaging     The patient's images were reviewed by me personally and also in real time with them in the examination room using our PACS imaging system.         CTA CAP 8/1/23 (performed for SOB): no PE, 1.7 cm enhancing solid R renal mass worrisome for neoplasm; indeterminate 1 cm L anterior upper pole lesion possibly complicated cyst    MRI abdomen w/ wo contrast 8/13/23: 1.7 cm enhancing mass in interpolar region of R kidney highly suggestive for neoplasm; no RP LAD, no renal vein thrombosis; hemorrhagic or proteinaceous LUP renal cyst with multiple additional simple cysts; 1.5 cm R adrenal adenoma      Pertinent Pathology     N/A

## 2023-09-15 ENCOUNTER — OFFICE VISIT (OUTPATIENT)
Dept: UROLOGY | Facility: CLINIC | Age: 78
End: 2023-09-15
Payer: MEDICARE

## 2023-09-15 ENCOUNTER — TELEPHONE (OUTPATIENT)
Dept: UROLOGY | Facility: CLINIC | Age: 78
End: 2023-09-15

## 2023-09-15 VITALS
BODY MASS INDEX: 48.73 KG/M2 | HEIGHT: 63 IN | DIASTOLIC BLOOD PRESSURE: 80 MMHG | SYSTOLIC BLOOD PRESSURE: 130 MMHG | WEIGHT: 275 LBS

## 2023-09-15 DIAGNOSIS — D35.00 ADRENAL ADENOMA, UNSPECIFIED LATERALITY: ICD-10-CM

## 2023-09-15 DIAGNOSIS — N28.89 RENAL MASS: Primary | ICD-10-CM

## 2023-09-15 PROCEDURE — 99204 OFFICE O/P NEW MOD 45 MIN: CPT | Performed by: UROLOGY

## 2023-09-15 NOTE — TELEPHONE ENCOUNTER
Please call patient to schedule when Dr. Terrazas  schedule opens in [facility] in 6 months. Thank you. Provider note: Has referral to nephrology but not sure when has appt. Please give her info to call them and make appt. Referral to endocrine for adrenal metabolic workup. Please give info for her to call and make appt. Follow up in 6 months with Joanna at [facility]. Needs ultrasound and CT done beforehand.

## 2023-09-15 NOTE — LETTER
September 15, 2023     Brett Flores, [address]  [facility]    Patient: Ruben Wu   YOB: 1945   Date of Visit: 9/15/2023       Dear Dr. Joshua Hope: Thank you for referring Alyx Goldman to me for evaluation. Below are my notes for this consultation. If you have questions, please do not hesitate to call me. I look forward to following your patient along with you. Sincerely,        Calli Canada DO        CC: No Recipients    Ellen Diallo  9/15/2023  9:12 AM  Sign when Signing Visit  Davin Garcia is a 77 y.o. female initially referred for renal lesion. Pertinent PMH/PSH: CKD stage 3 (8/1/2023: eGFR 36, Cr 1.37); breast cancer s/p radiation    CTA CAP 8/1/23 (performed for SOB): no PE, 1.7 cm enhancing solid R renal mass worrisome for neoplasm; indeterminate 1 cm L anterior upper pole lesion possibly complicated cyst    MRI abdomen w/ wo contrast 8/13/23: 1.7 cm enhancing mass in interpolar region of R kidney highly suggestive for neoplasm; no RP LAD, no renal vein thrombosis; hemorrhagic or proteinaceous LUP renal cyst with multiple additional simple cysts; 1.5 cm R adrenal adenoma    She has never had metabolic workup for adrenal adenoma. Denied hematuria, flank pain, headaches, BP problems. Assessment and plan:     Renal lesion    Patient had CT chest abdomen pelvis on 8/1/2023 which demonstrated 1.7 cm enhancing mass in the right kidney as well as 1 cm left anterior upper pole lesion possibly complicated cyst.    She had additional MRI abdomen with and without contrast on 8/13/2023 which showed a 1.7 cm enhancing mass in the interpolar region of the right kidney highly suggestive of neoplasm. She also had a 1.5 cm of the right adrenal adenoma. Upon my personal review of the images, the right renal lesion is small, under 2 cm. The left adrenal lesion is about 1 cm.   The right adrenal nodule was about 1.5 cm. None of the lesions appear to be invading adjacent organs. The images were discussed and reviewed with the patient. After obtaining a medical history, performing a physical exam, reviewing all available outside medical records, radiographs, and laboratory studies, I had a lengthy discussion with the patient regarding implications of a diagnosis of renal mass. Specifically, we discussed that over 80% of enhancing renal masses are malignant. I explained that 15-20% of newly-diagnosed localized renal lesions will prove benign upon resection. Such lesions -- largely oncocytomas and lipid-poor angiomyolipomas - are indistinguishable from renal cell carcinoma on modern imaging. Predictive models based on variables such as age, sex, size, symptoms at diagnosis and smoking history exist; however, these are relatively crude and their clinical utility is currently limited. We did discuss the value of renal mass biopsy. Traditionally, biopsy of enhancing renal masses has been hampered by sampling error and was reserved only for cases where lymphoma, infection, or a metastasis from another organ to the kidney was suspected. In fact, for many years biopsy was rarely employed. Recently value of biopsy has been recognized for select patients. In deciding whether to undergo biopsy, I explained that several factors must be considered:  (1) failure to obtain adequate tissue occurs in approximately 10% of biopsies (up to 20% in some reports); (2) although positive predictive value of biopsy is high (>95%), negative predictive value can range from 80% to 95% -- i.e., in up to 20% of cases, a malignancy may be present even if the biopsy suggests that the suspected tissue is benign (3) ability to differentiate high grade vs low grade pathology is currently rather limited (4) small but real risks of biopsy must be considered.   I explained that the risks of biopsy are as follows: bleeding requiring transfusion in (1-2%), extremely remote risk of renal unit loss, and in cases of severe bleeding potential disruptions of surgical tissue planes. I also explained that risks of tumor seeding are on the order of 1 in 10,000 biopsies. Nevertheless, these risks of seeding rise for infiltrative masses, as these may represent sarcomatoid RCC or urothelial carcinoma. Furthermore, biopsy of cystic lesions is generally discouraged, given the theoretical possibility of seeding upon cyst rupture and the fact that imaging features of these so-called Bosniak cysts are most often adequately robust to differentiate benign from malignant disease. I explained that the ultimate choice regarding renal mass biopsy mus be made based on whether results of biopsy will change our management plan. In particular, if the tumor was found to be benign on biopsy, would we decide to forego resection, knowing the following facts:  (1) Accuracy of modern renal biopsy is ~89%  (2) angiomyolipomas, although benign, will require long-term surveillance and, upon growth, possible intervention given risks of spontaneous bleeding (3) oncocytomas, also considered benign, can be notoriously difficult to differentiate from chromophobe renal cell carcinomas on biopsy, may harbor RCC in up to 18% of cases, and have been described to invade local structures, (4) accuracy for determining renal malignancy grade is suboptimal.    Next we discussed issues of tumor size, stage, grade and histology, and the prognostic significance of each. I explained the difference between clinical and pathologic stage, and communicated that tumor grade and histology can only be definitively assessed if the mass is resected. Furthermore, I briefly communicated the nuances regarding anatomic complexity of the tumor.      We had a lengthy discussion regarding treatment options, which I placed in the following categories: (1) active surveillance (2) ablative techniques and (3) surgical excision. I briefly explained that radiation therapy has no role in contemporary management of localized renal masses. Furthermore, I explained that medical therapy (i.e. targeted therapy / immunotherapy / chemotherapy) is largely reserved for patients with systemic disease. We discussed the risks and benefits of each approach. In particular, I explained that surgical excision is the absolute gold-standard treatment for localized renal cell carcinoma; however, approaches such as active surveillance and ablative techniques (i.e. cryotherapy and radiofrequency ablation) can be helpful in effectively managing renal masses for patients who are at high risk for surgical intervention. With regard to active surveillance, I explained that leading work applying this approach to patients with small renal masses was conducted by urologic oncologists here at [facility]. I communicated that this appears to be an extremely safe short-term strategy; however, given the fact that approximately 20% of small renal tumors will prove high grade upon resection, this approach is not without risk. A pooled analysis of the literature demonstrated that approximately 2% of patients who are on active surveillance will progress to systemic disease. I explained that to date no perfect predictor of tumor progression exists. Of those tumors that progress on active surveillance, nearly all demonstrate rapid tumor growth prior to disease dissemination (thereby affording a theoretical opportunity for timely intervention); however, a handful of possible cases with metastatic progression in the setting of net-zero tumor growth have been documented. Nevertheless, in patients with significant competing risks of mortality, active surveillance of a renal mass is a viable strategy. In fact many times it is an important and prudent approach that spares elderly and/ or comorbid patients from overtreatment.       We then discussed tumor ablation (also known as focal therapy) such as cryotherapy and radiofrequency ablation (RFA). I explained that this treatment strategy has appeal due to ease of execution, favorable patient tolerance, and perceived low periprocedural risks. Furthermore, this approach leaves the vast majority of the non-cancerous portion of the kidney untouched and is, therefore, nephron-sparing. Nevertheless, focal therapy has several major drawbacks. These include a lack of tumor removal for therapeutic and diagnostic considerations compounded by absence of high-quality data regarding long-term oncological outcomes. In fact, current data are far less robust for ablation therapy than for surgical resection. A recent analysis of the cumulative literature by a panel convened by the [employer] Association, demonstrated that even with shorter follow-up, focal therapy has higher local recurrence rates than surgical intervention. Indeed, some experts contend that the impact of focal therapy on the natural history of the small renal mass is yet to be proven. I communicated my opinion that focal therapy largely should be reserved for patients who are at high-risk for surgical intervention and who are uncomfortable with the concept of active surveillance. Finally, we discussed at length the surgical options. I explained that I prioritize the goals of surgical treatment in the following fashion:   Primary goal: oncological safety  Secondary goal: nephron preservation  Tertiary goal: application of minimally-invasive surgical approaches      I explained that over the past 15-20 years, partial nephrectomy (i.e, removal of only the renal tumor without sacrifice of the entire kidney) has become the standard of care not only for essential but also for elective indications.   I explained that powerful long-term data now demonstrate that patients whose renal tissue is preserved enjoy lower risks of not only renal insufficiency, but also of cardiovascular disease. Most importantly, patients who undergo nephron-sparing surgery have been shown to exhibit longer overall survival than those patients whose entire kidney was extirpated. As such, I encourage partial nephrectomy in all cases where this is feasible. I offer open partial nephrectomy for the highly complex lesions (i.e. deep, endophytic, central, hilar, etc.) and perform robot-assisted laparoscopic partial nephrectomy on those lesions that are amenable to minimally-invasive approaches. I explained that the benefits of the minimally-invasive approaches stem from decreased blood loss and faster recovery. When partial nephrectomy is not possible, a radical nephrectomy is necessary. I explained that although prospective randomized trials of open versus laparoscopic radical nephrectomy were never completed, long-term retrospective data suggest oncological equivalence between the two approaches. Furthermore, I communicated that given significantly lower intra-operative blood loss and shorter convalescence, laparoscopic radical nephrectomy, when possible, is the standard of care for renal surgery that requires total removal of the kidney. The risks of the surgery were discussed in detail including medical and anesthetic complications (e.g. heart attack, stroke, deep vein thrombosis, pulmonary embolism, infection (pneumonia, etc), bleeding with possible need for transfusion, adjacent organ involvement or injury, wound complications, reaction to medications). We discussed that overall kidney function may be impaired. We reviewed the risks for immediate or delayed, temporary or permanent dialysis in the context of the patient's current situation. The expected hospital and post-operative courses were reviewed. We reviewed what to expect with regard to incisions, post-operative pain, and risks of subsequent hernias.  I explained that during open surgery a portion of a rib may be resected. I communicated that there is always a small chance of open conversion when minimally-invasive approaches are employed. For partial nephrectomy, I communicated the risks of urinary leak/fistula and the potential for a prolonged drain at the operative site or a ureteral stent. I also explained that conversion to a radical nephrectomy at the time or surgery or in a delayed fashion is rare but possible. I communicated that the decision process regarding renal surgery can be extremely complex. I invited the patient to become an active participant in their care and to become proactive in learning about renal masses and the risks and benefits of treatment. I suggested that the AUA Guidelines for the Management of Clinical Stage I Renal Mass and the accompanying webinar that can be found at www. auanet.org/guidelines are an excellent source for such information. I explained that I am always willing to answer any questions that may arise following the office visit and encouraged the patient to call my office with any questions during this anxiety-provoking period. Adrenal adenoma    Upon personal review of the imaging, patient noted to have 1.5 cm right adrenal adenoma. She does not believe she ever had an endocrinology work-up for this. She is not seen an endocrinologist.  I explained to her that she would need referral to endocrinologist that she could have a metabolic work-up of her adrenal lesion. I explained that if the endocrinologist thought that it was prudent, she would have several blood tests performed in order to see if the adrenal nodule is metabolically active. I explained that if any of these blood test were positive, she would be referred back to urology regarding possible removal of adrenal gland. All questions were answered. Stage 3 CKD    Patient's kidney function including creatinine and GFR reviewed.   I explained that her GFR was not terrible for someone her age, but not ideal.  I explained that procedures on her kidney would put her at risk for worsening renal function, and therefore high risk of needing dialysis in the future. I stressed the importance of renal preservation is much as possible. I stated that since she may need future intervention regarding her renal lesions, it would be a very good idea for her to get plugged in with nephrology. She has previously had a referral to nephrology. I asked her to call them and make an appointment. PLAN  -She is opting for active surveillance for kidney. She will see me in office in 6 months with CT and US. -Referral made to endocrine for adrenal metabolic workup.  -She has referral to nephrology. I asked her to make appointment. Portions of the above record have been created with voice recognition software. Occasional wrong word or "sound alike" substitution may have occurred due to the inherent limitations of voice recognition software. Read the chart carefully and recognize, using context, where substitution may have occurred. Filiberto Gomez DO        Chief Complaint     Renal lesions      History of Present Illness     Sandra Vela is a 77 y.o. female presenting in consultation for renal lesions. The patient was referred by Max Cockayne, CRNP and today's note has been sent to the referring provider. See summary at top of note    Denied interval issues. Denied pain. The following portions of the patient's history were reviewed and updated as appropriate: allergies, current medications, past family history, past medical history, past social history, past surgical history and problem list.            Review of Systems     Review of Systems   Constitutional: Negative for chills and fever. HENT: Negative for congestion and hearing loss. Respiratory: Negative for cough and shortness of breath. Gastrointestinal: Negative for abdominal pain and nausea.    Genitourinary: Negative for difficulty urinating and dysuria. Psychiatric/Behavioral: Negative for agitation and behavioral problems. Allergies     Allergies   Allergen Reactions   • Amoxicillin    • Codeine Other (See Comments)     "I didn't like the feeling."   • Morphine        Physical Exam     Physical Exam  Constitutional:       General: She is not in acute distress. HENT:      Head: Normocephalic and atraumatic. Pulmonary:      Effort: Pulmonary effort is normal. No respiratory distress. Abdominal:      General: Abdomen is flat. Palpations: Abdomen is soft. Tenderness: There is no right CVA tenderness or left CVA tenderness. Neurological:      General: No focal deficit present. Mental Status: She is alert and oriented to person, place, and time. Psychiatric:         Mood and Affect: Mood normal.         Behavior: Behavior normal.             Vital Signs  There were no vitals filed for this visit. Current Medications       Current Outpatient Medications:   •  amLODIPine (NORVASC) 10 mg tablet, TAKE 1 TABLET(10 MG) BY MOUTH DAILY, Disp: 90 tablet, Rfl: 1  •  aspirin 81 mg chewable tablet, Chew 81 mg daily, Disp: , Rfl:   •  Blood Pressure Monitoring (B-D ASSURE BPM/AUTO ARM CUFF) MISC, Use in the morning, Disp: 1 each, Rfl: 0  •  Cholecalciferol (VITAMIN D3) 50 MCG (2000 UT) TABS, Take 2,000 Units by mouth daily, Disp: , Rfl:   •  dexlansoprazole (Dexilant) 60 MG capsule, Take 1 capsule (60 mg total) by mouth daily, Disp: 90 capsule, Rfl: 1  •  famotidine (PEPCID) 20 mg tablet, Take 1 tablet (20 mg total) by mouth 2 (two) times a day, Disp: 60 tablet, Rfl: 1  •  levothyroxine 50 mcg tablet, TAKE 1 TABLET(50 MCG) BY MOUTH DAILY, Disp: 90 tablet, Rfl: 1  •  lisinopril (ZESTRIL) 10 mg tablet, Take 1 tablet (10 mg total) by mouth daily, Disp: 90 tablet, Rfl: 1  •  Misc. Devices (Round Shower Stool) MISC, Use daily Use as needed in the shower. , Disp: 1 each, Rfl: 0  •  polyethylene glycol (GLYCOLAX) 17 GM/SCOOP powder, Take 17 g by mouth daily, Disp: 578 g, Rfl: 1  •  potassium chloride (K-DUR,KLOR-CON) 20 mEq tablet, Take 1 tablet (20 mEq total) by mouth daily, Disp: 90 tablet, Rfl: 0  •  rosuvastatin (CRESTOR) 10 MG tablet, TAKE 1 TABLET(10 MG) BY MOUTH DAILY, Disp: 90 tablet, Rfl: 1      Active Problems     Patient Active Problem List   Diagnosis   • Intention tremor   • Benign essential tremor   • Depression with anxiety   • Essential hypertension   • CKD (chronic kidney disease) stage 3, GFR 30-59 ml/min ([facility])   • Hypothyroidism   • Shortness of breath   • Mixed hyperlipidemia   • History of breast cancer   • Mild cognitive impairment   • Bilateral malignant neoplasm of breast in female ([address])   • IFG (impaired fasting glucose)   • Compression fracture of fifth lumbar vertebra ([facility])   • L4-L5 disc bulge   • Sciatica of right side   • Chronic obstructive pulmonary disease, unspecified COPD type ([facility])   • Severe episode of recurrent major depressive disorder, without psychotic features ([address])   • Incontinence of feces   • Other fatigue   • Morbid obesity with BMI of 45.0-49.9, adult ([address])   • Kidney lesion   • Constipation   • Gastroesophageal reflux disease         Past Medical History     Past Medical History:   Diagnosis Date   • Acute ill-defined cerebrovascular disease    • Benign essential tremor    • Breast cancer ([address])     pt states doesn't remember the year thinks it might have been on the right.    • Chronic kidney disease    • CKD (chronic kidney disease) stage 3, GFR 30-59 ml/min ([facility]) 9/25/2018   • Colon polyp    • COPD (chronic obstructive pulmonary disease) ([facility])    • Depressive disorder    • Disease of thyroid gland    • Heart murmur    • Hypertension    • Impaired fasting glucose    • Intention tremor     last assessed: 8/18/2016   • Osteochondropathy    • Panic disorder without agoraphobia with mild panic attacks    • Psychiatric disorder     anxiety   • Stroke [facility])    • Tubular adenoma of colon          Surgical History     Past Surgical History:   Procedure Laterality Date   • AUGMENTATION BREAST      pt states during mammo she doesn't have any other surgery than the 2 biopsy   • BLEPHAROPLASTY      Upper   • BREAST BIOPSY Left     pt doesn't recall when and believes the neg biop was on left   • BREAST LUMPECTOMY W/ NEEDLE LOCALIZATION Left     description: benign last assessed; 8/21/2016   • CHOLECYSTECTOMY     • COLONOSCOPY     • EGD     • HYSTERECTOMY     • TUBAL LIGATION           Family History     Family History   Problem Relation Age of Onset   • Alcohol abuse Mother    • Asthma Mother    • Cancer Mother    • Mental illness Mother    • Osteoarthritis Mother    • Gout Father    • Cancer Father    • Other Sister         carotid arterial disease   • COPD Sister    • Hyperlipidemia Sister    • Hypertension Sister    • Heart attack Sister    • Breast cancer Sister    • No Known Problems Daughter    • No Known Problems Maternal Grandmother    • No Known Problems Paternal Grandmother    • Lung cancer Sister    • No Known Problems Sister    • No Known Problems Daughter    • No Known Problems Daughter          Social History     Social History     Social History     Tobacco Use   Smoking Status Former   Smokeless Tobacco Never   Tobacco Comments    10 years ago         Pertinent Lab Values     Lab Results   Component Value Date    CREATININE 1.37 (H) 08/01/2023       No results found for: "PSA"            Pertinent Imaging     The patient's images were reviewed by me personally and also in real time with them in the examination room using our PACS imaging system.         CTA CAP 8/1/23 (performed for SOB): no PE, 1.7 cm enhancing solid R renal mass worrisome for neoplasm; indeterminate 1 cm L anterior upper pole lesion possibly complicated cyst    MRI abdomen w/ wo contrast 8/13/23: 1.7 cm enhancing mass in interpolar region of R kidney highly suggestive for neoplasm; no RP LAD, no renal vein thrombosis; hemorrhagic or proteinaceous LUP renal cyst with multiple additional simple cysts; 1.5 cm R adrenal adenoma      Pertinent Pathology     N/A

## 2023-09-18 ENCOUNTER — TELEPHONE (OUTPATIENT)
Dept: ENDOCRINOLOGY | Facility: CLINIC | Age: 78
End: 2023-09-18

## 2023-09-18 ENCOUNTER — TELEPHONE (OUTPATIENT)
Dept: NEPHROLOGY | Facility: CLINIC | Age: 78
End: 2023-09-18

## 2023-09-18 NOTE — TELEPHONE ENCOUNTER
Received referral for Yokasta Jameson. Left voicemail for patient to contact office to schedule Consult/New Patient Appointment.

## 2023-09-19 DIAGNOSIS — R79.89 ELEVATED TSH: ICD-10-CM

## 2023-09-19 DIAGNOSIS — R53.83 OTHER FATIGUE: ICD-10-CM

## 2023-09-19 RX ORDER — LEVOTHYROXINE SODIUM 0.05 MG/1
TABLET ORAL
Qty: 90 TABLET | Refills: 1 | Status: SHIPPED | OUTPATIENT
Start: 2023-09-19

## 2023-10-02 DIAGNOSIS — I10 ESSENTIAL HYPERTENSION: ICD-10-CM

## 2023-10-03 RX ORDER — LISINOPRIL 10 MG/1
10 TABLET ORAL DAILY
Qty: 90 TABLET | Refills: 1 | Status: SHIPPED | OUTPATIENT
Start: 2023-10-03

## 2023-10-10 ENCOUNTER — OFFICE VISIT (OUTPATIENT)
Age: 78
End: 2023-10-10

## 2023-10-10 VITALS
DIASTOLIC BLOOD PRESSURE: 91 MMHG | HEIGHT: 63 IN | HEART RATE: 71 BPM | SYSTOLIC BLOOD PRESSURE: 122 MMHG | BODY MASS INDEX: 48.9 KG/M2 | WEIGHT: 276 LBS | OXYGEN SATURATION: 97 % | TEMPERATURE: 97.3 F

## 2023-10-10 DIAGNOSIS — H02.9 EYELID LESION: Primary | ICD-10-CM

## 2023-10-10 DIAGNOSIS — Z01.818 PREOP EXAMINATION: ICD-10-CM

## 2023-10-10 PROCEDURE — PREOP: Performed by: PHYSICIAN ASSISTANT

## 2023-10-10 NOTE — PROGRESS NOTES
Subjective   Patient ID: Levi Ahmadi is a 77 y.o. female. Vitals:    10/10/23 1320   BP: 122/91   Pulse: 71   Temp: (!) 97.3 °F (36.3 °C)   SpO2: 97%     HPI Patient is a 50-year-old female who is here today for preoperative H&P for excision of left eyelid lesion on 11/9/23. The patient states that she had a blepharoplasty and brow lift about one year ago and stated that the lesion was to be removed at that time, however the lesion was not removed and persisted postoperatively and she would like correction of this lesion. She states the lesion has been there for some time, is not associated with any redness, swelling, drainage, no crusting, no ulceration, no irritation, no bleeding or skip lesions, the patient does not remember any antecedent trauma to that area, she otherwise has no complaints, she has no constitutional symptoms. She is a non-smoker, she does not take steroids. She does take aspirin daily. Patient has several medication allergies. The following portions of the patient's history were reviewed and updated as appropriate: allergies, current medications, past family history, past medical history, past social history, past surgical history and problem list.    Review of Systems   All other systems reviewed and are negative. Objective   Physical Exam   Vitals reviewed. Constitutional  She appears well-developed and well-nourished. Eyes  Pupils are equal, round, and reactive to light. System normal.     General -             Right: Right eye extraocular movements are normal.             Left: Left eye extraocular movements are normal.     Cardiovascular: Normal rate. Pulmonary/Chest  Effort normal.     Skin  Skin is warm and dry. Patient with 1-1/2 cm x 1 cm firm nodular well-circumscribed lesion on the junction of the upper lid and brow skin, this lesion is consistent with an inclusion cyst, she does not have any malignant characteristics.      Psychiatric  She has a normal mood and affect. Her behavior is normal. Judgment normal.       Past Medical History:   Diagnosis Date   • Acute ill-defined cerebrovascular disease    • Benign essential tremor    • Breast cancer ([address])     pt states doesn't remember the year thinks it might have been on the right.    • Chronic kidney disease    • CKD (chronic kidney disease) stage 3, GFR 30-59 ml/min ([facility]) 9/25/2018   • Colon polyp    • COPD (chronic obstructive pulmonary disease) ([facility])    • Depressive disorder    • Disease of thyroid gland    • Heart murmur    • Hypertension    • Impaired fasting glucose    • Intention tremor     last assessed: 8/18/2016   • Osteochondropathy    • Panic disorder without agoraphobia with mild panic attacks    • Psychiatric disorder     anxiety   • Stroke [facility])    • Tubular adenoma of colon      Patient Active Problem List   Diagnosis   • Intention tremor   • Benign essential tremor   • Depression with anxiety   • Essential hypertension   • CKD (chronic kidney disease) stage 3, GFR 30-59 ml/min ([facility])   • Hypothyroidism   • Shortness of breath   • Mixed hyperlipidemia   • History of breast cancer   • Mild cognitive impairment   • Bilateral malignant neoplasm of breast in female ([address])   • IFG (impaired fasting glucose)   • Compression fracture of fifth lumbar vertebra ([facility])   • L4-L5 disc bulge   • Sciatica of right side   • Chronic obstructive pulmonary disease, unspecified COPD type ([address])   • Severe episode of recurrent major depressive disorder, without psychotic features ([address])   • Incontinence of feces   • Other fatigue   • Morbid obesity with BMI of 45.0-49.9, adult ([address])   • Kidney lesion   • Constipation   • Gastroesophageal reflux disease       Past Surgical History:   Procedure Laterality Date   • AUGMENTATION BREAST      pt states during mammo she doesn't have any other surgery than the 2 biopsy   • BLEPHAROPLASTY      Upper   • BREAST BIOPSY Left     pt doesn't recall when and believes the neg biop was on left   • BREAST LUMPECTOMY W/ NEEDLE LOCALIZATION Left     description: benign last assessed; 8/21/2016   • CHOLECYSTECTOMY     • COLONOSCOPY     • EGD     • HYSTERECTOMY     • TUBAL LIGATION       Current Outpatient Medications   Medication Instructions   • amLODIPine (NORVASC) 10 mg tablet TAKE 1 TABLET(10 MG) BY MOUTH DAILY   • aspirin 81 mg, Oral, Daily   • Blood Pressure Monitoring (B-D ASSURE BPM/AUTO ARM CUFF) MISC Does not apply, Daily   • dexlansoprazole (DEXILANT) 60 mg, Oral, Daily   • famotidine (PEPCID) 20 mg, Oral, 2 times daily   • levothyroxine 50 mcg tablet TAKE 1 TABLET(50 MCG) BY MOUTH DAILY   • lisinopril (ZESTRIL) 10 mg, Oral, Daily   • Misc. Devices (Round Shower Stool) MISC Does not apply, Daily, Use as needed in the shower. • polyethylene glycol (GLYCOLAX) 17 g, Oral, Daily   • potassium chloride (K-DUR,KLOR-CON) 20 mEq tablet 20 mEq, Oral, Daily   • rosuvastatin (CRESTOR) 10 MG tablet TAKE 1 TABLET(10 MG) BY MOUTH DAILY   • Vitamin D3 2,000 Units, Oral, Daily       Social History     Social History Narrative         Always uses seat belt     Social History     Tobacco Use   Smoking Status Former   Smokeless Tobacco Never   Tobacco Comments    10 years ago       Assessment/Plan 27-year-old female with upper lid/lateral brow mass status post blepharoplasty    Today we discussed the procedure in detail including the expected surgical time, hospital stay, and recovery time.  We discussed at length all of the potential risks and complications of the surgery including bleeding, infection, recurrence, scarring, wound healing complications, hematoma, seroma, contour deformity, blood clots, asymmetry, damage to surrounding structures, poor cosmetic result, skin necrosis, and need for additional procedures.  We discussed postop activity restrictions and medications. We discussed the use of sutures x one week.  Lesion will be sent to pathology. All of the patient's questions were answered to her satisfaction, she understands and agrees to the plan of care. Informed consent was obtained today for the upcoming procedure.         Jose D Clark PA-C  Plastic & Reconstructive Surgery

## 2023-10-11 ENCOUNTER — OFFICE VISIT (OUTPATIENT)
Dept: FAMILY MEDICINE CLINIC | Facility: CLINIC | Age: 78
End: 2023-10-11
Payer: MEDICARE

## 2023-10-11 VITALS
WEIGHT: 275.8 LBS | HEART RATE: 94 BPM | SYSTOLIC BLOOD PRESSURE: 130 MMHG | TEMPERATURE: 97.4 F | BODY MASS INDEX: 48.87 KG/M2 | DIASTOLIC BLOOD PRESSURE: 82 MMHG | HEIGHT: 63 IN | OXYGEN SATURATION: 97 %

## 2023-10-11 DIAGNOSIS — R06.02 SHORTNESS OF BREATH: Primary | ICD-10-CM

## 2023-10-11 DIAGNOSIS — Z01.818 PRE-OP EXAM: ICD-10-CM

## 2023-10-11 DIAGNOSIS — R35.0 URINARY FREQUENCY: ICD-10-CM

## 2023-10-11 PROCEDURE — 99215 OFFICE O/P EST HI 40 MIN: CPT

## 2023-10-11 RX ORDER — FLUTICASONE PROPIONATE AND SALMETEROL XINAFOATE 45; 21 UG/1; UG/1
2 AEROSOL, METERED RESPIRATORY (INHALATION) 2 TIMES DAILY
Qty: 12 G | Refills: 1 | Status: SHIPPED | OUTPATIENT
Start: 2023-10-11

## 2023-10-11 RX ORDER — OXYBUTYNIN CHLORIDE 5 MG/1
5 TABLET, EXTENDED RELEASE ORAL DAILY
Qty: 30 TABLET | Refills: 1 | Status: SHIPPED | OUTPATIENT
Start: 2023-10-11

## 2023-10-11 NOTE — ASSESSMENT & PLAN NOTE
Will start Advair daily, please have pft and follow up with pulmonology. Reviewed CT, appointment is scheduled with cardiology. Follow up here in 3 months. Quit smoking over 10 years ago.

## 2023-10-11 NOTE — PROGRESS NOTES
[address]    NAME: Raymond Lanza  AGE: 77 y.o. SEX: female  : 1945     DATE: 10/11/2023    Chief Complaint: Pre-operative Evaluation     Surgery: eye lid surgery  Anticipated Date of Surgery: 23  Referring Provider: No ref. provider found       History of Present Illness:     Raymond Lanza is a 77 y.o. female who presents to the office today for a preoperative consultation at the request of surgeon Dr. Corina Rios who plans on performing eye lid surgery on 23. Planned anesthesia is IV sedation. Patient has a bleeding risk of: no recent abnormal bleeding. Patient does not have objections to receiving blood products if needed. Current anti-platelet/anti-coagulation medications that the patient is prescribed includes: Aspirin     Assessment of Chronic Conditions:   - Hypertension: well controlled     Assessment of Cardiac Risk:  Denies unstable or severe angina or MI in the last 6 weeks or history of stent placement in the last year   Denies decompensated heart failure (e.g. New onset heart failure, NYHA functional class IV heart failure, or worsening existing heart failure)  Denies significant arrhythmias such as high grade AV block, symptomatic ventricular arrhythmia, newly recognized ventricular tachycardia, supraventricular tachycardia with resting heart rate >100, or symptomatic bradycardia  Denies severe heart valve disease including aortic stenosis or symptomatic mitral stenosis     Exercise Capacity:  Able to walk 4 blocks without symptoms?: No  Able to walk 2 flights without symptoms?: No    Prior Anesthesia Reactions: No     Personal history of venous thromboembolic disease? Yes, after 3rd child in 1967    History of steroid use for >2 weeks within last year?  No    STOP-BANG Sleep Apnea Screening Questionnaire:         Review of Systems:     Review of Systems   Constitutional:  Negative for chills, fatigue and fever.   HENT:  Negative for congestion, ear pain, rhinorrhea, sinus pressure, sinus pain and sore throat. Eyes:  Negative for pain and visual disturbance. Respiratory:  Positive for shortness of breath and wheezing. Negative for cough and chest tightness. Cardiovascular:  Negative for chest pain and palpitations. Gastrointestinal:  Negative for abdominal pain, constipation, diarrhea, nausea and vomiting. Genitourinary:  Positive for frequency. Negative for dysuria and hematuria. Musculoskeletal:  Negative for arthralgias, back pain and myalgias. Skin:  Negative for color change and rash. Neurological:  Positive for light-headedness. Negative for dizziness, seizures, syncope and headaches. Psychiatric/Behavioral:  Positive for sleep disturbance. Negative for dysphoric mood. The patient is not nervous/anxious. All other systems reviewed and are negative. Current Problem List:     Patient Active Problem List   Diagnosis   • Intention tremor   • Benign essential tremor   • Depression with anxiety   • Essential hypertension   • CKD (chronic kidney disease) stage 3, GFR 30-59 ml/min ([facility])   • Hypothyroidism   • Shortness of breath   • Mixed hyperlipidemia   • History of breast cancer   • Mild cognitive impairment   • Bilateral malignant neoplasm of breast in female ([address])   • IFG (impaired fasting glucose)   • Compression fracture of fifth lumbar vertebra ([facility])   • L4-L5 disc bulge   • Sciatica of right side   • Chronic obstructive pulmonary disease, unspecified COPD type ([address])   • Severe episode of recurrent major depressive disorder, without psychotic features ([address])   • Incontinence of feces   • Other fatigue   • Morbid obesity with BMI of 45.0-49.9, adult ([address])   • Kidney lesion   • Constipation   • Gastroesophageal reflux disease   • Urinary frequency       Allergies:      Allergies   Allergen Reactions   • Amoxicillin    • Codeine Other (See Comments)     "I didn't like the feeling."   • Morphine        Physical Exam:       Current Outpatient Medications:   •  amLODIPine (NORVASC) 10 mg tablet, TAKE 1 TABLET(10 MG) BY MOUTH DAILY, Disp: 90 tablet, Rfl: 1  •  aspirin 81 mg chewable tablet, Chew 81 mg daily, Disp: , Rfl:   •  Cholecalciferol (VITAMIN D3) 50 MCG (2000 UT) TABS, Take 2,000 Units by mouth daily, Disp: , Rfl:   •  dexlansoprazole (Dexilant) 60 MG capsule, Take 1 capsule (60 mg total) by mouth daily, Disp: 90 capsule, Rfl: 1  •  famotidine (PEPCID) 20 mg tablet, Take 1 tablet (20 mg total) by mouth 2 (two) times a day, Disp: 60 tablet, Rfl: 1  •  fluticasone-salmeterol (Advair HFA) 45-21 MCG/ACT inhaler, Inhale 2 puffs 2 (two) times a day Rinse mouth after use., Disp: 12 g, Rfl: 1  •  levothyroxine 50 mcg tablet, TAKE 1 TABLET(50 MCG) BY MOUTH DAILY, Disp: 90 tablet, Rfl: 1  •  lisinopril (ZESTRIL) 10 mg tablet, Take 1 tablet (10 mg total) by mouth daily, Disp: 90 tablet, Rfl: 1  •  oxybutynin (DITROPAN-XL) 5 mg 24 hr tablet, Take 1 tablet (5 mg total) by mouth daily, Disp: 30 tablet, Rfl: 1  •  potassium chloride (K-DUR,KLOR-CON) 20 mEq tablet, Take 1 tablet (20 mEq total) by mouth daily, Disp: 90 tablet, Rfl: 0  •  rosuvastatin (CRESTOR) 10 MG tablet, TAKE 1 TABLET(10 MG) BY MOUTH DAILY, Disp: 90 tablet, Rfl: 1    Past Medical History:       Past Medical History:   Diagnosis Date   • Acute ill-defined cerebrovascular disease    • Benign essential tremor    • Breast cancer ([address])     pt states doesn't remember the year thinks it might have been on the right.    • Chronic kidney disease    • CKD (chronic kidney disease) stage 3, GFR 30-59 ml/min ([facility]) 9/25/2018   • Colon polyp    • COPD (chronic obstructive pulmonary disease) ([facility])    • Depressive disorder    • Disease of thyroid gland    • Heart murmur    • Hypertension    • Impaired fasting glucose    • Intention tremor     last assessed: 8/18/2016   • Osteochondropathy    • Panic disorder without agoraphobia with mild panic attacks    • Psychiatric disorder     anxiety   • Stroke [facility])    • Tubular adenoma of colon         Past Surgical History:   Procedure Laterality Date   • AUGMENTATION BREAST      pt states during mammo she doesn't have any other surgery than the 2 biopsy   • BLEPHAROPLASTY      Upper   • BREAST BIOPSY Left     pt doesn't recall when and believes the neg biop was on left   • BREAST LUMPECTOMY W/ NEEDLE LOCALIZATION Left     description: benign last assessed; 8/21/2016   • CHOLECYSTECTOMY     • COLONOSCOPY     • EGD     • HYSTERECTOMY     • TUBAL LIGATION          Family History   Problem Relation Age of Onset   • Alcohol abuse Mother    • Asthma Mother    • Cancer Mother    • Mental illness Mother    • Osteoarthritis Mother    • Gout Father    • Cancer Father    • Other Sister         carotid arterial disease   • COPD Sister    • Hyperlipidemia Sister    • Hypertension Sister    • Heart attack Sister    • Breast cancer Sister    • No Known Problems Daughter    • No Known Problems Maternal Grandmother    • No Known Problems Paternal Grandmother    • Lung cancer Sister    • No Known Problems Sister    • No Known Problems Daughter    • No Known Problems Daughter         Social History     Socioeconomic History   • Marital status: Single     Spouse name: Not on file   • Number of children: Not on file   • Years of education: Not on file   • Highest education level: Not on file   Occupational History   • Occupation: Retired   Tobacco Use   • Smoking status: Former   • Smokeless tobacco: Never   • Tobacco comments:     10 years ago   Vaping Use   • Vaping Use: Never used   Substance and Sexual Activity   • Alcohol use: Yes     Comment: socially - Per allscripts - denies alcohol consumption   • Drug use: No   • Sexual activity: Not on file   Other Topics Concern   • Not on file   Social History Narrative         Always uses seat belt     Social Determinants of Health     Financial Resource Strain: Low Risk  (10/4/2022) Overall Financial Resource Strain (CARDIA)    • Difficulty of Paying Living Expenses: Not hard at all   Food Insecurity: Not on file   Transportation Needs: No Transportation Needs (10/4/2022)    PRAPARE - Transportation    • Lack of Transportation (Medical): No    • Lack of Transportation (Non-Medical): No   Physical Activity: Not on file   Stress: Not on file   Social Connections: Not on file   Intimate Partner Violence: Not on file   Housing Stability: Not on file        Physical Exam:     /82 (BP Location: Right arm, Patient Position: Sitting, Cuff Size: Large)   Pulse 94   Temp (!) 97.4 °F (36.3 °C)   Ht 5' 3" (1.6 m)   Wt 125 kg (275 lb 12.8 oz)   SpO2 97%   BMI 48.86 kg/m²     Physical Exam  Vitals and nursing note reviewed. Constitutional:       Appearance: Normal appearance. She is obese. She is not ill-appearing. HENT:      Head: Normocephalic. Right Ear: Tympanic membrane, ear canal and external ear normal. There is no impacted cerumen. Left Ear: Tympanic membrane, ear canal and external ear normal. There is no impacted cerumen. Nose: Nose normal. No congestion. Mouth/Throat:      Mouth: Mucous membranes are moist.      Pharynx: No posterior oropharyngeal erythema. Eyes:      Extraocular Movements: Extraocular movements intact. Conjunctiva/sclera: Conjunctivae normal.      Pupils: Pupils are equal, round, and reactive to light. Cardiovascular:      Rate and Rhythm: Normal rate and regular rhythm. Heart sounds: Normal heart sounds. No murmur heard. Pulmonary:      Effort: Pulmonary effort is normal.      Breath sounds: Normal breath sounds. No wheezing. Abdominal:      General: Bowel sounds are normal.      Palpations: Abdomen is soft. Tenderness: There is no abdominal tenderness. Musculoskeletal:         General: Normal range of motion. Cervical back: Normal range of motion. Right lower leg: No edema. Left lower leg: No edema. Skin:     General: Skin is warm and dry. Neurological:      General: No focal deficit present. Mental Status: She is alert. Psychiatric:         Mood and Affect: Mood normal.         Behavior: Behavior normal.          Data:     Pre-operative work-up  CBC:   Results from last 6 Months   Lab Units 23  1412   WBC Thousand/uL 7.50   RBC Million/uL 5.13*   HEMOGLOBIN g/dL 13.9   HEMATOCRIT % 43.8   MCV fL 85   MCH pg 27.1   MCHC g/dL 31.7   RDW % 14.1   MPV fL 10.8   PLATELETS Thousands/uL 232   NRBC AUTO /100 WBCs 0   NEUTROS PCT % 68   LYMPHS PCT % 22   MONOS PCT % 7   EOS PCT % 2   BASOS PCT % 1   NEUTROS ABS Thousands/µL 5.10   LYMPHS ABS Thousands/µL 1.67   MONOS ABS Thousand/µL 0.51   EOS ABS Thousand/µL 0.16       Chemistry Profile:   Results from last 6 Months   Lab Units 23  1412   POTASSIUM mmol/L 4.0   CHLORIDE mmol/L 106   CO2 mmol/L 29   BUN mg/dL 14   CREATININE mg/dL 1.37*   GLUCOSE FASTING mg/dL 107*   CALCIUM mg/dL 9.9   AST U/L 18   ALT U/L 14   ALK PHOS U/L 79   EGFR ml/min/1.73sq m 36       Coagulation Studies:     Endocrine Studies:       Invalid input(s): "[id number]"    EKG: EKG: normal EKG, normal sinus rhythm. From September. Chest x-ray: NA CT in August    Previous cardiopulmonary studies within the past year:  Echocardiogram: 2023  Cardiac Catheterization: NA  Stress Test: NA  Pulmonary Function Testin      Assessment & Recommendations:     1. Shortness of breath  Ambulatory Referral to Pulmonology    Complete PFT with post bronchodilator    fluticasone-salmeterol (Advair HFA) 45-21 MCG/ACT inhaler      2. Urinary frequency  oxybutynin (DITROPAN-XL) 5 mg 24 hr tablet      3. Pre-op exam      cleared for IV sedation for eyelid sx, will hold ASA for 10 days prior. Hx of blood clot in  after birth of 3rd child, not requiring anticoagulation since. Pre-Op Evaluation Assessment  77 y.o. female with planned surgery: eyelid surgery.     Known risk factors for perioperative complications: None  HTN, obesity . Cardiac Risk Estimation: per the Revised Cardiac Risk Index (Circ. 100:1043, 1999), the patient's risk factors for cardiac complications include  HTN, obesity , putting her in: RCI RISK CLASS III (2 risk factors, risk of major cardiac compl. appr. 3.6%). Current medications which may produce withdrawal symptoms if withheld perioperatively: none. Pre-Op Evaluation Plan  1. Further preoperative workup as follows:   - None; no further preoperative work-up is required    2. Change in medication regimen before surgery:   - Patient has been instructed to avoid aspirin containing medications or non-steroidal anti-inflammatory drugs for the week preceding surgery. 3. Prophylaxis for cardiac events with perioperative beta-blockers: not indicated. 4. Patient requires further consultation with: None    Clearance  Patient is CLEARED for surgery without any additional cardiac testing. Haven Jacobson, [address] [address]  Phone#  [phone]  Fax#  [phone]    Portions of the record may have been created with voice recognition software. Occasional wrong word or "sound a like" substitutions may have occurred due to the inherent limitations of voice recognition software. Read the chart carefully and recognize, using context, where substitutions have occurred.

## 2023-10-13 ENCOUNTER — TELEPHONE (OUTPATIENT)
Age: 78
End: 2023-10-13

## 2023-10-13 ENCOUNTER — PREP FOR PROCEDURE (OUTPATIENT)
Dept: GASTROENTEROLOGY | Facility: CLINIC | Age: 78
End: 2023-10-13

## 2023-10-13 ENCOUNTER — CONSULT (OUTPATIENT)
Dept: GASTROENTEROLOGY | Facility: CLINIC | Age: 78
End: 2023-10-13
Payer: MEDICARE

## 2023-10-13 VITALS
BODY MASS INDEX: 47.98 KG/M2 | HEIGHT: 63 IN | WEIGHT: 270.8 LBS | SYSTOLIC BLOOD PRESSURE: 122 MMHG | HEART RATE: 88 BPM | DIASTOLIC BLOOD PRESSURE: 82 MMHG

## 2023-10-13 DIAGNOSIS — K21.9 GASTROESOPHAGEAL REFLUX DISEASE, UNSPECIFIED WHETHER ESOPHAGITIS PRESENT: Primary | ICD-10-CM

## 2023-10-13 DIAGNOSIS — K21.9 GASTROESOPHAGEAL REFLUX DISEASE, UNSPECIFIED WHETHER ESOPHAGITIS PRESENT: ICD-10-CM

## 2023-10-13 DIAGNOSIS — K59.00 CONSTIPATION, UNSPECIFIED CONSTIPATION TYPE: ICD-10-CM

## 2023-10-13 PROCEDURE — 99214 OFFICE O/P EST MOD 30 MIN: CPT | Performed by: INTERNAL MEDICINE

## 2023-10-13 RX ORDER — PANTOPRAZOLE SODIUM 40 MG/1
TABLET, DELAYED RELEASE ORAL
Qty: 180 TABLET | Refills: 1 | Status: SHIPPED | OUTPATIENT
Start: 2023-10-13

## 2023-10-13 RX ORDER — PANTOPRAZOLE SODIUM 40 MG/1
40 TABLET, DELAYED RELEASE ORAL
Qty: 62 TABLET | Refills: 6 | Status: SHIPPED | OUTPATIENT
Start: 2023-10-13 | End: 2023-10-13

## 2023-10-13 NOTE — PROGRESS NOTES
[facility] - Outpatient Follow-up Note  Yao Sanchez 77 y.o. female MRN: [id number]  Encounter: [id number]          ASSESSMENT AND PLAN:      1. Constipation, unspecified constipation type  - Ambulatory Referral to Gastroenterology  -High-fiber diet to include fruits, vegetables, and whole grain foods, daily  -Increase water intake up to 8 glasses/day  -Schedule future colonoscopy    2. Gastroesophageal reflux disease, unspecified whether esophagitis present  - Ambulatory Referral to Gastroenterology  -Continue pantoprazole 40 mg p.o. twice daily  -Discontinue Dexilant  -Gradual esophagogastroduodenoscopy  -No lying down within 2 hours of eating or snacking    ______________________________________________________________________    SUBJECTIVE: Lorena Orosco is a 16-year-old female who comes the office today complaining of ongoing problems with acid reflux despite the fact that she has been taking Dexilant 60 mg over the past 2 years. She is experiencing breakthrough symptoms that are frequent requiring Tums sometimes every day or every other day. She has had 3 to 4 years of ongoing indigestion as well. She is currently not taking famotidine. She denies abdominal pain but she does admit to the heartburn. She denies dysphagia, nausea, vomiting, rectal bleeding, bloating but she does admit to gaseousness. She denies any rectal bleeding, weight gain. She states that she does not eat or snack late at night. She has a known history of a hiatal hernia. REVIEW OF SYSTEMS IS OTHERWISE NEGATIVE. Historical Information   Past Medical History:   Diagnosis Date    Acute ill-defined cerebrovascular disease     Benign essential tremor     Breast cancer ([address])     pt states doesn't remember the year thinks it might have been on the right.     Chronic kidney disease     CKD (chronic kidney disease) stage 3, GFR 30-59 ml/min ([facility]) 9/25/2018    Colon polyp     COPD (chronic obstructive pulmonary disease) ([address])     Depressive disorder     Disease of thyroid gland     Heart murmur     Hypertension     Impaired fasting glucose     Intention tremor     last assessed: 8/18/2016    Osteochondropathy     Panic disorder without agoraphobia with mild panic attacks     Psychiatric disorder     anxiety    Stroke ([address])     Tubular adenoma of colon      Past Surgical History:   Procedure Laterality Date    AUGMENTATION BREAST      pt states during mammo she doesn't have any other surgery than the 2 biopsy    BLEPHAROPLASTY      Upper    BREAST BIOPSY Left     pt doesn't recall when and believes the neg biop was on left    BREAST LUMPECTOMY W/ NEEDLE LOCALIZATION Left     description: benign last assessed; 8/21/2016    CHOLECYSTECTOMY      COLONOSCOPY      EGD      HYSTERECTOMY      TUBAL LIGATION       Social History   Social History     Substance and Sexual Activity   Alcohol Use Not Currently    Comment: socially - Per allscripts - denies alcohol consumption     Social History     Substance and Sexual Activity   Drug Use No     Social History     Tobacco Use   Smoking Status Former   Smokeless Tobacco Never   Tobacco Comments    10 years ago     Family History   Problem Relation Age of Onset    Alcohol abuse Mother     Asthma Mother     Cancer Mother     Mental illness Mother     Osteoarthritis Mother     Gout Father     Cancer Father     Other Sister         carotid arterial disease    COPD Sister     Hyperlipidemia Sister     Hypertension Sister     Heart attack Sister     Breast cancer Sister     No Known Problems Daughter     No Known Problems Maternal Grandmother     No Known Problems Paternal Grandmother     Lung cancer Sister     No Known Problems Sister     No Known Problems Daughter     No Known Problems Daughter        Meds/Allergies       Current Outpatient Medications:     amLODIPine (NORVASC) 10 mg tablet    aspirin 81 mg chewable tablet    Cholecalciferol (VITAMIN D3) 50 MCG (2000 UT) TABS dexlansoprazole (Dexilant) 60 MG capsule    famotidine (PEPCID) 20 mg tablet    fluticasone-salmeterol (Advair HFA) 45-21 MCG/ACT inhaler    levothyroxine 50 mcg tablet    lisinopril (ZESTRIL) 10 mg tablet    oxybutynin (DITROPAN-XL) 5 mg 24 hr tablet    potassium chloride (K-DUR,KLOR-CON) 20 mEq tablet    rosuvastatin (CRESTOR) 10 MG tablet    pantoprazole (PROTONIX) 40 mg tablet    Allergies   Allergen Reactions    Amoxicillin     Codeine Other (See Comments)     "I didn't like the feeling."    Morphine            Objective     Blood pressure 122/82, pulse 88, height 5' 3" (1.6 m), weight 123 kg (270 lb 12.8 oz), not currently breastfeeding. Body mass index is 47.97 kg/m². PHYSICAL EXAM:      General Appearance:   Alert, cooperative, no distress   HEENT:   Normocephalic, atraumatic, anicteric. Neck:  Supple, symmetrical, trachea midline   Lungs:   Clear to auscultation bilaterally; no rales, rhonchi or wheezing; respirations unlabored    Heart[de-identified]   Regular rate and rhythm; no murmur, rub, or gallop. Abdomen:   Soft, non-tender, non-distended; normal bowel sounds; no masses, no organomegaly    Genitalia:   Deferred    Rectal:   Deferred    Extremities:  No cyanosis, clubbing or edema    Pulses:  2+ and symmetric    Skin:  No jaundice, rashes, or lesions    Lymph nodes:  No palpable cervical lymphadenopathy        Lab Results:   No visits with results within 1 Day(s) from this visit. Latest known visit with results is:   Office Visit on 09/06/2023   Component Date Value    GLUCOSE BLD 09/06/2023 108          Radiology Results:   No results found.

## 2023-10-18 ENCOUNTER — TELEPHONE (OUTPATIENT)
Dept: PLASTIC SURGERY | Facility: CLINIC | Age: 78
End: 2023-10-18

## 2023-10-18 NOTE — TELEPHONE ENCOUNTER
LVM for pt to r/s appt from 11/14 with Dr. Jovon Bah to 11/13 with Josue Ortez. When pt calls back please schedule pt with Jose D on 11/13 at any available time.

## 2023-10-24 ENCOUNTER — IMMUNIZATIONS (OUTPATIENT)
Dept: FAMILY MEDICINE CLINIC | Facility: CLINIC | Age: 78
End: 2023-10-24
Payer: MEDICARE

## 2023-10-24 DIAGNOSIS — Z23 ENCOUNTER FOR IMMUNIZATION: Primary | ICD-10-CM

## 2023-10-24 PROCEDURE — G0008 ADMIN INFLUENZA VIRUS VAC: HCPCS

## 2023-10-24 PROCEDURE — 90662 IIV NO PRSV INCREASED AG IM: CPT

## 2023-10-27 ENCOUNTER — CONSULT (OUTPATIENT)
Age: 78
End: 2023-10-27
Payer: MEDICARE

## 2023-10-27 VITALS
DIASTOLIC BLOOD PRESSURE: 84 MMHG | OXYGEN SATURATION: 98 % | SYSTOLIC BLOOD PRESSURE: 120 MMHG | HEART RATE: 76 BPM | BODY MASS INDEX: 49.08 KG/M2 | TEMPERATURE: 98.1 F | WEIGHT: 277 LBS | HEIGHT: 63 IN

## 2023-10-27 DIAGNOSIS — G47.33 OSA (OBSTRUCTIVE SLEEP APNEA): ICD-10-CM

## 2023-10-27 DIAGNOSIS — R06.02 SHORTNESS OF BREATH: Primary | ICD-10-CM

## 2023-10-27 PROCEDURE — 99204 OFFICE O/P NEW MOD 45 MIN: CPT | Performed by: INTERNAL MEDICINE

## 2023-10-27 NOTE — PROGRESS NOTES
Consultation - Pulmonary Medicine   Devonte Guerra 77 y.o. female MRN: [id number]    Physician Requesting Consult: Demarco Eason  Reason for Consult: babatunde Guerra is a 77 y.o. female hx GERD, Hypothyropidism, COPD, CKD3, hx breast cancer, MCI, obesity, new renal mass undergoing workup, who presents for evaluation of dyspnea. ?COPD  Former Smoker  Dyspnea  Former smoker 47 [address] [facility], quit 2011. Pfts 2019 showed no obstruction or BD response. Dyspnea likely due to diastolic CHF and obesity/deconditioning, with possible contribution from airway disease though last PFTs were normal  Eos < 300. CT chest without parenchymal lung disease    - repeat PFTs w BD  - needs annual chest CT for lung cancer screening for 1 more year  - inhalers: continue advair for now  --- performed inhaler teaching  --- spacer given    Obesity  ? OBI  Unsure if snores, does have some sudden awakening at night.  Very sleepy during the day, never while driving BMI 49.  - Home sleep study  - discussed diet and exercise, limited by chronic back and knee pain    Vaccines    Immunization History   Administered Date(s) Administered    COVID-19 MODERNA VACC 0.5 ML IM 01/28/2021, 02/24/2021    COVID-19 Pfizer Vac BIVALENT Madhav-sucrose 12 Yr+ IM 10/04/2022    INFLUENZA 01/30/2007, 10/02/2009, 01/28/2011, 09/08/2011, 08/20/2012, 09/20/2013, 09/23/2014, 10/27/2015, 10/26/2018    Influenza Split High Dose Preservative Free IM 11/18/2016, 10/31/2017    Influenza, high dose seasonal 0.7 mL 10/26/2018, 11/05/2019, 11/03/2020, 09/02/2021, 10/04/2022, 10/24/2023    Influenza, seasonal, injectable 01/30/2007, 10/02/2009, 01/28/2011, 09/08/2011, 08/20/2012, 09/20/2013, 09/23/2014, 10/27/2015    Pneumococcal Conjugate 13-Valent 08/06/2015    Pneumococcal Polysaccharide PPV23 07/01/2011    Tdap 07/01/2011    Zoster 06/24/2015        I have spent a total time of 45 minutes on 10/27/23 in caring for this patient including Diagnostic results, Prognosis, Risks and benefits of tx options, Risk factor reductions, Impressions, Counseling / Coordination of care, Documenting in the medical record, Reviewing / ordering tests, medicine, procedures  , and Obtaining or reviewing history  . ______________________________________________________________________    HPI:    Lauren Mckeon is a 77 y.o. female hx GERD, Hypothyropidism, COPD, CKD3, hx breast cancer, MCI, obesity, who presents for evaluation of dyspnea. Former smoker 52Py, quit 2011  Pfts in 2019 normal  Reports dyspnea on exertion for many years (> 10 years). ET from parking lot to the building. < 1 flight of stairs  Minimal cough, more throat clearing. Also reports poor sleep, nightime awakening, feels like she stops breathing. 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     Current Medications:    Current Outpatient Medications:     amLODIPine (NORVASC) 10 mg tablet, TAKE 1 TABLET(10 MG) BY MOUTH DAILY, Disp: 90 tablet, Rfl: 1    aspirin 81 mg chewable tablet, Chew 81 mg daily, Disp: , Rfl:     Cholecalciferol (VITAMIN D3) 50 MCG (2000 UT) TABS, Take 2,000 Units by mouth daily, Disp: , Rfl:     famotidine (PEPCID) 20 mg tablet, Take 1 tablet (20 mg total) by mouth 2 (two) times a day, Disp: 60 tablet, Rfl: 1    levothyroxine 50 mcg tablet, TAKE 1 TABLET(50 MCG) BY MOUTH DAILY, Disp: 90 tablet, Rfl: 1    lisinopril (ZESTRIL) 10 mg tablet, Take 1 tablet (10 mg total) by mouth daily, Disp: 90 tablet, Rfl: 1    oxybutynin (DITROPAN-XL) 5 mg 24 hr tablet, Take 1 tablet (5 mg total) by mouth daily, Disp: 30 tablet, Rfl: 1    pantoprazole (PROTONIX) 40 mg tablet, TAKE 1 TABLET(40 MG) BY MOUTH TWICE DAILY BEFORE MEALS, Disp: 180 tablet, Rfl: 1    potassium chloride (K-DUR,KLOR-CON) 20 mEq tablet, Take 1 tablet (20 mEq total) by mouth daily, Disp: 90 tablet, Rfl: 0    rosuvastatin (CRESTOR) 10 MG tablet, TAKE 1 TABLET(10 MG) BY MOUTH DAILY, Disp: 90 tablet, Rfl: 1    dexlansoprazole (Dexilant) 60 MG capsule, Take 1 capsule (60 mg total) by mouth daily (Patient not taking: Reported on 10/27/2023), Disp: 90 capsule, Rfl: 1    fluticasone-salmeterol (Advair HFA) 45-21 MCG/ACT inhaler, Inhale 2 puffs 2 (two) times a day Rinse mouth after use. (Patient not taking: Reported on 10/27/2023), Disp: 12 g, Rfl: 1    Historical Information   Past Medical History:   Diagnosis Date    Acute ill-defined cerebrovascular disease     Benign essential tremor     Breast cancer ([address])     pt states doesn't remember the year thinks it might have been on the right.     Chronic kidney disease     CKD (chronic kidney disease) stage 3, GFR 30-59 ml/min ([facility]) 9/25/2018    Colon polyp     COPD (chronic obstructive pulmonary disease) ([facility])     Depressive disorder     Disease of thyroid gland     Heart murmur     Hypertension     Impaired fasting glucose     Intention tremor     last assessed: 8/18/2016    Osteochondropathy     Panic disorder without agoraphobia with mild panic attacks     Psychiatric disorder     anxiety    Stroke ([address])     Tubular adenoma of colon      Past Surgical History:   Procedure Laterality Date    AUGMENTATION BREAST      pt states during mammo she doesn't have any other surgery than the 2 biopsy    BLEPHAROPLASTY      Upper    BREAST BIOPSY Left     pt doesn't recall when and believes the neg biop was on left    BREAST LUMPECTOMY W/ NEEDLE LOCALIZATION Left     description: benign last assessed; 8/21/2016    CHOLECYSTECTOMY      COLONOSCOPY      EGD      HYSTERECTOMY      TUBAL LIGATION       Social History   Social History     Tobacco Use   Smoking Status Former    Packs/day: 1.00    Years: 50.00 Total pack years: 50.00    Types: Cigarettes    Start date: 64    Quit date:     Years since quittin.8   Smokeless Tobacco Never   Tobacco Comments    10 years ago       Family History:   Family History   Problem Relation Age of Onset    Alcohol abuse Mother     Asthma Mother     Cancer Mother     Mental illness Mother     Osteoarthritis Mother     Gout Father     Cancer Father     Other Sister         carotid arterial disease    COPD Sister     Hyperlipidemia Sister     Hypertension Sister     Heart attack Sister     Breast cancer Sister     No Known Problems Daughter     No Known Problems Maternal Grandmother     No Known Problems Paternal Grandmother     Lung cancer Sister     No Known Problems Sister     No Known Problems Daughter     No Known Problems Daughter          PhysicalExamination:  Vitals:   /84   Pulse 76   Temp 98.1 °F (36.7 °C)   Ht 5' 3" (1.6 m)   Wt 126 kg (277 lb)   SpO2 98%   BMI 49.07 kg/m²     Appearance -- NAD, speaking full sentences  HEENT -- anicteric sclera, clear OP, MMM  Neck -- no JVD  Heart -- RRR, no murmurs  Lungs -- CTAB  Abdomen -- soft, NTND, +bs  Extremities -- WWP, no LE edema  Skin -- no rash  Neuro -- A&Ox3, wnl  Psych -- no obvious depression or hallucination        Diagnostic Data:  Labs: I personally reviewed the most recent laboratory data pertinent to today's visit    Lab Results   Component Value Date    WBC 7.50 2023    HGB 13.9 2023    HCT 43.8 2023    MCV 85 2023     2023     Lab Results   Component Value Date    CALCIUM 9.9 2023    K 4.0 2023    CO2 29 2023     2023    BUN 14 2023    CREATININE 1.37 (H) 2023     No results found for: "IGE"  Lab Results   Component Value Date    ALT 14 2023    AST 18 2023    ALKPHOS 79 2023       PFT results: The most recent pulmonary function tests were reviewed.   2019: no obstruction or restriction, no BD response, normal DLCO    Imaging:  I personally reviewed the images on the [facility] system pertinent to today's visit  CTA Chest 9/2023:  No evidence for pulmonary embolism. No suspicious pulmonary nodules are identified. There is no focal airspace consolidation. There is no tracheal or endobronchial lesion. 1.7 cm enhancing solid right renal mass worrisome for neoplasm.       Other studies:  TTE 1954: mild diastolic dysfunction, normal RV        Char Ohara MD  [facility]

## 2023-10-31 ENCOUNTER — TELEPHONE (OUTPATIENT)
Dept: SLEEP CENTER | Facility: CLINIC | Age: 78
End: 2023-10-31

## 2023-10-31 ENCOUNTER — TELEPHONE (OUTPATIENT)
Dept: PLASTIC SURGERY | Facility: CLINIC | Age: 78
End: 2023-10-31

## 2023-10-31 RX ORDER — ALBUTEROL SULFATE 90 UG/1
2 AEROSOL, METERED RESPIRATORY (INHALATION) EVERY 6 HOURS PRN
COMMUNITY

## 2023-10-31 NOTE — TELEPHONE ENCOUNTER
----- Message from Levon Negro MD sent at 10/31/2023  3:30 PM EDT -----  Approved  ----- Message -----  From: Jac Gotti: 10/31/2023   9:20 AM EDT  To: Sleep Medicine [facility] Provider    This home sleep study needs approval.     If approved please sign and return to clerical pool. If denied please include reasons why. Also provide alternative testing if warranted. Please sign and return to clerical pool.

## 2023-10-31 NOTE — PRE-PROCEDURE INSTRUCTIONS
Pre-Surgery Instructions:   Medication Instructions    albuterol (PROVENTIL HFA,VENTOLIN HFA) 90 mcg/act inhaler Uses PRN- OK to take day of surgery    amLODIPine (NORVASC) 10 mg tablet Hold day of surgery. Cholecalciferol (VITAMIN D3) 50 MCG (2000 UT) TABS Hold day of surgery. famotidine (PEPCID) 20 mg tablet Hold day of surgery. levothyroxine 50 mcg tablet Hold day of surgery. lisinopril (ZESTRIL) 10 mg tablet Hold day of surgery. oxybutynin (DITROPAN-XL) 5 mg 24 hr tablet Hold day of surgery. pantoprazole (PROTONIX) 40 mg tablet Hold day of surgery. potassium chloride (K-DUR,KLOR-CON) 20 mEq tablet Hold day of surgery. rosuvastatin (CRESTOR) 10 MG tablet Hold day of surgery. Patient wishes Not to take any medications dos. Medication instructions for day surgery reviewed. Please use only a sip of water to take your instructed medications. Avoid all over the counter vitamins, supplements and NSAIDS for one week prior to surgery per anesthesia guidelines. Tylenol is ok to take as needed. You will receive a call one business day prior to surgery with an arrival time and hospital directions. If your surgery is scheduled on a Monday, the hospital will be calling you on the Friday prior to your surgery. If you have not heard from anyone by 8pm, please call the hospital supervisor through the hospital  at [phone]. [place] [phone]). Do not eat or drink anything after midnight the night before your surgery, including candy, mints, lifesavers, or chewing gum. Do not drink alcohol 24hrs before your surgery. Try not to smoke at least 24hrs before your surgery. Follow the pre surgery showering instructions as listed in the [facility] Surgical Experience Booklet” or otherwise provided by your surgeon's office. Do not use a blade to shave the surgical area 1 week before surgery. It is okay to use a clean electric clippers up to 24 hours before surgery.  Do not apply any lotions, creams, including makeup, cologne, deodorant, or perfumes after showering on the day of your surgery. Do not use dry shampoo, hair spray, hair gel, or any type of hair products. No contact lenses, eye make-up, or artificial eyelashes. Remove nail polish, including gel polish, and any artificial, gel, or acrylic nails if possible. Remove all jewelry including rings and body piercing jewelry. Wear causal clothing that is easy to take on and off. Consider your type of surgery. Keep any valuables, jewelry, piercings at home. Please bring any specially ordered equipment (sling, braces) if indicated. Arrange for a responsible person to drive you to and from the hospital on the day of your surgery. Visitor Guidelines discussed. Call the surgeon's office with any new illnesses, exposures, or additional questions prior to surgery. Please reference your [facility] Surgical Experience Booklet” for additional information to prepare for your upcoming surgery.

## 2023-11-01 PROCEDURE — NC001 PR NO CHARGE: Performed by: PHYSICIAN ASSISTANT

## 2023-11-01 NOTE — H&P
Subjective   Patient ID: Lauren Mckeon is a 77 y.o. female. Vitals:     10/10/23 1320   BP: 122/91   Pulse: 71   Temp: (!) 97.3 °F (36.3 °C)   SpO2: 97%      HPI Patient is a 70-year-old female who is here today for preoperative H&P for excision of left eyelid lesion on 11/9/23. The patient states that she had a blepharoplasty and brow lift about one year ago and stated that the lesion was to be removed at that time, however the lesion was not removed and persisted postoperatively and she would like correction of this lesion. She states the lesion has been there for some time, is not associated with any redness, swelling, drainage, no crusting, no ulceration, no irritation, no bleeding or skip lesions, the patient does not remember any antecedent trauma to that area, she otherwise has no complaints, she has no constitutional symptoms. She is a non-smoker, she does not take steroids. She does take aspirin daily. Patient has several medication allergies. The following portions of the patient's history were reviewed and updated as appropriate: allergies, current medications, past family history, past medical history, past social history, past surgical history and problem list.     Review of Systems   All other systems reviewed and are negative. Objective   Physical Exam   Vitals reviewed. Constitutional  She appears well-developed and well-nourished. Eyes  Pupils are equal, round, and reactive to light. System normal.      General -             Right: Right eye extraocular movements are normal.             Left: Left eye extraocular movements are normal.      Cardiovascular: Normal rate. Pulmonary/Chest  Effort normal.     Skin  Skin is warm and dry. Patient with 1-1/2 cm x 1 cm firm nodular well-circumscribed lesion on the junction of the upper lid and brow skin, this lesion is consistent with an inclusion cyst, she does not have any malignant characteristics. Psychiatric  She has a normal mood and affect. Her behavior is normal. Judgment normal.         Medical History        Past Medical History:   Diagnosis Date    Acute ill-defined cerebrovascular disease      Benign essential tremor      Breast cancer ([address])       pt states doesn't remember the year thinks it might have been on the right.     Chronic kidney disease      CKD (chronic kidney disease) stage 3, GFR 30-59 ml/min ([facility]) 9/25/2018    Colon polyp      COPD (chronic obstructive pulmonary disease) ([facility])      Depressive disorder      Disease of thyroid gland      Heart murmur      Hypertension      Impaired fasting glucose      Intention tremor       last assessed: 8/18/2016    Osteochondropathy      Panic disorder without agoraphobia with mild panic attacks      Psychiatric disorder       anxiety    Stroke ([address])      Tubular adenoma of colon               Patient Active Problem List   Diagnosis    Intention tremor    Benign essential tremor    Depression with anxiety    Essential hypertension    CKD (chronic kidney disease) stage 3, GFR 30-59 ml/min ([facility])    Hypothyroidism    Shortness of breath    Mixed hyperlipidemia    History of breast cancer    Mild cognitive impairment    Bilateral malignant neoplasm of breast in female ([address])    IFG (impaired fasting glucose)    Compression fracture of fifth lumbar vertebra ([facility])    L4-L5 disc bulge    Sciatica of right side    Chronic obstructive pulmonary disease, unspecified COPD type ([address])    Severe episode of recurrent major depressive disorder, without psychotic features ([address])    Incontinence of feces    Other fatigue    Morbid obesity with BMI of 45.0-49.9, adult ([address])    Kidney lesion    Constipation    Gastroesophageal reflux disease         Surgical History         Past Surgical History:   Procedure Laterality Date    AUGMENTATION BREAST         pt states during mammo she doesn't have any other surgery than the 2 biopsy    BLEPHAROPLASTY         Upper    BREAST BIOPSY Left       pt doesn't recall when and believes the neg biop was on left    BREAST LUMPECTOMY W/ NEEDLE LOCALIZATION Left       description: benign last assessed; 8/21/2016    CHOLECYSTECTOMY        COLONOSCOPY        EGD        HYSTERECTOMY        TUBAL LIGATION                  Current Outpatient Medications   Medication Instructions    amLODIPine (NORVASC) 10 mg tablet TAKE 1 TABLET(10 MG) BY MOUTH DAILY    aspirin 81 mg, Oral, Daily    Blood Pressure Monitoring (B-D ASSURE BPM/AUTO ARM CUFF) MISC Does not apply, Daily    dexlansoprazole (DEXILANT) 60 mg, Oral, Daily    famotidine (PEPCID) 20 mg, Oral, 2 times daily    levothyroxine 50 mcg tablet TAKE 1 TABLET(50 MCG) BY MOUTH DAILY    lisinopril (ZESTRIL) 10 mg, Oral, Daily    Misc. Devices (Round Shower Stool) MISC Does not apply, Daily, Use as needed in the shower. polyethylene glycol (GLYCOLAX) 17 g, Oral, Daily    potassium chloride (K-DUR,KLOR-CON) 20 mEq tablet 20 mEq, Oral, Daily    rosuvastatin (CRESTOR) 10 MG tablet TAKE 1 TABLET(10 MG) BY MOUTH DAILY    Vitamin D3 2,000 Units, Oral, Daily         Social History          Social History Narrative           Always uses seat belt      Social History           Tobacco Use   Smoking Status Former   Smokeless Tobacco Never   Tobacco Comments     10 years ago         Assessment/Plan 79-year-old female with upper lid/lateral brow mass status post blepharoplasty     Today we discussed the procedure in detail including the expected surgical time, hospital stay, and recovery time. We discussed at length all of the potential risks and complications of the surgery including bleeding, infection, recurrence, scarring, wound healing complications, hematoma, seroma, contour deformity, blood clots, asymmetry, damage to surrounding structures, poor cosmetic result, skin necrosis, and need for additional procedures. We discussed postop activity restrictions and medications.  We discussed the use of sutures x one week. Lesion will be sent to pathology. All of the patient's questions were answered to her satisfaction, she understands and agrees to the plan of care. Informed consent was obtained today for the upcoming procedure.            Latonia Ellis PA-C  Plastic & Reconstructive Surgery

## 2023-11-03 ENCOUNTER — TELEPHONE (OUTPATIENT)
Dept: PLASTIC SURGERY | Facility: CLINIC | Age: 78
End: 2023-11-03

## 2023-11-03 ENCOUNTER — APPOINTMENT (OUTPATIENT)
Dept: LAB | Facility: HOSPITAL | Age: 78
End: 2023-11-03
Attending: PLASTIC SURGERY
Payer: MEDICARE

## 2023-11-03 DIAGNOSIS — Z01.812 ENCOUNTER FOR PRE-OPERATIVE LABORATORY TESTING: ICD-10-CM

## 2023-11-03 DIAGNOSIS — H02.9 LESION OF EYELID: ICD-10-CM

## 2023-11-03 LAB
ANION GAP SERPL CALCULATED.3IONS-SCNC: 5 MMOL/L
BASOPHILS # BLD AUTO: 0.02 THOUSANDS/ÂΜL (ref 0–0.1)
BASOPHILS NFR BLD AUTO: 0 % (ref 0–1)
BUN SERPL-MCNC: 12 MG/DL (ref 5–25)
CALCIUM SERPL-MCNC: 9.2 MG/DL (ref 8.4–10.2)
CHLORIDE SERPL-SCNC: 107 MMOL/L (ref 96–108)
CO2 SERPL-SCNC: 29 MMOL/L (ref 21–32)
CREAT SERPL-MCNC: 1.46 MG/DL (ref 0.6–1.3)
EOSINOPHIL # BLD AUTO: 0.14 THOUSAND/ÂΜL (ref 0–0.61)
EOSINOPHIL NFR BLD AUTO: 2 % (ref 0–6)
ERYTHROCYTE [DISTWIDTH] IN BLOOD BY AUTOMATED COUNT: 14 % (ref 11.6–15.1)
GFR SERPL CREATININE-BSD FRML MDRD: 33 ML/MIN/1.73SQ M
GLUCOSE SERPL-MCNC: 113 MG/DL (ref 65–140)
HCT VFR BLD AUTO: 43.4 % (ref 34.8–46.1)
HGB BLD-MCNC: 13.5 G/DL (ref 11.5–15.4)
IMM GRANULOCYTES # BLD AUTO: 0.02 THOUSAND/UL (ref 0–0.2)
IMM GRANULOCYTES NFR BLD AUTO: 0 % (ref 0–2)
LYMPHOCYTES # BLD AUTO: 1.27 THOUSANDS/ÂΜL (ref 0.6–4.47)
LYMPHOCYTES NFR BLD AUTO: 22 % (ref 14–44)
MCH RBC QN AUTO: 26.9 PG (ref 26.8–34.3)
MCHC RBC AUTO-ENTMCNC: 31.1 G/DL (ref 31.4–37.4)
MCV RBC AUTO: 87 FL (ref 82–98)
MONOCYTES # BLD AUTO: 0.44 THOUSAND/ÂΜL (ref 0.17–1.22)
MONOCYTES NFR BLD AUTO: 8 % (ref 4–12)
NEUTROPHILS # BLD AUTO: 3.87 THOUSANDS/ÂΜL (ref 1.85–7.62)
NEUTS SEG NFR BLD AUTO: 68 % (ref 43–75)
NRBC BLD AUTO-RTO: 0 /100 WBCS
PLATELET # BLD AUTO: 213 THOUSANDS/UL (ref 149–390)
PMV BLD AUTO: 11 FL (ref 8.9–12.7)
POTASSIUM SERPL-SCNC: 3.6 MMOL/L (ref 3.5–5.3)
RBC # BLD AUTO: 5.02 MILLION/UL (ref 3.81–5.12)
SODIUM SERPL-SCNC: 141 MMOL/L (ref 135–147)
WBC # BLD AUTO: 5.76 THOUSAND/UL (ref 4.31–10.16)

## 2023-11-03 PROCEDURE — 80048 BASIC METABOLIC PNL TOTAL CA: CPT

## 2023-11-03 PROCEDURE — 36415 COLL VENOUS BLD VENIPUNCTURE: CPT

## 2023-11-03 PROCEDURE — 85025 COMPLETE CBC W/AUTO DIFF WBC: CPT

## 2023-11-08 ENCOUNTER — HOSPITAL ENCOUNTER (OUTPATIENT)
Dept: CT IMAGING | Facility: HOSPITAL | Age: 78
Discharge: HOME | End: 2023-11-08
Attending: UROLOGY
Payer: MEDICARE

## 2023-11-08 DIAGNOSIS — N28.89 RENAL MASS: ICD-10-CM

## 2023-11-08 PROCEDURE — G1004 CDSM NDSC: HCPCS

## 2023-11-08 PROCEDURE — 74178 CT ABD&PLV WO CNTR FLWD CNTR: CPT

## 2023-11-08 RX ADMIN — IOHEXOL 100 ML: 350 INJECTION, SOLUTION INTRAVENOUS at 14:41

## 2023-11-09 ENCOUNTER — ANESTHESIA (OUTPATIENT)
Dept: PERIOP | Facility: HOSPITAL | Age: 78
End: 2023-11-09
Payer: MEDICARE

## 2023-11-09 ENCOUNTER — ANESTHESIA EVENT (OUTPATIENT)
Dept: PERIOP | Facility: HOSPITAL | Age: 78
End: 2023-11-09
Payer: MEDICARE

## 2023-11-09 ENCOUNTER — HOSPITAL ENCOUNTER (OUTPATIENT)
Facility: HOSPITAL | Age: 78
Setting detail: OUTPATIENT SURGERY
Discharge: HOME | End: 2023-11-09
Attending: PLASTIC SURGERY | Admitting: PLASTIC SURGERY
Payer: MEDICARE

## 2023-11-09 VITALS
BODY MASS INDEX: 46.71 KG/M2 | WEIGHT: 273.59 LBS | DIASTOLIC BLOOD PRESSURE: 71 MMHG | HEART RATE: 66 BPM | TEMPERATURE: 97.7 F | RESPIRATION RATE: 19 BRPM | OXYGEN SATURATION: 97 % | SYSTOLIC BLOOD PRESSURE: 116 MMHG | HEIGHT: 64 IN

## 2023-11-09 DIAGNOSIS — L98.9 FACIAL LESION: Primary | ICD-10-CM

## 2023-11-09 DIAGNOSIS — H02.9 LESION OF EYELID: ICD-10-CM

## 2023-11-09 PROBLEM — G47.30 SLEEP APNEA: Status: ACTIVE | Noted: 2023-11-09

## 2023-11-09 PROCEDURE — 64774 REMOVE SKIN NERVE LESION: CPT | Performed by: PLASTIC SURGERY

## 2023-11-09 PROCEDURE — 99024 POSTOP FOLLOW-UP VISIT: CPT | Performed by: PLASTIC SURGERY

## 2023-11-09 RX ORDER — CLINDAMYCIN PHOSPHATE 900 MG/50ML
900 INJECTION INTRAVENOUS ONCE
Status: COMPLETED | OUTPATIENT
Start: 2023-11-09 | End: 2023-11-09

## 2023-11-09 RX ORDER — CLINDAMYCIN HYDROCHLORIDE 300 MG/1
300 CAPSULE ORAL 3 TIMES DAILY
Qty: 15 CAPSULE | Refills: 0 | Status: SHIPPED | OUTPATIENT
Start: 2023-11-09 | End: 2023-11-14

## 2023-11-09 RX ORDER — MAGNESIUM HYDROXIDE 1200 MG/15ML
LIQUID ORAL AS NEEDED
Status: DISCONTINUED | OUTPATIENT
Start: 2023-11-09 | End: 2023-11-09 | Stop reason: HOSPADM

## 2023-11-09 RX ORDER — ACETAMINOPHEN 325 MG/1
975 TABLET ORAL ONCE
Status: COMPLETED | OUTPATIENT
Start: 2023-11-09 | End: 2023-11-09

## 2023-11-09 RX ORDER — LIDOCAINE HYDROCHLORIDE AND EPINEPHRINE 10; 10 MG/ML; UG/ML
INJECTION, SOLUTION INFILTRATION; PERINEURAL AS NEEDED
Status: DISCONTINUED | OUTPATIENT
Start: 2023-11-09 | End: 2023-11-09 | Stop reason: HOSPADM

## 2023-11-09 RX ORDER — FENTANYL CITRATE 50 UG/ML
INJECTION, SOLUTION INTRAMUSCULAR; INTRAVENOUS AS NEEDED
Status: DISCONTINUED | OUTPATIENT
Start: 2023-11-09 | End: 2023-11-09

## 2023-11-09 RX ORDER — SODIUM CHLORIDE 9 MG/ML
125 INJECTION, SOLUTION INTRAVENOUS CONTINUOUS
Status: DISCONTINUED | OUTPATIENT
Start: 2023-11-09 | End: 2023-11-09 | Stop reason: HOSPADM

## 2023-11-09 RX ORDER — SODIUM CHLORIDE, SODIUM LACTATE, POTASSIUM CHLORIDE, CALCIUM CHLORIDE 600; 310; 30; 20 MG/100ML; MG/100ML; MG/100ML; MG/100ML
50 INJECTION, SOLUTION INTRAVENOUS CONTINUOUS
Status: CANCELLED | OUTPATIENT
Start: 2023-11-09

## 2023-11-09 RX ORDER — FENTANYL CITRATE/PF 50 MCG/ML
12.5 SYRINGE (ML) INJECTION
Status: CANCELLED | OUTPATIENT
Start: 2023-11-09

## 2023-11-09 RX ORDER — MIDAZOLAM HYDROCHLORIDE 2 MG/2ML
INJECTION, SOLUTION INTRAMUSCULAR; INTRAVENOUS AS NEEDED
Status: DISCONTINUED | OUTPATIENT
Start: 2023-11-09 | End: 2023-11-09

## 2023-11-09 RX ORDER — ALBUTEROL SULFATE 2.5 MG/3ML
2.5 SOLUTION RESPIRATORY (INHALATION) ONCE AS NEEDED
Status: CANCELLED | OUTPATIENT
Start: 2023-11-09

## 2023-11-09 RX ORDER — LIDOCAINE HCL/PF 100 MG/5ML
SYRINGE (ML) INJECTION AS NEEDED
Status: DISCONTINUED | OUTPATIENT
Start: 2023-11-09 | End: 2023-11-09

## 2023-11-09 RX ORDER — ONDANSETRON 2 MG/ML
4 INJECTION INTRAMUSCULAR; INTRAVENOUS ONCE AS NEEDED
Status: CANCELLED | OUTPATIENT
Start: 2023-11-09

## 2023-11-09 RX ORDER — GINSENG 100 MG
CAPSULE ORAL AS NEEDED
Status: DISCONTINUED | OUTPATIENT
Start: 2023-11-09 | End: 2023-11-09 | Stop reason: HOSPADM

## 2023-11-09 RX ADMIN — ACETAMINOPHEN 975 MG: 325 TABLET, FILM COATED ORAL at 14:25

## 2023-11-09 RX ADMIN — MIDAZOLAM 1 MG: 1 INJECTION INTRAMUSCULAR; INTRAVENOUS at 14:55

## 2023-11-09 RX ADMIN — FENTANYL CITRATE 25 MCG: 50 INJECTION INTRAMUSCULAR; INTRAVENOUS at 15:01

## 2023-11-09 RX ADMIN — CLINDAMYCIN IN 5 PERCENT DEXTROSE 900 MG: 18 INJECTION, SOLUTION INTRAVENOUS at 14:50

## 2023-11-09 RX ADMIN — LIDOCAINE HYDROCHLORIDE 100 MG: 20 INJECTION INTRAVENOUS at 14:57

## 2023-11-09 NOTE — ANESTHESIA POSTPROCEDURE EVALUATION
Post-Op Assessment Note    CV Status:  Stable  Pain Score: 0    Pain management: adequate     Mental Status:  Arousable and alert   Hydration Status:  Stable   PONV Controlled:  Controlled   Airway Patency:  Patent      Post Op Vitals Reviewed: Yes      Staff: CRNA         No notable events documented.     /63 (11/09/23 1522)    Temp 97.7 °F (36.5 °C) (11/09/23 1522)    Pulse 72 (11/09/23 1522)   Resp 17 (11/09/23 1522)    SpO2 96 % (11/09/23 1522)

## 2023-11-09 NOTE — H&P
Subjective   Patient ID: Golden Colby is a 77 y.o. female. Vitals:     10/10/23 1320   BP: 122/91   Pulse: 71   Temp: (!) 97.3 °F (36.3 °C)   SpO2: 97%      HPI Patient is a 68-year-old female who is here today for preoperative H&P for excision of left eyelid lesion on 11/9/23. The patient states that she had a blepharoplasty and brow lift about one year ago and stated that the lesion was to be removed at that time, however the lesion was not removed and persisted postoperatively and she would like correction of this lesion. She states the lesion has been there for some time, is not associated with any redness, swelling, drainage, no crusting, no ulceration, no irritation, no bleeding or skip lesions, the patient does not remember any antecedent trauma to that area, she otherwise has no complaints, she has no constitutional symptoms. She is a non-smoker, she does not take steroids. She does take aspirin daily. Patient has several medication allergies. The following portions of the patient's history were reviewed and updated as appropriate: allergies, current medications, past family history, past medical history, past social history, past surgical history and problem list.     Review of Systems   All other systems reviewed and are negative. Objective   Physical Exam   Vitals reviewed. Constitutional  She appears well-developed and well-nourished. Eyes  Pupils are equal, round, and reactive to light. System normal.      General -             Right: Right eye extraocular movements are normal.             Left: Left eye extraocular movements are normal.      Cardiovascular: Normal rate. Pulmonary/Chest  Effort normal.     Skin  Skin is warm and dry. Patient with 1-1/2 cm x 1 cm firm nodular well-circumscribed lesion on the junction of the upper lid and brow skin, this lesion is consistent with an inclusion cyst, she does not have any malignant characteristics. Psychiatric  She has a normal mood and affect. Her behavior is normal. Judgment normal.         Medical History        Past Medical History:   Diagnosis Date    Acute ill-defined cerebrovascular disease      Benign essential tremor      Breast cancer ([address])       pt states doesn't remember the year thinks it might have been on the right.     Chronic kidney disease      CKD (chronic kidney disease) stage 3, GFR 30-59 ml/min (HCC) 9/25/2018    Colon polyp      COPD (chronic obstructive pulmonary disease) ([facility])      Depressive disorder      Disease of thyroid gland      Heart murmur      Hypertension      Impaired fasting glucose      Intention tremor       last assessed: 8/18/2016    Osteochondropathy      Panic disorder without agoraphobia with mild panic attacks      Psychiatric disorder       anxiety    Stroke ([address])      Tubular adenoma of colon               Patient Active Problem List   Diagnosis    Intention tremor    Benign essential tremor    Depression with anxiety    Essential hypertension    CKD (chronic kidney disease) stage 3, GFR 30-59 ml/min (HCC)    Hypothyroidism    Shortness of breath    Mixed hyperlipidemia    History of breast cancer    Mild cognitive impairment    Bilateral malignant neoplasm of breast in female ([address])    IFG (impaired fasting glucose)    Compression fracture of fifth lumbar vertebra ([facility])    L4-L5 disc bulge    Sciatica of right side    Chronic obstructive pulmonary disease, unspecified COPD type ([address])    Severe episode of recurrent major depressive disorder, without psychotic features ([address])    Incontinence of feces    Other fatigue    Morbid obesity with BMI of 45.0-49.9, adult ([address])    Kidney lesion    Constipation    Gastroesophageal reflux disease         Surgical History         Past Surgical History:   Procedure Laterality Date    AUGMENTATION BREAST         pt states during mammo she doesn't have any other surgery than the 2 biopsy    BLEPHAROPLASTY         Upper    BREAST BIOPSY Left       pt doesn't recall when and believes the neg biop was on left    BREAST LUMPECTOMY W/ NEEDLE LOCALIZATION Left       description: benign last assessed; 8/21/2016    CHOLECYSTECTOMY        COLONOSCOPY        EGD        HYSTERECTOMY        TUBAL LIGATION                  Current Outpatient Medications   Medication Instructions    amLODIPine (NORVASC) 10 mg tablet TAKE 1 TABLET(10 MG) BY MOUTH DAILY    aspirin 81 mg, Oral, Daily    Blood Pressure Monitoring (B-D ASSURE BPM/AUTO ARM CUFF) MISC Does not apply, Daily    dexlansoprazole (DEXILANT) 60 mg, Oral, Daily    famotidine (PEPCID) 20 mg, Oral, 2 times daily    levothyroxine 50 mcg tablet TAKE 1 TABLET(50 MCG) BY MOUTH DAILY    lisinopril (ZESTRIL) 10 mg, Oral, Daily    Misc. Devices (Round Shower Stool) MISC Does not apply, Daily, Use as needed in the shower. polyethylene glycol (GLYCOLAX) 17 g, Oral, Daily    potassium chloride (K-DUR,KLOR-CON) 20 mEq tablet 20 mEq, Oral, Daily    rosuvastatin (CRESTOR) 10 MG tablet TAKE 1 TABLET(10 MG) BY MOUTH DAILY    Vitamin D3 2,000 Units, Oral, Daily         Social History          Social History Narrative           Always uses seat belt      Social History           Tobacco Use   Smoking Status Former   Smokeless Tobacco Never   Tobacco Comments     10 years ago         Assessment/Plan 48-year-old female with upper lid/lateral brow mass status post blepharoplasty     Today we discussed the procedure in detail including the expected surgical time, hospital stay, and recovery time. We discussed at length all of the potential risks and complications of the surgery including bleeding, infection, recurrence, scarring, wound healing complications, hematoma, seroma, contour deformity, blood clots, asymmetry, damage to surrounding structures, poor cosmetic result, skin necrosis, and need for additional procedures. We discussed postop activity restrictions and medications.  We discussed the use of sutures x one week. Lesion will be sent to pathology. All of the patient's questions were answered to her satisfaction, she understands and agrees to the plan of care. Informed consent was obtained today for the upcoming procedure. Ashlee Callaway PA-C  Plastic & Reconstructive Surgery    Reviewed with patient the risks, benefits, alternatives of excision, as above including risk of bleeding, infection, scarring, poor wound healing, damage surrounding and underlying structures, need for further surgery, need for multiple procedures, recurrence, all her questions were answered to her satisfaction, informed consent obtained and signed, will proceed to the OR as scheduled.     Dano Red  11/09/23  2:17 PM

## 2023-11-09 NOTE — ANESTHESIA PREPROCEDURE EVALUATION
Procedure:  EXCISION OF EYELID LESION (Left: Eye)    Relevant Problems   ANESTHESIA (within normal limits)      CARDIO   (+) Essential hypertension   (+) Mixed hyperlipidemia      ENDO   (+) Hypothyroidism      GI/HEPATIC  NPO confirmed  BMI 47   (+) Gastroesophageal reflux disease (Improved since starting omeprazole but reports cough related to reflux which is also improved but remains persistent, no symptoms today)      /RENAL   (+) CKD (chronic kidney disease) stage 3, GFR 30-59 ml/min (HCC)   (+) Kidney lesion      GYN   (+) Bilateral malignant neoplasm of breast in female (HCC)      MUSCULOSKELETAL   (+) Sciatica of right side      NEURO/PSYCH   (+) Depression with anxiety   (+) Severe episode of recurrent major depressive disorder, without psychotic features (HCC)      PULMONARY   (+) Chronic obstructive pulmonary disease, unspecified COPD type ([facility])   (+) Shortness of breath   (+) Sleep apnea   (-) URI (upper respiratory infection)     Allergies   Allergen Reactions    Codeine Other (See Comments)     "I didn't like the feeling."    Amoxicillin Other (See Comments)     Unsure of allergy    Morphine Other (See Comments)     Does not like feeling     Social History     Tobacco Use    Smoking status: Former     Packs/day: 1.00     Years: 50.00     Total pack years: 50.00     Types: Cigarettes     Start date:      Quit date:      Years since quittin.8    Smokeless tobacco: Never    Tobacco comments:     10 years ago   Vaping Use    Vaping Use: Never used   Substance Use Topics    Alcohol use: Not Currently     Comment: socially - Per allscripts - denies alcohol consumption    Drug use: No     Current Outpatient Medications   Medication Instructions    albuterol (PROVENTIL HFA,VENTOLIN HFA) 90 mcg/act inhaler 2 puffs, Inhalation, Every 6 hours PRN    amLODIPine (NORVASC) 10 mg tablet TAKE 1 TABLET(10 MG) BY MOUTH DAILY    aspirin 81 mg, Oral, Daily    dexlansoprazole (DEXILANT) 60 mg, Oral, Daily famotidine (PEPCID) 20 mg, Oral, 2 times daily    fluticasone-salmeterol (Advair HFA) 45-21 MCG/ACT inhaler 2 puffs, Inhalation, 2 times daily, Rinse mouth after use. levothyroxine 50 mcg tablet TAKE 1 TABLET(50 MCG) BY MOUTH DAILY    lisinopril (ZESTRIL) 10 mg, Oral, Daily    oxybutynin (DITROPAN-XL) 5 mg, Oral, Daily    pantoprazole (PROTONIX) 40 mg tablet TAKE 1 TABLET(40 MG) BY MOUTH TWICE DAILY BEFORE MEALS    potassium chloride (K-DUR,KLOR-CON) 20 mEq tablet 20 mEq, Oral, Daily    rosuvastatin (CRESTOR) 10 MG tablet TAKE 1 TABLET(10 MG) BY MOUTH DAILY    Vitamin D3 2,000 Units, Oral, Daily     Lab Results   Component Value Date    WBC 5.76 11/03/2023    HGB 13.5 11/03/2023    HCT 43.4 11/03/2023     11/03/2023    SODIUM 141 11/03/2023    K 3.6 11/03/2023     11/03/2023    CO2 29 11/03/2023    BUN 12 11/03/2023    CREATININE 1.46 (H) 11/03/2023    GLUC 113 11/03/2023    HGBA1C 5.6 03/31/2023    AST 18 08/01/2023    ALT 14 08/01/2023    ALKPHOS 79 08/01/2023    TBILI 0.60 08/01/2023    ALB 4.1 08/01/2023     Vitals:    11/09/23 1412   BP: 93/66   Pulse: 84   Resp: 16   Temp: 97.6 °F (36.4 °C)   SpO2: 98%     Echo 9/1/23    Left Ventricle: Left ventricular cavity size is normal. Wall thickness is normal. The left ventricular ejection fraction is 63%. Systolic function is normal. Wall motion is normal. Diastolic function is mildly abnormal, consistent with grade I (abnormal) relaxation. Left Atrium: The atrium is mildly dilated. Physical Exam    Airway    Mallampati score: III         Dental   Comment: Lower partials upper dentures and lower dentures    Cardiovascular  Rhythm: regular, Rate: normal    Pulmonary   Breath sounds clear to auscultation    Other Findings        Anesthesia Plan  ASA Score- 3     Anesthesia Type- IV sedation with anesthesia with ASA Monitors. Additional Monitors:     Airway Plan:     Comment: O2 mask, natural airway, EtCO2 monitor.  Risks discussed including awareness, aspiration, drug reactions and conversion to GA. Rosetta Gill Plan Factors-Exercise tolerance (METS): <4 METS. Chart reviewed. EKG reviewed. Existing labs reviewed. Patient summary reviewed. Patient is not a current smoker. Induction- intravenous. Postoperative Plan-     Informed Consent- Anesthetic plan and risks discussed with patient. I personally reviewed this patient with the CRNA. Discussed and agreed on the Anesthesia Plan with the CRNA. Rosetta Warrenh

## 2023-11-09 NOTE — DISCHARGE INSTR - AVS FIRST PAGE
Plastic Surgery Discharge Instructions: You may shower in 24 hours. Twice daily, apply antibiotic ointment, like Neosporin, to your incision. Your sutures will be removed in about 1 week. You may ice the area for several minutes every few hours, but do not apply ice directly to incision. Place washcloth between skin and ice. If you experience swelling and bruising around and under your eyes, this is normal and to be expected after your procedure. No pushing, pulling, or lifting more than 10lbs or strenuous activity for 4 weeks post-operatively. All Surgeries     You must be off all pain medications and have a clear field of vision before driving  For the next 24 hours:  Do not sign any legal documents or operate machinery. Have a responsible adult help you. Take it easy & rest.  Clear liquids first, if no nausea, progress to soft diet, and then regular diet as tolerated. Take medications as ordered, and do not take any pain medication on an empty stomach. Once pain meds are finished you may alternate Tylenol & Advil as directed for pain  Make sure to take all antibiotics until completion  If lovenox or heparin was prescribed, use as directed until completion  Avoid alcoholic beverages. Resume any prior medications at home unless otherwise instructed by Dr. Jorge Alberto Lua. Call Dr. Jorge Alberto Lua at [phone] -office,  Obvious bleeding, excessive swelling, and tenderness  Hardness of face on palpation  Swelling & hardness at eyelids. Fever over 101.0°   Shortness of breath, severe calf or thigh pain. Redness, odor, or pus at the wound {some oozing is normal from incision sites and may continue for several days ABD pads or feminine pads can be used for absorption )  Persistent vomiting or pain that is not relieved by your medication.   To make your follow-up appointment , ask a question, or report a problem

## 2023-11-10 ENCOUNTER — TELEPHONE (OUTPATIENT)
Age: 78
End: 2023-11-10

## 2023-11-10 DIAGNOSIS — L98.9 FACIAL LESION: Primary | ICD-10-CM

## 2023-11-10 RX ORDER — DOXYCYCLINE HYCLATE 100 MG/1
100 CAPSULE ORAL EVERY 12 HOURS SCHEDULED
Qty: 14 CAPSULE | Refills: 0 | Status: SHIPPED | OUTPATIENT
Start: 2023-11-10 | End: 2023-11-17

## 2023-11-10 NOTE — TELEPHONE ENCOUNTER
Called patient and made her aware of above. Informed patient to eat prior to taking medication to coat stomach.

## 2023-11-10 NOTE — TELEPHONE ENCOUNTER
Patient is experiencing stomach pain after starting the clindamycin medication this morning after she was seen yesterday by Dr. Terri Graves. Patient said she took medication on an empty stomach and the 10-15 minutes later ate a banana. Stomach pain is very bothersome.

## 2023-11-13 NOTE — OP NOTE
OPERATIVE REPORT  PATIENT NAME: Jamaica Oliveira    :  1945  MRN: [id number]  Pt Location: [facility] OR ROOM 02    SURGERY DATE: 2023    Surgeon(s) and Role:     * Frandy Bartlett MD - Primary     * BRUNO Abraham-C - [address], MD - Assisting    Preop Diagnosis:  Left eyelid cyst [id number]    Post-Op Diagnosis Codes:     * Lesion of eyelid [H02.9], same    Procedure(s):  Excision of left eyelid cyst with excision of excess skin of the left eyelid. Specimen(s):  ID Type Source Tests Collected by Time Destination   1 : Left Eyelid Cyst Tissue Eye, Left TISSUE EXAM Frandy Bartlett MD 2023  3:14 PM        Estimated Blood Loss:   Minimal    Drains:  * No LDAs found *    Anesthesia Type:   IV Sedation with Anesthesia    Operative Indications:  Lesion of eyelid [H02.9]      Operative Findings:  Dictated    Complications:   None    Procedure and Technique:      Patient is a 69-year-old female who is known to me status post blepharoplasty at an outside institution, after which the patient states that she developed a skin lesion in the previous area of her blepharoplasty scar, this lesion slowly grew over time to the point where it began to interfere with vision and was somewhat tender, the lesion was consistent with an inclusion cyst, we discussed the options for management including excision of the cyst and possible skin revision, we discussed the risk of bleeding, infection, scarring, poor wound healing, damage surrounding and underlying structures, need for further surgery, need for multiple procedures, recurrence, poor aesthetic result, lagophthalmos, asymmetry, the patient did have some redundant skin in this area and the possible need for excision, because of the location and size I did feel that this procedure is best treated with some sedation as opposed to a purely local procedure.   She understood and agreed, all her questions were answered to her satisfaction, informed consent obtained and signed the patient was brought to the hospital as an outpatient elective basis to have the procedure performed. The patient was identified in the preoperative holding area, the site was marked, she received preoperative antibiotics, her SCDs were activated prior to induction of anesthesia, she was then brought to the OR where she is identified verbally, by site, and by armband on the operating table prior to induction of anesthesia. After the induction of anesthesia she was prepped and draped in the standard surgical fashion, timeout was performed and the surgical site identified. At this point an incision was made in the superior aspect of the eyelid lesion after infiltrating the area with 1% lidocaine with epinephrine. The cyst was readily identifiable from the surrounding structures, however the cyst was very adherent to the thin skin in this area making removal very difficult. The decision was made at this point to excise the overlying and redundant skin of the upper eyelid, an elliptical incision was made to excise this redundant skin along with the cyst.  The cyst was then excised and sent for specimen, Bovie electrocautery was used for hemostasis. The eyelid skin was then closed with multiple 6-0 Prolene's in simple interrupted fashion, the patient had very good aesthetic result with no lagophthalmos after excising this excess skin. The total excision measured approximately 1.5 cm x 5 mm. The patient was awoken from anesthesia and stable condition sent to the recovery room, we will monitor her status and she will likely be discharged today. I was present for the entire procedure.     Patient Disposition:  PACU     This procedure was not performed to treat primary cutaneous melanoma through wide local excision      SIGNATURE: Jason Stearns MD  DATE: November 13, 2023  TIME: 12:58 PM

## 2023-11-16 ENCOUNTER — TREATMENT (OUTPATIENT)
Dept: GASTROENTEROLOGY | Facility: CLINIC | Age: 78
End: 2023-11-16

## 2023-11-16 ENCOUNTER — ANESTHESIA EVENT (OUTPATIENT)
Dept: GASTROENTEROLOGY | Facility: HOSPITAL | Age: 78
End: 2023-11-16

## 2023-11-16 ENCOUNTER — ANESTHESIA (OUTPATIENT)
Dept: GASTROENTEROLOGY | Facility: HOSPITAL | Age: 78
End: 2023-11-16

## 2023-11-16 ENCOUNTER — HOSPITAL ENCOUNTER (OUTPATIENT)
Dept: GASTROENTEROLOGY | Facility: HOSPITAL | Age: 78
Setting detail: OUTPATIENT SURGERY
End: 2023-11-16
Attending: INTERNAL MEDICINE
Payer: MEDICARE

## 2023-11-16 VITALS
DIASTOLIC BLOOD PRESSURE: 59 MMHG | WEIGHT: 276.46 LBS | TEMPERATURE: 97.6 F | OXYGEN SATURATION: 98 % | RESPIRATION RATE: 20 BRPM | SYSTOLIC BLOOD PRESSURE: 111 MMHG | BODY MASS INDEX: 48.98 KG/M2 | HEART RATE: 58 BPM | HEIGHT: 63 IN

## 2023-11-16 DIAGNOSIS — K21.9 GASTROESOPHAGEAL REFLUX DISEASE WITHOUT ESOPHAGITIS: Primary | ICD-10-CM

## 2023-11-16 DIAGNOSIS — K21.9 GASTROESOPHAGEAL REFLUX DISEASE, UNSPECIFIED WHETHER ESOPHAGITIS PRESENT: ICD-10-CM

## 2023-11-16 DIAGNOSIS — K21.9 GASTROESOPHAGEAL REFLUX DISEASE WITHOUT ESOPHAGITIS: ICD-10-CM

## 2023-11-16 DIAGNOSIS — K59.00 CONSTIPATION, UNSPECIFIED CONSTIPATION TYPE: ICD-10-CM

## 2023-11-16 PROCEDURE — 43235 EGD DIAGNOSTIC BRUSH WASH: CPT | Performed by: INTERNAL MEDICINE

## 2023-11-16 RX ORDER — LIDOCAINE HYDROCHLORIDE 20 MG/ML
INJECTION, SOLUTION EPIDURAL; INFILTRATION; INTRACAUDAL; PERINEURAL AS NEEDED
Status: DISCONTINUED | OUTPATIENT
Start: 2023-11-16 | End: 2023-11-16

## 2023-11-16 RX ORDER — PROPOFOL 10 MG/ML
INJECTION, EMULSION INTRAVENOUS AS NEEDED
Status: DISCONTINUED | OUTPATIENT
Start: 2023-11-16 | End: 2023-11-16

## 2023-11-16 RX ORDER — FAMOTIDINE 20 MG/1
20 TABLET, FILM COATED ORAL 2 TIMES DAILY
Qty: 180 TABLET | Refills: 1 | Status: SHIPPED | OUTPATIENT
Start: 2023-11-16 | End: 2023-11-21 | Stop reason: SDUPTHER

## 2023-11-16 RX ORDER — FAMOTIDINE 20 MG/1
20 TABLET, FILM COATED ORAL 2 TIMES DAILY
Qty: 62 TABLET | Refills: 6 | Status: SHIPPED | OUTPATIENT
Start: 2023-11-16 | End: 2023-11-16

## 2023-11-16 RX ORDER — SODIUM CHLORIDE, SODIUM LACTATE, POTASSIUM CHLORIDE, CALCIUM CHLORIDE 600; 310; 30; 20 MG/100ML; MG/100ML; MG/100ML; MG/100ML
INJECTION, SOLUTION INTRAVENOUS CONTINUOUS PRN
Status: DISCONTINUED | OUTPATIENT
Start: 2023-11-16 | End: 2023-11-16

## 2023-11-16 RX ADMIN — PROPOFOL 80 MG: 10 INJECTION, EMULSION INTRAVENOUS at 08:08

## 2023-11-16 RX ADMIN — PROPOFOL 20 MG: 10 INJECTION, EMULSION INTRAVENOUS at 08:10

## 2023-11-16 RX ADMIN — PROPOFOL 30 MG: 10 INJECTION, EMULSION INTRAVENOUS at 08:11

## 2023-11-16 RX ADMIN — SODIUM CHLORIDE, SODIUM LACTATE, POTASSIUM CHLORIDE, AND CALCIUM CHLORIDE: .6; .31; .03; .02 INJECTION, SOLUTION INTRAVENOUS at 08:07

## 2023-11-16 RX ADMIN — LIDOCAINE HYDROCHLORIDE 80 MG: 20 INJECTION, SOLUTION EPIDURAL; INFILTRATION; INTRACAUDAL; PERINEURAL at 08:07

## 2023-11-16 RX ADMIN — PROPOFOL 20 MG: 10 INJECTION, EMULSION INTRAVENOUS at 08:14

## 2023-11-16 NOTE — ANESTHESIA POSTPROCEDURE EVALUATION
Post-Op Assessment Note    CV Status:  Stable    Pain management: adequate       Mental Status:  Arousable and sleepy   Hydration Status:  Stable   PONV Controlled:  Controlled   Airway Patency:  Patent  Two or more mitigation strategies used for obstructive sleep apnea   Post Op Vitals Reviewed: Yes    No anethesia notable event occurred.     Staff: Anesthesiologist, CRNA               BP   104/55   Temp   97.7   Pulse  75   Resp   17   SpO2   97

## 2023-11-16 NOTE — ANESTHESIA PREPROCEDURE EVALUATION
Procedure:  EGD    Relevant Problems   ANESTHESIA (within normal limits)      CARDIO   (+) Essential hypertension   (+) Mixed hyperlipidemia      ENDO   (+) Hypothyroidism      GI/HEPATIC  NPO confirmed  BMI 47   (+) Gastroesophageal reflux disease (Improved since starting omeprazole but reports cough related to reflux which is also improved but remains persistent, no symptoms today)      /RENAL   (+) CKD (chronic kidney disease) stage 3, GFR 30-59 ml/min (HCC)   (+) Kidney lesion      GYN   (+) Bilateral malignant neoplasm of breast in female (HCC)      MUSCULOSKELETAL   (+) Sciatica of right side      NEURO/PSYCH   (+) Depression with anxiety   (+) Severe episode of recurrent major depressive disorder, without psychotic features (HCC)      PULMONARY   (+) Chronic obstructive pulmonary disease, unspecified COPD type ([facility])   (+) Shortness of breath   (+) Sleep apnea   (-) URI (upper respiratory infection)     Allergies   Allergen Reactions    Codeine Other (See Comments)     "I didn't like the feeling."    Amoxicillin Other (See Comments)     Unsure of allergy    Morphine Other (See Comments)     Does not like feeling     Social History     Tobacco Use    Smoking status: Former     Packs/day: 1.00     Years: 50.00     Total pack years: 50.00     Types: Cigarettes     Start date:      Quit date:      Years since quittin.8    Smokeless tobacco: Never    Tobacco comments:     10 years ago   Vaping Use    Vaping Use: Never used   Substance Use Topics    Alcohol use: Not Currently     Comment: socially - Per allscripts - denies alcohol consumption    Drug use: No     Current Outpatient Medications   Medication Instructions    albuterol (PROVENTIL HFA,VENTOLIN HFA) 90 mcg/act inhaler 2 puffs, Inhalation, Every 6 hours PRN    amLODIPine (NORVASC) 10 mg tablet TAKE 1 TABLET(10 MG) BY MOUTH DAILY    aspirin 81 mg, Oral, Daily    dexlansoprazole (DEXILANT) 60 mg, Oral, Daily    doxycycline hyclate (VIBRAMYCIN) 100 mg, Oral, Every 12 hours scheduled    famotidine (PEPCID) 20 mg, Oral, 2 times daily    fluticasone-salmeterol (Advair HFA) 45-21 MCG/ACT inhaler 2 puffs, Inhalation, 2 times daily, Rinse mouth after use. levothyroxine 50 mcg tablet TAKE 1 TABLET(50 MCG) BY MOUTH DAILY    lisinopril (ZESTRIL) 10 mg, Oral, Daily    oxybutynin (DITROPAN-XL) 5 mg, Oral, Daily    pantoprazole (PROTONIX) 40 mg tablet TAKE 1 TABLET(40 MG) BY MOUTH TWICE DAILY BEFORE MEALS    potassium chloride (K-DUR,KLOR-CON) 20 mEq tablet 20 mEq, Oral, Daily    rosuvastatin (CRESTOR) 10 MG tablet TAKE 1 TABLET(10 MG) BY MOUTH DAILY    Vitamin D3 2,000 Units, Oral, Daily     Lab Results   Component Value Date    WBC 5.76 11/03/2023    HGB 13.5 11/03/2023    HCT 43.4 11/03/2023     11/03/2023    SODIUM 141 11/03/2023    K 3.6 11/03/2023     11/03/2023    CO2 29 11/03/2023    BUN 12 11/03/2023    CREATININE 1.46 (H) 11/03/2023    GLUC 113 11/03/2023    HGBA1C 5.6 03/31/2023    AST 18 08/01/2023    ALT 14 08/01/2023    ALKPHOS 79 08/01/2023    TBILI 0.60 08/01/2023    ALB 4.1 08/01/2023     Vitals:    11/16/23 0650   BP: 111/80   Pulse: 99   Resp: 18   Temp: 97.6 °F (36.4 °C)   SpO2: 99%     Echo 9/1/23    Left Ventricle: Left ventricular cavity size is normal. Wall thickness is normal. The left ventricular ejection fraction is 63%. Systolic function is normal. Wall motion is normal. Diastolic function is mildly abnormal, consistent with grade I (abnormal) relaxation. Left Atrium: The atrium is mildly dilated. Physical Exam    Airway    Mallampati score: III         Dental   Comment: Lower partials upper dentures and lower dentures    Cardiovascular  Rhythm: regular, Rate: normal    Pulmonary   Breath sounds clear to auscultation    Other Findings  post-pubertal.      Anesthesia Plan  ASA Score- 3     Anesthesia Type- IV sedation with anesthesia with ASA Monitors.          Additional Monitors:     Airway Plan:     Comment: O2 mask, natural airway, EtCO2 monitor. Risks discussed including awareness, aspiration, drug reactions and conversion to GA. Pastor Larson Plan Factors-Exercise tolerance (METS): <4 METS. Chart reviewed. EKG reviewed. Existing labs reviewed. Patient summary reviewed. Patient is not a current smoker. Induction- intravenous. Postoperative Plan-     Informed Consent- Anesthetic plan and risks discussed with patient. I personally reviewed this patient with the CRNA. Discussed and agreed on the Anesthesia Plan with the CRNA. Pastor Larson

## 2023-11-16 NOTE — H&P
History and Physical -  [facility]  Nino Turk 77 y.o. female MRN: [id number]      HPI: Nino Turk is a 77y.o. year old female who presents for evaluation of gastroesophageal reflux disease      REVIEW OF SYSTEMS: Per the HPI, and otherwise unremarkable. Historical Information   Past Medical History:   Diagnosis Date    Acute ill-defined cerebrovascular disease     Benign essential tremor     Breast cancer ([address])     pt states doesn't remember the year thinks it might have been on the right.     Chronic kidney disease     CKD (chronic kidney disease) stage 3, GFR 30-59 ml/min ([facility]) 09/25/2018    Colon polyp     COPD (chronic obstructive pulmonary disease) ([facility])     Depressive disorder     Disease of thyroid gland     Heart murmur     Hypertension     Impaired fasting glucose     Intention tremor     last assessed: 8/18/2016    Osteochondropathy     Panic disorder without agoraphobia with mild panic attacks     PONV (postoperative nausea and vomiting)     Psychiatric disorder     anxiety    Stroke ([address])     Tubular adenoma of colon      Past Surgical History:   Procedure Laterality Date    AUGMENTATION BREAST      pt states during mammo she doesn't have any other surgery than the 2 biopsy    BLEPHAROPLASTY      Upper    BREAST BIOPSY Left     pt doesn't recall when and believes the neg biop was on left    BREAST LUMPECTOMY W/ NEEDLE LOCALIZATION Left     description: benign last assessed; 8/21/2016    CHOLECYSTECTOMY      COLONOSCOPY      EGD      HYSTERECTOMY      AL EXC TUMOR SOFT TISS FACE&SCALP SUBFASCIAL <2CM Left 11/9/2023    Procedure: EXCISION OF EYELID LESION;  Surgeon: Elzbieta Mckeon MD;  Location: [facility];  Service: Plastics    TUBAL LIGATION       Social History   Social History     Substance and Sexual Activity   Alcohol Use Not Currently    Comment: socially - Per allscripts - denies alcohol consumption     Social History     Substance and Sexual Activity Drug Use No     Social History     Tobacco Use   Smoking Status Former    Packs/day: 1.00    Years: 50.00    Total pack years: 50.00    Types: Cigarettes    Start date: 64    Quit date:     Years since quittin.8   Smokeless Tobacco Never   Tobacco Comments    10 years ago     Family History   Problem Relation Age of Onset    Alcohol abuse Mother     Asthma Mother     Cancer Mother     Mental illness Mother     Osteoarthritis Mother     Gout Father     Cancer Father     Other Sister         carotid arterial disease    COPD Sister     Hyperlipidemia Sister     Hypertension Sister     Heart attack Sister     Breast cancer Sister     No Known Problems Daughter     No Known Problems Maternal Grandmother     No Known Problems Paternal Grandmother     Lung cancer Sister     No Known Problems Sister     No Known Problems Daughter     No Known Problems Daughter        Meds/Allergies     (Not in a hospital admission)      Allergies   Allergen Reactions    Codeine Other (See Comments)     "I didn't like the feeling."    Amoxicillin Other (See Comments)     Unsure of allergy    Morphine Other (See Comments)     Does not like feeling       Objective     Blood pressure 111/80, pulse 99, temperature 97.6 °F (36.4 °C), temperature source Temporal, resp. rate 18, height 5' 3" (1.6 m), weight 125 kg (276 lb 7.3 oz), SpO2 99 %, not currently breastfeeding. PHYSICAL EXAM    Gen: NAD  CV: RRR  CHEST: Clear  ABD: soft, NT/ND  EXT: no edema      ASSESSMENT/PLAN:  This is a 77y.o. year old female here for EGD, and she is stable and optimized for her procedure.

## 2023-11-17 ENCOUNTER — OFFICE VISIT (OUTPATIENT)
Dept: PLASTIC SURGERY | Facility: CLINIC | Age: 78
End: 2023-11-17

## 2023-11-17 DIAGNOSIS — H02.9 EYELID LESION: Primary | ICD-10-CM

## 2023-11-17 PROCEDURE — 99024 POSTOP FOLLOW-UP VISIT: CPT

## 2023-11-17 NOTE — PROGRESS NOTES
[facility] SPECIALTY [facility]astic and Reconstructive Surgery  [address] [place] [address], [facility]  [phone]    Patient Identification: Abdulkadir Walker is a 77 y.o. female     History of Present Illness: The patient is a 77y.o.  year-old female  who presents to the office for post-op visit. Patient is 8 days s/p Excision Of Eyelid Lesion - Left  on 11/9/2023 by Dr. Danielle Shaw  Patient states she's doing well at this time. She denies any significant pain, fevers, chills or signs of infection. She's been applying Vaseline to the area daily. Patient has no complaints at this time. Past Medical History:   Diagnosis Date    Acute ill-defined cerebrovascular disease     Benign essential tremor     Breast cancer ([address])     pt states doesn't remember the year thinks it might have been on the right. Chronic kidney disease     CKD (chronic kidney disease) stage 3, GFR 30-59 ml/min ([facility]) 09/25/2018    Colon polyp     COPD (chronic obstructive pulmonary disease) ([facility])     Depressive disorder     Disease of thyroid gland     Heart murmur     Hypertension     Impaired fasting glucose     Intention tremor     last assessed: 8/18/2016    Osteochondropathy     Panic disorder without agoraphobia with mild panic attacks     PONV (postoperative nausea and vomiting)     Psychiatric disorder     anxiety    Stroke [facility])     Tubular adenoma of colon         Review of Systems  Constitutional: Denies fevers, chills or pain. Skin: Denies any warmth, erythema, edema or mucopurulent drainage. Physical Exam    Upper left eyelid: Surgical incisions are clean, dry, and intact. Sutures removed. Skin perfusion is intact. Expected amount of post-operative edema. There are no signs of infection or dehiscence    Assessment and Plan:  The patient is an 77y.o.  year-old female who presents to the office for post-op visit.  Patient is 8 days s/p Excision Of Eyelid Lesion - Left  on 11/9/2023 by Dr. Danielle Shaw    -At today's visit sutures removed. Patient tolerated well. -Pathology is not yet finalized. -At today's visit we discussed continuing daily scar massage and use of silicone scar gel/tape to promote scar softening and flattening. The patient was educated on scar care and that it can take up to 1 year for full scar maturation.  -The patient is to return as needed with questions or concerns. All of the patient's questions were answered at this time and they agree with the plan of care.       Regla Rosas PA-C  [facility] Plastic and Reconstructive Surgery

## 2023-11-20 ENCOUNTER — HOSPITAL ENCOUNTER (OUTPATIENT)
Dept: PULMONOLOGY | Facility: HOSPITAL | Age: 78
Discharge: HOME | End: 2023-11-20
Attending: INTERNAL MEDICINE
Payer: MEDICARE

## 2023-11-20 DIAGNOSIS — R06.02 SHORTNESS OF BREATH: ICD-10-CM

## 2023-11-20 PROCEDURE — 94760 N-INVAS EAR/PLS OXIMETRY 1: CPT

## 2023-11-20 PROCEDURE — 94060 EVALUATION OF WHEEZING: CPT

## 2023-11-20 PROCEDURE — 94726 PLETHYSMOGRAPHY LUNG VOLUMES: CPT | Performed by: INTERNAL MEDICINE

## 2023-11-20 PROCEDURE — 94729 DIFFUSING CAPACITY: CPT

## 2023-11-20 PROCEDURE — 94726 PLETHYSMOGRAPHY LUNG VOLUMES: CPT

## 2023-11-20 PROCEDURE — 94729 DIFFUSING CAPACITY: CPT | Performed by: INTERNAL MEDICINE

## 2023-11-20 PROCEDURE — 94010 BREATHING CAPACITY TEST: CPT | Performed by: INTERNAL MEDICINE

## 2023-11-20 RX ORDER — ALBUTEROL SULFATE 2.5 MG/3ML
2.5 SOLUTION RESPIRATORY (INHALATION) ONCE
Status: COMPLETED | OUTPATIENT
Start: 2023-11-20 | End: 2023-11-20

## 2023-11-20 RX ADMIN — ALBUTEROL SULFATE 2.5 MG: 2.5 SOLUTION RESPIRATORY (INHALATION) at 12:44

## 2023-11-21 ENCOUNTER — OFFICE VISIT (OUTPATIENT)
Dept: CARDIOLOGY CLINIC | Facility: CLINIC | Age: 78
End: 2023-11-21
Payer: MEDICARE

## 2023-11-21 VITALS
BODY MASS INDEX: 48.73 KG/M2 | SYSTOLIC BLOOD PRESSURE: 130 MMHG | HEIGHT: 63 IN | WEIGHT: 275 LBS | HEART RATE: 72 BPM | DIASTOLIC BLOOD PRESSURE: 90 MMHG

## 2023-11-21 DIAGNOSIS — R07.89 CHEST TIGHTNESS: ICD-10-CM

## 2023-11-21 DIAGNOSIS — I10 ESSENTIAL HYPERTENSION: ICD-10-CM

## 2023-11-21 DIAGNOSIS — R06.02 SHORTNESS OF BREATH: Primary | ICD-10-CM

## 2023-11-21 DIAGNOSIS — N18.32 STAGE 3B CHRONIC KIDNEY DISEASE (HCC): ICD-10-CM

## 2023-11-21 DIAGNOSIS — E66.01 MORBID OBESITY (HCC): ICD-10-CM

## 2023-11-21 DIAGNOSIS — E78.5 HYPERLIPIDEMIA, UNSPECIFIED HYPERLIPIDEMIA TYPE: ICD-10-CM

## 2023-11-21 PROCEDURE — 99204 OFFICE O/P NEW MOD 45 MIN: CPT | Performed by: INTERNAL MEDICINE

## 2023-11-21 NOTE — PROGRESS NOTES
[facility] [address]  [phone]                                            Cardiology Office Consult  Lauren Mckeon, 77 y.o. female  YOB: 1945  MRN: [id number] Encounter: [id number]      PCP - Haven Jacobson, [address] [place] [address]  Referring Provider - Haven Jacobson [address] [place] [address]    Chief Complaint   Patient presents with   • New Patient Visit     SOB        Assessment  Shortness of breath   Hyperlipidemia  Hypertension  Hypothyroidism   Morbid obesity, Body mass index is 48.71 kg/m². Wt gain after that 15-20 lbs from inactivity  2018-20: wt was 255-260 lbs  h/o fall in 2021    Plan  Shortness of breath, Obesity - Body mass index is 48.71 kg/m². Slowly progressive shortness of breath  No significant chest pain, although does have some chest pressure on and off  TTE 9/1/2023 reviewed. LVEF 24%, grade 1 diastolic dysfunction, no significant severe valvular abnormalities  ECG 9/6/2023 from PCP personally reviewed. Normal sinus rhythm, poor R wave progression, left anterior fascicle block. No acute ST-T changes  Impression  Shortness of breath possibly related to obesity/deconditioning versus underlying CAD  Plan  Check nuclear Lexiscan stress test to rule out ischemia  Recommend dietary modifications and gradually trying to increase cardio activities with walking/biking or swimming or 15 to 30 minutes daily  She herself understands the need for dietary modifications and is already working on it and is scheduled to follow-up with a nutritionist as well  Her back pain and issues after the fall are what seem to be limiting her the most over the last 2-3 years    Hyperlipidemia, Obesity - Body mass index is 48.71 kg/m².      Well-controlled cholesterol levels  Continue rosuvastatin 10 mg daily  Counseled regarding need for weight loss -every 10 to 15 pound weight loss will help her with feeling better with the breathing    Hypertension  Reasonably controlled, 130/90  Continue amlodipine 10 mg daily, lisinopril 10 mg daily  Low-salt diet    No results found for this visit on 11/21/23. No orders of the defined types were placed in this encounter. Return in about 4 months (around 3/21/2024), or if symptoms worsen or fail to improve. History of Present Illness   77 y.o. female comes in as a new patient for consultation regarding symptoms of shortness of breath. She reports ongoing symptoms of shortness of breath for well over a year, which has slowly progressed. She does report on and off chest pressure/tightness as well occurring with exertion. Both the symptoms occurred with exertion while walking on level ground. She is additionally limited in walking due to back pain issues, and at times the back pain is the more limiting factor for her. She notes to me that she used to do better and was more active with walking 3 to 4 years ago. But then she had a fall where she twisted her foot and hit her chest.  After that she has not been able to get back to all the activities and this seems to have led to some weight gain. She does admit to dietary indiscretions as well, including soda intake, cheese and crackers, and has been working on cutting down on some of these. She discussed these with her PCP and due to ongoing shortness of breath symptoms she was referred to see cardiology and had a PFT completed yesterday as well. Historical Information   Past Medical History:   Diagnosis Date   • Acute ill-defined cerebrovascular disease    • Benign essential tremor    • Breast cancer ([address])     pt states doesn't remember the year thinks it might have been on the right.    • Chronic kidney disease    • CKD (chronic kidney disease) stage 3, GFR 30-59 ml/min ([facility]) 09/25/2018   • Colon polyp    • COPD (chronic obstructive pulmonary disease) ([facility])    • Depressive disorder    • Disease of thyroid gland    • Heart murmur    • Hypertension    • Impaired fasting glucose    • Intention tremor     last assessed: 8/18/2016   • Osteochondropathy    • Panic disorder without agoraphobia with mild panic attacks    • PONV (postoperative nausea and vomiting)    • Psychiatric disorder     anxiety   • Stroke [facility])    • Tubular adenoma of colon      Past Surgical History:   Procedure Laterality Date   • AUGMENTATION BREAST      pt states during mammo she doesn't have any other surgery than the 2 biopsy   • BLEPHAROPLASTY      Upper   • BREAST BIOPSY Left     pt doesn't recall when and believes the neg biop was on left   • BREAST LUMPECTOMY W/ NEEDLE LOCALIZATION Left     description: benign last assessed; 8/21/2016   • CHOLECYSTECTOMY     • COLONOSCOPY     • EGD     • HYSTERECTOMY     • NY EXC TUMOR SOFT TISS FACE&SCALP SUBFASCIAL <2CM Left 11/9/2023    Procedure: EXCISION OF EYELID LESION;  Surgeon: Juancarlos Finn MD;  Location: [facility];  Service: Plastics   • TUBAL LIGATION       Family History   Problem Relation Age of Onset   • Alcohol abuse Mother    • Asthma Mother    • Cancer Mother    • Mental illness Mother    • Osteoarthritis Mother    • Gout Father    • Cancer Father    • Other Sister         carotid arterial disease   • COPD Sister    • Hyperlipidemia Sister    • Hypertension Sister    • Heart attack Sister    • Breast cancer Sister    • No Known Problems Daughter    • No Known Problems Maternal Grandmother    • No Known Problems Paternal Grandmother    • Lung cancer Sister    • No Known Problems Sister    • No Known Problems Daughter    • No Known Problems Daughter      Current Outpatient Medications on File Prior to Visit   Medication Sig Dispense Refill   • amLODIPine (NORVASC) 10 mg tablet TAKE 1 TABLET(10 MG) BY MOUTH DAILY 90 tablet 1   • aspirin 81 mg chewable tablet Chew 81 mg daily     • Cholecalciferol (VITAMIN D3) 50 MCG (2000 UT) TABS Take 2,000 Units by mouth daily     • famotidine (PEPCID) 20 mg tablet Take 1 tablet (20 mg total) by mouth 2 (two) times a day 60 tablet 1   • levothyroxine 50 mcg tablet TAKE 1 TABLET(50 MCG) BY MOUTH DAILY 90 tablet 1   • lisinopril (ZESTRIL) 10 mg tablet Take 1 tablet (10 mg total) by mouth daily 90 tablet 1   • oxybutynin (DITROPAN-XL) 5 mg 24 hr tablet Take 1 tablet (5 mg total) by mouth daily 30 tablet 1   • pantoprazole (PROTONIX) 40 mg tablet TAKE 1 TABLET(40 MG) BY MOUTH TWICE DAILY BEFORE MEALS 180 tablet 1   • potassium chloride (K-DUR,KLOR-CON) 20 mEq tablet Take 1 tablet (20 mEq total) by mouth daily 90 tablet 0   • rosuvastatin (CRESTOR) 10 MG tablet TAKE 1 TABLET(10 MG) BY MOUTH DAILY 90 tablet 1   • albuterol (PROVENTIL HFA,VENTOLIN HFA) 90 mcg/act inhaler Inhale 2 puffs every 6 (six) hours as needed for wheezing (Patient not taking: Reported on 11/21/2023)     • dexlansoprazole (Dexilant) 60 MG capsule Take 1 capsule (60 mg total) by mouth daily (Patient not taking: Reported on 10/27/2023) 90 capsule 1   • fluticasone-salmeterol (Advair HFA) 45-21 MCG/ACT inhaler Inhale 2 puffs 2 (two) times a day Rinse mouth after use. (Patient not taking: Reported on 10/27/2023) 12 g 1   • [DISCONTINUED] famotidine (PEPCID) 20 mg tablet TAKE 1 TABLET(20 MG) BY MOUTH TWICE DAILY 180 tablet 1     No current facility-administered medications on file prior to visit.      Allergies   Allergen Reactions   • Codeine Other (See Comments)     "I didn't like the feeling."   • Amoxicillin Other (See Comments)     Unsure of allergy   • Morphine Other (See Comments)     Does not like feeling     Social History     Socioeconomic History   • Marital status: Single     Spouse name: None   • Number of children: None   • Years of education: None   • Highest education level: None   Occupational History   • Occupation: Retired   Tobacco Use   • Smoking status: Former     Packs/day: 1.00     Years: 50.00     Total pack years: 50.00     Types: Cigarettes     Start date: 1961     Quit date: 2011     Years since quittin.8   • Smokeless tobacco: Never   • Tobacco comments:     10 years ago   Vaping Use   • Vaping Use: Never used   Substance and Sexual Activity   • Alcohol use: Not Currently     Comment: socially - Per allscripts - denies alcohol consumption   • Drug use: No   • Sexual activity: None   Other Topics Concern   • None   Social History Narrative         Always uses seat belt     Social Determinants of Health     Financial Resource Strain: Low Risk  (10/4/2022)    Overall Financial Resource Strain (CARDIA)    • Difficulty of Paying Living Expenses: Not hard at all   Food Insecurity: Not on file   Transportation Needs: No Transportation Needs (10/4/2022)    PRAPARE - Transportation    • Lack of Transportation (Medical): No    • Lack of Transportation (Non-Medical): No   Physical Activity: Not on file   Stress: Not on file   Social Connections: Not on file   Intimate Partner Violence: Not on file   Housing Stability: Not on file        Review of Systems   All other systems reviewed and are negative. Vitals:  Vitals:    23 1111   BP: 130/90   Pulse: 72   Weight: 125 kg (275 lb)   Height: 5' 3" (1.6 m)     BMI - Body mass index is 48.71 kg/m². Wt Readings from Last 7 Encounters:   23 125 kg (275 lb)   23 125 kg (276 lb 7.3 oz)   23 124 kg (273 lb 9.5 oz)   10/27/23 126 kg (277 lb)   10/13/23 123 kg (270 lb 12.8 oz)   10/11/23 125 kg (275 lb 12.8 oz)   10/10/23 125 kg (276 lb)       Physical Exam  Vitals and nursing note reviewed. Constitutional:       General: She is not in acute distress. Appearance: Normal appearance. She is well-developed. She is obese. She is not ill-appearing. HENT:      Head: Normocephalic and atraumatic. Nose: No congestion. Eyes:      General: No scleral icterus. Conjunctiva/sclera: Conjunctivae normal.   Neck:      Vascular: No carotid bruit or JVD. Cardiovascular:      Rate and Rhythm: Normal rate and regular rhythm. Pulses: Normal pulses. Heart sounds: Normal heart sounds. No murmur heard. No friction rub. No gallop. Pulmonary:      Effort: Pulmonary effort is normal. No respiratory distress. Breath sounds: Normal breath sounds. No rales. Abdominal:      General: There is no distension. Palpations: Abdomen is soft. Tenderness: There is no abdominal tenderness. Musculoskeletal:         General: No swelling or tenderness. Cervical back: Neck supple. Right lower leg: No edema. Left lower leg: No edema. Skin:     General: Skin is warm. Neurological:      General: No focal deficit present. Mental Status: She is alert and oriented to person, place, and time. Mental status is at baseline. Psychiatric:         Mood and Affect: Mood normal.         Behavior: Behavior normal.         Thought Content: Thought content normal.           Labs:  CBC:   Lab Results   Component Value Date    WBC 5.76 11/03/2023    RBC 5.02 11/03/2023    HGB 13.5 11/03/2023    HCT 43.4 11/03/2023    MCV 87 11/03/2023     11/03/2023    RDW 14.0 11/03/2023       CMP:   Lab Results   Component Value Date    K 3.6 11/03/2023     11/03/2023    CO2 29 11/03/2023    BUN 12 11/03/2023    CREATININE 1.46 (H) 11/03/2023    EGFR 33 11/03/2023    CALCIUM 9.2 11/03/2023    AST 18 08/01/2023    ALT 14 08/01/2023    ALKPHOS 79 08/01/2023       Magnesium:  No results found for: "MG"    Lipid Profile:   Lab Results   Component Value Date    HDL 41 (L) 03/31/2023    TRIG 118 03/31/2023    LDLCALC 46 03/31/2023       Thyroid Studies:   Lab Results   Component Value Date    TUF6LPQENSYZ 2.953 03/31/2023    T3FREE 2.22 (L) 11/30/2018    FREET4 1.17 04/08/2022       A1c:  No components found for: "HGA1C"    INR:  No results found for: "EGM"8    Imaging: EGD    Result Date: 11/16/2023  Narrative: Table formatting from the original result was not included.   [address] [place] [address] [place] [id number] [phone] DATE OF SERVICE: 11/16/23 PHYSICIAN(S): Attending: Dioni Saldivar DO Fellow: No Staff Documented INDICATION: Constipation, unspecified constipation type, Gastroesophageal reflux disease, unspecified whether esophagitis present POST-OP DIAGNOSIS: See the impression below. PREPROCEDURE: Informed consent was obtained for the procedure, including sedation. Risks of perforation, hemorrhage, adverse drug reaction and aspiration were discussed. The patient was placed in the left lateral decubitus position. Patient was explained about the risks and benefits of the procedure. Risks including but not limited to bleeding, infection, and perforation were explained in detail. Also explained about less than 100% sensitivity with the exam and other alternatives. PROCEDURE: EGD DETAILS OF PROCEDURE: Patient was taken to the procedure room where a time out was performed to confirm correct patient and correct procedure. The patient underwent monitored anesthesia care, which was administered by an anesthesia professional. The patient's blood pressure, heart rate, level of consciousness, respirations and oxygen were monitored throughout the procedure. The scope was introduced through the mouth and advanced to the second part of the duodenum. Retroflexion was performed in the fundus. The patient experienced no blood loss. The procedure was not difficult. The patient tolerated the procedure well. There were no apparent adverse events. ANESTHESIA INFORMATION: ASA: III Anesthesia Type: IV Sedation with Anesthesia MEDICATIONS: No administrations occurring from 0804 to 0817 on 11/16/23 FINDINGS: The cricopharynx, upper third of the esophagus, middle third of the esophagus, lower third of the esophagus, GE junction and Z-line appeared normal. Z-line is 35 cm from the incisors.  The cardia, fundus of the stomach, body of the stomach, greater curve of the stomach, lesser curve of the stomach, incisura, antrum, prepyloric region and pylorus appeared normal. Medium herniation of gastric fundus into thoracic cavity (type II hiatal hernia) without Vickii Bibles lesions present - GE junction 37 cm from the incisors, diaphragmatic impression 43 cm from the incisors:  Hill classification: Grade IV The duodenal bulb and 2nd part of the duodenum appeared normal. SPECIMENS: * No specimens in log *     Impression: The cricopharynx, upper third of the esophagus, middle third of the esophagus, lower third of the esophagus, GE junction and Z-line appeared normal. The cardia, fundus of the stomach, body of the stomach, greater curve of the stomach, lesser curve of the stomach, incisura, antrum, prepyloric region and pylorus appeared normal. Medium type II hiatal hernia The duodenal bulb and 2nd part of the duodenum appeared normal. RECOMMENDATION: Famotidine 20 mg po bid   DO Salvador Oropeza [place]     CT renal protocol    Result Date: 11/14/2023  Narrative: CT ABDOMEN AND PELVIS WITH AND WITHOUT IV CONTRAST INDICATION:   N28.89: Other specified disorders of kidney and ureter. COMPARISON: May 6, 2021, August 1, 2023, August 13, 2023 TECHNIQUE: Initial CT of the abdomen and pelvis was performed without intravenous contrast.  Subsequent dynamic CT evaluation of the abdomen and pelvis was performed after the administration of intravenous contrast in both nephrographic and delayed phases. Multiplanar 2D reformatted images were created from the source data. This examination, like all CT scans performed in the [facility], was performed utilizing techniques to minimize radiation dose exposure, including the use of iterative reconstruction and automated exposure control. Radiation dose length product (DLP) for this visit:  4209 mGy-cm IV Contrast:  100 mL of iohexol (OMNIPAQUE) Enteric Contrast:  Enteric contrast was not administered.  FINDINGS: ABDOMEN RIGHT KIDNEY AND URETER: Stable appearance of right mid posterior cortical mass. This currently measures 1.7 x 1.8 cm, showing no significant interval change. As before is noted to have solid enhancement and washout suggesting neoplastic etiology. No significant cystic component. This lesion bulges the renal capsule, approximately 50% of the lesion projects beyond the confines of the kidney. There is no evidence of extension to the renal hilum or central sinus fat. No regional adenopathy. Additional smaller subcentimeter cysts remaining stable. No urothelial lesions identified. No hydronephrosis or hydroureter. No urinary tract calculi. No perinephric collection. LEFT KIDNEY AND URETER: No solid renal mass. Stable exophytic cyst. Stable small minimally hyperdense cyst in the medial upper pole. No detectable urothelial mass. No hydronephrosis or hydroureter. No urinary tract calculi. No perinephric collection. URINARY BLADDER: No bladder wall mass. No calculi. LOWER CHEST:  No clinically significant abnormality identified in the visualized lower chest. Moderate paraesophageal hernia. No significant change. Small adjacent fluid density noted unclear if this represents a small cyst, fluid or gastric diverticulum. Minimally increased compared to 2021. LIVER/BILIARY TREE: Mild central biliary prominence likely related to chronic postcholecystectomy state. Liver otherwise within normal limits. GALLBLADDER: Gallbladder is surgically absent. SPLEEN:  Unremarkable. PANCREAS:  Unremarkable. ADRENAL GLANDS: Stable bilateral adrenal adenomata, on the right 1.5 cm, on the left with mild nodular thickening of the body and posterior limb measuring 1.0 cm. STOMACH AND BOWEL: There is colonic diverticulosis without evidence of acute diverticulitis. APPENDIX:  No findings to suggest appendicitis. ABDOMINOPELVIC CAVITY:  No ascites. No free intraperitoneal air. No lymphadenopathy. VESSELS: Moderate calcified atherosclerosis. PELVIS REPRODUCTIVE ORGANS:  Patient is status post hysterectomy.  ABDOMINAL WALL/INGUINAL REGIONS:  Unremarkable. OSSEOUS STRUCTURES:  No acute fracture or destructive osseous lesion. Impression: Right posterior mid cortex mass without suspicious interval change. No evidence for local adenopathy or distant metastatic disease. Stable bilateral adrenal adenomata. Moderate paraesophageal gastric hernia and small adjacent cystlike hypodensity as discussed above. Of doubtful clinical significance. Workstation performed: [id number]       Cardiac testing:   No results found for this or any previous visit. No results found for this or any previous visit. No results found for this or any previous visit. No results found for this or any previous visit. Complete PFT with post bronchodilator  Pulmonary Function Test Report  Shonna Fuentes 77 y.o. female MRN: [id number]    Date of Testin2023    Requesting Physician: Dr. Quinn Schwab    Reason for Testing: Shortness of breath    Reference set for interpretation: NHANES III    Procedure: The patient was taken to pulmonary function testing laboratory. The patient demonstrated good effort and cooperation. The results of   this test meet ATS standards for acceptability and repeatability. Data   set appears appropriate for interpretation. Post bronchodilator testing not performed   Results:  FEV1/FVC Ratio: 70  Forced Vital Capacity: 2.10 L, 86% predicted  FEV1: 1.48 L, 79% predicted  After administration of bronchodilator FEV1: Not performed    Lung volumes by plethysmography: Total Lung Capacity 103% predicted   Residual volume 118% predicted    DLCO corrected for patients hemoglobin level: 69%    Airway resistance decreased    Interpretation:    Patient had a full lung function testing with spirometry lung volumes and   DLCO. Patient gave good effort. Results meet the ATS standards for acceptability and repeatability.   The flow volume curve demonstrates an obstructive pattern  Spirometry demonstrates mild obstructive airflow limitation  The lung volumes are increased  The residual volume is increased consistent with hyperinflation and air   trapping  The DLCO is mildly decreased    Vivian Downey MD   [facility]

## 2023-12-04 NOTE — PROGRESS NOTES
NEPHROLOGY OFFICE CONSULTATION NOTE    Patient: Sylvia Eugene               Sex: female           YOB: 1945        Age:  77 y.o.       12/6/2023      BACKGROUND     I had the pleasure of seeing Kristie Hutchison in the nephrology office today for consultation. She has a past medical history significant for hypertension, GERD, hypothyroidism, sleep apnea, COPD, and chronic kidney disease. She was referred to our office for further work-up and management of chronic kidney disease. She has underlying hypertension, currently maintained on amlodipine and lisinopril. Reports good blood pressure readings, she is not monitoring at home presently. She is following with Dr. Giovany Andrade from Cardiology, she was referred for further work-up of shortness of breath. TTE 9/1/2023 showed an ejection fraction of 63% with grade 1 diastolic dysfunction. She is planned to undergo a Lexiscan stress test. She does have underlying hyperlipidemia and maintained on statin therapy. She was also evaluated by Pulmonary for shortness of breath with scheduled PFTs. She is maintained on Advair inhaler. She has GERD, maintained on pantoprazole and famotidine. She was found to have a 1.7 cm enhancing mass in the interpolar region of the right kidney highly suggestive for neoplasm. This was first noted on CT scan 8/1/2023. She has had a follow-up MRI on 8/13 as well as CT renal protocol on 11/8/2023. She was also noted to have an adrenal adenoma and was referred to Endocrinology for further work-up and evaluation. She was referred to Urology and seen by Dr. Valerie Atkinson on 9/15/2023. According to the patient and medical record, she is undergoing active surveillance with repeat imaging in 6 months. She reports that she is avoiding NSAIDs. Most recent labs were obtained on 11/3/2023, which we have reviewed together. SUBJECTIVE     She currently has no complaints at this time and is feeling well. Patient denies any chest pain, shortness of breath, or swelling. REVIEW OF SYSTEMS     Review of Systems   Constitutional:  Negative for activity change, chills, fatigue and fever. HENT:  Negative for trouble swallowing. Respiratory:  Negative for shortness of breath. Cardiovascular:  Negative for leg swelling. Gastrointestinal:  Negative for nausea and vomiting. Genitourinary:  Positive for dysuria and frequency. Negative for difficulty urinating and hematuria. Musculoskeletal:  Negative for back pain. Skin:  Negative for pallor. Neurological:  Negative for dizziness, syncope, weakness and light-headedness. Psychiatric/Behavioral:  Negative for sleep disturbance. The patient is not nervous/anxious. OBJECTIVE     Current Weight: Weight - Scale: 126 kg (277 lb)  Vitals:    12/06/23 1454   BP: 132/84   Pulse: 95   Resp: 18   Temp: 97.7 °F (36.5 °C)   SpO2: 100%     Body mass index is 49.07 kg/m². CURRENT MEDICATIONS       Current Outpatient Medications:     albuterol (PROVENTIL HFA,VENTOLIN HFA) 90 mcg/act inhaler, Inhale 2 puffs every 6 (six) hours as needed for wheezing, Disp: , Rfl:     amLODIPine (NORVASC) 10 mg tablet, TAKE 1 TABLET(10 MG) BY MOUTH DAILY, Disp: 90 tablet, Rfl: 1    aspirin 81 mg chewable tablet, Chew 81 mg daily, Disp: , Rfl:     Cholecalciferol (VITAMIN D3) 50 MCG (2000 UT) TABS, Take 2,000 Units by mouth daily, Disp: , Rfl:     famotidine (PEPCID) 20 mg tablet, Take 1 tablet (20 mg total) by mouth 2 (two) times a day, Disp: 60 tablet, Rfl: 1    fluticasone-salmeterol (Advair HFA) 45-21 MCG/ACT inhaler, Inhale 2 puffs 2 (two) times a day Rinse mouth after use.  (Patient taking differently: Inhale 2 puffs as needed Rinse mouth after use.), Disp: 12 g, Rfl: 1    levothyroxine 50 mcg tablet, TAKE 1 TABLET(50 MCG) BY MOUTH DAILY, Disp: 90 tablet, Rfl: 1    lisinopril (ZESTRIL) 10 mg tablet, Take 1 tablet (10 mg total) by mouth daily, Disp: 90 tablet, Rfl: 1 oxybutynin (DITROPAN-XL) 5 mg 24 hr tablet, Take 1 tablet (5 mg total) by mouth daily, Disp: 30 tablet, Rfl: 1    pantoprazole (PROTONIX) 40 mg tablet, TAKE 1 TABLET(40 MG) BY MOUTH TWICE DAILY BEFORE MEALS, Disp: 180 tablet, Rfl: 1    potassium chloride (K-DUR,KLOR-CON) 20 mEq tablet, Take 1 tablet (20 mEq total) by mouth daily, Disp: 90 tablet, Rfl: 0    rosuvastatin (CRESTOR) 10 MG tablet, TAKE 1 TABLET(10 MG) BY MOUTH DAILY, Disp: 90 tablet, Rfl: 1    PHYSICAL EXAMINATION     Physical Exam  Vitals reviewed. Constitutional:       General: She is not in acute distress. HENT:      Head: Normocephalic. Mouth/Throat:      Lips: Pink. Mouth: Mucous membranes are moist.   Eyes:      General: Lids are normal. No scleral icterus. Cardiovascular:      Rate and Rhythm: Normal rate and regular rhythm. Heart sounds: S1 normal and S2 normal.   Pulmonary:      Effort: Pulmonary effort is normal. No accessory muscle usage or respiratory distress. Breath sounds: Normal breath sounds. Abdominal:      General: There is no distension. Tenderness: There is no abdominal tenderness. Musculoskeletal:      Cervical back: Normal range of motion and neck supple. No tenderness. Right lower leg: No edema. Left lower leg: No edema. Skin:     General: Skin is warm. Coloration: Skin is not cyanotic or jaundiced. Neurological:      General: No focal deficit present. Mental Status: She is alert and oriented to person, place, and time. Psychiatric:         Attention and Perception: Attention normal.         Speech: Speech normal.         Behavior: Behavior is cooperative. LAB RESULTS     [facility] 11/3/2023:  Sodium 141  Potassium 3.6  Chloride 107  CO2 29  Anion Gap 5  BUN 12  Creatinine 1.46  Glucose 113  Calcium 9.2  eGFR 33    Urine Microalbumin to creatinine ratio 4/2/2022 - 6    RADIOLOGY RESULTS      No results found for this or any previous visit.     Results for orders placed during the hospital encounter of 02/28/20    XR chest pa & lateral    Narrative  CHEST    INDICATION:   R53.83: Other fatigue  Z85.3: Personal history of malignant neoplasm of breast  R05: Cough. COMPARISON:  Chest CT from 5/23/2019. EXAM PERFORMED/VIEWS:  XR CHEST PA & LATERAL  DUAL ENERGY SUBTRACTION TECHNIQUE      FINDINGS:    Cardiomediastinal silhouette appears unremarkable. Redemonstration of moderate hiatal hernia. The lungs are clear. No pneumothorax or pleural effusion. Osseous structures appear within normal limits for patient age. Cholecystectomy. Clips in the right anterior chest wall. Impression  No acute cardiopulmonary disease. Workstation performed: [id number]    CT renal protocol 11/8/2023:  RIGHT KIDNEY AND URETER:  Stable appearance of right mid posterior cortical mass. This currently measures 1.7 x 1.8 cm, showing no significant interval change. As before is noted to have solid enhancement and washout suggesting neoplastic etiology. No significant cystic   component. This lesion bulges the renal capsule, approximately 50% of the lesion projects beyond the confines of the kidney. There is no evidence of extension to the renal hilum or central sinus fat. No regional adenopathy. Additional smaller   subcentimeter cysts remaining stable. No urothelial lesions identified. No hydronephrosis or hydroureter. No urinary tract calculi. No perinephric collection. LEFT KIDNEY AND URETER:  No solid renal mass. Stable exophytic cyst. Stable small minimally hyperdense cyst in the medial upper pole. No detectable urothelial mass. No hydronephrosis or hydroureter. No urinary tract calculi. No perinephric collection. ASSESSMENT/PLAN     Chronic Kidney Disease Stage 3:  After review of the medical record, it appears baseline creatinine levels are fluctuating around 1.2 - 1.4 dating back through 2017. Current creatinine level is 1.46 with an eGFR of 33.      Most recent urinalysis showed the presence of proteinuria, will follow-up on updated UA and obtain microalbumin to creatinine ratio prior to follow-up. Patient has a 1.7 x 1.8 cm right mid posterior cortical mass, no evidence of distant metastatic disease on most recent CT 11/8/2023. The lesion bulges the renal capsule, approximately 50% of the lesion projects beyond the confines of the kidney. Additional smaller subcentimeter cysts remaining stable. Stable exophytic cyst noted in the left kidney. Highly suspect etiology of chronic kidney disease is multifactorial in the setting of hypertensive nephrosclerosis, age-related nephron loss, and nephron loss with documented renal mass. I advised hydration with a minimum of 48 hours daily, she is actively working on increasing water intake. Avoid nephrotoxic agents such as NSAIDs. Follow closely with Urology for management of renal lesion. Will obtain updated CKD labs prior to follow-up. Management of hypertension as below. Renal Lesion:  Patient has a 1.7 x 1.8 cm right mid posterior cortical mass, no evidence of distant metastatic disease on most recent CT 11/8/2023. The lesion bulges the renal capsule, approximately 50% of the lesion projects beyond the confines of the kidney, concerning for neoplasm. She is following with Urology, active surveillance at this time. Hypertension:  Currently maintained on amlodipine 10 mg daily and lisinopril 10 mg daily. Blood pressure appears to be in acceptable range on the current regimen. Advise she begin monitoring home blood pressure readings with a goal average of 130/80 or less. Dysuria:  She reports urinary discomfort and frequency on office visit today. No updated urine studies on file, will obtain updated urinalysis this week. Obesity:  Most recent BMI 49.07. Discussed importance of activity and healthy lifestyle in the setting of chronic kidney disease.       Thank you for the consultation to assist in the care of [facility]. We will continue to follow the patient with you. Recommend nephrology follow-up in 5 months. Sami Republic, [address] [facility]  Nephrology  12/6/2023      Portions of the record may have been created with voice recognition software. Occasional wrong word or "sound a like" substitutions may have occurred due to the inherent limitations of voice recognition software. Read the chart carefully and recognize, using context, where substitutions have occurred.

## 2023-12-05 ENCOUNTER — HOSPITAL ENCOUNTER (OUTPATIENT)
Dept: NON INVASIVE DIAGNOSTICS | Facility: CLINIC | Age: 78
Discharge: HOME | End: 2023-12-05
Payer: MEDICARE

## 2023-12-05 ENCOUNTER — TELEPHONE (OUTPATIENT)
Dept: NEPHROLOGY | Facility: CLINIC | Age: 78
End: 2023-12-05

## 2023-12-05 VITALS
OXYGEN SATURATION: 99 % | SYSTOLIC BLOOD PRESSURE: 126 MMHG | HEIGHT: 63 IN | BODY MASS INDEX: 48.73 KG/M2 | DIASTOLIC BLOOD PRESSURE: 90 MMHG | WEIGHT: 275 LBS | HEART RATE: 68 BPM

## 2023-12-05 DIAGNOSIS — R06.02 SHORTNESS OF BREATH: ICD-10-CM

## 2023-12-05 LAB
NUC STRESS DIASTOLIC VOLUME INDEX: 77 ML/M2
NUC STRESS EJECTION FRACTION: 74 %
NUC STRESS SYSTOLIC VOLUME INDEX: 20 ML/M2
RATE PRESSURE PRODUCT: NORMAL
SL CV REST NUCLEAR ISOTOPE DOSE: 15.81 MCI
SL CV STRESS NUCLEAR ISOTOPE DOSE: 48.2 MCI
SL CV STRESS RECOVERY BP: NORMAL MMHG
SL CV STRESS RECOVERY HR: 86 BPM
SL CV STRESS RECOVERY O2 SAT: 97 %
STRESS ANGINA INDEX: 0
STRESS BASELINE BP: NORMAL MMHG
STRESS BASELINE HR: 68 BPM
STRESS O2 SAT REST: 99 %
STRESS PEAK HR: 118 BPM
STRESS POST O2 SAT PEAK: 99 %
STRESS POST PEAK BP: 134 MMHG
STRESS/REST PERFUSION RATIO: 1.17

## 2023-12-05 PROCEDURE — A9502 TC99M TETROFOSMIN: HCPCS

## 2023-12-05 PROCEDURE — 93018 CV STRESS TEST I&R ONLY: CPT | Performed by: INTERNAL MEDICINE

## 2023-12-05 PROCEDURE — 93016 CV STRESS TEST SUPVJ ONLY: CPT | Performed by: INTERNAL MEDICINE

## 2023-12-05 PROCEDURE — G1004 CDSM NDSC: HCPCS

## 2023-12-05 PROCEDURE — 93017 CV STRESS TEST TRACING ONLY: CPT

## 2023-12-05 PROCEDURE — 78452 HT MUSCLE IMAGE SPECT MULT: CPT | Performed by: INTERNAL MEDICINE

## 2023-12-05 PROCEDURE — 78452 HT MUSCLE IMAGE SPECT MULT: CPT

## 2023-12-05 RX ORDER — REGADENOSON 0.08 MG/ML
0.4 INJECTION, SOLUTION INTRAVENOUS ONCE
Status: COMPLETED | OUTPATIENT
Start: 2023-12-05 | End: 2023-12-05

## 2023-12-05 RX ADMIN — REGADENOSON 0.4 MG: 0.08 INJECTION, SOLUTION INTRAVENOUS at 12:52

## 2023-12-05 NOTE — TELEPHONE ENCOUNTER
I called and left message on patients answering machine confirming appt for tomorrow wed 12/06/2023 with Tom

## 2023-12-06 ENCOUNTER — CONSULT (OUTPATIENT)
Dept: NEPHROLOGY | Facility: CLINIC | Age: 78
End: 2023-12-06
Payer: MEDICARE

## 2023-12-06 VITALS
WEIGHT: 277 LBS | RESPIRATION RATE: 18 BRPM | HEART RATE: 95 BPM | OXYGEN SATURATION: 100 % | SYSTOLIC BLOOD PRESSURE: 132 MMHG | BODY MASS INDEX: 49.08 KG/M2 | HEIGHT: 63 IN | DIASTOLIC BLOOD PRESSURE: 84 MMHG | TEMPERATURE: 97.7 F

## 2023-12-06 DIAGNOSIS — R30.0 DYSURIA: ICD-10-CM

## 2023-12-06 DIAGNOSIS — I10 ESSENTIAL HYPERTENSION: ICD-10-CM

## 2023-12-06 DIAGNOSIS — N28.9 KIDNEY LESION: ICD-10-CM

## 2023-12-06 DIAGNOSIS — E66.01 MORBID OBESITY WITH BMI OF 45.0-49.9, ADULT (HCC): ICD-10-CM

## 2023-12-06 DIAGNOSIS — N18.32 STAGE 3B CHRONIC KIDNEY DISEASE (HCC): Primary | ICD-10-CM

## 2023-12-06 LAB
CHEST PAIN STATEMENT: NORMAL
MAX DIASTOLIC BP: 98 MMHG
MAX PREDICTED HEART RATE: 141 BPM
PROTOCOL NAME: NORMAL
REASON FOR TERMINATION: NORMAL
STRESS POST EXERCISE DUR MIN: 3 MIN
STRESS POST EXERCISE DUR SEC: 0 SEC
STRESS POST PEAK HR: 118 BPM
STRESS POST PEAK SYSTOLIC BP: 156 MMHG
TARGET HR FORMULA: NORMAL
TEST INDICATION: NORMAL

## 2023-12-06 PROCEDURE — 99204 OFFICE O/P NEW MOD 45 MIN: CPT

## 2023-12-06 NOTE — PATIENT INSTRUCTIONS
If you require any imaging of your kidneys with IV contrast, please reach out to our office first.   Start monitoring your blood pressure at home, goal average around 130/80    Chronic Kidney Disease   AMBULATORY CARE:   Chronic kidney disease (CKD)  is the gradual and permanent loss of kidney function. Normally, the kidneys remove fluid, chemicals, and waste from your blood. These wastes are turned into urine by your kidneys. CKD may worsen over time and lead to kidney failure. Common signs and symptoms include the following:   Changes in how often you need to urinate    Swelling in your arms, legs, or feet    Shortness of breath    Fatigue or weakness    Bad or bitter taste in your mouth    Nausea, vomiting, or loss of appetite    Call your local emergency number (911 in the [address]) if:   You have a seizure. You have shortness of breath. Seek care immediately if:   You are confused and very drowsy. Call your doctor or nephrologist if:   You suddenly gain or lose more weight than your healthcare provider has told you is okay. You have itchy skin or a rash. You urinate more or less than you normally do. You have blood in your urine. You have nausea and are vomiting. You have fatigue or muscle weakness. You have hiccups that will not stop. You have questions or concerns about your condition or care. How CKD is diagnosed:  CKD has 5 stages. Your healthcare provider will use results from the following tests to find the stage of CKD you have:  Blood and urine tests  show how well your kidneys are working. They may also show the cause of your CKD. Ultrasound, CT scan, or MRI  pictures may be used to check your kidneys. You may be given contrast liquid to help your kidneys show up better in the pictures. Tell the healthcare provider if you have ever had an allergic reaction to contrast liquid. Do not enter the MRI room with anything metal. Metal can cause serious injury.  Tell the healthcare provider if you have any metal in or on your body. A biopsy  is a procedure to remove a small piece of tissue from your kidney. It is done to find the cause of your CKD. Treatment  can help control signs and symptoms, and prevent a worse stage of CKD. Your care team may include specialists, such as a dietitian or a heart specialist. This depends on the stage of your CKD and if you have other health conditions to manage. Healthcare providers will work with you to create a plan based on your decisions for treatment. Your treatment plan may include any of the following:  Medicines  may be given to decrease your blood pressure and get rid of extra fluid. You may also receive medicine to manage health conditions that may occur with CKD, such as anemia, diabetes, and heart disease. Dialysis  is a treatment to remove chemicals and waste from your blood when your kidneys can no longer do this. Surgery  may be needed to create an arteriovenous fistula (AVF) in your arm or insert a catheter into your abdomen. This is done so you can receive dialysis. A kidney transplant  may be done if your CKD becomes severe. What you can do to manage CKD: Management may include making some lifestyle changes. Tell your healthcare provider if you have any concerns about being able to make changes. He or she can help you find solutions, including working with specialists. Ask for help creating a plan to break large goals into smaller steps. Your plan may include any of the following:  Manage other health conditions. Your healthcare provider will work with you to make a care plan that meets your needs. You will be checked regularly for heart disease or other conditions that can make CKD worse, such as diabetes. Your blood pressure will be closely monitored. You will also get a target blood pressure and help making a plan to reach your target. This may include taking your blood pressure at home.          Maintain a healthy weight. Your weight and body mass index (BMI) will be checked regularly. BMI helps find if your weight is healthy for your height. Your healthcare provider will use other tests to check your muscle and protein levels. Extra weight can strain your kidneys. A low weight or low muscle mass can make you feel more tired. You may have trouble doing your daily activities. Ask your provider what a healthy weight is for you. He or she can help you create a plan to lose or gain weight safely, if needed. The plan may include keeping a food diary. This is a list of foods and liquids you have each day. Your provider will use the diary to help you make changes, if needed. Changes are based on your health and any other conditions you have, such as diabetes. Create an exercise plan. Regular exercise can help you manage CKD, high blood pressure, and diabetes. Exercise also helps control weight. Your provider can help you create exercise goals and a plan to reach those goals. For example, your goal may be to exercise for 30 minutes in a day. Your plan can include breaking exercise into 10 minute sessions, 3 times during the day. Create a healthy eating plan. Your provider may tell you to eat food low in potassium, phosphorus, or protein. Your provider may also recommend vitamin or mineral supplements. Do not take any supplements without talking to your provider. A dietitian can help you plan meals if needed. Ask how much liquid to drink each day and which liquids are best for you. Limit sodium (salt) as directed. You may need to limit sodium to less than 2,300 milligrams (mg) each day. Ask your dietitian or healthcare provider how much sodium you can have each day. The amount depends on your stage of kidney disease. Table salt, canned foods, soups, salted snacks, and processed meats, like deli meats and sausage, are high in sodium.  Your provider or a dietitian can show you how to read food labels for sodium. Limit alcohol as directed. Alcohol can cause fluid retention and can affect your kidneys. Ask how much alcohol is safe for you. A drink of alcohol is 12 ounces of beer, 5 ounces of wine, or 1½ ounces of liquor. Do not smoke. Nicotine and other chemicals in cigarettes and cigars can cause kidney damage. Ask your provider for information if you currently smoke and need help to quit. E-cigarettes or smokeless tobacco still contain nicotine. Talk to your provider before you use these products. Ask about over-the-counter medicines. Medicines such as NSAIDs and laxatives may harm your kidneys. Some cough and cold medicines can raise your blood pressure. Always ask if a medicine is safe before you take it. Ask about vaccines you may need. CKD can increase your risk for infections such as pneumonia, influenza, and hepatitis. Vaccines lower your risk for infection. Your healthcare provider will tell you which vaccines you need and when to get them. Follow up with your doctor or nephrologist as directed: You will need to return for tests to monitor your kidney and nerve function, and your parathyroid hormone level. Your medicines may be changed, based on certain test results. Write down your questions so you remember to ask them during your visits. © Copyright [employer] 2023 Information is for End User's use only and may not be sold, redistributed or otherwise used for commercial purposes. The above information is an  only. It is not intended as medical advice for individual conditions or treatments. Talk to your doctor, nurse or pharmacist before following any medical regimen to see if it is safe and effective for you.

## 2023-12-07 ENCOUNTER — TELEPHONE (OUTPATIENT)
Dept: FAMILY MEDICINE CLINIC | Facility: CLINIC | Age: 78
End: 2023-12-07

## 2023-12-07 DIAGNOSIS — I10 ESSENTIAL HYPERTENSION: ICD-10-CM

## 2023-12-07 RX ORDER — AMLODIPINE BESYLATE 10 MG/1
TABLET ORAL
Qty: 90 TABLET | Refills: 1 | Status: SHIPPED | OUTPATIENT
Start: 2023-12-07

## 2023-12-07 NOTE — TELEPHONE ENCOUNTER
She states that her vision is blurry close up in both eyes and wants to know if it could be from the oxybutynin.

## 2023-12-08 ENCOUNTER — APPOINTMENT (OUTPATIENT)
Dept: LAB | Facility: HOSPITAL | Age: 78
End: 2023-12-08
Payer: MEDICARE

## 2023-12-08 DIAGNOSIS — R30.0 DYSURIA: ICD-10-CM

## 2023-12-08 LAB
BACTERIA UR QL AUTO: ABNORMAL /HPF
BILIRUB UR QL STRIP: NEGATIVE
CLARITY UR: CLEAR
COLOR UR: YELLOW
GLUCOSE UR STRIP-MCNC: NEGATIVE MG/DL
HGB UR QL STRIP.AUTO: NEGATIVE
HYALINE CASTS #/AREA URNS LPF: ABNORMAL /LPF
KETONES UR STRIP-MCNC: NEGATIVE MG/DL
LEUKOCYTE ESTERASE UR QL STRIP: NEGATIVE
MUCOUS THREADS UR QL AUTO: ABNORMAL
NITRITE UR QL STRIP: NEGATIVE
NON-SQ EPI CELLS URNS QL MICRO: ABNORMAL /HPF
PH UR STRIP.AUTO: 5.5 [PH]
PROT UR STRIP-MCNC: NEGATIVE MG/DL
RBC #/AREA URNS AUTO: ABNORMAL /HPF
SP GR UR STRIP.AUTO: 1.02 (ref 1–1.03)
UROBILINOGEN UR STRIP-ACNC: <2 MG/DL
WBC #/AREA URNS AUTO: ABNORMAL /HPF

## 2023-12-08 PROCEDURE — 81001 URINALYSIS AUTO W/SCOPE: CPT

## 2023-12-18 ENCOUNTER — OFFICE VISIT (OUTPATIENT)
Age: 78
End: 2023-12-18
Payer: MEDICARE

## 2023-12-18 VITALS
DIASTOLIC BLOOD PRESSURE: 82 MMHG | OXYGEN SATURATION: 97 % | RESPIRATION RATE: 16 BRPM | HEART RATE: 85 BPM | HEIGHT: 63 IN | SYSTOLIC BLOOD PRESSURE: 133 MMHG | BODY MASS INDEX: 49.08 KG/M2 | WEIGHT: 277 LBS

## 2023-12-18 DIAGNOSIS — G47.33 OBSTRUCTIVE SLEEP APNEA SYNDROME: ICD-10-CM

## 2023-12-18 DIAGNOSIS — R06.02 SHORTNESS OF BREATH: Primary | ICD-10-CM

## 2023-12-18 DIAGNOSIS — J44.9 CHRONIC OBSTRUCTIVE PULMONARY DISEASE, UNSPECIFIED COPD TYPE (HCC): ICD-10-CM

## 2023-12-18 PROCEDURE — 99213 OFFICE O/P EST LOW 20 MIN: CPT | Performed by: PHYSICIAN ASSISTANT

## 2023-12-18 NOTE — PROGRESS NOTES
"Assessment/Plan:   Diagnoses and all orders for this visit:    Shortness of breath    Obstructive sleep apnea syndrome    Chronic obstructive pulmonary disease, unspecified COPD type (HCC)    Patient is here today for follow-up.  Currently denies any significant shortness of breath.  Symptoms likely multifactorial related to her CHF as well as mild COPD.  She had a recent PFT done which shows mild obstruction, increased lung volumes and air trapping.  Previous PFT in 2019 was normal.  She does have Advair and albuterol, does not use Advair on a regular basis and has not needed to use her albuterol since her last visit.  She will continue with the albuterol as needed.  She is scheduled for her sleep study in early January, we will call her with the results.  She will be due for screening CT scan in August 2024, this will be her last screening CT scan.  She will follow-up with us in about 4 months or sooner if necessary.    Return in about 4 months (around 4/18/2024).  All questions are answered to the patient's satisfaction and understanding.  She verbalizes understanding.  She is encouraged to call with any further questions or concerns.    Portions of the record may have been created with voice recognition software.  Occasional wrong word or \"sound a like\" substitutions may have occurred due to the inherent limitations of voice recognition software.  Read the chart carefully and recognize, using context, where substitutions have occurred.    Electronically Signed by Lei Mantilla PA-C    ______________________________________________________________________    Chief Complaint:   Chief Complaint   Patient presents with    Follow-up       Patient ID: Ladi is a 78 y.o. y.o. female has a past medical history of Acute ill-defined cerebrovascular disease, Arthritis, Benign essential tremor, Breast cancer (HCC), Chronic kidney disease, CKD (chronic kidney disease) stage 3, GFR 30-59 ml/min (HCC) (09/25/2018), " Colon polyp, Depressive disorder, Disease of thyroid gland, GERD (gastroesophageal reflux disease), Heart murmur, Hyperlipidemia, Hypertension, Impaired fasting glucose, Intention tremor, Osteochondropathy, Panic disorder without agoraphobia with mild panic attacks, PONV (postoperative nausea and vomiting), Psychiatric disorder, Stroke (HCC), Tubular adenoma of colon, and Urinary tract infection.    12/18/2023  Patient presents today for follow-up visit.  Patient is a 78-year-old female former smoker with past medical history of GERD, hypothyroidism, COPD, CKD, history of breast cancer, renal mass undergoing workup.    She is here today for follow-up.  Was seen initially for complaint of dyspnea on exertion.  Currently she is doing okay with her breathing, does have Advair as well as albuterol but she does not need to use this on a regular basis.               Review of Systems   Constitutional: Negative.    HENT: Negative.     Respiratory: Negative.     Cardiovascular: Negative.    Gastrointestinal: Negative.    Genitourinary: Negative.    Musculoskeletal: Negative.    Skin: Negative.    Allergic/Immunologic: Negative.    Neurological: Negative.    Psychiatric/Behavioral: Negative.         Smoking history: She reports that she quit smoking about 12 years ago. Her smoking use included cigarettes. She started smoking about 63 years ago. She has a 50.0 pack-year smoking history. She has been exposed to tobacco smoke. She has never used smokeless tobacco.    The following portions of the patient's history were reviewed and updated as appropriate: allergies, current medications, past family history, past medical history, past social history, past surgical history, and problem list.    Immunization History   Administered Date(s) Administered    COVID-19 MODERNA VACC 0.5 ML IM 01/28/2021, 02/24/2021    COVID-19 Pfizer Vac BIVALENT Madhav-sucrose 12 Yr+ IM 10/04/2022    INFLUENZA 01/30/2007, 10/02/2009, 01/28/2011,  09/08/2011, 08/20/2012, 09/20/2013, 09/23/2014, 10/27/2015, 10/26/2018, 10/24/2023    Influenza Split High Dose Preservative Free IM 11/18/2016, 10/31/2017    Influenza, high dose seasonal 0.7 mL 10/26/2018, 11/05/2019, 11/03/2020, 09/02/2021, 10/04/2022, 10/24/2023    Influenza, seasonal, injectable 01/30/2007, 10/02/2009, 01/28/2011, 09/08/2011, 08/20/2012, 09/20/2013, 09/23/2014, 10/27/2015    Pneumococcal Conjugate 13-Valent 08/06/2015    Pneumococcal Polysaccharide PPV23 07/01/2011    Tdap 07/01/2011    Zoster 06/24/2015     Current Outpatient Medications   Medication Sig Dispense Refill    albuterol (PROVENTIL HFA,VENTOLIN HFA) 90 mcg/act inhaler Inhale 2 puffs every 6 (six) hours as needed for wheezing      amLODIPine (NORVASC) 10 mg tablet TAKE 1 TABLET(10 MG) BY MOUTH DAILY 90 tablet 1    aspirin 81 mg chewable tablet Chew 81 mg daily      Cholecalciferol (VITAMIN D3) 50 MCG (2000 UT) TABS Take 2,000 Units by mouth daily      famotidine (PEPCID) 20 mg tablet Take 1 tablet (20 mg total) by mouth 2 (two) times a day 60 tablet 1    fluticasone-salmeterol (Advair HFA) 45-21 MCG/ACT inhaler Inhale 2 puffs 2 (two) times a day Rinse mouth after use. (Patient taking differently: Inhale 2 puffs as needed Rinse mouth after use.) 12 g 1    levothyroxine 50 mcg tablet TAKE 1 TABLET(50 MCG) BY MOUTH DAILY 90 tablet 1    lisinopril (ZESTRIL) 10 mg tablet Take 1 tablet (10 mg total) by mouth daily 90 tablet 1    oxybutynin (DITROPAN-XL) 5 mg 24 hr tablet Take 1 tablet (5 mg total) by mouth daily 30 tablet 1    pantoprazole (PROTONIX) 40 mg tablet TAKE 1 TABLET(40 MG) BY MOUTH TWICE DAILY BEFORE MEALS 180 tablet 1    potassium chloride (K-DUR,KLOR-CON) 20 mEq tablet Take 1 tablet (20 mEq total) by mouth daily 90 tablet 0    rosuvastatin (CRESTOR) 10 MG tablet TAKE 1 TABLET(10 MG) BY MOUTH DAILY 90 tablet 1     No current facility-administered medications for this visit.     Allergies: Codeine, Amoxicillin, and  "Morphine    Objective:  Vitals:    12/18/23 1406 12/18/23 1407   BP: 133/82    BP Location: Right arm    Patient Position: Sitting    Cuff Size: Large    Pulse: 85    Resp: 16    SpO2: 97% 97%   Weight: 126 kg (277 lb)    Height: 5' 3\" (1.6 m)    Oxygen Therapy  SpO2: 97 %  Oxygen Therapy: None (Room air)  .  Wt Readings from Last 3 Encounters:   12/18/23 126 kg (277 lb)   12/06/23 126 kg (277 lb)   12/05/23 125 kg (275 lb)     Body mass index is 49.07 kg/m².    Physical Exam  Constitutional:       General: She is not in acute distress.     Appearance: Normal appearance. She is well-developed. She is not ill-appearing.   HENT:      Head: Normocephalic and atraumatic.      Mouth/Throat:      Pharynx: Oropharynx is clear.   Eyes:      Pupils: Pupils are equal, round, and reactive to light.   Cardiovascular:      Rate and Rhythm: Normal rate and regular rhythm.   Pulmonary:      Effort: Pulmonary effort is normal. No respiratory distress.      Breath sounds: Normal breath sounds. No decreased breath sounds, wheezing, rhonchi or rales.   Abdominal:      General: Abdomen is flat. There is no distension.   Musculoskeletal:         General: Normal range of motion.      Cervical back: Normal range of motion.      Right lower leg: No edema.      Left lower leg: No edema.   Skin:     General: Skin is warm and dry.      Findings: No rash.   Neurological:      Mental Status: She is alert and oriented to person, place, and time.   Psychiatric:         Mood and Affect: Mood normal.         Behavior: Behavior normal.         Lab Review:   Lab Results   Component Value Date    K 3.6 11/03/2023     11/03/2023    CO2 29 11/03/2023    BUN 12 11/03/2023    CREATININE 1.46 (H) 11/03/2023    CALCIUM 9.2 11/03/2023     Lab Results   Component Value Date    WBC 5.76 11/03/2023    HGB 13.5 11/03/2023    HCT 43.4 11/03/2023    MCV 87 11/03/2023     11/03/2023       Diagnostics:  I have personally reviewed pertinent reports.  "   Reviewed PFT  Office Spirometry Results:     ESS:    Stress strip    Result Date: 2023  Narrative: Confirmed by MONIKA CERNA ([id number]),  Perla Longo ([id number]) on 2023 12:21:30 PM    NM myocardial perfusion spect (rx stress and/or rest)    Result Date: 2023  Narrative: 1.  Resting EKG shows left anterior hemiblock.  There is no significant change with Lexiscan 2.  No chest discomfort 3.  There is a small mild intensity fixed anterior wall defect most likely related to tissue attenuation 4.  Normal wall motion.  Calculated ejection fraction is 74%     Complete PFT with post bronchodilator    Result Date: 2023  Narrative: Pulmonary Function Test Report Ladi Mojica 78 y.o. female MRN: [id number] Date of Testin2023 Requesting Physician: Dr. Mead Reason for Testing: Shortness of breath Reference set for interpretation: NHANES III Procedure: The patient was taken to pulmonary function testing laboratory.  The patient demonstrated good effort and cooperation.  The results of this test meet ATS standards for acceptability and repeatability.  Data set appears appropriate for interpretation. Post bronchodilator testing not performed Results: FEV1/FVC Ratio: 70 Forced Vital Capacity: 2.10 L, 86% predicted FEV1: 1.48 L, 79% predicted After administration of bronchodilator FEV1: Not performed Lung volumes by plethysmography: Total Lung Capacity 103% predicted Residual volume 118% predicted DLCO corrected for patients hemoglobin level: 69% Airway resistance decreased Interpretation: Patient had a full lung function testing with spirometry lung volumes and DLCO. Patient gave good effort. Results meet the ATS standards for acceptability and repeatability. The flow volume curve demonstrates an obstructive pattern Spirometry demonstrates mild obstructive airflow limitation The lung volumes are increased The residual volume is increased consistent with hyperinflation and air trapping  The DLCO is mildly decreased Stephania Hinds MD [facility]

## 2023-12-22 DIAGNOSIS — E78.2 MIXED HYPERLIPIDEMIA: ICD-10-CM

## 2023-12-22 RX ORDER — ROSUVASTATIN CALCIUM 10 MG/1
TABLET, COATED ORAL
Qty: 90 TABLET | Refills: 1 | Status: SHIPPED | OUTPATIENT
Start: 2023-12-22

## 2024-01-05 ENCOUNTER — RA CDI HCC (OUTPATIENT)
Dept: OTHER | Facility: HOSPITAL | Age: 79
End: 2024-01-05

## 2024-01-05 ENCOUNTER — HOSPITAL ENCOUNTER (OUTPATIENT)
Facility: CLINIC | Age: 79
Discharge: HOME | End: 2024-01-05
Payer: MEDICARE

## 2024-01-05 DIAGNOSIS — G47.33 OSA (OBSTRUCTIVE SLEEP APNEA): ICD-10-CM

## 2024-01-05 PROCEDURE — G0399 HOME SLEEP TEST/TYPE 3 PORTA: HCPCS

## 2024-01-08 DIAGNOSIS — G47.33 OBSTRUCTIVE SLEEP APNEA SYNDROME: Primary | ICD-10-CM

## 2024-01-08 PROCEDURE — 95806 SLEEP STUDY UNATT&RESP EFFT: CPT | Performed by: INTERNAL MEDICINE

## 2024-01-08 NOTE — PROGRESS NOTES
Home Sleep Study Documentation    HOME STUDY DEVICE: Noxturnal yes                                           Roshni G3 no      Pre-Sleep Home Study:    Set-up and instructions performed by: T Tech    Technician performed demonstration for Patient: yes    Return demonstration performed by Patient: yes    Written instructions provided to Patient: yes    Patient signed consent form: yes        Post-Sleep Home Study:    Additional comments by Patient: None    Home Sleep Study Failed:no:    Failure reason: N/A    Reported or Detected: N/A    Scored by: ANNA Hanson

## 2024-01-09 ENCOUNTER — TELEPHONE (OUTPATIENT)
Dept: PULMONOLOGY | Facility: CLINIC | Age: 79
End: 2024-01-09

## 2024-01-09 NOTE — TELEPHONE ENCOUNTER
Called pt to discuss CPAP results: moderate OBI    No answer, left voice mail explaining results and recommending CPAP.

## 2024-01-10 ENCOUNTER — APPOINTMENT (OUTPATIENT)
Dept: LAB | Facility: HOSPITAL | Age: 79
End: 2024-01-10
Payer: MEDICARE

## 2024-01-10 DIAGNOSIS — E66.01 MORBID OBESITY WITH BMI OF 45.0-49.9, ADULT (HCC): ICD-10-CM

## 2024-01-10 DIAGNOSIS — E03.9 HYPOTHYROIDISM, UNSPECIFIED TYPE: ICD-10-CM

## 2024-01-10 DIAGNOSIS — I10 ESSENTIAL HYPERTENSION: ICD-10-CM

## 2024-01-10 LAB
ALBUMIN SERPL BCP-MCNC: 3.9 G/DL (ref 3.5–5)
ALP SERPL-CCNC: 76 U/L (ref 34–104)
ALT SERPL W P-5'-P-CCNC: 14 U/L (ref 7–52)
ANION GAP SERPL CALCULATED.3IONS-SCNC: 5 MMOL/L
AST SERPL W P-5'-P-CCNC: 18 U/L (ref 13–39)
BASOPHILS # BLD AUTO: 0.04 THOUSANDS/ÂΜL (ref 0–0.1)
BASOPHILS NFR BLD AUTO: 1 % (ref 0–1)
BILIRUB SERPL-MCNC: 0.52 MG/DL (ref 0.2–1)
BUN SERPL-MCNC: 15 MG/DL (ref 5–25)
CALCIUM SERPL-MCNC: 9.2 MG/DL (ref 8.4–10.2)
CHLORIDE SERPL-SCNC: 108 MMOL/L (ref 96–108)
CHOLEST SERPL-MCNC: 121 MG/DL
CO2 SERPL-SCNC: 29 MMOL/L (ref 21–32)
CREAT SERPL-MCNC: 1.37 MG/DL (ref 0.6–1.3)
EOSINOPHIL # BLD AUTO: 0.2 THOUSAND/ÂΜL (ref 0–0.61)
EOSINOPHIL NFR BLD AUTO: 3 % (ref 0–6)
ERYTHROCYTE [DISTWIDTH] IN BLOOD BY AUTOMATED COUNT: 13.9 % (ref 11.6–15.1)
GFR SERPL CREATININE-BSD FRML MDRD: 36 ML/MIN/1.73SQ M
GLUCOSE SERPL-MCNC: 103 MG/DL (ref 65–140)
HCT VFR BLD AUTO: 42.9 % (ref 34.8–46.1)
HDLC SERPL-MCNC: 50 MG/DL
HGB BLD-MCNC: 13.3 G/DL (ref 11.5–15.4)
IMM GRANULOCYTES # BLD AUTO: 0.01 THOUSAND/UL (ref 0–0.2)
IMM GRANULOCYTES NFR BLD AUTO: 0 % (ref 0–2)
LDLC SERPL CALC-MCNC: 37 MG/DL (ref 0–100)
LYMPHOCYTES # BLD AUTO: 1.86 THOUSANDS/ÂΜL (ref 0.6–4.47)
LYMPHOCYTES NFR BLD AUTO: 26 % (ref 14–44)
MCH RBC QN AUTO: 26.1 PG (ref 26.8–34.3)
MCHC RBC AUTO-ENTMCNC: 31 G/DL (ref 31.4–37.4)
MCV RBC AUTO: 84 FL (ref 82–98)
MONOCYTES # BLD AUTO: 0.54 THOUSAND/ÂΜL (ref 0.17–1.22)
MONOCYTES NFR BLD AUTO: 7 % (ref 4–12)
NEUTROPHILS # BLD AUTO: 4.64 THOUSANDS/ÂΜL (ref 1.85–7.62)
NEUTS SEG NFR BLD AUTO: 63 % (ref 43–75)
NONHDLC SERPL-MCNC: 71 MG/DL
NRBC BLD AUTO-RTO: 0 /100 WBCS
PLATELET # BLD AUTO: 231 THOUSANDS/UL (ref 149–390)
PMV BLD AUTO: 10.8 FL (ref 8.9–12.7)
POTASSIUM SERPL-SCNC: 4.3 MMOL/L (ref 3.5–5.3)
PROT SERPL-MCNC: 7.2 G/DL (ref 6.4–8.4)
RBC # BLD AUTO: 5.1 MILLION/UL (ref 3.81–5.12)
SODIUM SERPL-SCNC: 142 MMOL/L (ref 135–147)
TRIGL SERPL-MCNC: 170 MG/DL
TSH SERPL DL<=0.05 MIU/L-ACNC: 1.63 UIU/ML (ref 0.45–4.5)
WBC # BLD AUTO: 7.29 THOUSAND/UL (ref 4.31–10.16)

## 2024-01-10 PROCEDURE — 80053 COMPREHEN METABOLIC PANEL: CPT

## 2024-01-10 PROCEDURE — 85025 COMPLETE CBC W/AUTO DIFF WBC: CPT

## 2024-01-10 PROCEDURE — 36415 COLL VENOUS BLD VENIPUNCTURE: CPT

## 2024-01-10 PROCEDURE — 80061 LIPID PANEL: CPT

## 2024-01-10 PROCEDURE — 84443 ASSAY THYROID STIM HORMONE: CPT

## 2024-01-11 ENCOUNTER — OFFICE VISIT (OUTPATIENT)
Dept: FAMILY MEDICINE CLINIC | Facility: CLINIC | Age: 79
End: 2024-01-11
Payer: MEDICARE

## 2024-01-11 VITALS
HEIGHT: 63 IN | HEART RATE: 98 BPM | BODY MASS INDEX: 49.36 KG/M2 | WEIGHT: 278.6 LBS | DIASTOLIC BLOOD PRESSURE: 76 MMHG | SYSTOLIC BLOOD PRESSURE: 132 MMHG | OXYGEN SATURATION: 98 %

## 2024-01-11 DIAGNOSIS — K21.9 GASTROESOPHAGEAL REFLUX DISEASE, UNSPECIFIED WHETHER ESOPHAGITIS PRESENT: ICD-10-CM

## 2024-01-11 DIAGNOSIS — K63.5 POLYP OF COLON, UNSPECIFIED PART OF COLON, UNSPECIFIED TYPE: Primary | ICD-10-CM

## 2024-01-11 DIAGNOSIS — E66.01 MORBID OBESITY WITH BMI OF 45.0-49.9, ADULT (HCC): ICD-10-CM

## 2024-01-11 DIAGNOSIS — E78.2 MIXED HYPERLIPIDEMIA: ICD-10-CM

## 2024-01-11 DIAGNOSIS — D50.9 IRON DEFICIENCY ANEMIA, UNSPECIFIED IRON DEFICIENCY ANEMIA TYPE: ICD-10-CM

## 2024-01-11 DIAGNOSIS — C50.912 BILATERAL MALIGNANT NEOPLASM OF BREAST IN FEMALE, UNSPECIFIED ESTROGEN RECEPTOR STATUS, UNSPECIFIED SITE OF BREAST (HCC): ICD-10-CM

## 2024-01-11 DIAGNOSIS — R35.0 URINARY FREQUENCY: ICD-10-CM

## 2024-01-11 DIAGNOSIS — N18.32 STAGE 3B CHRONIC KIDNEY DISEASE (HCC): ICD-10-CM

## 2024-01-11 DIAGNOSIS — R73.01 IFG (IMPAIRED FASTING GLUCOSE): ICD-10-CM

## 2024-01-11 DIAGNOSIS — J44.9 CHRONIC OBSTRUCTIVE PULMONARY DISEASE, UNSPECIFIED COPD TYPE (HCC): ICD-10-CM

## 2024-01-11 DIAGNOSIS — C50.911 BILATERAL MALIGNANT NEOPLASM OF BREAST IN FEMALE, UNSPECIFIED ESTROGEN RECEPTOR STATUS, UNSPECIFIED SITE OF BREAST (HCC): ICD-10-CM

## 2024-01-11 DIAGNOSIS — F33.2 SEVERE EPISODE OF RECURRENT MAJOR DEPRESSIVE DISORDER, WITHOUT PSYCHOTIC FEATURES (HCC): ICD-10-CM

## 2024-01-11 LAB — SL AMB POCT HEMOGLOBIN AIC: 5.5 (ref ?–6.5)

## 2024-01-11 PROCEDURE — 99214 OFFICE O/P EST MOD 30 MIN: CPT

## 2024-01-11 PROCEDURE — 83036 HEMOGLOBIN GLYCOSYLATED A1C: CPT

## 2024-01-11 RX ORDER — PANTOPRAZOLE SODIUM 40 MG/1
TABLET, DELAYED RELEASE ORAL
Qty: 180 TABLET | Refills: 1 | Status: SHIPPED | OUTPATIENT
Start: 2024-01-11

## 2024-01-11 RX ORDER — TIRZEPATIDE 2.5 MG/.5ML
2.5 INJECTION, SOLUTION SUBCUTANEOUS WEEKLY
Qty: 2 ML | Refills: 0 | Status: SHIPPED | OUTPATIENT
Start: 2024-01-11 | End: 2024-02-08

## 2024-01-11 RX ORDER — ROSUVASTATIN CALCIUM 5 MG/1
10 TABLET, COATED ORAL DAILY
Qty: 90 TABLET | Refills: 3 | Status: SHIPPED | OUTPATIENT
Start: 2024-01-11 | End: 2024-01-16

## 2024-01-11 RX ORDER — MIRABEGRON 25 MG/1
25 TABLET, FILM COATED, EXTENDED RELEASE ORAL
Qty: 30 TABLET | Refills: 1 | Status: SHIPPED | OUTPATIENT
Start: 2024-01-11

## 2024-01-11 RX ORDER — IRON,CARBONYL/ASCORBIC ACID 65MG-125MG
1 TABLET, DELAYED RELEASE (ENTERIC COATED) ORAL DAILY
Qty: 90 TABLET | Refills: 1 | Status: SHIPPED | OUTPATIENT
Start: 2024-01-11

## 2024-01-11 NOTE — ASSESSMENT & PLAN NOTE
Lab Results   Component Value Date    EGFR 36 01/10/2024    EGFR 33 11/03/2023    EGFR 36 08/01/2023    CREATININE 1.37 (H) 01/10/2024    CREATININE 1.46 (H) 11/03/2023    CREATININE 1.37 (H) 08/01/2023   Function is stable, do recommend increasing hydration even more.  Will continue to monitor

## 2024-01-11 NOTE — ASSESSMENT & PLAN NOTE
Has been dieting, will work on increasing vegetables and limiting carbs. Patient would like to start weight loss, she should not be on phentermine due to HTN, Will start Zepbound. Follow up in 1 month.

## 2024-01-11 NOTE — PROGRESS NOTES
Assessment/Plan:         Problem List Items Addressed This Visit          Digestive    Polyp of colon - Primary     Will be due for colonoscopy this year, referral placed to Dr. Lisa         Relevant Orders    Ambulatory Referral to Gastroenterology       Endocrine    IFG (impaired fasting glucose)    Relevant Orders    POCT hemoglobin A1c (Completed)       Respiratory    Chronic obstructive pulmonary disease, unspecified COPD type ([facility])     Breathing is stable, will be starting CPAP machine.  Following with pulmonology.            Genitourinary    CKD (chronic kidney disease) stage 3, GFR 30-59 ml/min ([facility])     Lab Results   Component Value Date    EGFR 36 01/10/2024    EGFR 33 11/03/2023    EGFR 36 08/01/2023    CREATININE 1.37 (H) 01/10/2024    CREATININE 1.46 (H) 11/03/2023    CREATININE 1.37 (H) 08/01/2023   Function is stable, do recommend increasing hydration even more.  Will continue to monitor         Relevant Orders    UA w Reflex to Microscopic w Reflex to Culture    Comprehensive metabolic panel       Other    Mixed hyperlipidemia     Cholesterol is over corrected, lab work was non fasting. Will reduce rosuvastatin to 5 mg daily. Will continue to monitor.          Relevant Medications    rosuvastatin (CRESTOR) 5 mg tablet    Bilateral malignant neoplasm of breast in female ([facility])     Following with breast specialist, has exam scheduled next month.          Severe episode of recurrent major depressive disorder, without psychotic features ([facility])     No depression, doing well, will continue to monitor.          Relevant Medications    tirzepatide (Zepbound) 2.5 mg/0.5 mL auto-injector    Morbid obesity with BMI of 45.0-49.9, adult ([facility])     Has been dieting, will work on increasing vegetables and limiting carbs. Patient would like to start weight loss, she should not be on phentermine due to HTN, Will start Zepbound. Follow up in 1 month.          Relevant Medications    tirzepatide (Zepbound) 2.5 mg/0.5 mL  auto-injector    Urinary frequency    Relevant Medications    Mirabegron ER (Myrbetriq) 25 MG TB24     Other Visit Diagnoses       Iron deficiency anemia, unspecified iron deficiency anemia type        Will start iron once a day to every other day, follow up with GI repeat lab work in 4 weeks.    Relevant Medications    Iron-Vitamin C (Vitron-C)  MG TABS    Other Relevant Orders    CBC and differential              Subjective:      Patient ID: Ladi Mojica is a 78 y.o. female.    Ladi is here for follow up, she is not losing weight.        The following portions of the patient's history were reviewed and updated as appropriate:   Past Medical History:  She has a past medical history of Acute ill-defined cerebrovascular disease, Arthritis, Benign essential tremor, Breast cancer ([facility]), Chronic kidney disease, CKD (chronic kidney disease) stage 3, GFR 30-59 ml/min ([facility]) (09/25/2018), Colon polyp, Depressive disorder, Disease of thyroid gland, GERD (gastroesophageal reflux disease), Heart murmur, Hyperlipidemia, Hypertension, Impaired fasting glucose, Intention tremor, Osteochondropathy, Panic disorder without agoraphobia with mild panic attacks, PONV (postoperative nausea and vomiting), Psychiatric disorder, Stroke (HCC), Tubular adenoma of colon, and Urinary tract infection.,  _______________________________________________________________________  Medical Problems:  does not have any pertinent problems on file.,  _______________________________________________________________________  Past Surgical History:   has a past surgical history that includes Hysterectomy; Cholecystectomy; AUGMENTATION BREAST; Breast lumpectomy w/ needle localization (Left); Colonoscopy; EGD; Breast biopsy (Left); Tubal ligation; Blepharoplasty; and pr exc tumor soft tiss face&scalp subfascial <2cm (Left, 11/9/2023).,  _______________________________________________________________________  Family History:  family history  includes Alcohol abuse in her mother; Asthma in her mother; Breast cancer in her sister; COPD in her sister; Cancer in her father and mother; Gout in her father; Heart attack in her sister; Hyperlipidemia in her sister; Hypertension in her sister; Lung cancer in her sister; Mental illness in her mother; No Known Problems in her daughter, daughter, daughter, maternal grandmother, paternal grandmother, and sister; Osteoarthritis in her mother; Other in her sister.,  _______________________________________________________________________  Social History:   reports that she quit smoking about 13 years ago. Her smoking use included cigarettes. She started smoking about 63 years ago. She has a 50.0 pack-year smoking history. She has been exposed to tobacco smoke. She has never used smokeless tobacco. She reports current alcohol use. She reports that she does not use drugs.,  _______________________________________________________________________  Allergies:  is allergic to codeine, amoxicillin, oxybutynin, and morphine..  _______________________________________________________________________  Current Outpatient Medications   Medication Sig Dispense Refill    amLODIPine (NORVASC) 10 mg tablet TAKE 1 TABLET(10 MG) BY MOUTH DAILY 90 tablet 1    aspirin 81 mg chewable tablet Chew 81 mg daily      Cholecalciferol (VITAMIN D3) 50 MCG (2000 UT) TABS Take 2,000 Units by mouth daily      famotidine (PEPCID) 20 mg tablet Take 1 tablet (20 mg total) by mouth 2 (two) times a day 60 tablet 1    Iron-Vitamin C (Vitron-C)  MG TABS Take 1 tablet by mouth in the morning 90 tablet 1    levothyroxine 50 mcg tablet TAKE 1 TABLET(50 MCG) BY MOUTH DAILY 90 tablet 1    lisinopril (ZESTRIL) 10 mg tablet Take 1 tablet (10 mg total) by mouth daily 90 tablet 1    Mirabegron ER (Myrbetriq) 25 MG TB24 Take 25 mg by mouth daily at bedtime 30 tablet 1    pantoprazole (PROTONIX) 40 mg tablet TAKE 1 TABLET(40 MG) BY MOUTH TWICE DAILY BEFORE MEALS  "180 tablet 1    potassium chloride (K-DUR,KLOR-CON) 20 mEq tablet Take 1 tablet (20 mEq total) by mouth daily 90 tablet 0    rosuvastatin (CRESTOR) 5 mg tablet Take 2 tablets (10 mg total) by mouth daily 90 tablet 3    tirzepatide (Zepbound) 2.5 mg/0.5 mL auto-injector Inject 0.5 mL (2.5 mg total) under the skin once a week for 28 days 2 mL 0     No current facility-administered medications for this visit.     _______________________________________________________________________  Review of Systems   Constitutional:  Positive for fatigue. Negative for chills, diaphoresis and fever.   HENT:  Negative for congestion, ear pain, postnasal drip, rhinorrhea, sinus pain and sore throat.    Eyes:  Negative for pain and visual disturbance.   Respiratory:  Negative for cough, chest tightness and shortness of breath.    Cardiovascular:  Negative for chest pain and palpitations.   Gastrointestinal:  Negative for abdominal pain, constipation, diarrhea, nausea and vomiting.   Genitourinary:  Negative for dysuria, frequency, hematuria and urgency.   Musculoskeletal:  Positive for arthralgias. Negative for back pain.   Skin:  Negative for color change and rash.   Neurological:  Positive for headaches. Negative for dizziness, seizures and syncope.   Psychiatric/Behavioral:  Negative for dysphoric mood and sleep disturbance. The patient is not nervous/anxious.    All other systems reviewed and are negative.        Objective:  Vitals:    01/11/24 1402   BP: 132/76   BP Location: Right arm   Patient Position: Sitting   Cuff Size: Large   Pulse: 98   SpO2: 98%   Weight: 126 kg (278 lb 9.6 oz)   Height: 5' 3\" (1.6 m)     Body mass index is 49.35 kg/m².     Physical Exam  Vitals and nursing note reviewed.   Constitutional:       Appearance: Normal appearance. She is not ill-appearing.   HENT:      Head: Normocephalic.      Right Ear: Tympanic membrane, ear canal and external ear normal. There is no impacted cerumen.      Left Ear: " Tympanic membrane, ear canal and external ear normal. There is no impacted cerumen.      Nose: Nose normal. No congestion.      Mouth/Throat:      Mouth: Mucous membranes are moist.      Pharynx: No posterior oropharyngeal erythema.   Eyes:      Extraocular Movements: Extraocular movements intact.      Conjunctiva/sclera: Conjunctivae normal.      Pupils: Pupils are equal, round, and reactive to light.   Cardiovascular:      Rate and Rhythm: Normal rate and regular rhythm.      Heart sounds: Normal heart sounds. No murmur heard.  Pulmonary:      Effort: Pulmonary effort is normal.      Breath sounds: Normal breath sounds. No wheezing.   Abdominal:      Palpations: Abdomen is soft.      Tenderness: There is no abdominal tenderness.   Musculoskeletal:         General: Normal range of motion.      Cervical back: Normal range of motion.      Right lower leg: No edema.      Left lower leg: No edema.   Skin:     General: Skin is warm and dry.   Neurological:      General: No focal deficit present.      Mental Status: She is alert.   Psychiatric:         Mood and Affect: Mood normal.         Behavior: Behavior normal.

## 2024-01-11 NOTE — PATIENT INSTRUCTIONS
Problem List Items Addressed This Visit          Digestive    Polyp of colon - Primary     Will be due for colonoscopy this year, referral placed to Dr. Lisa         Relevant Orders    Ambulatory Referral to Gastroenterology       Respiratory    Chronic obstructive pulmonary disease, unspecified COPD type ([facility])     Breathing is stable, will be starting CPAP machine.  Following with pulmonology.            Genitourinary    CKD (chronic kidney disease) stage 3, GFR 30-59 ml/min ([facility])     Lab Results   Component Value Date    EGFR 36 01/10/2024    EGFR 33 11/03/2023    EGFR 36 08/01/2023    CREATININE 1.37 (H) 01/10/2024    CREATININE 1.46 (H) 11/03/2023    CREATININE 1.37 (H) 08/01/2023   Function is stable, do recommend increasing hydration even more.  Will continue to monitor            Other    Mixed hyperlipidemia     Cholesterol is over corrected, lab work was non fasting. Will reduce rosuvastatin to 5 mg daily. Will continue to monitor.          Relevant Medications    rosuvastatin (CRESTOR) 5 mg tablet    Bilateral malignant neoplasm of breast in female ([facility])     Following with breast specialist, has exam scheduled next month.          Severe episode of recurrent major depressive disorder, without psychotic features ([facility])     No depression, doing well, will continue to monitor.          Relevant Medications    tirzepatide (Zepbound) 2.5 mg/0.5 mL auto-injector    Morbid obesity with BMI of 45.0-49.9, adult ([facility])     Has been dieting, will work on increasing vegetables and limiting carbs. Patient would like to start weight loss, she should not be on phentermine due to HTN, Will start Zepbound. Follow up in 1 month.          Relevant Medications    tirzepatide (Zepbound) 2.5 mg/0.5 mL auto-injector     Other Visit Diagnoses       Iron deficiency anemia, unspecified iron deficiency anemia type        Will start iron once a day to every other day, follow up with GI repeat lab work in 4 weeks.    Relevant  Medications    Iron-Vitamin C (Vitron-C)  MG TABS

## 2024-01-11 NOTE — ASSESSMENT & PLAN NOTE
Cholesterol is over corrected, lab work was non fasting. Will reduce rosuvastatin to 5 mg daily. Will continue to monitor.

## 2024-01-12 ENCOUNTER — TELEPHONE (OUTPATIENT)
Dept: FAMILY MEDICINE CLINIC | Facility: CLINIC | Age: 79
End: 2024-01-12

## 2024-01-12 LAB

## 2024-01-12 NOTE — TELEPHONE ENCOUNTER
Pt called and left message on [employer]. Stated that she didn't  her Myrbetriq medicine because it was $400 as per pharmacy.

## 2024-01-16 RX ORDER — ROSUVASTATIN CALCIUM 5 MG/1
10 TABLET, COATED ORAL DAILY
Qty: 180 TABLET | Refills: 3 | Status: SHIPPED | OUTPATIENT
Start: 2024-01-16

## 2024-01-16 NOTE — TELEPHONE ENCOUNTER
Called patient and she is aware, she said the only med she received was the cholesterol one. She is not sure why she didn't get the others

## 2024-01-18 LAB

## 2024-01-31 LAB

## 2024-02-05 ENCOUNTER — HOSPITAL ENCOUNTER (OUTPATIENT)
Dept: ULTRASOUND IMAGING | Facility: CLINIC | Age: 79
Discharge: HOME | End: 2024-02-05
Payer: MEDICARE

## 2024-02-05 ENCOUNTER — HOSPITAL ENCOUNTER (OUTPATIENT)
Dept: MAMMOGRAPHY | Facility: CLINIC | Age: 79
Discharge: HOME | End: 2024-02-05
Payer: MEDICARE

## 2024-02-05 DIAGNOSIS — N64.4 BREAST PAIN, LEFT: ICD-10-CM

## 2024-02-05 PROCEDURE — 76642 ULTRASOUND BREAST LIMITED: CPT

## 2024-02-05 PROCEDURE — 77065 DX MAMMO INCL CAD UNI: CPT

## 2024-02-05 PROCEDURE — G0279 TOMOSYNTHESIS, MAMMO: HCPCS

## 2024-02-12 ENCOUNTER — OFFICE VISIT (OUTPATIENT)
Dept: FAMILY MEDICINE CLINIC | Facility: CLINIC | Age: 79
End: 2024-02-12
Payer: MEDICARE

## 2024-02-12 VITALS
OXYGEN SATURATION: 98 % | WEIGHT: 278 LBS | TEMPERATURE: 98 F | BODY MASS INDEX: 49.26 KG/M2 | SYSTOLIC BLOOD PRESSURE: 122 MMHG | RESPIRATION RATE: 16 BRPM | HEIGHT: 63 IN | DIASTOLIC BLOOD PRESSURE: 80 MMHG | HEART RATE: 88 BPM

## 2024-02-12 DIAGNOSIS — E66.01 MORBID OBESITY WITH BMI OF 45.0-49.9, ADULT (HCC): ICD-10-CM

## 2024-02-12 DIAGNOSIS — R35.0 URINARY FREQUENCY: Primary | ICD-10-CM

## 2024-02-12 DIAGNOSIS — Z00.00 ENCOUNTER FOR ANNUAL WELLNESS VISIT (AWV) IN MEDICARE PATIENT: ICD-10-CM

## 2024-02-12 DIAGNOSIS — E28.39 OVARIAN FAILURE DUE TO MENOPAUSE: ICD-10-CM

## 2024-02-12 PROCEDURE — 99214 OFFICE O/P EST MOD 30 MIN: CPT

## 2024-02-12 PROCEDURE — G0439 PPPS, SUBSEQ VISIT: HCPCS

## 2024-02-12 RX ORDER — PHENTERMINE HYDROCHLORIDE 15 MG/1
15 CAPSULE ORAL EVERY MORNING
Qty: 30 CAPSULE | Refills: 0 | Status: SHIPPED | OUTPATIENT
Start: 2024-02-12

## 2024-02-12 RX ORDER — SOLIFENACIN SUCCINATE 5 MG/1
5 TABLET, FILM COATED ORAL DAILY
Qty: 30 TABLET | Refills: 1 | Status: SHIPPED | OUTPATIENT
Start: 2024-02-12

## 2024-02-12 NOTE — ASSESSMENT & PLAN NOTE
Will start phentermine and continue to work on diet. Do recommend 20 minutes of exercise most days. Follow up in 1 month.

## 2024-02-12 NOTE — PROGRESS NOTES
Assessment and Plan:     Problem List Items Addressed This Visit        Other    Morbid obesity with BMI of 45.0-49.9, adult (HCC)     Will start phentermine and continue to work on diet. Do recommend 20 minutes of exercise most days. Follow up in 1 month.          Relevant Medications    phentermine 15 MG capsule    Urinary frequency - Primary     Will do trial of vesicare, follow up in 1 month.          Relevant Medications    solifenacin (VESICARE) 5 mg tablet   Other Visit Diagnoses     Ovarian failure due to menopause        dexa scan, will call with results    Relevant Orders    DXA bone density spine hip and pelvis    Encounter for annual wellness visit (AWV) in Medicare patient        updated as needed           Preventive health issues were discussed with patient, and age appropriate screening tests were ordered as noted in patient's After Visit Summary.  Personalized health advice and appropriate referrals for health education or preventive services given if needed, as noted in patient's After Visit Summary.     History of Present Illness:     Patient presents for a Medicare Wellness Visit    Ladi is here for follow up, the mybetriq was not covered and neither was the zepbound. She is using the nasal pillow for cpap and it is helping her sleeping is better.        Patient Care Team:  MONIQUE Sloan as PCP - General (Nurse Practitioner)  MD Yani Kwon CRNP as Nurse Practitioner (Nurse Practitioner)     Review of Systems:     Review of Systems   Constitutional:  Positive for diaphoresis. Negative for chills and fever.   HENT:  Negative for congestion, ear pain, postnasal drip, rhinorrhea, sinus pain and sore throat.    Respiratory:  Negative for cough, chest tightness, shortness of breath and wheezing.    Cardiovascular:  Negative for chest pain and palpitations.   Gastrointestinal:  Negative for abdominal pain, constipation, nausea and vomiting.    Genitourinary:  Positive for frequency. Negative for dysuria and hematuria.   Musculoskeletal:  Positive for arthralgias. Negative for back pain and myalgias.   Skin:  Negative for color change and rash.   Neurological:  Negative for dizziness, seizures, syncope, light-headedness and headaches.   Psychiatric/Behavioral:  Positive for sleep disturbance. Negative for dysphoric mood. The patient is not nervous/anxious.    All other systems reviewed and are negative.       Problem List:     Patient Active Problem List   Diagnosis   • Intention tremor   • Benign essential tremor   • Depression with anxiety   • Essential hypertension   • CKD (chronic kidney disease) stage 3, GFR 30-59 ml/min ([facility])   • Hypothyroidism   • Shortness of breath   • Mixed hyperlipidemia   • History of breast cancer   • Mild cognitive impairment   • Bilateral malignant neoplasm of breast in female ([facility])   • IFG (impaired fasting glucose)   • Compression fracture of fifth lumbar vertebra ([facility])   • L4-L5 disc bulge   • Sciatica of right side   • Chronic obstructive pulmonary disease, unspecified COPD type ([facility])   • Severe episode of recurrent major depressive disorder, without psychotic features ([facility])   • Incontinence of feces   • Other fatigue   • Morbid obesity with BMI of 45.0-49.9, adult ([facility])   • Kidney lesion   • Constipation   • Gastroesophageal reflux disease   • Urinary frequency   • OBI (obstructive sleep apnea)   • Obstructive sleep apnea syndrome   • Polyp of colon      Past Medical and Surgical History:     Past Medical History:   Diagnosis Date   • Acute ill-defined cerebrovascular disease    • Arthritis    • Benign essential tremor    • Breast cancer ([facility])     pt states doesn't remember the year thinks it might have been on the right.   • Chronic kidney disease    • CKD (chronic kidney disease) stage 3, GFR 30-59 ml/min ([facility]) 09/25/2018   • Colon polyp    • Depressive disorder    • Disease of thyroid gland    • GERD  (gastroesophageal reflux disease)    • Heart murmur    • Hyperlipidemia    • Hypertension    • Impaired fasting glucose    • Intention tremor     last assessed: 8/18/2016   • Osteochondropathy    • Panic disorder without agoraphobia with mild panic attacks    • PONV (postoperative nausea and vomiting)    • Psychiatric disorder     anxiety   • Stroke (HCC)    • Tubular adenoma of colon    • Urinary tract infection      Past Surgical History:   Procedure Laterality Date   • AUGMENTATION BREAST      pt states during mammo she doesn't have any other surgery than the 2 biopsy   • BLEPHAROPLASTY      Upper   • BREAST BIOPSY Left     pt doesn't recall when and believes the neg biop was on left   • BREAST LUMPECTOMY W/ NEEDLE LOCALIZATION Left     description: benign last assessed; 8/21/2016   • CHOLECYSTECTOMY     • COLONOSCOPY     • EGD     • HYSTERECTOMY     • ND EXC TUMOR SOFT TISS FACE&SCALP SUBFASCIAL <2CM Left 11/9/2023    Procedure: EXCISION OF EYELID LESION;  Surgeon: Eugene Garnica MD;  Location: [facility];  Service: Plastics   • TUBAL LIGATION        Family History:     Family History   Problem Relation Age of Onset   • Alcohol abuse Mother    • Asthma Mother    • Cancer Mother    • Mental illness Mother    • Osteoarthritis Mother    • Gout Father    • Cancer Father    • Other Sister         carotid arterial disease   • COPD Sister    • Hyperlipidemia Sister    • Hypertension Sister    • Heart attack Sister    • Breast cancer Sister    • No Known Problems Daughter    • No Known Problems Maternal Grandmother    • No Known Problems Paternal Grandmother    • Lung cancer Sister    • No Known Problems Sister    • No Known Problems Daughter    • No Known Problems Daughter       Social History:     Social History     Socioeconomic History   • Marital status: Single     Spouse name: None   • Number of children: None   • Years of education: None   • Highest education level: None   Occupational History   •  Occupation: Retired   Tobacco Use   • Smoking status: Former     Current packs/day: 0.00     Average packs/day: 1 pack/day for 50.0 years (50.0 ttl pk-yrs)     Types: Cigarettes     Start date:      Quit date:      Years since quittin.1     Passive exposure: Past   • Smokeless tobacco: Never   • Tobacco comments:     10 years ago   Vaping Use   • Vaping status: Never Used   Substance and Sexual Activity   • Alcohol use: Yes     Comment: occ   • Drug use: Never   • Sexual activity: None   Other Topics Concern   • None   Social History Narrative         Always uses seat belt     Social Determinants of Health     Financial Resource Strain: Low Risk  (2024)    Overall Financial Resource Strain (CARDIA)    • Difficulty of Paying Living Expenses: Not very hard   Food Insecurity: Not on file   Transportation Needs: No Transportation Needs (2024)    PRAPARE - Transportation    • Lack of Transportation (Medical): No    • Lack of Transportation (Non-Medical): No   Physical Activity: Not on file   Stress: Not on file   Social Connections: Not on file   Intimate Partner Violence: Not on file   Housing Stability: Not on file      Medications and Allergies:     Current Outpatient Medications   Medication Sig Dispense Refill   • amLODIPine (NORVASC) 10 mg tablet TAKE 1 TABLET(10 MG) BY MOUTH DAILY 90 tablet 1   • aspirin 81 mg chewable tablet Chew 81 mg daily     • Cholecalciferol (VITAMIN D3) 50 MCG (2000 UT) TABS Take 2,000 Units by mouth daily     • famotidine (PEPCID) 20 mg tablet Take 1 tablet (20 mg total) by mouth 2 (two) times a day 60 tablet 1   • Iron-Vitamin C (Vitron-C)  MG TABS Take 1 tablet by mouth in the morning 90 tablet 1   • levothyroxine 50 mcg tablet TAKE 1 TABLET(50 MCG) BY MOUTH DAILY 90 tablet 1   • lisinopril (ZESTRIL) 10 mg tablet Take 1 tablet (10 mg total) by mouth daily 90 tablet 1   • pantoprazole (PROTONIX) 40 mg tablet TAKE 1 TABLET(40 MG) BY MOUTH TWICE DAILY  "BEFORE MEALS 180 tablet 1   • phentermine 15 MG capsule Take 1 capsule (15 mg total) by mouth every morning 30 capsule 0   • potassium chloride (K-DUR,KLOR-CON) 20 mEq tablet Take 1 tablet (20 mEq total) by mouth daily 90 tablet 0   • rosuvastatin (CRESTOR) 5 mg tablet TAKE 2 TABLETS(10 MG) BY MOUTH DAILY 180 tablet 3   • solifenacin (VESICARE) 5 mg tablet Take 1 tablet (5 mg total) by mouth daily 30 tablet 1     No current facility-administered medications for this visit.     Allergies   Allergen Reactions   • Codeine Other (See Comments)     \"I didn't like the feeling.\"   • Amoxicillin Other (See Comments)     Unsure of allergy   • Oxybutynin Visual Disturbance   • Morphine Other (See Comments)     Does not like feeling      Immunizations:     Immunization History   Administered Date(s) Administered   • COVID-19 MODERNA VACC 0.5 ML IM 01/28/2021, 02/24/2021   • COVID-19 Pfizer Vac BIVALENT Madhav-sucrose 12 Yr+ IM 10/04/2022   • INFLUENZA 01/30/2007, 10/02/2009, 01/28/2011, 09/08/2011, 08/20/2012, 09/20/2013, 09/23/2014, 10/27/2015, 10/26/2018, 10/24/2023   • Influenza Split High Dose Preservative Free IM 11/18/2016, 10/31/2017   • Influenza, high dose seasonal 0.7 mL 10/26/2018, 11/05/2019, 11/03/2020, 09/02/2021, 10/04/2022, 10/24/2023   • Influenza, seasonal, injectable 01/30/2007, 10/02/2009, 01/28/2011, 09/08/2011, 08/20/2012, 09/20/2013, 09/23/2014, 10/27/2015   • Pneumococcal Conjugate 13-Valent 08/06/2015   • Pneumococcal Polysaccharide PPV23 07/01/2011   • Tdap 07/01/2011   • Zoster 06/24/2015      Health Maintenance:         Topic Date Due   • DXA SCAN  11/19/2021   • Lung Cancer Screening  08/01/2024   • Breast Cancer Screening: Mammogram  08/03/2024   • Colorectal Cancer Screening  09/17/2024   • Hepatitis C Screening  Completed         Topic Date Due   • COVID-19 Vaccine (4 - 2023-24 season) 09/01/2023      Medicare Screening Tests and Risk Assessments:     Ladi is here for her Subsequent Wellness " visit. Last Medicare Wellness visit information reviewed, patient interviewed, no change since last AWV.     Health Risk Assessment:   Patient rates overall health as good. Patient feels that their physical health rating is same. Patient is satisfied with their life. Eyesight was rated as same. Hearing was rated as same. Patient feels that their emotional and mental health rating is same. Patients states they are sometimes angry. Patient states they are always unusually tired/fatigued. Pain experienced in the last 7 days has been none. Patient states that she has experienced weight loss or gain in last 6 months.     Fall Risk Screening:   In the past year, patient has experienced: no history of falling in past year      Urinary Incontinence Screening:   Patient has leaked urine accidently in the last six months.     Home Safety:  Patient does not have trouble with stairs inside or outside of their home. Patient has working smoke alarms and has working carbon monoxide detector. Home safety hazards include: none.     Nutrition:   Current diet is Regular.     Medications:   Patient is currently taking over-the-counter supplements. OTC medications include: see medication list. Patient is able to manage medications.     Activities of Daily Living (ADLs)/Instrumental Activities of Daily Living (IADLs):   Walk and transfer into and out of bed and chair?: Yes  Dress and groom yourself?: Yes    Bathe or shower yourself?: Yes    Feed yourself? Yes  Do your laundry/housekeeping?: Yes  Manage your money, pay your bills and track your expenses?: Yes  Make your own meals?: Yes    Do your own shopping?: Yes    Previous Hospitalizations:   Any hospitalizations or ED visits within the last 12 months?: Yes    How many hospitalizations have you had in the last year?: 1-2    Advance Care Planning:   Living will: No    Durable POA for healthcare: No    Advanced directive: No    Advanced directive counseling given: Yes    ACP document  "given: Yes    Patient declined ACP directive: No    End of Life Decisions reviewed with patient: Yes    Provider agrees with end of life decisions: Yes      Comments: Blanco not want resuscitation attempts made    Cognitive Screening:   Provider or family/friend/caregiver concerned regarding cognition?: Yes    PREVENTIVE SCREENINGS      Cardiovascular Screening:    General: Screening Not Indicated, History Lipid Disorder and Screening Current      Diabetes Screening:     General: Screening Current      Colorectal Cancer Screening:     General: Screening Current      Breast Cancer Screening:     General: History Breast Cancer and Screening Current      Cervical Cancer Screening:    General: Screening Not Indicated      Osteoporosis Screening:      Due for: DXA Axial and DXA Appendicular      Abdominal Aortic Aneurysm (AAA) Screening:        General: Screening Not Indicated      Lung Cancer Screening:     General: Screening Not Indicated      Hepatitis C Screening:    General: Screening Current    Screening, Brief Intervention, and Referral to Treatment (SBIRT)      Brief Intervention  Alcohol & drug use screenings were reviewed. No concerns regarding substance use disorder identified.     Other Counseling Topics:   Car/seat belt/driving safety.     No results found.     Physical Exam:     /80   Pulse 88   Temp 98 °F (36.7 °C)   Resp 16   Ht 5' 3\" (1.6 m)   Wt 126 kg (278 lb)   SpO2 98%   BMI 49.25 kg/m²     Physical Exam  Vitals and nursing note reviewed.   Constitutional:       General: She is not in acute distress.     Appearance: Normal appearance. She is well-developed. She is not ill-appearing.   HENT:      Head: Normocephalic and atraumatic.      Right Ear: Tympanic membrane, ear canal and external ear normal. There is no impacted cerumen.      Left Ear: Tympanic membrane, ear canal and external ear normal. There is no impacted cerumen.      Nose: Nose normal. No congestion.      Mouth/Throat:      " Mouth: Mucous membranes are moist.      Pharynx: No posterior oropharyngeal erythema.   Eyes:      Extraocular Movements: Extraocular movements intact.      Conjunctiva/sclera: Conjunctivae normal.      Pupils: Pupils are equal, round, and reactive to light.   Cardiovascular:      Rate and Rhythm: Normal rate and regular rhythm.      Heart sounds: No murmur heard.  Pulmonary:      Effort: Pulmonary effort is normal. No respiratory distress.      Breath sounds: Normal breath sounds.   Abdominal:      Palpations: Abdomen is soft.      Tenderness: There is no abdominal tenderness.   Musculoskeletal:         General: No swelling.      Cervical back: Normal range of motion and neck supple.      Right lower leg: No edema.      Left lower leg: No edema.   Lymphadenopathy:      Cervical: No cervical adenopathy.   Skin:     General: Skin is warm and dry.      Capillary Refill: Capillary refill takes less than 2 seconds.   Neurological:      General: No focal deficit present.      Mental Status: She is alert.   Psychiatric:         Mood and Affect: Mood normal.         Behavior: Behavior normal.          MONIQUE Sloan

## 2024-02-12 NOTE — PATIENT INSTRUCTIONS
Problem List Items Addressed This Visit          Other    Morbid obesity with BMI of 45.0-49.9, adult (HCC)     Will start phentermine and continue to work on diet. Do recommend 20 minutes of exercise most days. Follow up in 1 month.          Relevant Medications    phentermine 15 MG capsule    Urinary frequency - Primary     Will do trial of vesicare, follow up in 1 month.          Relevant Medications    solifenacin (VESICARE) 5 mg tablet     Other Visit Diagnoses       Ovarian failure due to menopause        dexa scan, will call with results    Relevant Orders    DXA bone density spine hip and pelvis          Medicare Preventive Visit Patient Instructions  Thank you for completing your Welcome to Medicare Visit or Medicare Annual Wellness Visit today. Your next wellness visit will be due in one year (2/12/2025).  The screening/preventive services that you may require over the next 5-10 years are detailed below. Some tests may not apply to you based off risk factors and/or age. Screening tests ordered at today's visit but not completed yet may show as past due. Also, please note that scanned in results may not display below.  Preventive Screenings:  Service Recommendations Previous Testing/Comments   Colorectal Cancer Screening  * Colonoscopy    * Fecal Occult Blood Test (FOBT)/Fecal Immunochemical Test (FIT)  * Fecal DNA/Cologuard Test  * Flexible Sigmoidoscopy Age: 45-75 years old   Colonoscopy: every 10 years (may be performed more frequently if at higher risk)  OR  FOBT/FIT: every 1 year  OR  Cologuard: every 3 years  OR  Sigmoidoscopy: every 5 years  Screening may be recommended earlier than age 45 if at higher risk for colorectal cancer. Also, an individualized decision between you and your healthcare provider will decide whether screening between the ages of 76-85 would be appropriate. Colonoscopy: 09/17/2019  FOBT/FIT: Not on file  Cologuard: Not on file  Sigmoidoscopy: Not on file    Screening Current      Breast Cancer Screening Age: 40+ years old  Frequency: every 1-2 years  Not required if history of left and right mastectomy Mammogram: 08/03/2023    History Breast Cancer   Cervical Cancer Screening Between the ages of 21-29, pap smear recommended once every 3 years.   Between the ages of 30-65, can perform pap smear with HPV co-testing every 5 years.   Recommendations may differ for women with a history of total hysterectomy, cervical cancer, or abnormal pap smears in past. Pap Smear: Not on file    Screening Not Indicated   Hepatitis C Screening Once for adults born between 1945 and 1965  More frequently in patients at high risk for Hepatitis C Hep C Antibody: 09/02/2021    Screening Current   Diabetes Screening 1-2 times per year if you're at risk for diabetes or have pre-diabetes Fasting glucose: 107 mg/dL (8/1/2023)  A1C: 5.5 (1/11/2024)  Screening Current   Cholesterol Screening Once every 5 years if you don't have a lipid disorder. May order more often based on risk factors. Lipid panel: 01/10/2024    Screening Not Indicated  History Lipid Disorder     Other Preventive Screenings Covered by Medicare:  Abdominal Aortic Aneurysm (AAA) Screening: covered once if your at risk. You're considered to be at risk if you have a family history of AAA.  Lung Cancer Screening: covers low dose CT scan once per year if you meet all of the following conditions: (1) Age 55-77; (2) No signs or symptoms of lung cancer; (3) Current smoker or have quit smoking within the last 15 years; (4) You have a tobacco smoking history of at least 20 pack years (packs per day multiplied by number of years you smoked); (5) You get a written order from a healthcare provider.  Glaucoma Screening: covered annually if you're considered high risk: (1) You have diabetes OR (2) Family history of glaucoma OR (3)  aged 50 and older OR (4)  American aged 65 and older  Osteoporosis Screening: covered every 2 years if you meet  one of the following conditions: (1) You're estrogen deficient and at risk for osteoporosis based off medical history and other findings; (2) Have a vertebral abnormality; (3) On glucocorticoid therapy for more than 3 months; (4) Have primary hyperparathyroidism; (5) On osteoporosis medications and need to assess response to drug therapy.   Last bone density test (DXA Scan): 11/19/2019.  HIV Screening: covered annually if you're between the age of 15-65. Also covered annually if you are younger than 15 and older than 65 with risk factors for HIV infection. For pregnant patients, it is covered up to 3 times per pregnancy.    Immunizations:  Immunization Recommendations   Influenza Vaccine Annual influenza vaccination during flu season is recommended for all persons aged >= 6 months who do not have contraindications   Pneumococcal Vaccine   * Pneumococcal conjugate vaccine = PCV13 (Prevnar 13), PCV15 (Vaxneuvance), PCV20 (Prevnar 20)  * Pneumococcal polysaccharide vaccine = PPSV23 (Pneumovax) Adults 19-65 yo with certain risk factors or if 65+ yo  If never received any pneumonia vaccine: recommend Prevnar 20 (PCV20)  Give PCV20 if previously received 1 dose of PCV13 or PPSV23   Hepatitis B Vaccine 3 dose series if at intermediate or high risk (ex: diabetes, end stage renal disease, liver disease)   Respiratory syncytial virus (RSV) Vaccine - COVERED BY MEDICARE PART D  * RSVPreF3 (Arexvy) CDC recommends that adults 60 years of age and older may receive a single dose of RSV vaccine using shared clinical decision-making (SCDM)   Tetanus (Td) Vaccine - COST NOT COVERED BY MEDICARE PART B Following completion of primary series, a booster dose should be given every 10 years to maintain immunity against tetanus. Td may also be given as tetanus wound prophylaxis.   Tdap Vaccine - COST NOT COVERED BY MEDICARE PART B Recommended at least once for all adults. For pregnant patients, recommended with each pregnancy.   Shingles  Vaccine (Shingrix) - COST NOT COVERED BY MEDICARE PART B  2 shot series recommended in those 19 years and older who have or will have weakened immune systems or those 50 years and older     Health Maintenance Due:      Topic Date Due    DXA SCAN  11/19/2021    Lung Cancer Screening  08/01/2024    Breast Cancer Screening: Mammogram  08/03/2024    Colorectal Cancer Screening  09/17/2024    Hepatitis C Screening  Completed     Immunizations Due:      Topic Date Due    COVID-19 Vaccine (4 - 2023-24 season) 09/01/2023     Advance Directives   What are advance directives?  Advance directives are legal documents that state your wishes and plans for medical care. These plans are made ahead of time in case you lose your ability to make decisions for yourself. Advance directives can apply to any medical decision, such as the treatments you want, and if you want to donate organs.   What are the types of advance directives?  There are many types of advance directives, and each state has rules about how to use them. You may choose a combination of any of the following:  Living will:  This is a written record of the treatment you want. You can also choose which treatments you do not want, which to limit, and which to stop at a certain time. This includes surgery, medicine, IV fluid, and tube feedings.   Durable power of  for healthcare (DPAHC):  This is a written record that states who you want to make healthcare choices for you when you are unable to make them for yourself. This person, called a proxy, is usually a family member or a friend. You may choose more than 1 proxy.  Do not resuscitate (DNR) order:  A DNR order is used in case your heart stops beating or you stop breathing. It is a request not to have certain forms of treatment, such as CPR. A DNR order may be included in other types of advance directives.  Medical directive:  This covers the care that you want if you are in a coma, near death, or unable to make  decisions for yourself. You can list the treatments you want for each condition. Treatment may include pain medicine, surgery, blood transfusions, dialysis, IV or tube feedings, and a ventilator (breathing machine).  Values history:  This document has questions about your views, beliefs, and how you feel and think about life. This information can help others choose the care that you would choose.  Why are advance directives important?  An advance directive helps you control your care. Although spoken wishes may be used, it is better to have your wishes written down. Spoken wishes can be misunderstood, or not followed. Treatments may be given even if you do not want them. An advance directive may make it easier for your family to make difficult choices about your care.   Urinary Incontinence   Urinary incontinence (UI)  is when you lose control of your bladder. UI develops because your bladder cannot store or empty urine properly. The 3 most common types of UI are stress incontinence, urge incontinence, or both.  Medicines:   May be given to help strengthen your bladder control. Report any side effects of medication to your healthcare provider.  Do pelvic muscle exercises often:  Your pelvic muscles help you stop urinating. Squeeze these muscles tight for 5 seconds, then relax for 5 seconds. Gradually work up to squeezing for 10 seconds. Do 3 sets of 15 repetitions a day, or as directed. This will help strengthen your pelvic muscles and improve bladder control.  Train your bladder:  Go to the bathroom at set times, such as every 2 hours, even if you do not feel the urge to go. You can also try to hold your urine when you feel the urge to go. For example, hold your urine for 5 minutes when you feel the urge to go. As that becomes easier, hold your urine for 10 minutes.   Self-care:   Keep a UI record.  Write down how often you leak urine and how much you leak. Make a note of what you were doing when you leaked  urine.  Drink liquids as directed. You may need to limit the amount of liquid you drink to help control your urine leakage. Do not drink any liquid right before you go to bed. Limit or do not have drinks that contain caffeine or alcohol.   Prevent constipation.  Eat a variety of high-fiber foods. Good examples are high-fiber cereals, beans, vegetables, and whole-grain breads. Walking is the best way to trigger your intestines to have a bowel movement.  Exercise regularly and maintain a healthy weight.  Weight loss and exercise will decrease pressure on your bladder and help you control your leakage.   Use a catheter as directed  to help empty your bladder. A catheter is a tiny, plastic tube that is put into your bladder to drain your urine.   Go to behavior therapy as directed.  Behavior therapy may be used to help you learn to control your urge to urinate.    Weight Management   Why it is important to manage your weight:  Being overweight increases your risk of health conditions such as heart disease, high blood pressure, type 2 diabetes, and certain types of cancer. It can also increase your risk for osteoarthritis, sleep apnea, and other respiratory problems. Aim for a slow, steady weight loss. Even a small amount of weight loss can lower your risk of health problems.  How to lose weight safely:  A safe and healthy way to lose weight is to eat fewer calories and get regular exercise. You can lose up about 1 pound a week by decreasing the number of calories you eat by 500 calories each day.   Healthy meal plan for weight management:  A healthy meal plan includes a variety of foods, contains fewer calories, and helps you stay healthy. A healthy meal plan includes the following:  Eat whole-grain foods more often.  A healthy meal plan should contain fiber. Fiber is the part of grains, fruits, and vegetables that is not broken down by your body. Whole-grain foods are healthy and provide extra fiber in your diet. Some  examples of whole-grain foods are whole-wheat breads and pastas, oatmeal, brown rice, and bulgur.  Eat a variety of vegetables every day.  Include dark, leafy greens such as spinach, kale, neeraj greens, and mustard greens. Eat yellow and orange vegetables such as carrots, sweet potatoes, and winter squash.   Eat a variety of fruits every day.  Choose fresh or canned fruit (canned in its own juice or light syrup) instead of juice. Fruit juice has very little or no fiber.  Eat low-fat dairy foods.  Drink fat-free (skim) milk or 1% milk. Eat fat-free yogurt and low-fat cottage cheese. Try low-fat cheeses such as mozzarella and other reduced-fat cheeses.  Choose meat and other protein foods that are low in fat.  Choose beans or other legumes such as split peas or lentils. Choose fish, skinless poultry (chicken or turkey), or lean cuts of red meat (beef or pork). Before you cook meat or poultry, cut off any visible fat.   Use less fat and oil.  Try baking foods instead of frying them. Add less fat, such as margarine, sour cream, regular salad dressing and mayonnaise to foods. Eat fewer high-fat foods. Some examples of high-fat foods include french fries, doughnuts, ice cream, and cakes.  Eat fewer sweets.  Limit foods and drinks that are high in sugar. This includes candy, cookies, regular soda, and sweetened drinks.  Exercise:  Exercise at least 30 minutes per day on most days of the week. Some examples of exercise include walking, biking, dancing, and swimming. You can also fit in more physical activity by taking the stairs instead of the elevator or parking farther away from stores. Ask your healthcare provider about the best exercise plan for you.      © Copyright [employer] 2018 Information is for End User's use only and may not be sold, redistributed or otherwise used for commercial purposes. All illustrations and images included in CareNotes® are the copyrighted property of [employer] or [employer]

## 2024-02-28 ENCOUNTER — TELEPHONE (OUTPATIENT)
Dept: FAMILY MEDICINE CLINIC | Facility: CLINIC | Age: 79
End: 2024-02-28

## 2024-02-28 DIAGNOSIS — E78.2 MIXED HYPERLIPIDEMIA: Primary | ICD-10-CM

## 2024-02-28 DIAGNOSIS — E78.2 MIXED HYPERLIPIDEMIA: ICD-10-CM

## 2024-02-28 RX ORDER — ROSUVASTATIN CALCIUM 10 MG/1
10 TABLET, COATED ORAL DAILY
Qty: 90 TABLET | Refills: 3 | Status: SHIPPED | OUTPATIENT
Start: 2024-02-28

## 2024-02-28 NOTE — TELEPHONE ENCOUNTER
Patient called in regards to medication rrosuvastatin 5 mg.  She was told by the Pharmacist that her insurance will not cover for her to take 2 tablets a day. She is wondering if you can possibly change the dose to 1 tablet or if you can override it for the 2 tablets per day. If possible if this is okay please send refill for patient.  Thanks

## 2024-03-04 ENCOUNTER — HOSPITAL ENCOUNTER (OUTPATIENT)
Dept: ULTRASOUND IMAGING | Facility: HOSPITAL | Age: 79
Discharge: HOME | End: 2024-03-04
Attending: UROLOGY
Payer: MEDICARE

## 2024-03-04 DIAGNOSIS — N28.89 RENAL MASS: ICD-10-CM

## 2024-03-04 PROCEDURE — 76775 US EXAM ABDO BACK WALL LIM: CPT

## 2024-03-12 ENCOUNTER — OFFICE VISIT (OUTPATIENT)
Dept: CARDIOLOGY CLINIC | Facility: CLINIC | Age: 79
End: 2024-03-12
Payer: MEDICARE

## 2024-03-12 VITALS
BODY MASS INDEX: 48.9 KG/M2 | SYSTOLIC BLOOD PRESSURE: 128 MMHG | WEIGHT: 276 LBS | HEIGHT: 63 IN | HEART RATE: 100 BPM | DIASTOLIC BLOOD PRESSURE: 80 MMHG

## 2024-03-12 DIAGNOSIS — I10 ESSENTIAL HYPERTENSION: ICD-10-CM

## 2024-03-12 DIAGNOSIS — E78.5 HYPERLIPIDEMIA, UNSPECIFIED HYPERLIPIDEMIA TYPE: ICD-10-CM

## 2024-03-12 DIAGNOSIS — G47.33 OSA ON CPAP: ICD-10-CM

## 2024-03-12 DIAGNOSIS — R06.02 SHORTNESS OF BREATH: Primary | ICD-10-CM

## 2024-03-12 DIAGNOSIS — E66.01 MORBID OBESITY (HCC): ICD-10-CM

## 2024-03-12 PROCEDURE — 99214 OFFICE O/P EST MOD 30 MIN: CPT | Performed by: INTERNAL MEDICINE

## 2024-03-12 NOTE — PROGRESS NOTES
[facility] [address] [address] [phone]                                            Cardiology Office Consult  Ladi Mojica, 78 y.o. female  YOB: 1945  MRN: [id number] Encounter: [id number]      PCP - MONIQUE Sloan  Referring Provider - No ref. provider found    Chief Complaint   Patient presents with   • Follow-up     4 month        Assessment  Shortness of breath   TTE 9/1/2023 reviewed.  LVEF 63%, grade 1 diastolic dysfunction, no significant severe valvular abnormalities  Hyperlipidemia  Hypertension  Hypothyroidism   Left anterior fascicular block  OBI on CPAP  Morbid obesity, Body mass index is 48.89 kg/m².   Wt gain after that 15-20 lbs from inactivity  2018-20: wt was 255-260 lbs  h/o fall in 2021    Plan  Shortness of breath, LAFB, Obesity - Body mass index is 48.89 kg/m².   Overview  11/2023: Slowly progressive shortness of breath  No significant chest pain, although does have some chest pressure on and off  ECG 9/6/2023- Normal sinus rhythm, poor R wave progression, LAFB.  No acute ST-T changes  TTE - LVEF 63%, no significant valvular abnormalities  12/2023: Nuclear Rx: LVEF 74%, small fixed anterior defect related to breast attenuation, no significant ischemia or infarct  3/2024: Shortness of breath is stable/improving  Remains free of chest pain  She was started on CPAP and feels better with this  Impression  Shortness of breath related to obesity/deconditioning/OBI  Plan  Recommend gradually increasing cardio-exercises with walking 10-15 minutes 2-3 times a day  Continue to work on diet modifications and pursue weight loss  Currently on phentermine    Hyperlipidemia, Obesity - Body mass index is 48.89 kg/m².     Cholesterol levels well-controlled, triglycerides mildly elevated  Continue rosuvastatin 10 mg daily  Counseled regarding dietary modifications to lower triglyceride   Weight  loss recommended    Hypertension  Well controlled, 128/80  Continue current therapy with amlodipine 10 mg daily, lisinopril 10 mg daily  Low-salt diet    No results found for this visit on 03/12/24.    No orders of the defined types were placed in this encounter.    Return if symptoms worsen or fail to improve.      History of Present Illness   78 y.o. female comes in as a new patient for consultation regarding symptoms of shortness of breath.    She reports ongoing symptoms of shortness of breath for well over a year, which has slowly progressed.  She does report on and off chest pressure/tightness as well occurring with exertion.  Both the symptoms occurred with exertion while walking on level ground.  She is additionally limited in walking due to back pain issues, and at times the back pain is the more limiting factor for her.    She notes to me that she used to do better and was more active with walking 3 to 4 years ago.  But then she had a fall where she twisted her foot and hit her chest.  After that she has not been able to get back to all the activities and this seems to have led to some weight gain.  She does admit to dietary indiscretions as well, including soda intake, cheese and crackers, and has been working on cutting down on some of these.    She discussed these with her PCP and due to ongoing shortness of breath symptoms she was referred to see cardiology and had a PFT completed yesterday as well.    Interval history - 3/12/2024  She returns for follow-up after about 4 months.  In the interim she completed the nuclear stress test which did not reveal any significant ischemia.  She also completed a sleep study and was found to have moderate sleep apnea and recommended CPAP initiation.  She has started using CPAP and feels much better with this.  Overall shortness of breath is stable/improving.  No complaints of chest pain, palpitations or dizziness      Historical Information   Past Medical History:    Diagnosis Date   • Acute ill-defined cerebrovascular disease    • Arthritis    • Benign essential tremor    • Breast cancer (HCC)     pt states doesn't remember the year thinks it might have been on the right.   • Chronic kidney disease    • CKD (chronic kidney disease) stage 3, GFR 30-59 ml/min (HCC) 09/25/2018   • Colon polyp    • Depressive disorder    • Disease of thyroid gland    • GERD (gastroesophageal reflux disease)    • Heart murmur    • Hyperlipidemia    • Hypertension    • Impaired fasting glucose    • Intention tremor     last assessed: 8/18/2016   • Osteochondropathy    • Panic disorder without agoraphobia with mild panic attacks    • PONV (postoperative nausea and vomiting)    • Psychiatric disorder     anxiety   • Stroke (HCC)    • Tubular adenoma of colon    • Urinary tract infection      Past Surgical History:   Procedure Laterality Date   • AUGMENTATION BREAST      pt states during mammo she doesn't have any other surgery than the 2 biopsy   • BLEPHAROPLASTY      Upper   • BREAST BIOPSY Left     pt doesn't recall when and believes the neg biop was on left   • BREAST LUMPECTOMY W/ NEEDLE LOCALIZATION Left     description: benign last assessed; 8/21/2016   • CHOLECYSTECTOMY     • COLONOSCOPY     • EGD     • HYSTERECTOMY     • IL EXC TUMOR SOFT TISS FACE&SCALP SUBFASCIAL <2CM Left 11/9/2023    Procedure: EXCISION OF EYELID LESION;  Surgeon: Eugene Garnica MD;  Location: [facility];  Service: Plastics   • TUBAL LIGATION       Family History   Problem Relation Age of Onset   • Alcohol abuse Mother    • Asthma Mother    • Cancer Mother    • Mental illness Mother    • Osteoarthritis Mother    • Gout Father    • Cancer Father    • Other Sister         carotid arterial disease   • COPD Sister    • Hyperlipidemia Sister    • Hypertension Sister    • Heart attack Sister    • Breast cancer Sister    • No Known Problems Daughter    • No Known Problems Maternal Grandmother    • No Known Problems  "Paternal Grandmother    • Lung cancer Sister    • No Known Problems Sister    • No Known Problems Daughter    • No Known Problems Daughter      Current Outpatient Medications on File Prior to Visit   Medication Sig Dispense Refill   • amLODIPine (NORVASC) 10 mg tablet TAKE 1 TABLET(10 MG) BY MOUTH DAILY 90 tablet 1   • aspirin 81 mg chewable tablet Chew 81 mg daily     • Cholecalciferol (VITAMIN D3) 50 MCG (2000 UT) TABS Take 2,000 Units by mouth daily     • famotidine (PEPCID) 20 mg tablet Take 1 tablet (20 mg total) by mouth 2 (two) times a day 60 tablet 1   • Iron-Vitamin C (Vitron-C)  MG TABS Take 1 tablet by mouth in the morning 90 tablet 1   • levothyroxine 50 mcg tablet TAKE 1 TABLET(50 MCG) BY MOUTH DAILY 90 tablet 1   • lisinopril (ZESTRIL) 10 mg tablet Take 1 tablet (10 mg total) by mouth daily 90 tablet 1   • pantoprazole (PROTONIX) 40 mg tablet TAKE 1 TABLET(40 MG) BY MOUTH TWICE DAILY BEFORE MEALS 180 tablet 1   • phentermine 15 MG capsule Take 1 capsule (15 mg total) by mouth every morning 30 capsule 0   • potassium chloride (K-DUR,KLOR-CON) 20 mEq tablet Take 1 tablet (20 mEq total) by mouth daily 90 tablet 0   • rosuvastatin (CRESTOR) 10 MG tablet Take 1 tablet (10 mg total) by mouth daily 90 tablet 3   • solifenacin (VESICARE) 5 mg tablet Take 1 tablet (5 mg total) by mouth daily 30 tablet 1     No current facility-administered medications on file prior to visit.     Allergies   Allergen Reactions   • Codeine Other (See Comments)     \"I didn't like the feeling.\"   • Amoxicillin Other (See Comments)     Unsure of allergy   • Oxybutynin Visual Disturbance   • Morphine Other (See Comments)     Does not like feeling     Social History     Socioeconomic History   • Marital status: Single     Spouse name: None   • Number of children: None   • Years of education: None   • Highest education level: None   Occupational History   • Occupation: Retired   Tobacco Use   • Smoking status: Former     Current " "packs/day: 0.00     Average packs/day: 1 pack/day for 50.0 years (50.0 ttl pk-yrs)     Types: Cigarettes     Start date:      Quit date:      Years since quittin.2     Passive exposure: Past   • Smokeless tobacco: Never   • Tobacco comments:     10 years ago   Vaping Use   • Vaping status: Never Used   Substance and Sexual Activity   • Alcohol use: Yes     Comment: occ   • Drug use: Never   • Sexual activity: None   Other Topics Concern   • None   Social History Narrative         Always uses seat belt     Social Determinants of Health     Financial Resource Strain: Low Risk  (2024)    Overall Financial Resource Strain (CARDIA)    • Difficulty of Paying Living Expenses: Not very hard   Food Insecurity: Not on file   Transportation Needs: No Transportation Needs (2024)    PRAPARE - Transportation    • Lack of Transportation (Medical): No    • Lack of Transportation (Non-Medical): No   Physical Activity: Not on file   Stress: Not on file   Social Connections: Not on file   Intimate Partner Violence: Not on file   Housing Stability: Not on file        Review of Systems   All other systems reviewed and are negative.        Vitals:  Vitals:    24 1413   BP: 128/80   Pulse: 100   Weight: 125 kg (276 lb)   Height: 5' 3\" (1.6 m)     BMI - Body mass index is 48.89 kg/m².  Wt Readings from Last 7 Encounters:   24 125 kg (276 lb)   24 126 kg (278 lb)   24 126 kg (278 lb 9.6 oz)   23 126 kg (277 lb)   23 126 kg (277 lb)   23 125 kg (275 lb)   23 125 kg (275 lb)       Physical Exam  Vitals and nursing note reviewed.   Constitutional:       General: She is not in acute distress.     Appearance: Normal appearance. She is well-developed. She is obese. She is not ill-appearing.   HENT:      Head: Normocephalic and atraumatic.      Nose: No congestion.   Eyes:      General: No scleral icterus.     Conjunctiva/sclera: Conjunctivae normal.   Neck:      " "Vascular: No carotid bruit or JVD.   Cardiovascular:      Rate and Rhythm: Normal rate and regular rhythm.      Pulses: Normal pulses.      Heart sounds: Normal heart sounds. No murmur heard.     No friction rub. No gallop.   Pulmonary:      Effort: Pulmonary effort is normal. No respiratory distress.      Breath sounds: Normal breath sounds. No rales.   Abdominal:      General: There is no distension.      Palpations: Abdomen is soft.      Tenderness: There is no abdominal tenderness.   Musculoskeletal:         General: No swelling or tenderness.      Cervical back: Neck supple.      Right lower leg: No edema.      Left lower leg: No edema.   Skin:     General: Skin is warm.   Neurological:      General: No focal deficit present.      Mental Status: She is alert and oriented to person, place, and time. Mental status is at baseline.   Psychiatric:         Mood and Affect: Mood normal.         Behavior: Behavior normal.         Thought Content: Thought content normal.           Labs:  CBC:   Lab Results   Component Value Date    WBC 7.29 01/10/2024    RBC 5.10 01/10/2024    HGB 13.3 01/10/2024    HCT 42.9 01/10/2024    MCV 84 01/10/2024     01/10/2024    RDW 13.9 01/10/2024       CMP:   Lab Results   Component Value Date    K 4.3 01/10/2024     01/10/2024    CO2 29 01/10/2024    BUN 15 01/10/2024    CREATININE 1.37 (H) 01/10/2024    EGFR 36 01/10/2024    CALCIUM 9.2 01/10/2024    AST 18 01/10/2024    ALT 14 01/10/2024    ALKPHOS 76 01/10/2024       Magnesium:  No results found for: \"MG\"    Lipid Profile:   Lab Results   Component Value Date    HDL 50 01/10/2024    TRIG 170 (H) 01/10/2024    LDLCALC 37 01/10/2024       Thyroid Studies:   Lab Results   Component Value Date    LHW5KLMJJIYE 1.634 01/10/2024    T3FREE 2.22 (L) 11/30/2018    FREET4 1.17 04/08/2022       A1c:  No components found for: \"HGA1C\"    INR:  No results found for: \"INR\"5    Imaging: EGD    Result Date: 11/16/2023  Narrative: Table " formatting from the original result was not included.  [facility] [address] [place] [address] [phone] DATE OF SERVICE: 11/16/23 PHYSICIAN(S): Attending: David Lisa DO Fellow: No Staff Documented INDICATION: Constipation, unspecified constipation type, Gastroesophageal reflux disease, unspecified whether esophagitis present POST-OP DIAGNOSIS: See the impression below. PREPROCEDURE: Informed consent was obtained for the procedure, including sedation.  Risks of perforation, hemorrhage, adverse drug reaction and aspiration were discussed. The patient was placed in the left lateral decubitus position. Patient was explained about the risks and benefits of the procedure. Risks including but not limited to bleeding, infection, and perforation were explained in detail. Also explained about less than 100% sensitivity with the exam and other alternatives. PROCEDURE: EGD DETAILS OF PROCEDURE: Patient was taken to the procedure room where a time out was performed to confirm correct patient and correct procedure. The patient underwent monitored anesthesia care, which was administered by an anesthesia professional. The patient's blood pressure, heart rate, level of consciousness, respirations and oxygen were monitored throughout the procedure. The scope was introduced through the mouth and advanced to the second part of the duodenum. Retroflexion was performed in the fundus. The patient experienced no blood loss. The procedure was not difficult. The patient tolerated the procedure well. There were no apparent adverse events. ANESTHESIA INFORMATION: ASA: III Anesthesia Type: IV Sedation with Anesthesia MEDICATIONS: No administrations occurring from 0804 to 0817 on 11/16/23 FINDINGS: The cricopharynx, upper third of the esophagus, middle third of the esophagus, lower third of the esophagus, GE junction and Z-line appeared normal. Z-line is 35 cm from the incisors. The cardia, fundus of  the stomach, body of the stomach, greater curve of the stomach, lesser curve of the stomach, incisura, antrum, prepyloric region and pylorus appeared normal. Medium herniation of gastric fundus into thoracic cavity (type II hiatal hernia) without Zane lesions present - GE junction 37 cm from the incisors, diaphragmatic impression 43 cm from the incisors:  Hill classification: Grade IV The duodenal bulb and 2nd part of the duodenum appeared normal. SPECIMENS: * No specimens in log *     Impression: The cricopharynx, upper third of the esophagus, middle third of the esophagus, lower third of the esophagus, GE junction and Z-line appeared normal. The cardia, fundus of the stomach, body of the stomach, greater curve of the stomach, lesser curve of the stomach, incisura, antrum, prepyloric region and pylorus appeared normal. Medium type II hiatal hernia The duodenal bulb and 2nd part of the duodenum appeared normal. RECOMMENDATION: Famotidine 20 mg po bid   David Lisa DO FACG, FACP     CT renal protocol    Result Date: 11/14/2023  Narrative: CT ABDOMEN AND PELVIS WITH AND WITHOUT IV CONTRAST INDICATION:   N28.89: Other specified disorders of kidney and ureter. COMPARISON: May 6, 2021, August 1, 2023, August 13, 2023 TECHNIQUE: Initial CT of the abdomen and pelvis was performed without intravenous contrast.  Subsequent dynamic CT evaluation of the abdomen and pelvis was performed after the administration of intravenous contrast in both nephrographic and delayed phases. Multiplanar 2D reformatted images were created from the source data. This examination, like all CT scans performed in the [facility], was performed utilizing techniques to minimize radiation dose exposure, including the use of iterative reconstruction and automated exposure control. Radiation dose length product (DLP) for this visit:  4209 mGy-cm IV Contrast:  100 mL of iohexol (OMNIPAQUE) Enteric Contrast:  Enteric contrast was not  administered. FINDINGS: ABDOMEN RIGHT KIDNEY AND URETER: Stable appearance of right mid posterior cortical mass. This currently measures 1.7 x 1.8 cm, showing no significant interval change. As before is noted to have solid enhancement and washout suggesting neoplastic etiology. No significant cystic component. This lesion bulges the renal capsule, approximately 50% of the lesion projects beyond the confines of the kidney. There is no evidence of extension to the renal hilum or central sinus fat. No regional adenopathy. Additional smaller subcentimeter cysts remaining stable. No urothelial lesions identified. No hydronephrosis or hydroureter. No urinary tract calculi. No perinephric collection. LEFT KIDNEY AND URETER: No solid renal mass. Stable exophytic cyst. Stable small minimally hyperdense cyst in the medial upper pole. No detectable urothelial mass. No hydronephrosis or hydroureter. No urinary tract calculi. No perinephric collection. URINARY BLADDER: No bladder wall mass. No calculi. LOWER CHEST:  No clinically significant abnormality identified in the visualized lower chest. Moderate paraesophageal hernia. No significant change. Small adjacent fluid density noted unclear if this represents a small cyst, fluid or gastric diverticulum. Minimally increased compared to 2021. LIVER/BILIARY TREE: Mild central biliary prominence likely related to chronic postcholecystectomy state. Liver otherwise within normal limits. GALLBLADDER: Gallbladder is surgically absent. SPLEEN:  Unremarkable. PANCREAS:  Unremarkable. ADRENAL GLANDS: Stable bilateral adrenal adenomata, on the right 1.5 cm, on the left with mild nodular thickening of the body and posterior limb measuring 1.0 cm. STOMACH AND BOWEL: There is colonic diverticulosis without evidence of acute diverticulitis. APPENDIX:  No findings to suggest appendicitis. ABDOMINOPELVIC CAVITY:  No ascites.  No free intraperitoneal air. No lymphadenopathy. VESSELS: Moderate  calcified atherosclerosis. PELVIS REPRODUCTIVE ORGANS:  Patient is status post hysterectomy. ABDOMINAL WALL/INGUINAL REGIONS:  Unremarkable. OSSEOUS STRUCTURES:  No acute fracture or destructive osseous lesion.     Impression: Right posterior mid cortex mass without suspicious interval change. No evidence for local adenopathy or distant metastatic disease. Stable bilateral adrenal adenomata. Moderate paraesophageal gastric hernia and small adjacent cystlike hypodensity as discussed above. Of doubtful clinical significance. Workstation performed: [id number]       Cardiac testing:   No results found for this or any previous visit.    No results found for this or any previous visit.    No results found for this or any previous visit.    No results found for this or any previous visit.      US kidney and bladder  Narrative: RENAL ULTRASOUND    INDICATION: N28.89: Other specified disorders of kidney and ureter.    COMPARISON: CT abdomen/pelvis 11/8/2023.    TECHNIQUE: Ultrasound of the retroperitoneum was performed with a curvilinear transducer utilizing volumetric sweeps and still imaging techniques.    FINDINGS:    KIDNEYS:  Symmetric and normal size.  Right kidney: 10.5 x 6.1 x 4.6 cm. Volume 156.0 mL  Left kidney: 11.3 x 5.6 x 4.3 cm. Volume 141.1 mL    Right kidney  Normal echogenicity and contour.  Known right mid renal mass is not visualized. There is a 1.3 cm mid renal simple cyst.  No hydronephrosis.  No shadowing calculi.  No perinephric fluid collections.    Left kidney  Normal echogenicity and contour.  No mass is identified. There is a 2.6 cm lower pole simple cyst.  No hydronephrosis.  No shadowing calculi.  No perinephric fluid collections.    URETERS:  Nonvisualized.    BLADDER:  Completely collapsed and not evaluable.  Impression: Known right renal mass is not visualized. CT abdomen with contrast is recommended for subsequent follow-ups.    Workstation performed: [id number]

## 2024-03-13 ENCOUNTER — OFFICE VISIT (OUTPATIENT)
Dept: FAMILY MEDICINE CLINIC | Facility: CLINIC | Age: 79
End: 2024-03-13
Payer: MEDICARE

## 2024-03-13 VITALS
BODY MASS INDEX: 48.73 KG/M2 | HEART RATE: 100 BPM | DIASTOLIC BLOOD PRESSURE: 76 MMHG | SYSTOLIC BLOOD PRESSURE: 122 MMHG | HEIGHT: 63 IN | OXYGEN SATURATION: 97 % | WEIGHT: 275 LBS

## 2024-03-13 DIAGNOSIS — K21.9 GASTROESOPHAGEAL REFLUX DISEASE, UNSPECIFIED WHETHER ESOPHAGITIS PRESENT: ICD-10-CM

## 2024-03-13 DIAGNOSIS — E66.01 MORBID OBESITY WITH BMI OF 45.0-49.9, ADULT (HCC): Primary | ICD-10-CM

## 2024-03-13 DIAGNOSIS — H65.01 NON-RECURRENT ACUTE SEROUS OTITIS MEDIA OF RIGHT EAR: ICD-10-CM

## 2024-03-13 PROCEDURE — 99214 OFFICE O/P EST MOD 30 MIN: CPT

## 2024-03-13 RX ORDER — DOXYCYCLINE HYCLATE 100 MG/1
100 CAPSULE ORAL EVERY 12 HOURS SCHEDULED
Qty: 20 CAPSULE | Refills: 0 | Status: SHIPPED | OUTPATIENT
Start: 2024-03-13 | End: 2024-03-23

## 2024-03-13 RX ORDER — FLUTICASONE PROPIONATE 50 MCG
1 SPRAY, SUSPENSION (ML) NASAL DAILY
Qty: 15.8 ML | Refills: 1 | Status: SHIPPED | OUTPATIENT
Start: 2024-03-13

## 2024-03-13 RX ORDER — FAMOTIDINE 20 MG/1
20 TABLET, FILM COATED ORAL DAILY
Start: 2024-03-13

## 2024-03-13 RX ORDER — TIRZEPATIDE 2.5 MG/.5ML
2.5 INJECTION, SOLUTION SUBCUTANEOUS WEEKLY
Qty: 2 ML | Refills: 0 | Status: SHIPPED | OUTPATIENT
Start: 2024-03-13 | End: 2024-04-10

## 2024-03-13 NOTE — PATIENT INSTRUCTIONS
Problem List Items Addressed This Visit          Digestive    Gastroesophageal reflux disease     Will cut pepcid back to once daily, follow up as needed.          Relevant Medications    famotidine (PEPCID) 20 mg tablet       Other    Morbid obesity with BMI of 45.0-49.9, adult ([facility]) - Primary     Has lost weight, dry mouth and dizziness from phentermine. Will switch to zepbound, given my plate, continue to try to cut back on calories, follow up in 1 month.          Relevant Medications    tirzepatide (Zepbound) 2.5 mg/0.5 mL auto-injector     Other Visit Diagnoses       Non-recurrent acute serous otitis media of right ear        Start antibiotic twice daily, flonase daily, follow up as needed.    Relevant Medications    doxycycline hyclate (VIBRAMYCIN) 100 mg capsule    fluticasone (FLONASE) 50 mcg/act nasal spray

## 2024-03-13 NOTE — ASSESSMENT & PLAN NOTE
Has lost weight, dry mouth and dizziness from phentermine. Will switch to zepbound, given my plate, continue to try to cut back on calories, follow up in 1 month.

## 2024-03-13 NOTE — PROGRESS NOTES
Assessment/Plan:         Problem List Items Addressed This Visit        Digestive    Gastroesophageal reflux disease     Will cut pepcid back to once daily, follow up as needed.          Relevant Medications    famotidine (PEPCID) 20 mg tablet       Other    Morbid obesity with BMI of 45.0-49.9, adult ([facility]) - Primary     Has lost weight, dry mouth and dizziness from phentermine. Will switch to zepbound, given my plate, continue to try to cut back on calories, follow up in 1 month.          Relevant Medications    tirzepatide (Zepbound) 2.5 mg/0.5 mL auto-injector   Other Visit Diagnoses     Non-recurrent acute serous otitis media of right ear        Start antibiotic twice daily, flonase daily, follow up as needed.    Relevant Medications    doxycycline hyclate (VIBRAMYCIN) 100 mg capsule    fluticasone (FLONASE) 50 mcg/act nasal spray            Subjective:      Patient ID: Ladi Mojica is a 78 y.o. female.    Ladi is here for follow up, she is having a lot of dry mouth from the phentermine, she has only lost 3 lbs. She does not like it because she will forget to eat with it. She has been wearing the cpap and likes it.         The following portions of the patient's history were reviewed and updated as appropriate:   Past Medical History:  She has a past medical history of Acute ill-defined cerebrovascular disease, Arthritis, Benign essential tremor, Breast cancer ([facility]), Chronic kidney disease, CKD (chronic kidney disease) stage 3, GFR 30-59 ml/min ([facility]) (09/25/2018), Colon polyp, Depressive disorder, Disease of thyroid gland, GERD (gastroesophageal reflux disease), Heart murmur, Hyperlipidemia, Hypertension, Impaired fasting glucose, Intention tremor, Osteochondropathy, Panic disorder without agoraphobia with mild panic attacks, PONV (postoperative nausea and vomiting), Psychiatric disorder, Stroke ([facility]), Tubular adenoma of colon, and Urinary tract  infection.,  _______________________________________________________________________  Medical Problems:  does not have any pertinent problems on file.,  _______________________________________________________________________  Past Surgical History:   has a past surgical history that includes Hysterectomy; Cholecystectomy; AUGMENTATION BREAST; Breast lumpectomy w/ needle localization (Left); Colonoscopy; EGD; Breast biopsy (Left); Tubal ligation; Blepharoplasty; and pr exc tumor soft tiss face&scalp subfascial <2cm (Left, 11/9/2023).,  _______________________________________________________________________  Family History:  family history includes Alcohol abuse in her mother; Asthma in her mother; Breast cancer in her sister; COPD in her sister; Cancer in her father and mother; Gout in her father; Heart attack in her sister; Hyperlipidemia in her sister; Hypertension in her sister; Lung cancer in her sister; Mental illness in her mother; No Known Problems in her daughter, daughter, daughter, maternal grandmother, paternal grandmother, and sister; Osteoarthritis in her mother; Other in her sister.,  _______________________________________________________________________  Social History:   reports that she quit smoking about 13 years ago. Her smoking use included cigarettes. She started smoking about 63 years ago. She has a 50 pack-year smoking history. She has been exposed to tobacco smoke. She has never used smokeless tobacco. She reports current alcohol use. She reports that she does not use drugs.,  _______________________________________________________________________  Allergies:  is allergic to codeine, amoxicillin, oxybutynin, and morphine..  _______________________________________________________________________  Current Outpatient Medications   Medication Sig Dispense Refill   • doxycycline hyclate (VIBRAMYCIN) 100 mg capsule Take 1 capsule (100 mg total) by mouth every 12 (twelve) hours for 10 days 20  capsule 0   • famotidine (PEPCID) 20 mg tablet Take 1 tablet (20 mg total) by mouth daily     • fluticasone (FLONASE) 50 mcg/act nasal spray 1 spray into each nostril daily 15.8 mL 1   • tirzepatide (Zepbound) 2.5 mg/0.5 mL auto-injector Inject 0.5 mL (2.5 mg total) under the skin once a week for 28 days 2 mL 0   • amLODIPine (NORVASC) 10 mg tablet TAKE 1 TABLET(10 MG) BY MOUTH DAILY 90 tablet 1   • aspirin 81 mg chewable tablet Chew 81 mg daily     • Cholecalciferol (VITAMIN D3) 50 MCG (2000 UT) TABS Take 2,000 Units by mouth daily     • Iron-Vitamin C (Vitron-C)  MG TABS Take 1 tablet by mouth in the morning 90 tablet 1   • levothyroxine 50 mcg tablet TAKE 1 TABLET(50 MCG) BY MOUTH DAILY 90 tablet 1   • lisinopril (ZESTRIL) 10 mg tablet Take 1 tablet (10 mg total) by mouth daily 90 tablet 1   • pantoprazole (PROTONIX) 40 mg tablet TAKE 1 TABLET(40 MG) BY MOUTH TWICE DAILY BEFORE MEALS 180 tablet 1   • potassium chloride (K-DUR,KLOR-CON) 20 mEq tablet Take 1 tablet (20 mEq total) by mouth daily 90 tablet 0   • rosuvastatin (CRESTOR) 10 MG tablet Take 1 tablet (10 mg total) by mouth daily 90 tablet 3   • solifenacin (VESICARE) 5 mg tablet Take 1 tablet (5 mg total) by mouth daily 30 tablet 1     No current facility-administered medications for this visit.     _______________________________________________________________________  Review of Systems   Constitutional:  Negative for chills, diaphoresis and fever.   HENT:  Positive for congestion and ear pain. Negative for sore throat.    Eyes:  Negative for pain and visual disturbance.   Respiratory:  Positive for cough. Negative for chest tightness, shortness of breath and wheezing.    Cardiovascular:  Negative for chest pain and palpitations.   Gastrointestinal:  Negative for abdominal pain, constipation, diarrhea and vomiting.   Genitourinary:  Negative for dysuria, frequency and hematuria.   Musculoskeletal:  Positive for back pain.   Skin:  Negative for  "color change and rash.   Neurological:  Positive for dizziness and headaches. Negative for seizures and syncope.   Psychiatric/Behavioral:  Negative for dysphoric mood and sleep disturbance. The patient is not nervous/anxious.    All other systems reviewed and are negative.        Objective:  Vitals:    03/13/24 1400   BP: 122/76   Pulse: 100   SpO2: 97%   Weight: 125 kg (275 lb)   Height: 5' 3\" (1.6 m)     Body mass index is 48.71 kg/m².     Physical Exam  Vitals and nursing note reviewed.   Constitutional:       General: She is not in acute distress.     Appearance: Normal appearance. She is obese. She is not ill-appearing.   HENT:      Head: Normocephalic.      Right Ear: Tympanic membrane normal. There is no impacted cerumen.      Left Ear: There is no impacted cerumen. Tympanic membrane is erythematous and bulging. Tympanic membrane is not retracted.      Nose: Nose normal. No congestion.      Mouth/Throat:      Mouth: Mucous membranes are moist.      Pharynx: No posterior oropharyngeal erythema.   Eyes:      Extraocular Movements: Extraocular movements intact.      Conjunctiva/sclera: Conjunctivae normal.      Pupils: Pupils are equal, round, and reactive to light.   Cardiovascular:      Rate and Rhythm: Normal rate and regular rhythm.      Heart sounds: Normal heart sounds. No murmur heard.  Pulmonary:      Effort: Pulmonary effort is normal.      Breath sounds: Normal breath sounds. No wheezing.   Abdominal:      Palpations: Abdomen is soft.      Tenderness: There is no abdominal tenderness.   Musculoskeletal:         General: Normal range of motion.      Cervical back: Normal range of motion.      Right lower leg: No edema.      Left lower leg: No edema.   Skin:     General: Skin is warm and dry.   Neurological:      General: No focal deficit present.      Mental Status: She is alert.   Psychiatric:         Mood and Affect: Mood normal.         Behavior: Behavior normal.         "

## 2024-03-18 DIAGNOSIS — R35.0 URINARY FREQUENCY: ICD-10-CM

## 2024-03-18 RX ORDER — SOLIFENACIN SUCCINATE 5 MG/1
5 TABLET, FILM COATED ORAL DAILY
Qty: 90 TABLET | Refills: 0 | Status: SHIPPED | OUTPATIENT
Start: 2024-03-18 | End: 2024-03-20

## 2024-03-19 DIAGNOSIS — I10 ESSENTIAL HYPERTENSION: ICD-10-CM

## 2024-03-19 RX ORDER — AMLODIPINE BESYLATE 10 MG/1
10 TABLET ORAL DAILY
Qty: 90 TABLET | Refills: 1 | Status: SHIPPED | OUTPATIENT
Start: 2024-03-19

## 2024-03-20 DIAGNOSIS — I10 ESSENTIAL HYPERTENSION: ICD-10-CM

## 2024-03-20 DIAGNOSIS — R35.0 URINARY FREQUENCY: ICD-10-CM

## 2024-03-20 RX ORDER — LISINOPRIL 10 MG/1
10 TABLET ORAL DAILY
Qty: 90 TABLET | Refills: 1 | Status: SHIPPED | OUTPATIENT
Start: 2024-03-20

## 2024-03-20 RX ORDER — SOLIFENACIN SUCCINATE 5 MG/1
5 TABLET, FILM COATED ORAL DAILY
Qty: 90 TABLET | Refills: 0 | Status: SHIPPED | OUTPATIENT
Start: 2024-03-20

## 2024-03-27 ENCOUNTER — CONSULT (OUTPATIENT)
Age: 79
End: 2024-03-27
Payer: MEDICARE

## 2024-03-27 VITALS
SYSTOLIC BLOOD PRESSURE: 124 MMHG | BODY MASS INDEX: 49.43 KG/M2 | DIASTOLIC BLOOD PRESSURE: 84 MMHG | HEIGHT: 63 IN | WEIGHT: 279 LBS

## 2024-03-27 DIAGNOSIS — E03.9 HYPOTHYROIDISM, UNSPECIFIED TYPE: ICD-10-CM

## 2024-03-27 DIAGNOSIS — D35.00 ADRENAL ADENOMA, UNSPECIFIED LATERALITY: Primary | ICD-10-CM

## 2024-03-27 PROCEDURE — 99204 OFFICE O/P NEW MOD 45 MIN: CPT | Performed by: INTERNAL MEDICINE

## 2024-03-27 NOTE — PROGRESS NOTES
Ladi Mojica 78 y.o. female MRN: [id number]    Encounter: [id number]      Assessment/Plan     Problem List Items Addressed This Visit          Endocrine    Adrenal adenoma - Primary     Imaging characteristics consistent with benign adenoma  Check 24-hour urine catecholamines and metanephrines-consider Dex suppression in the future         Relevant Orders    Catecholamines, fractionated, urine, 24 hour    Metanephrines Fractionated, urine, 24 hour    Cortisol, Free, Urine, 24 Hour    Creatinine, urine, 24 hour    Basic metabolic panel    Cortisol Level, AM Specimen    DHEA-sulfate    ACTH    Hypothyroidism     Continue levothyroxine at current dose             CC:   Adrenal adenoma    History of Present Illness     HPI:  78-year-old female referred here for evaluation of incidentally discovered adrenal adenoma  History of HTN - 10 years  - well controlled on 2 agents     No episodes of severe headaches, flushing, diaphoresis, palpitations    Weight stable - for years - no muscle weakness ,  dieffoiculty climbing stairs due to knee pain   No falls   No purple striae         Review of Systems    Historical Information   Past Medical History:   Diagnosis Date    Acute ill-defined cerebrovascular disease     Arthritis     Benign essential tremor     Breast cancer (HCC)     pt states doesn't remember the year thinks it might have been on the right.    Chronic kidney disease     CKD (chronic kidney disease) stage 3, GFR 30-59 ml/min (HCC) 09/25/2018    Colon polyp     Depressive disorder     Disease of thyroid gland     GERD (gastroesophageal reflux disease)     Heart murmur     Hyperlipidemia     Hypertension     Impaired fasting glucose     Intention tremor     last assessed: 8/18/2016    Osteochondropathy     Panic disorder without agoraphobia with mild panic attacks     PONV (postoperative nausea and vomiting)     Psychiatric disorder     anxiety    Stroke (HCC)     Tubular adenoma of colon     Urinary tract  infection      Past Surgical History:   Procedure Laterality Date    AUGMENTATION BREAST      pt states during mammo she doesn't have any other surgery than the 2 biopsy    BLEPHAROPLASTY      Upper    BREAST BIOPSY Left     pt doesn't recall when and believes the neg biop was on left    BREAST LUMPECTOMY W/ NEEDLE LOCALIZATION Left     description: benign last assessed; 2016    CHOLECYSTECTOMY      COLONOSCOPY      EGD      HYSTERECTOMY      DC EXC TUMOR SOFT TISS FACE&SCALP SUBFASCIAL <2CM Left 2023    Procedure: EXCISION OF EYELID LESION;  Surgeon: Eugene Garnica MD;  Location: [facility];  Service: Plastics    TUBAL LIGATION       Social History   Social History     Substance and Sexual Activity   Alcohol Use Yes    Comment: occ     Social History     Substance and Sexual Activity   Drug Use Never     Social History     Tobacco Use   Smoking Status Former    Current packs/day: 0.00    Average packs/day: 1 pack/day for 50.0 years (50.0 ttl pk-yrs)    Types: Cigarettes    Start date:     Quit date:     Years since quittin.2    Passive exposure: Past   Smokeless Tobacco Never   Tobacco Comments    10 years ago     Family History:   Family History   Problem Relation Age of Onset    Alcohol abuse Mother     Asthma Mother     Cancer Mother     Mental illness Mother     Osteoarthritis Mother     Gout Father     Cancer Father     Other Sister         carotid arterial disease    COPD Sister     Hyperlipidemia Sister     Hypertension Sister     Heart attack Sister     Breast cancer Sister     No Known Problems Daughter     No Known Problems Maternal Grandmother     No Known Problems Paternal Grandmother     Lung cancer Sister     No Known Problems Sister     No Known Problems Daughter     No Known Problems Daughter        Meds/Allergies   Current Outpatient Medications   Medication Sig Dispense Refill    amLODIPine (NORVASC) 10 mg tablet Take 1 tablet (10 mg total) by mouth daily 90 tablet 1  "   aspirin 81 mg chewable tablet Chew 81 mg daily      Cholecalciferol (VITAMIN D3) 50 MCG (2000 UT) TABS Take 2,000 Units by mouth daily      famotidine (PEPCID) 20 mg tablet Take 1 tablet (20 mg total) by mouth daily      fluticasone (FLONASE) 50 mcg/act nasal spray 1 spray into each nostril daily 15.8 mL 1    Iron-Vitamin C (Vitron-C)  MG TABS Take 1 tablet by mouth in the morning 90 tablet 1    levothyroxine 50 mcg tablet TAKE 1 TABLET(50 MCG) BY MOUTH DAILY 90 tablet 1    lisinopril (ZESTRIL) 10 mg tablet Take 1 tablet (10 mg total) by mouth daily 90 tablet 1    pantoprazole (PROTONIX) 40 mg tablet TAKE 1 TABLET(40 MG) BY MOUTH TWICE DAILY BEFORE MEALS 180 tablet 1    potassium chloride (K-DUR,KLOR-CON) 20 mEq tablet Take 1 tablet (20 mEq total) by mouth daily 90 tablet 0    rosuvastatin (CRESTOR) 10 MG tablet Take 1 tablet (10 mg total) by mouth daily 90 tablet 3    solifenacin (VESICARE) 5 mg tablet TAKE 1 TABLET(5 MG) BY MOUTH DAILY 90 tablet 0    tirzepatide (Zepbound) 2.5 mg/0.5 mL auto-injector Inject 0.5 mL (2.5 mg total) under the skin once a week for 28 days (Patient not taking: Reported on 3/27/2024) 2 mL 0     No current facility-administered medications for this visit.     Allergies   Allergen Reactions    Codeine Other (See Comments)     \"I didn't like the feeling.\"    Amoxicillin Other (See Comments)     Unsure of allergy    Oxybutynin Visual Disturbance    Morphine Other (See Comments)     Does not like feeling       Objective   Vitals: Blood pressure 124/84, height 5' 3\" (1.6 m), weight 127 kg (279 lb), not currently breastfeeding.    Physical Exam  Vitals reviewed.   Constitutional:       General: She is not in acute distress.     Appearance: Normal appearance. She is obese. She is not ill-appearing, toxic-appearing or diaphoretic.   HENT:      Head: Normocephalic and atraumatic.   Eyes:      General: No scleral icterus.     Extraocular Movements: Extraocular movements intact. "   Cardiovascular:      Rate and Rhythm: Normal rate and regular rhythm.      Heart sounds: Normal heart sounds. No murmur heard.  Pulmonary:      Effort: Pulmonary effort is normal. No respiratory distress.      Breath sounds: Normal breath sounds. No wheezing or rales.   Musculoskeletal:      Cervical back: Neck supple.      Right lower leg: No edema.      Left lower leg: No edema.   Lymphadenopathy:      Cervical: No cervical adenopathy.   Skin:     General: Skin is warm and dry.   Neurological:      General: No focal deficit present.      Mental Status: She is alert and oriented to person, place, and time.      Gait: Gait normal.   Psychiatric:         Mood and Affect: Mood normal.         Behavior: Behavior normal.         Thought Content: Thought content normal.         Judgment: Judgment normal.         The history was obtained from the review of the chart, patient.    Lab Results:   Lab Results   Component Value Date/Time    Potassium 4.3 01/10/2024 02:30 PM    Potassium 3.6 11/03/2023 02:39 PM    Potassium 4.0 08/01/2023 02:12 PM    Chloride 108 01/10/2024 02:30 PM    Chloride 107 11/03/2023 02:39 PM    Chloride 106 08/01/2023 02:12 PM    CO2 29 01/10/2024 02:30 PM    CO2 29 11/03/2023 02:39 PM    CO2 29 08/01/2023 02:12 PM    BUN 15 01/10/2024 02:30 PM    BUN 12 11/03/2023 02:39 PM    BUN 14 08/01/2023 02:12 PM    Creatinine 1.37 (H) 01/10/2024 02:30 PM    Creatinine 1.46 (H) 11/03/2023 02:39 PM    Creatinine 1.37 (H) 08/01/2023 02:12 PM    Glucose, Fasting 107 (H) 08/01/2023 02:12 PM    Glucose, Fasting 108 (H) 03/31/2023 09:36 AM    Calcium 9.2 01/10/2024 02:30 PM    Calcium 9.2 11/03/2023 02:39 PM    Calcium 9.9 08/01/2023 02:12 PM    eGFR 36 01/10/2024 02:30 PM    eGFR 33 11/03/2023 02:39 PM    eGFR 36 08/01/2023 02:12 PM    TSH 3RD GENERATON 1.634 01/10/2024 02:30 PM    TSH 3RD GENERATON 2.953 03/31/2023 09:36 AM             Imaging Studies:    Study Result    Narrative & Impression   MRI - ABDOMEN -  WITH AND WITHOUT CONTRAST     INDICATION: 78 years / Female  N28.89: Other specified disorders of kidney and ureter. Suspicious right renal lesion     COMPARISON: CTA of the chest abdomen and pelvis August 2023, CT abdomen May 2021     TECHNIQUE:  Multiplanar/multisequence MRI of the abdomen was performed before and after administration of contrast.     IV Contrast:  12 mL of Gadobutrol injection (SINGLE-DOSE)     FINDINGS:     LOWER CHEST:   Hiatal hernia. Small amount of fluid posterior to the hernia sac.     LIVER:  Normal in size and configuration.  No suspicious mass.  The hepatic veins and portal veins are patent.     BILE DUCTS: No intrahepatic or extrahepatic bile duct dilation.     GALLBLADDER: Status post cholecystectomy.     PANCREAS:  Unremarkable.     ADRENAL GLANDS:  1.5 cm right adrenal nodule which shows significant signal loss on the out of phase sequences, consistent with adenoma.  Normal left adrenal gland.     SPLEEN:  Normal.     KIDNEYS/PROXIMAL URETERS:  No hydroureteronephrosis.  Heterogeneous T2 hyperintense mass in the posterior interpolar region of the right kidney, corresponding to the finding on CT.  No restricted diffusion.  There is marked associated enhancement consistent with a solid lesion. It measures 1.5 x 1.5 x 1.7 cm series 12, 96, series 14, 39.  1.1 cm cyst anterior upper pole left kidney series 8/21 measuring 1.2 cm. It shows mild hyperintensity on T1-weighted sequences consistent with hemorrhage or proteinaceous contents.  There is no associated enhancement.  2.6 cm simple cyst lower pole left kidney.  Additional subcentimeter bilateral renal cysts.  Patent renal veins and inferior vena cava. No retroperitoneal adenopathy.  BOWEL:   No dilated loops of bowel.     PERITONEUM/RETROPERITONEUM: No mass.  No ascites.     LYMPH NODES: No abdominal lymphadenopathy.     VASCULAR STRUCTURES:  No aneurysm.     ABDOMINAL WALL:  Unremarkable.     OSSEOUS STRUCTURES:  No suspicious  "osseous lesion.        IMPRESSION:     1. 1.7 cm enhancing mass in the interpolar region right kidney highly suggestive for neoplasm, corresponding to the finding on CT.  No retroperitoneal adenopathy or renal vein thrombosis.  2. Hemorrhagic or proteinaceous left upper pole renal cyst with multiple additional simple renal cysts.  3. 1.5 cm right adrenal adenoma.  4. Large hiatal hernia.  The study was marked in EPIC for immediate notification.                 I have personally reviewed pertinent reports.      Portions of the record may have been created with voice recognition software. Occasional wrong word or \"sound a like\" substitutions may have occurred due to the inherent limitations of voice recognition software. Read the chart carefully and recognize, using context, where substitutions have occurred.    "

## 2024-03-29 PROBLEM — D35.00 ADRENAL ADENOMA: Status: ACTIVE | Noted: 2024-03-29

## 2024-03-29 NOTE — ASSESSMENT & PLAN NOTE
Imaging characteristics consistent with benign adenoma  Check 24-hour urine catecholamines and metanephrines-consider Dex suppression in the future

## 2024-04-01 ENCOUNTER — APPOINTMENT (OUTPATIENT)
Dept: LAB | Facility: HOSPITAL | Age: 79
End: 2024-04-01

## 2024-04-02 ENCOUNTER — TELEPHONE (OUTPATIENT)
Dept: NEPHROLOGY | Facility: CLINIC | Age: 79
End: 2024-04-02

## 2024-04-02 NOTE — TELEPHONE ENCOUNTER
Called spoke with patient advised patient to get non-fasting labs done 1 week prior to 04/09/24 follow up appointment with MONIQUE Sol.  patient verbalized understanding and is okay with it. lab orders in [facility].

## 2024-04-03 ENCOUNTER — TELEPHONE (OUTPATIENT)
Age: 79
End: 2024-04-03

## 2024-04-03 ENCOUNTER — APPOINTMENT (OUTPATIENT)
Dept: LAB | Facility: HOSPITAL | Age: 79
End: 2024-04-03
Payer: MEDICARE

## 2024-04-03 DIAGNOSIS — D50.9 IRON DEFICIENCY ANEMIA, UNSPECIFIED IRON DEFICIENCY ANEMIA TYPE: ICD-10-CM

## 2024-04-03 DIAGNOSIS — N18.32 STAGE 3B CHRONIC KIDNEY DISEASE (HCC): ICD-10-CM

## 2024-04-03 DIAGNOSIS — D35.00 ADRENAL ADENOMA, UNSPECIFIED LATERALITY: ICD-10-CM

## 2024-04-03 LAB
ALBUMIN SERPL BCP-MCNC: 3.7 G/DL (ref 3.5–5)
ALP SERPL-CCNC: 78 U/L (ref 34–104)
ALT SERPL W P-5'-P-CCNC: 12 U/L (ref 7–52)
ANION GAP SERPL CALCULATED.3IONS-SCNC: 5 MMOL/L (ref 4–13)
AST SERPL W P-5'-P-CCNC: 14 U/L (ref 13–39)
BASOPHILS # BLD AUTO: 0.03 THOUSANDS/ÂΜL (ref 0–0.1)
BASOPHILS NFR BLD AUTO: 1 % (ref 0–1)
BILIRUB SERPL-MCNC: 0.62 MG/DL (ref 0.2–1)
BUN SERPL-MCNC: 15 MG/DL (ref 5–25)
CALCIUM SERPL-MCNC: 9.4 MG/DL (ref 8.4–10.2)
CHLORIDE SERPL-SCNC: 108 MMOL/L (ref 96–108)
CO2 SERPL-SCNC: 30 MMOL/L (ref 21–32)
CORTIS AM PEAK SERPL-MCNC: 14.8 UG/DL (ref 6.7–22.6)
CREAT 24H UR-MRATE: 1 G/24HR (ref 0.6–1.8)
CREAT SERPL-MCNC: 1.26 MG/DL (ref 0.6–1.3)
EOSINOPHIL # BLD AUTO: 0.19 THOUSAND/ÂΜL (ref 0–0.61)
EOSINOPHIL NFR BLD AUTO: 3 % (ref 0–6)
ERYTHROCYTE [DISTWIDTH] IN BLOOD BY AUTOMATED COUNT: 14.7 % (ref 11.6–15.1)
GFR SERPL CREATININE-BSD FRML MDRD: 40 ML/MIN/1.73SQ M
GLUCOSE P FAST SERPL-MCNC: 104 MG/DL (ref 65–99)
HCT VFR BLD AUTO: 44.8 % (ref 34.8–46.1)
HGB BLD-MCNC: 14 G/DL (ref 11.5–15.4)
IMM GRANULOCYTES # BLD AUTO: 0.02 THOUSAND/UL (ref 0–0.2)
IMM GRANULOCYTES NFR BLD AUTO: 0 % (ref 0–2)
LYMPHOCYTES # BLD AUTO: 1.79 THOUSANDS/ÂΜL (ref 0.6–4.47)
LYMPHOCYTES NFR BLD AUTO: 28 % (ref 14–44)
MCH RBC QN AUTO: 26.8 PG (ref 26.8–34.3)
MCHC RBC AUTO-ENTMCNC: 31.3 G/DL (ref 31.4–37.4)
MCV RBC AUTO: 86 FL (ref 82–98)
MONOCYTES # BLD AUTO: 0.46 THOUSAND/ÂΜL (ref 0.17–1.22)
MONOCYTES NFR BLD AUTO: 7 % (ref 4–12)
NEUTROPHILS # BLD AUTO: 3.83 THOUSANDS/ÂΜL (ref 1.85–7.62)
NEUTS SEG NFR BLD AUTO: 61 % (ref 43–75)
NRBC BLD AUTO-RTO: 0 /100 WBCS
PLATELET # BLD AUTO: 196 THOUSANDS/UL (ref 149–390)
PMV BLD AUTO: 11.1 FL (ref 8.9–12.7)
POTASSIUM SERPL-SCNC: 4.1 MMOL/L (ref 3.5–5.3)
PROT SERPL-MCNC: 6.8 G/DL (ref 6.4–8.4)
RBC # BLD AUTO: 5.23 MILLION/UL (ref 3.81–5.12)
SODIUM SERPL-SCNC: 143 MMOL/L (ref 135–147)
SPECIMEN VOL UR: 625 ML
WBC # BLD AUTO: 6.32 THOUSAND/UL (ref 4.31–10.16)

## 2024-04-03 PROCEDURE — 82384 ASSAY THREE CATECHOLAMINES: CPT

## 2024-04-03 PROCEDURE — 82533 TOTAL CORTISOL: CPT

## 2024-04-03 PROCEDURE — 80053 COMPREHEN METABOLIC PANEL: CPT

## 2024-04-03 PROCEDURE — 82570 ASSAY OF URINE CREATININE: CPT

## 2024-04-03 PROCEDURE — 82627 DEHYDROEPIANDROSTERONE: CPT

## 2024-04-03 PROCEDURE — 85025 COMPLETE CBC W/AUTO DIFF WBC: CPT

## 2024-04-03 PROCEDURE — 82024 ASSAY OF ACTH: CPT

## 2024-04-03 PROCEDURE — 36415 COLL VENOUS BLD VENIPUNCTURE: CPT

## 2024-04-03 PROCEDURE — 82530 CORTISOL FREE: CPT

## 2024-04-03 PROCEDURE — 83835 ASSAY OF METANEPHRINES: CPT

## 2024-04-03 NOTE — TELEPHONE ENCOUNTER
Pt calling to see if she needs any blood work for her lizette with Dr. Marshall.    Pt scheduled with him on 4/8/24    Please call pt back if she does at [phone]

## 2024-04-03 NOTE — TELEPHONE ENCOUNTER
Spoke to patient and reviewed last office note regarding blood work question. Per last office note she was referred to endocrinology for adrenal metabolic workup. Blood work is resulted in Epic for date of 4/3/2024. Ultrasound completed as well. Please advise if any other testing is needed for her upcoming appointment.

## 2024-04-04 LAB
ACTH PLAS-MCNC: 20.4 PG/ML (ref 7.2–63.3)
DHEA-S SERPL-MCNC: 11.9 UG/DL (ref 13.9–142.8)

## 2024-04-05 NOTE — PROGRESS NOTES
NEPHROLOGY OFFICE NOTE    Patient: Ladi Mojica               Sex: female           YOB: 1945        Age:  78 y.o.       4/9/2024      BACKGROUND     I had the pleasure of seeing Ladi Mojica in the nephrology office today for follow-up. She has a past medical history significant for hypertension, GERD, hypothyroidism, sleep apnea, COPD, and chronic kidney disease. She is following with our office for management of chronic kidney disease.      She has underlying hypertension, currently maintained on amlodipine and lisinopril.  Reports good blood pressure readings, she is not monitoring at home presently.     She is following with Dr. Mckenzie from Cardiology, she was referred for further work-up of shortness of breath. TTE 9/1/2023 showed an ejection fraction of 63% with grade 1 diastolic dysfunction. She underwent Lexiscan stress testing revealing a small fixed anterior defect related to breast attenuation with no significant ischemia or infarct. She does have underlying hyperlipidemia and maintained on statin therapy.      She was also evaluated by Pulmonary for shortness of breath with scheduled PFTs. She is maintained on Advair inhaler.      She has GERD, maintained on pantoprazole and famotidine.      She was found to have a 1.7 cm enhancing mass in the interpolar region of the right kidney highly suggestive for neoplasm. This was first noted on CT scan 8/1/2023. She has had a follow-up MRI on 8/13 as well as CT renal protocol on 11/8/2023 and referred to Urology.      She was seen by Dr. Marshall on on 4/8/2024. According to the patient and medical record, she is undergoing active surveillance with MRI scheduled for 6 months.     She was also noted to have an adrenal adenoma and was referred to Endocrinology for further work-up and evaluation.  Her workup has been negative today and she is awaiting on 24-hour urine studies to be finalized.    She presents with her sister for  follow-up.  Reports she has been doing overall well since her last office visit.  Avoiding NSAIDs.    Most recent labs were obtained on 4/3/2024, which we have reviewed together.     SUBJECTIVE     She currently has no complaints at this time and is feeling well. Patient denies any chest pain, shortness of breath, or swelling.    REVIEW OF SYSTEMS     Review of Systems   Constitutional:  Negative for chills and fever.   HENT:  Negative for trouble swallowing.    Respiratory:  Negative for shortness of breath.    Cardiovascular:  Negative for leg swelling.   Gastrointestinal:  Negative for constipation, diarrhea, nausea and vomiting.   Genitourinary:  Negative for difficulty urinating, dysuria, frequency and hematuria.   Musculoskeletal:  Negative for back pain.   Skin:  Negative for pallor.   Neurological:  Negative for dizziness, syncope, weakness and light-headedness.   Psychiatric/Behavioral:  Negative for sleep disturbance. The patient is not nervous/anxious.        OBJECTIVE     Current Weight: Weight - Scale: 127 kg (280 lb)  Vitals:    04/09/24 1452   BP: 118/76   Pulse: 90   Resp: 18   Temp: 98.4 °F (36.9 °C)   SpO2: 98%     Body mass index is 49.6 kg/m².    CURRENT MEDICATIONS       Current Outpatient Medications:     ALPRAZolam (XANAX) 2 MG tablet, Take 1 tablet (2 mg total) by mouth once as needed for anxiety (take 1 hour before MRI to relax) for up to 1 dose, Disp: 1 tablet, Rfl: 0    amLODIPine (NORVASC) 10 mg tablet, Take 1 tablet (10 mg total) by mouth daily, Disp: 90 tablet, Rfl: 1    aspirin 81 mg chewable tablet, Chew 81 mg daily, Disp: , Rfl:     Cholecalciferol (VITAMIN D3) 50 MCG (2000 UT) TABS, Take 2,000 Units by mouth daily, Disp: , Rfl:     famotidine (PEPCID) 20 mg tablet, Take 1 tablet (20 mg total) by mouth daily, Disp: , Rfl:     fluticasone (FLONASE) 50 mcg/act nasal spray, 1 spray into each nostril daily, Disp: 15.8 mL, Rfl: 1    Iron-Vitamin C (Vitron-C)  MG TABS, Take 1 tablet  by mouth in the morning, Disp: 90 tablet, Rfl: 1    levothyroxine 50 mcg tablet, TAKE 1 TABLET(50 MCG) BY MOUTH DAILY, Disp: 90 tablet, Rfl: 1    lisinopril (ZESTRIL) 10 mg tablet, Take 1 tablet (10 mg total) by mouth daily, Disp: 90 tablet, Rfl: 1    pantoprazole (PROTONIX) 40 mg tablet, TAKE 1 TABLET(40 MG) BY MOUTH TWICE DAILY BEFORE MEALS, Disp: 180 tablet, Rfl: 1    potassium chloride (K-DUR,KLOR-CON) 20 mEq tablet, Take 1 tablet (20 mEq total) by mouth daily, Disp: 90 tablet, Rfl: 0    rosuvastatin (CRESTOR) 10 MG tablet, Take 1 tablet (10 mg total) by mouth daily, Disp: 90 tablet, Rfl: 3    solifenacin (VESICARE) 5 mg tablet, TAKE 1 TABLET(5 MG) BY MOUTH DAILY, Disp: 90 tablet, Rfl: 0    tirzepatide (Zepbound) 2.5 mg/0.5 mL auto-injector, Inject 0.5 mL (2.5 mg total) under the skin once a week for 28 days, Disp: 2 mL, Rfl: 0    PHYSICAL EXAMINATION     Physical Exam  Vitals reviewed.   Constitutional:       General: She is not in acute distress.  HENT:      Head: Normocephalic.      Mouth/Throat:      Lips: Pink.      Mouth: Mucous membranes are moist.   Eyes:      General: Lids are normal. No scleral icterus.  Cardiovascular:      Rate and Rhythm: Normal rate and regular rhythm.      Heart sounds: S1 normal and S2 normal.   Pulmonary:      Effort: Pulmonary effort is normal. No accessory muscle usage or respiratory distress.      Breath sounds: Normal breath sounds.   Abdominal:      General: There is no distension.      Tenderness: There is no abdominal tenderness.   Musculoskeletal:      Cervical back: Normal range of motion and neck supple. No tenderness.      Right lower leg: No edema.      Left lower leg: No edema.   Skin:     General: Skin is warm.      Coloration: Skin is not cyanotic or jaundiced.   Neurological:      General: No focal deficit present.      Mental Status: She is alert and oriented to person, place, and time.   Psychiatric:         Attention and Perception: Attention normal.          Speech: Speech normal.         Behavior: Behavior is cooperative.           LAB RESULTS     Results from last 7 days   Lab Units 04/03/24  0801   WBC Thousand/uL 6.32   HEMOGLOBIN g/dL 14.0   HEMATOCRIT % 44.8   PLATELETS Thousands/uL 196   SODIUM mmol/L 143   POTASSIUM mmol/L 4.1   CHLORIDE mmol/L 108   CO2 mmol/L 30   BUN mg/dL 15   CREATININE mg/dL 1.26   EGFR ml/min/1.73sq m 40   CALCIUM mg/dL 9.4         RADIOLOGY RESULTS      No results found for this or any previous visit.    Results for orders placed during the hospital encounter of 02/28/20    XR chest pa & lateral    Narrative  CHEST    INDICATION:   R53.83: Other fatigue  Z85.3: Personal history of malignant neoplasm of breast  R05: Cough.    COMPARISON:  Chest CT from 5/23/2019.    EXAM PERFORMED/VIEWS:  XR CHEST PA & LATERAL  DUAL ENERGY SUBTRACTION TECHNIQUE      FINDINGS:    Cardiomediastinal silhouette appears unremarkable. Redemonstration of moderate hiatal hernia.    The lungs are clear.  No pneumothorax or pleural effusion.    Osseous structures appear within normal limits for patient age.    Cholecystectomy. Clips in the right anterior chest wall.    Impression  No acute cardiopulmonary disease.        Workstation performed: [id number]    CT renal protocol 11/8/2023:  RIGHT KIDNEY AND URETER:  Stable appearance of right mid posterior cortical mass. This currently measures 1.7 x 1.8 cm, showing no significant interval change. As before is noted to have solid enhancement and washout suggesting neoplastic etiology. No significant cystic   component. This lesion bulges the renal capsule, approximately 50% of the lesion projects beyond the confines of the kidney. There is no evidence of extension to the renal hilum or central sinus fat. No regional adenopathy. Additional smaller   subcentimeter cysts remaining stable. No urothelial lesions identified.  No hydronephrosis or hydroureter.  No urinary tract calculi. No perinephric collection.     LEFT  KIDNEY AND URETER:  No solid renal mass. Stable exophytic cyst. Stable small minimally hyperdense cyst in the medial upper pole. No detectable urothelial mass.  No hydronephrosis or hydroureter.  No urinary tract calculi. No perinephric collection.    ASSESSMENT/PLAN     Chronic Kidney Disease Stage 3:  After review of the medical record, it appears baseline creatinine levels are fluctuating around 1.2 - 1.4 dating back through 2017.     Patient has a 1.7 x 1.8 cm right mid posterior cortical mass, no evidence of distant metastatic disease on most recent CT 11/8/2023. The lesion bulges the renal capsule, approximately 50% of the lesion projects beyond the confines of the kidney. Additional smaller subcentimeter cysts remaining stable. Stable exophytic cyst noted in the left kidney.      Highly suspect etiology of chronic kidney disease is multifactorial in the setting of hypertensive nephrosclerosis, age-related nephron loss, and nephron loss with documented renal mass.     Current creatinine level is 1.26 with an eGFR of 40. No updated urinalysis or urine microalbumin to creatinine level obtained prior to follow-up.     She clinically appears euvolemic advise she increase her hydration with a minimum of 40 ounces daily and continue to avoid nephrotoxic agents such as NSAIDs.     Renal Lesion:  Patient has a 1.7 x 1.8 cm right mid posterior cortical mass, no evidence of distant metastatic disease on most recent CT 11/8/2023. The lesion bulges the renal capsule, approximately 50% of the lesion projects beyond the confines of the kidney, concerning for neoplasm.      She is following with Urology, active surveillance at this time with follow-up imaging scheduled.      Hypertension:  Currently maintained on amlodipine 10 mg daily and lisinopril 10 mg daily. Blood pressure in acceptable range on the current regimen.    Recommend nephrology follow-up in 6 months. Will obtain CKD labs prior to appointment.     Yani  "MONIQUE Bender  Nephrology  4/9/2024      Portions of the record may have been created with voice recognition software. Occasional wrong word or \"sound a like\" substitutions may have occurred due to the inherent limitations of voice recognition software. Read the chart carefully and recognize, using context, where substitutions have occurred.   "

## 2024-04-08 ENCOUNTER — OFFICE VISIT (OUTPATIENT)
Dept: UROLOGY | Facility: CLINIC | Age: 79
End: 2024-04-08
Payer: MEDICARE

## 2024-04-08 VITALS
TEMPERATURE: 97.7 F | HEART RATE: 84 BPM | OXYGEN SATURATION: 96 % | SYSTOLIC BLOOD PRESSURE: 118 MMHG | DIASTOLIC BLOOD PRESSURE: 62 MMHG | HEIGHT: 63 IN | BODY MASS INDEX: 48.55 KG/M2 | WEIGHT: 274 LBS

## 2024-04-08 DIAGNOSIS — N28.89 RENAL MASS: Primary | ICD-10-CM

## 2024-04-08 PROCEDURE — 99214 OFFICE O/P EST MOD 30 MIN: CPT | Performed by: UROLOGY

## 2024-04-08 RX ORDER — ALPRAZOLAM 2 MG/1
2 TABLET ORAL ONCE AS NEEDED
Qty: 1 TABLET | Refills: 0 | Status: SHIPPED | OUTPATIENT
Start: 2024-04-08

## 2024-04-08 NOTE — PATIENT INSTRUCTIONS
You will see me in around 6-7 months. Please get your MRI done at least 2 weeks before you see me.    You will take your relaxation medicine Xanax 1 hour before the MRI. You will need a  there and back.

## 2024-04-08 NOTE — PROGRESS NOTES
UROLOGY FOLLOW-UP ENCOUNTER    Ladi Mojica is a 78 y.o. female initially referred for renal lesion.    Pertinent PMH/PSH: CKD stage 3 (8/1/2023: eGFR 36, Cr 1.37); breast cancer s/p radiation    CTA CAP 8/1/23 (performed for SOB): no PE, 1.7 cm enhancing solid R renal mass worrisome for neoplasm; indeterminate 1 cm L anterior upper pole lesion possibly complicated cyst    MRI abdomen w/ wo contrast 8/13/23: 1.7 cm enhancing mass in interpolar region of R kidney highly suggestive for neoplasm; no RP LAD, no renal vein thrombosis; hemorrhagic or proteinaceous LUP renal cyst with multiple additional simple cysts; 1.5 cm R adrenal adenoma    Denied hematuria, flank pain, headaches, BP problems.    CTU 11/8/23: Right 1.5 cm adrenal adenoma, 1.0 cm left adrenal adenoma, both stable; 1.8 cm right mid posterior cortical mass right kidney, no significant interval change, over 50% of the lesion projects beyond the confines of the kidney    Endo 3/27/24: ACTH neg, DHEA neg, cortisol neg; urine metanephrines pending    RBUS on 3/4/24: R renal mass not well visualized        Assessment and plan:     Renal lesion    Patient had CT chest abdomen pelvis on 8/1/2023 which demonstrated 1.7 cm enhancing mass in the right kidney as well as 1 cm left anterior upper pole lesion possibly complicated cyst.    She had additional MRI abdomen with and without contrast on 8/13/2023 which showed a 1.7 cm enhancing mass in the interpolar region of the right kidney highly suggestive of neoplasm.  She also had a 1.5 cm of the right adrenal adenoma.     CT urogram from 11/8/2023 was reviewed with the patient.  Explained that this demonstrated stable right renal lesion.  Explained that the ultrasound from 3/4/2024 did not properly visualize the mass.  We will therefore have to move forward with either MRI or CT for surveillance in the future.    Since the mass was stable on imaging, we will continue surveillance.  We will get MRI abdomen  with and without contrast in about 6 months.  She admitted to severe anxiety and claustrophobia during her last MRI.  I did send her 2 mg of Xanax to take 1 hour before her MRI.  She knows that she needs to have a  to and from the MRI since she will be taking a benzodiazepine.  Side effects of the medication were reviewed.      If her mass is still stable in size after the MRI, we can stretch out her imaging to once a year.      Adrenal adenoma    Upon personal review of the imaging, patient noted to have 1.5 cm right adrenal adenoma.  She does not believe she ever had an endocrinology work-up for this.  She is not seen an endocrinologist.  I explained to her that she would need referral to endocrinologist that she could have a metabolic work-up of her adrenal lesion.     As instructed, she saw endocrinology for workup of her bilateral adrenal adenomas.    She has done a adrenal adenoma workup with endocrine.  So far everything is negative except for the urine metanephrines, which are pending.  She will continue follow-up with endocrine regarding this issue.  I explained to her that she likely will not need any further testing once the urine metanephrines are considered negative.    Stage 3 CKD    Follows with nephrology.      CKD (chronic kidney disease) stage 3, GFR 30-59 ml/min ([facility])              Lab Results   Component Value Date     EGFR 36 01/10/2024     EGFR 33 11/03/2023     EGFR 36 08/01/2023     CREATININE 1.37 (H) 01/10/2024     CREATININE 1.46 (H) 11/03/2023     CREATININE 1.37 (H) 08/01/2023                 PLAN  -Cont Vesicare  -Undergoing endo mejía for adrenal. Neg except for pending urine metanephrine.  -Cont fu with nephrology for CKD  -Will get MRI ab w wo in about 6 months  -Rx for Xanax sent for 1 hour before MRI (has anxiety). Knows to have  to and from MRI since she will take Xanax.          Portions of the above record have been created with voice recognition software.  Occasional  "wrong word or \"sound alike\" substitution may have occurred due to the inherent limitations of voice recognition software.  Read the chart carefully and recognize, using context, where substitution may have occurred.      David Marshall DO        Chief Complaint     Renal lesion      History of Present Illness     See summary above    No fevers or chills        The following portions of the patient's history were reviewed and updated as appropriate: allergies, current medications, past family history, past medical history, past social history, past surgical history and problem list.            Review of Systems       Negative for fevers, chills, nausea, vomiting, abdominal pain, painful urination, blood in urine    Allergies     Allergies   Allergen Reactions    Codeine Other (See Comments)     \"I didn't like the feeling.\"    Amoxicillin Other (See Comments)     Unsure of allergy    Oxybutynin Visual Disturbance    Morphine Other (See Comments)     Does not like feeling       Physical Exam     No acute distress, normocephalic atraumatic, no CVA tenderness bilaterally, abdomen soft nontender, nonlabored breathing    Vital Signs  Vitals:    04/08/24 1444   BP: 118/62   Pulse: 84   Temp: 97.7 °F (36.5 °C)   TempSrc: Temporal   SpO2: 96%   Weight: 124 kg (274 lb)   Height: 5' 3\" (1.6 m)         Current Medications       Current Outpatient Medications:     ALPRAZolam (XANAX) 2 MG tablet, Take 1 tablet (2 mg total) by mouth once as needed for anxiety (take 1 hour before MRI to relax) for up to 1 dose, Disp: 1 tablet, Rfl: 0    amLODIPine (NORVASC) 10 mg tablet, Take 1 tablet (10 mg total) by mouth daily, Disp: 90 tablet, Rfl: 1    aspirin 81 mg chewable tablet, Chew 81 mg daily, Disp: , Rfl:     Cholecalciferol (VITAMIN D3) 50 MCG (2000 UT) TABS, Take 2,000 Units by mouth daily, Disp: , Rfl:     famotidine (PEPCID) 20 mg tablet, Take 1 tablet (20 mg total) by mouth daily, Disp: , Rfl:     fluticasone (FLONASE) 50 " mcg/act nasal spray, 1 spray into each nostril daily, Disp: 15.8 mL, Rfl: 1    Iron-Vitamin C (Vitron-C)  MG TABS, Take 1 tablet by mouth in the morning, Disp: 90 tablet, Rfl: 1    levothyroxine 50 mcg tablet, TAKE 1 TABLET(50 MCG) BY MOUTH DAILY, Disp: 90 tablet, Rfl: 1    lisinopril (ZESTRIL) 10 mg tablet, Take 1 tablet (10 mg total) by mouth daily, Disp: 90 tablet, Rfl: 1    pantoprazole (PROTONIX) 40 mg tablet, TAKE 1 TABLET(40 MG) BY MOUTH TWICE DAILY BEFORE MEALS, Disp: 180 tablet, Rfl: 1    potassium chloride (K-DUR,KLOR-CON) 20 mEq tablet, Take 1 tablet (20 mEq total) by mouth daily, Disp: 90 tablet, Rfl: 0    rosuvastatin (CRESTOR) 10 MG tablet, Take 1 tablet (10 mg total) by mouth daily, Disp: 90 tablet, Rfl: 3    solifenacin (VESICARE) 5 mg tablet, TAKE 1 TABLET(5 MG) BY MOUTH DAILY, Disp: 90 tablet, Rfl: 0    tirzepatide (Zepbound) 2.5 mg/0.5 mL auto-injector, Inject 0.5 mL (2.5 mg total) under the skin once a week for 28 days, Disp: 2 mL, Rfl: 0      Active Problems     Patient Active Problem List   Diagnosis    Intention tremor    Benign essential tremor    Depression with anxiety    Essential hypertension    CKD (chronic kidney disease) stage 3, GFR 30-59 ml/min ([facility])    Hypothyroidism    Shortness of breath    Mixed hyperlipidemia    History of breast cancer    Mild cognitive impairment    Bilateral malignant neoplasm of breast in female ([facility])    IFG (impaired fasting glucose)    Compression fracture of fifth lumbar vertebra ([facility])    L4-L5 disc bulge    Sciatica of right side    Chronic obstructive pulmonary disease, unspecified COPD type ([facility])    Severe episode of recurrent major depressive disorder, without psychotic features ([facility])    Incontinence of feces    Other fatigue    Morbid obesity with BMI of 45.0-49.9, adult ([facility])    Kidney lesion    Constipation    Gastroesophageal reflux disease    Urinary frequency    OBI (obstructive sleep apnea)    Obstructive sleep apnea syndrome    Polyp of  colon    Adrenal adenoma         Past Medical History     Past Medical History:   Diagnosis Date    Acute ill-defined cerebrovascular disease     Arthritis     Benign essential tremor     Breast cancer (HCC)     pt states doesn't remember the year thinks it might have been on the right.    Chronic kidney disease     CKD (chronic kidney disease) stage 3, GFR 30-59 ml/min (HCC) 09/25/2018    Colon polyp     Depressive disorder     Disease of thyroid gland     GERD (gastroesophageal reflux disease)     Heart murmur     Hyperlipidemia     Hypertension     Impaired fasting glucose     Intention tremor     last assessed: 8/18/2016    Osteochondropathy     Panic disorder without agoraphobia with mild panic attacks     PONV (postoperative nausea and vomiting)     Psychiatric disorder     anxiety    Stroke (HCC)     Tubular adenoma of colon     Urinary tract infection          Surgical History     Past Surgical History:   Procedure Laterality Date    AUGMENTATION BREAST      pt states during mammo she doesn't have any other surgery than the 2 biopsy    BLEPHAROPLASTY      Upper    BREAST BIOPSY Left     pt doesn't recall when and believes the neg biop was on left    BREAST LUMPECTOMY W/ NEEDLE LOCALIZATION Left     description: benign last assessed; 8/21/2016    CHOLECYSTECTOMY      COLONOSCOPY      EGD      HYSTERECTOMY      RI EXC TUMOR SOFT TISS FACE&SCALP SUBFASCIAL <2CM Left 11/9/2023    Procedure: EXCISION OF EYELID LESION;  Surgeon: Eugene Garnica MD;  Location: [facility];  Service: Plastics    TUBAL LIGATION           Family History     Family History   Problem Relation Age of Onset    Alcohol abuse Mother     Asthma Mother     Cancer Mother     Mental illness Mother     Osteoarthritis Mother     Gout Father     Cancer Father     Other Sister         carotid arterial disease    COPD Sister     Hyperlipidemia Sister     Hypertension Sister     Heart attack Sister     Breast cancer Sister     No Known Problems  "Daughter     No Known Problems Maternal Grandmother     No Known Problems Paternal Grandmother     Lung cancer Sister     No Known Problems Sister     No Known Problems Daughter     No Known Problems Daughter          Social History     Social History     Social History     Tobacco Use   Smoking Status Former    Current packs/day: 0.00    Average packs/day: 1 pack/day for 50.0 years (50.0 ttl pk-yrs)    Types: Cigarettes    Start date:     Quit date:     Years since quittin.2    Passive exposure: Past   Smokeless Tobacco Never   Tobacco Comments    10 years ago         Pertinent Lab Values     Lab Results   Component Value Date    CREATININE 1.26 2024       No results found for: \"PSA\"            Pertinent Imaging     The patient's images were reviewed by me personally and also in real time with them in the examination room using our PACS imaging system.        CTA CAP 23 (performed for SOB): no PE, 1.7 cm enhancing solid R renal mass worrisome for neoplasm; indeterminate 1 cm L anterior upper pole lesion possibly complicated cyst    MRI abdomen w/ wo contrast 23: 1.7 cm enhancing mass in interpolar region of R kidney highly suggestive for neoplasm; no RP LAD, no renal vein thrombosis; hemorrhagic or proteinaceous LUP renal cyst with multiple additional simple cysts; 1.5 cm R adrenal adenoma      Pertinent Pathology     N/A      I have spent 30 minutes with Patient  today in which greater than 50% of this time was spent in counseling/coordination of care regarding Diagnostic results, Prognosis, Risks and benefits of tx options, Instructions for management, Patient and family education, Importance of tx compliance, Impressions, Counseling / Coordination of care, Documenting in the medical record, Reviewing / ordering tests, medicine, procedures  , and Obtaining or reviewing history  .    Please note this time includes cumulative time on the day of encounter, including reviewing medical " records and/or coordinating care among the patient's other specialists.

## 2024-04-09 ENCOUNTER — OFFICE VISIT (OUTPATIENT)
Dept: NEPHROLOGY | Facility: CLINIC | Age: 79
End: 2024-04-09

## 2024-04-09 VITALS
TEMPERATURE: 98.4 F | WEIGHT: 280 LBS | HEART RATE: 90 BPM | HEIGHT: 63 IN | RESPIRATION RATE: 18 BRPM | SYSTOLIC BLOOD PRESSURE: 118 MMHG | BODY MASS INDEX: 49.61 KG/M2 | OXYGEN SATURATION: 98 % | DIASTOLIC BLOOD PRESSURE: 76 MMHG

## 2024-04-09 DIAGNOSIS — N28.9 KIDNEY LESION: ICD-10-CM

## 2024-04-09 DIAGNOSIS — I10 ESSENTIAL HYPERTENSION: ICD-10-CM

## 2024-04-09 DIAGNOSIS — R79.89 ELEVATED TSH: ICD-10-CM

## 2024-04-09 DIAGNOSIS — N18.32 STAGE 3B CHRONIC KIDNEY DISEASE (HCC): Primary | ICD-10-CM

## 2024-04-09 DIAGNOSIS — R53.83 OTHER FATIGUE: ICD-10-CM

## 2024-04-09 LAB
CORTIS F 24H UR-MRATE: 8 UG/24 HR (ref 6–42)
CORTIS F UR-MCNC: 13 UG/L

## 2024-04-09 RX ORDER — LEVOTHYROXINE SODIUM 0.05 MG/1
TABLET ORAL
Qty: 90 TABLET | Refills: 1 | Status: SHIPPED | OUTPATIENT
Start: 2024-04-09

## 2024-04-09 NOTE — LETTER
April 9, 2024     MONIQUE Sloan  [address] [address]    Patient: Ladi Mojica   YOB: 1945   Date of Visit: 4/9/2024       Dear Dr. Perez:    Thank you for referring Ladi Mojica to me for evaluation. Below are my notes for this consultation.    If you have questions, please do not hesitate to call me. I look forward to following your patient along with you.         Sincerely,        MONIQUE Copeland        CC: No Recipients    MONIQUE Copeland  4/9/2024  3:26 PM  Sign when Signing Visit  NEPHROLOGY OFFICE NOTE    Patient: Ladi Mojica               Sex: female           YOB: 1945        Age:  78 y.o.       4/9/2024      BACKGROUND     I had the pleasure of seeing Ladi Mojica in the nephrology office today for follow-up. She has a past medical history significant for hypertension, GERD, hypothyroidism, sleep apnea, COPD, and chronic kidney disease. She is following with our office for management of chronic kidney disease.      She has underlying hypertension, currently maintained on amlodipine and lisinopril.  Reports good blood pressure readings, she is not monitoring at home presently.     She is following with Dr. Mckenzie from Cardiology, she was referred for further work-up of shortness of breath. TTE 9/1/2023 showed an ejection fraction of 63% with grade 1 diastolic dysfunction. She underwent Lexiscan stress testing revealing a small fixed anterior defect related to breast attenuation with no significant ischemia or infarct. She does have underlying hyperlipidemia and maintained on statin therapy.      She was also evaluated by Pulmonary for shortness of breath with scheduled PFTs. She is maintained on Advair inhaler.      She has GERD, maintained on pantoprazole and famotidine.      She was found to have a 1.7 cm enhancing mass in the interpolar region of the right kidney highly suggestive for neoplasm.  This was first noted on CT scan 8/1/2023. She has had a follow-up MRI on 8/13 as well as CT renal protocol on 11/8/2023 and referred to Urology.      She was seen by Dr. Marshall on on 4/8/2024. According to the patient and medical record, she is undergoing active surveillance with MRI scheduled for 6 months.     She was also noted to have an adrenal adenoma and was referred to Endocrinology for further work-up and evaluation.  Her workup has been negative today and she is awaiting on 24-hour urine studies to be finalized.    She presents with her sister for follow-up.  Reports she has been doing overall well since her last office visit.  Avoiding NSAIDs.    Most recent labs were obtained on 4/3/2024, which we have reviewed together.     SUBJECTIVE     She currently has no complaints at this time and is feeling well. Patient denies any chest pain, shortness of breath, or swelling.    REVIEW OF SYSTEMS     Review of Systems   Constitutional:  Negative for chills and fever.   HENT:  Negative for trouble swallowing.    Respiratory:  Negative for shortness of breath.    Cardiovascular:  Negative for leg swelling.   Gastrointestinal:  Negative for constipation, diarrhea, nausea and vomiting.   Genitourinary:  Negative for difficulty urinating, dysuria, frequency and hematuria.   Musculoskeletal:  Negative for back pain.   Skin:  Negative for pallor.   Neurological:  Negative for dizziness, syncope, weakness and light-headedness.   Psychiatric/Behavioral:  Negative for sleep disturbance. The patient is not nervous/anxious.        OBJECTIVE     Current Weight: Weight - Scale: 127 kg (280 lb)  Vitals:    04/09/24 1452   BP: 118/76   Pulse: 90   Resp: 18   Temp: 98.4 °F (36.9 °C)   SpO2: 98%     Body mass index is 49.6 kg/m².    CURRENT MEDICATIONS       Current Outpatient Medications:   •  ALPRAZolam (XANAX) 2 MG tablet, Take 1 tablet (2 mg total) by mouth once as needed for anxiety (take 1 hour before MRI to relax) for up  to 1 dose, Disp: 1 tablet, Rfl: 0  •  amLODIPine (NORVASC) 10 mg tablet, Take 1 tablet (10 mg total) by mouth daily, Disp: 90 tablet, Rfl: 1  •  aspirin 81 mg chewable tablet, Chew 81 mg daily, Disp: , Rfl:   •  Cholecalciferol (VITAMIN D3) 50 MCG (2000 UT) TABS, Take 2,000 Units by mouth daily, Disp: , Rfl:   •  famotidine (PEPCID) 20 mg tablet, Take 1 tablet (20 mg total) by mouth daily, Disp: , Rfl:   •  fluticasone (FLONASE) 50 mcg/act nasal spray, 1 spray into each nostril daily, Disp: 15.8 mL, Rfl: 1  •  Iron-Vitamin C (Vitron-C)  MG TABS, Take 1 tablet by mouth in the morning, Disp: 90 tablet, Rfl: 1  •  levothyroxine 50 mcg tablet, TAKE 1 TABLET(50 MCG) BY MOUTH DAILY, Disp: 90 tablet, Rfl: 1  •  lisinopril (ZESTRIL) 10 mg tablet, Take 1 tablet (10 mg total) by mouth daily, Disp: 90 tablet, Rfl: 1  •  pantoprazole (PROTONIX) 40 mg tablet, TAKE 1 TABLET(40 MG) BY MOUTH TWICE DAILY BEFORE MEALS, Disp: 180 tablet, Rfl: 1  •  potassium chloride (K-DUR,KLOR-CON) 20 mEq tablet, Take 1 tablet (20 mEq total) by mouth daily, Disp: 90 tablet, Rfl: 0  •  rosuvastatin (CRESTOR) 10 MG tablet, Take 1 tablet (10 mg total) by mouth daily, Disp: 90 tablet, Rfl: 3  •  solifenacin (VESICARE) 5 mg tablet, TAKE 1 TABLET(5 MG) BY MOUTH DAILY, Disp: 90 tablet, Rfl: 0  •  tirzepatide (Zepbound) 2.5 mg/0.5 mL auto-injector, Inject 0.5 mL (2.5 mg total) under the skin once a week for 28 days, Disp: 2 mL, Rfl: 0    PHYSICAL EXAMINATION     Physical Exam  Vitals reviewed.   Constitutional:       General: She is not in acute distress.  HENT:      Head: Normocephalic.      Mouth/Throat:      Lips: Pink.      Mouth: Mucous membranes are moist.   Eyes:      General: Lids are normal. No scleral icterus.  Cardiovascular:      Rate and Rhythm: Normal rate and regular rhythm.      Heart sounds: S1 normal and S2 normal.   Pulmonary:      Effort: Pulmonary effort is normal. No accessory muscle usage or respiratory distress.      Breath  sounds: Normal breath sounds.   Abdominal:      General: There is no distension.      Tenderness: There is no abdominal tenderness.   Musculoskeletal:      Cervical back: Normal range of motion and neck supple. No tenderness.      Right lower leg: No edema.      Left lower leg: No edema.   Skin:     General: Skin is warm.      Coloration: Skin is not cyanotic or jaundiced.   Neurological:      General: No focal deficit present.      Mental Status: She is alert and oriented to person, place, and time.   Psychiatric:         Attention and Perception: Attention normal.         Speech: Speech normal.         Behavior: Behavior is cooperative.           LAB RESULTS     Results from last 7 days   Lab Units 04/03/24  0801   WBC Thousand/uL 6.32   HEMOGLOBIN g/dL 14.0   HEMATOCRIT % 44.8   PLATELETS Thousands/uL 196   SODIUM mmol/L 143   POTASSIUM mmol/L 4.1   CHLORIDE mmol/L 108   CO2 mmol/L 30   BUN mg/dL 15   CREATININE mg/dL 1.26   EGFR ml/min/1.73sq m 40   CALCIUM mg/dL 9.4         RADIOLOGY RESULTS      No results found for this or any previous visit.    Results for orders placed during the hospital encounter of 02/28/20    XR chest pa & lateral    Narrative  CHEST    INDICATION:   R53.83: Other fatigue  Z85.3: Personal history of malignant neoplasm of breast  R05: Cough.    COMPARISON:  Chest CT from 5/23/2019.    EXAM PERFORMED/VIEWS:  XR CHEST PA & LATERAL  DUAL ENERGY SUBTRACTION TECHNIQUE      FINDINGS:    Cardiomediastinal silhouette appears unremarkable. Redemonstration of moderate hiatal hernia.    The lungs are clear.  No pneumothorax or pleural effusion.    Osseous structures appear within normal limits for patient age.    Cholecystectomy. Clips in the right anterior chest wall.    Impression  No acute cardiopulmonary disease.        Workstation performed: [id number]    CT renal protocol 11/8/2023:  RIGHT KIDNEY AND URETER:  Stable appearance of right mid posterior cortical mass. This currently measures 1.7  x 1.8 cm, showing no significant interval change. As before is noted to have solid enhancement and washout suggesting neoplastic etiology. No significant cystic   component. This lesion bulges the renal capsule, approximately 50% of the lesion projects beyond the confines of the kidney. There is no evidence of extension to the renal hilum or central sinus fat. No regional adenopathy. Additional smaller   subcentimeter cysts remaining stable. No urothelial lesions identified.  No hydronephrosis or hydroureter.  No urinary tract calculi. No perinephric collection.     LEFT KIDNEY AND URETER:  No solid renal mass. Stable exophytic cyst. Stable small minimally hyperdense cyst in the medial upper pole. No detectable urothelial mass.  No hydronephrosis or hydroureter.  No urinary tract calculi. No perinephric collection.    ASSESSMENT/PLAN     Chronic Kidney Disease Stage 3:  After review of the medical record, it appears baseline creatinine levels are fluctuating around 1.2 - 1.4 dating back through 2017.     Patient has a 1.7 x 1.8 cm right mid posterior cortical mass, no evidence of distant metastatic disease on most recent CT 11/8/2023. The lesion bulges the renal capsule, approximately 50% of the lesion projects beyond the confines of the kidney. Additional smaller subcentimeter cysts remaining stable. Stable exophytic cyst noted in the left kidney.      Highly suspect etiology of chronic kidney disease is multifactorial in the setting of hypertensive nephrosclerosis, age-related nephron loss, and nephron loss with documented renal mass.     Current creatinine level is 1.26 with an eGFR of 40. No updated urinalysis or urine microalbumin to creatinine level obtained prior to follow-up.     She clinically appears euvolemic advise she increase her hydration with a minimum of 40 ounces daily and continue to avoid nephrotoxic agents such as NSAIDs.     Renal Lesion:  Patient has a 1.7 x 1.8 cm right mid posterior cortical  "mass, no evidence of distant metastatic disease on most recent CT 11/8/2023. The lesion bulges the renal capsule, approximately 50% of the lesion projects beyond the confines of the kidney, concerning for neoplasm.      She is following with Urology, active surveillance at this time with follow-up imaging scheduled.      Hypertension:  Currently maintained on amlodipine 10 mg daily and lisinopril 10 mg daily. Blood pressure in acceptable range on the current regimen.    Recommend nephrology follow-up in 6 months. Will obtain CKD labs prior to appointment.     MONIQUE Copeland  Nephrology  4/9/2024      Portions of the record may have been created with voice recognition software. Occasional wrong word or \"sound a like\" substitutions may have occurred due to the inherent limitations of voice recognition software. Read the chart carefully and recognize, using context, where substitutions have occurred.   "

## 2024-04-10 ENCOUNTER — OFFICE VISIT (OUTPATIENT)
Dept: FAMILY MEDICINE CLINIC | Facility: CLINIC | Age: 79
End: 2024-04-10
Payer: MEDICARE

## 2024-04-10 VITALS
RESPIRATION RATE: 19 BRPM | BODY MASS INDEX: 50.04 KG/M2 | DIASTOLIC BLOOD PRESSURE: 78 MMHG | WEIGHT: 282.4 LBS | HEART RATE: 75 BPM | HEIGHT: 63 IN | TEMPERATURE: 97.9 F | OXYGEN SATURATION: 98 % | SYSTOLIC BLOOD PRESSURE: 116 MMHG

## 2024-04-10 DIAGNOSIS — E66.01 MORBID OBESITY WITH BMI OF 45.0-49.9, ADULT (HCC): Primary | ICD-10-CM

## 2024-04-10 DIAGNOSIS — H66.93 OTITIS OF BOTH EARS: ICD-10-CM

## 2024-04-10 DIAGNOSIS — R35.0 URINARY FREQUENCY: ICD-10-CM

## 2024-04-10 LAB
DOPAMINE 24H UR-MRATE: 143 UG/24 HR (ref 0–510)
DOPAMINE UR-MCNC: 228 UG/L
EPINEPH 24H UR-MRATE: <2 UG/24 HR (ref 0–20)
EPINEPH UR-MCNC: <3 UG/L
NOREPINEPH 24H UR-MRATE: 21 UG/24 HR (ref 0–135)
NOREPINEPH UR-MCNC: 34 UG/L

## 2024-04-10 PROCEDURE — 99214 OFFICE O/P EST MOD 30 MIN: CPT

## 2024-04-10 RX ORDER — PHENTERMINE HYDROCHLORIDE 15 MG/1
15 CAPSULE ORAL EVERY MORNING
Qty: 30 CAPSULE | Refills: 0 | Status: SHIPPED | OUTPATIENT
Start: 2024-04-10

## 2024-04-10 RX ORDER — SOLIFENACIN SUCCINATE 5 MG/1
5 TABLET, FILM COATED ORAL DAILY
Start: 2024-04-10

## 2024-04-10 RX ORDER — CIPROFLOXACIN AND DEXAMETHASONE 3; 1 MG/ML; MG/ML
4 SUSPENSION/ DROPS AURICULAR (OTIC) 2 TIMES DAILY
Qty: 7.5 ML | Refills: 0 | Status: SHIPPED | OUTPATIENT
Start: 2024-04-10

## 2024-04-10 NOTE — ASSESSMENT & PLAN NOTE
Gained even more weight. Does not want to see weight management. Recommend myplate and increasing exercise, follow up in 1 month. Phentermine daily continue to monitor food intake.

## 2024-04-10 NOTE — Clinical Note
Hi, I saw Ladi today and she is really struggling with weight gain. Do you think it could be related to the endocrine system

## 2024-04-10 NOTE — PROGRESS NOTES
Assessment/Plan:         Problem List Items Addressed This Visit        Other    Morbid obesity with BMI of 45.0-49.9, adult (Formerly [facility]) - Primary     Gained even more weight. Does not want to see weight management. Recommend myplate and increasing exercise, follow up in 1 month. Phentermine daily continue to monitor food intake.          Relevant Medications    phentermine 15 MG capsule    Urinary frequency     Doing well, continue current treatment plan, will continue to monitor.          Relevant Medications    solifenacin (VESICARE) 5 mg tablet   Other Visit Diagnoses     Otitis of both ears        Will send ciprodex, will continue to monitor.    Relevant Medications    ciprofloxacin-dexamethasone (CIPRODEX) otic suspension              Subjective:      Patient ID: Ladi Mojica is a 78 y.o. female.    Ladi is here for follow up, she is having burning and pain in the right leg from hip to knee, sister thinks it could be phlebitis. It hurts if something touches it. She also is still having ear pain. She has gained weight since last visit.         The following portions of the patient's history were reviewed and updated as appropriate:   Past Medical History:  She has a past medical history of Acute ill-defined cerebrovascular disease, Arthritis, Benign essential tremor, Breast cancer (Formerly [facility]), Chronic kidney disease, CKD (chronic kidney disease) stage 3, GFR 30-59 ml/min ([facility]) (09/25/2018), Colon polyp, Depressive disorder, Disease of thyroid gland, GERD (gastroesophageal reflux disease), Heart murmur, Hyperlipidemia, Hypertension, Impaired fasting glucose, Intention tremor, Osteochondropathy, Panic disorder without agoraphobia with mild panic attacks, PONV (postoperative nausea and vomiting), Psychiatric disorder, Stroke ([facility]), Tubular adenoma of colon, and Urinary tract infection.,  _______________________________________________________________________  Medical Problems:  does not have any pertinent problems  on file.,  _______________________________________________________________________  Past Surgical History:   has a past surgical history that includes Hysterectomy; Cholecystectomy; AUGMENTATION BREAST; Breast lumpectomy w/ needle localization (Left); Colonoscopy; EGD; Breast biopsy (Left); Tubal ligation; Blepharoplasty; and pr exc tumor soft tiss face&scalp subfascial <2cm (Left, 11/9/2023).,  _______________________________________________________________________  Family History:  family history includes Alcohol abuse in her mother; Asthma in her mother; Breast cancer in her sister; COPD in her sister; Cancer in her father and mother; Gout in her father; Heart attack in her sister; Hyperlipidemia in her sister; Hypertension in her sister; Lung cancer in her sister; Mental illness in her mother; No Known Problems in her daughter, daughter, daughter, maternal grandmother, paternal grandmother, and sister; Osteoarthritis in her mother; Other in her sister.,  _______________________________________________________________________  Social History:   reports that she quit smoking about 13 years ago. Her smoking use included cigarettes. She started smoking about 63 years ago. She has a 50 pack-year smoking history. She has been exposed to tobacco smoke. She has never used smokeless tobacco. She reports current alcohol use. She reports that she does not use drugs.,  _______________________________________________________________________  Allergies:  is allergic to codeine, amoxicillin, oxybutynin, and morphine..  _______________________________________________________________________  Current Outpatient Medications   Medication Sig Dispense Refill   • ALPRAZolam (XANAX) 2 MG tablet Take 1 tablet (2 mg total) by mouth once as needed for anxiety (take 1 hour before MRI to relax) for up to 1 dose 1 tablet 0   • amLODIPine (NORVASC) 10 mg tablet Take 1 tablet (10 mg total) by mouth daily 90 tablet 1   • aspirin 81 mg  chewable tablet Chew 81 mg daily     • Cholecalciferol (VITAMIN D3) 50 MCG (2000 UT) TABS Take 2,000 Units by mouth daily     • ciprofloxacin-dexamethasone (CIPRODEX) otic suspension Administer 4 drops to the right ear 2 (two) times a day 7.5 mL 0   • famotidine (PEPCID) 20 mg tablet Take 1 tablet (20 mg total) by mouth daily     • fluticasone (FLONASE) 50 mcg/act nasal spray 1 spray into each nostril daily 15.8 mL 1   • Iron-Vitamin C (Vitron-C)  MG TABS Take 1 tablet by mouth in the morning 90 tablet 1   • levothyroxine 50 mcg tablet TAKE 1 TABLET(50 MCG) BY MOUTH DAILY 90 tablet 1   • lisinopril (ZESTRIL) 10 mg tablet Take 1 tablet (10 mg total) by mouth daily 90 tablet 1   • phentermine 15 MG capsule Take 1 capsule (15 mg total) by mouth every morning 30 capsule 0   • potassium chloride (K-DUR,KLOR-CON) 20 mEq tablet Take 1 tablet (20 mEq total) by mouth daily 90 tablet 0   • rosuvastatin (CRESTOR) 10 MG tablet Take 1 tablet (10 mg total) by mouth daily 90 tablet 3   • solifenacin (VESICARE) 5 mg tablet Take 1 tablet (5 mg total) by mouth daily       No current facility-administered medications for this visit.     _______________________________________________________________________  Review of Systems   Constitutional:  Positive for fatigue. Negative for chills, diaphoresis and fever.   HENT:  Positive for ear pain. Negative for congestion, sinus pressure, sinus pain and sore throat.    Eyes:  Negative for pain and visual disturbance.   Respiratory:  Positive for cough, shortness of breath and wheezing. Negative for chest tightness.    Cardiovascular:  Negative for chest pain and palpitations.   Gastrointestinal:  Negative for abdominal pain, constipation, diarrhea, nausea and vomiting.   Genitourinary:  Negative for dysuria, frequency and hematuria.   Musculoskeletal:  Positive for arthralgias and back pain.   Skin:  Negative for color change and rash.   Neurological:  Positive for numbness. Negative  "for dizziness, seizures, syncope, light-headedness and headaches.   Psychiatric/Behavioral:  Negative for dysphoric mood and sleep disturbance. The patient is not nervous/anxious.    All other systems reviewed and are negative.        Objective:  Vitals:    04/10/24 1357   BP: 116/78   BP Location: Right arm   Patient Position: Sitting   Pulse: 75   Resp: 19   Temp: 97.9 °F (36.6 °C)   SpO2: 98%   Weight: 128 kg (282 lb 6.4 oz)   Height: 5' 3\" (1.6 m)     Body mass index is 50.02 kg/m².     Physical Exam  Vitals and nursing note reviewed.   Constitutional:       Appearance: Normal appearance. She is not ill-appearing.   HENT:      Head: Normocephalic.      Right Ear: Tympanic membrane, ear canal and external ear normal. There is no impacted cerumen.      Left Ear: Tympanic membrane, ear canal and external ear normal. There is no impacted cerumen.      Nose: Nose normal. No congestion.      Mouth/Throat:      Mouth: Mucous membranes are moist.      Pharynx: No posterior oropharyngeal erythema.   Eyes:      Extraocular Movements: Extraocular movements intact.      Conjunctiva/sclera: Conjunctivae normal.      Pupils: Pupils are equal, round, and reactive to light.   Cardiovascular:      Rate and Rhythm: Normal rate and regular rhythm.      Heart sounds: Normal heart sounds. No murmur heard.  Pulmonary:      Effort: Pulmonary effort is normal.      Breath sounds: Normal breath sounds. No wheezing.   Abdominal:      Palpations: Abdomen is soft.      Tenderness: There is no abdominal tenderness.   Musculoskeletal:         General: Tenderness present. Normal range of motion.      Cervical back: Normal range of motion.      Right lower leg: No edema.      Left lower leg: No edema.   Skin:     General: Skin is warm and dry.   Neurological:      General: No focal deficit present.      Mental Status: She is alert.   Psychiatric:         Mood and Affect: Mood normal.         Behavior: Behavior normal.         "

## 2024-04-10 NOTE — PATIENT INSTRUCTIONS
Problem List Items Addressed This Visit          Other    Morbid obesity with BMI of 45.0-49.9, adult (Former[facility]) - Primary     Gained even more weight. Does not want to see weight management. Recommend myplate and increasing exercise, follow up in 1 month. Phentermine daily continue to monitor food intake.          Relevant Medications    phentermine 15 MG capsule    Urinary frequency     Doing well, continue current treatment plan, will continue to monitor.          Relevant Medications    solifenacin (VESICARE) 5 mg tablet     Other Visit Diagnoses       Otitis of both ears        Will send ciprodex, will continue to monitor.    Relevant Medications    ciprofloxacin-dexamethasone (CIPRODEX) otic suspension

## 2024-04-11 LAB
METANEPH 24H UR-MRATE: 73 UG/24 HR (ref 36–209)
METANEPHS 24H UR-MCNC: 116 UG/L
NORMETANEPHRINE 24H UR-MCNC: 496 UG/L
NORMETANEPHRINE 24H UR-MRATE: 310 UG/24 HR (ref 131–612)

## 2024-04-16 ENCOUNTER — TELEPHONE (OUTPATIENT)
Age: 79
End: 2024-04-16

## 2024-04-16 NOTE — TELEPHONE ENCOUNTER
Patient  called  stated  that she  was to go over  her test results.  Their  was no phone encountered  or note in chart. Told  patient  that she would received another phone call to go over her test results.  Thank you

## 2024-04-17 ENCOUNTER — TELEPHONE (OUTPATIENT)
Dept: ENDOCRINOLOGY | Facility: CLINIC | Age: 79
End: 2024-04-17

## 2024-04-17 DIAGNOSIS — D35.00 ADRENAL ADENOMA, UNSPECIFIED LATERALITY: Primary | ICD-10-CM

## 2024-04-17 RX ORDER — DEXAMETHASONE 1 MG
TABLET ORAL
Qty: 1 TABLET | Refills: 0 | Status: SHIPPED | OUTPATIENT
Start: 2024-04-17

## 2024-04-17 NOTE — TELEPHONE ENCOUNTER
Take dexamethasone 1 mg at 11 PM the night before labs-next morning have labs done between 7 to 9 AM fasting.  I will send in the prescription as well as order labs

## 2024-04-17 NOTE — TELEPHONE ENCOUNTER
Patient verbally understood lab results message and she does want to do other testing. Please send prescription to [facility].

## 2024-04-17 NOTE — TELEPHONE ENCOUNTER
----- Message from Zulema Monaco MD sent at 4/15/2024  9:45 PM EDT -----  Please call the patient regarding labs -lab testing normal so far.  However given difficulty losing weight will check further  We will do dexamethasone suppression test to make sure adrenal gland is not making too much cortisol  She will take dexamethasone 1 mg 11 PM at night and in the morning have fasting labs done between 7 to 9 AM  Please let me know when patient has been in follow-up to I can order labs and medication

## 2024-04-22 ENCOUNTER — LAB (OUTPATIENT)
Dept: LAB | Facility: HOSPITAL | Age: 79
End: 2024-04-22
Payer: MEDICARE

## 2024-04-22 ENCOUNTER — OFFICE VISIT (OUTPATIENT)
Age: 79
End: 2024-04-22
Payer: MEDICARE

## 2024-04-22 ENCOUNTER — APPOINTMENT (OUTPATIENT)
Age: 79
End: 2024-04-22
Payer: MEDICARE

## 2024-04-22 VITALS
BODY MASS INDEX: 50.14 KG/M2 | SYSTOLIC BLOOD PRESSURE: 124 MMHG | HEIGHT: 63 IN | TEMPERATURE: 98.3 F | HEART RATE: 76 BPM | OXYGEN SATURATION: 96 % | WEIGHT: 283 LBS | DIASTOLIC BLOOD PRESSURE: 84 MMHG

## 2024-04-22 DIAGNOSIS — J44.9 COPD, MILD (HCC): ICD-10-CM

## 2024-04-22 DIAGNOSIS — N18.32 STAGE 3B CHRONIC KIDNEY DISEASE (HCC): ICD-10-CM

## 2024-04-22 DIAGNOSIS — I10 ESSENTIAL HYPERTENSION: ICD-10-CM

## 2024-04-22 DIAGNOSIS — D35.00 ADRENAL ADENOMA, UNSPECIFIED LATERALITY: ICD-10-CM

## 2024-04-22 DIAGNOSIS — E66.01 MORBID OBESITY WITH BMI OF 50.0-59.9, ADULT (HCC): ICD-10-CM

## 2024-04-22 DIAGNOSIS — G47.33 OSA (OBSTRUCTIVE SLEEP APNEA): Primary | ICD-10-CM

## 2024-04-22 LAB
BACTERIA UR QL AUTO: ABNORMAL /HPF
BACTERIA UR QL AUTO: ABNORMAL /HPF
BILIRUB UR QL STRIP: NEGATIVE
BILIRUB UR QL STRIP: NEGATIVE
CLARITY UR: ABNORMAL
CLARITY UR: ABNORMAL
COLOR UR: YELLOW
COLOR UR: YELLOW
CORTIS AM PEAK SERPL-MCNC: 1.8 UG/DL (ref 6.7–22.6)
GLUCOSE UR STRIP-MCNC: NEGATIVE MG/DL
GLUCOSE UR STRIP-MCNC: NEGATIVE MG/DL
HGB UR QL STRIP.AUTO: NEGATIVE
HGB UR QL STRIP.AUTO: NEGATIVE
KETONES UR STRIP-MCNC: NEGATIVE MG/DL
KETONES UR STRIP-MCNC: NEGATIVE MG/DL
LEUKOCYTE ESTERASE UR QL STRIP: NEGATIVE
LEUKOCYTE ESTERASE UR QL STRIP: NEGATIVE
MUCOUS THREADS UR QL AUTO: ABNORMAL
MUCOUS THREADS UR QL AUTO: ABNORMAL
NITRITE UR QL STRIP: NEGATIVE
NITRITE UR QL STRIP: NEGATIVE
NON-SQ EPI CELLS URNS QL MICRO: ABNORMAL /HPF
NON-SQ EPI CELLS URNS QL MICRO: ABNORMAL /HPF
PH UR STRIP.AUTO: 6 [PH]
PH UR STRIP.AUTO: 6 [PH]
PROT UR STRIP-MCNC: ABNORMAL MG/DL
PROT UR STRIP-MCNC: ABNORMAL MG/DL
RBC #/AREA URNS AUTO: ABNORMAL /HPF
RBC #/AREA URNS AUTO: ABNORMAL /HPF
SP GR UR STRIP.AUTO: 1.02 (ref 1–1.03)
SP GR UR STRIP.AUTO: 1.02 (ref 1–1.03)
UROBILINOGEN UR STRIP-ACNC: 3 MG/DL
UROBILINOGEN UR STRIP-ACNC: 3 MG/DL
WBC #/AREA URNS AUTO: ABNORMAL /HPF
WBC #/AREA URNS AUTO: ABNORMAL /HPF

## 2024-04-22 PROCEDURE — G2211 COMPLEX E/M VISIT ADD ON: HCPCS | Performed by: PHYSICIAN ASSISTANT

## 2024-04-22 PROCEDURE — 80299 QUANTITATIVE ASSAY DRUG: CPT

## 2024-04-22 PROCEDURE — 36415 COLL VENOUS BLD VENIPUNCTURE: CPT

## 2024-04-22 PROCEDURE — 81001 URINALYSIS AUTO W/SCOPE: CPT

## 2024-04-22 PROCEDURE — 82533 TOTAL CORTISOL: CPT

## 2024-04-22 PROCEDURE — 82570 ASSAY OF URINE CREATININE: CPT

## 2024-04-22 PROCEDURE — 99213 OFFICE O/P EST LOW 20 MIN: CPT | Performed by: PHYSICIAN ASSISTANT

## 2024-04-22 PROCEDURE — 82043 UR ALBUMIN QUANTITATIVE: CPT

## 2024-04-23 LAB
CREAT UR-MCNC: 143.2 MG/DL
MICROALBUMIN UR-MCNC: <7 MG/L
MICROALBUMIN/CREAT 24H UR: <5 MG/G CREATININE (ref 0–30)

## 2024-04-23 NOTE — RESULT ENCOUNTER NOTE
Please call the patient regarding labs -low cortisol-appropriate response to dexamethasone suppression test

## 2024-04-23 NOTE — PROGRESS NOTES
"Assessment/Plan:  Diagnoses and all orders for this visit:    OBI (obstructive sleep apnea)    COPD, mild (HCC)    Morbid obesity with BMI of 50.0-59.9, adult (HCC)        Patient is here today for follow-up.  She is doing very well with her breathing without any need for her rescue inhaler.  Has been off long-acting bronchodilators for some time now.  Since her last visit with us she was started on a CPAP for moderate obstructive sleep apnea.  She has overall been tolerating the CPAP well.  She does note significant improvement in her quality of sleep as well as decreased daytime sleepiness.  Reviewed compliance and she is using it on average just less than 6 hours per night with a residual AHI of 3.1.  She will continue at the current settings, obtaining supplies every 3 to 6 months.    She will follow-up with us in 6 months or sooner if necessary.    Return in about 6 months (around 10/22/2024).  All questions are answered to the patient's satisfaction and understanding.  She verbalizes understanding.  She is encouraged to call with any further questions or concerns.    Portions of the record may have been created with voice recognition software.  Occasional wrong word or \"sound a like\" substitutions may have occurred due to the inherent limitations of voice recognition software.  Read the chart carefully and recognize, using context, where substitutions have occurred.    Electronically Signed by Lei Mantilla PA-C    ______________________________________________________________________    Chief Complaint:   Chief Complaint   Patient presents with    Follow-up    Sleep Apnea       Patient ID: Ladi is a 78 y.o. y.o. female has a past medical history of Acute ill-defined cerebrovascular disease, Arthritis, Benign essential tremor, Breast cancer ([facility]), Chronic kidney disease, CKD (chronic kidney disease) stage 3, GFR 30-59 ml/min ([facility]) (09/25/2018), Colon polyp, Depressive disorder, Disease of thyroid " gland, GERD (gastroesophageal reflux disease), Heart murmur, Hyperlipidemia, Hypertension, Impaired fasting glucose, Intention tremor, Osteochondropathy, Panic disorder without agoraphobia with mild panic attacks, PONV (postoperative nausea and vomiting), Psychiatric disorder, Sleep apnea, obstructive, Stroke (HCC), Tubular adenoma of colon, and Urinary tract infection.    4/22/2024  Patient presents today for follow-up visit.  Patient is a 78-year-old female former smoker with past medical history of GERD, hypothyroidism, COPD, CKD, history of breast cancer.    She is here today for follow-up.  She is doing very well with her breathing with rare need for her rescue inhaler.  She was started on a CPAP since her last visit with us and does feel she is getting a much better quality sleep and feels much more well rested during the day.  She is overall tolerating the mask although some nights she does take it off early.         Review of Systems   Constitutional: Negative.    HENT: Negative.     Respiratory: Negative.     Cardiovascular: Negative.    Gastrointestinal: Negative.    Genitourinary: Negative.    Musculoskeletal: Negative.    Skin: Negative.    Allergic/Immunologic: Negative.    Neurological: Negative.    Psychiatric/Behavioral: Negative.         Smoking history: She reports that she quit smoking about 13 years ago. Her smoking use included cigarettes. She started smoking about 63 years ago. She has a 50 pack-year smoking history. She has been exposed to tobacco smoke. She has never used smokeless tobacco.    The following portions of the patient's history were reviewed and updated as appropriate: allergies, current medications, past family history, past medical history, past social history, past surgical history, and problem list.    Immunization History   Administered Date(s) Administered    COVID-19 MODERNA VACC 0.5 ML IM 01/28/2021, 02/24/2021    COVID-19 Pfizer Vac BIVALENT Madhav-sucrose 12 Yr+ IM  10/04/2022    INFLUENZA 01/30/2007, 10/02/2009, 01/28/2011, 09/08/2011, 08/20/2012, 09/20/2013, 09/23/2014, 10/27/2015, 10/26/2018, 10/24/2023    Influenza Split High Dose Preservative Free IM 11/18/2016, 10/31/2017    Influenza, high dose seasonal 0.7 mL 10/26/2018, 11/05/2019, 11/03/2020, 09/02/2021, 10/04/2022, 10/24/2023    Influenza, seasonal, injectable 01/30/2007, 10/02/2009, 01/28/2011, 09/08/2011, 08/20/2012, 09/20/2013, 09/23/2014, 10/27/2015    Pneumococcal Conjugate 13-Valent 08/06/2015    Pneumococcal Polysaccharide PPV23 07/01/2011    Tdap 07/01/2011    Zoster 06/24/2015     Current Outpatient Medications   Medication Sig Dispense Refill    ALPRAZolam (XANAX) 2 MG tablet Take 1 tablet (2 mg total) by mouth once as needed for anxiety (take 1 hour before MRI to relax) for up to 1 dose 1 tablet 0    amLODIPine (NORVASC) 10 mg tablet Take 1 tablet (10 mg total) by mouth daily 90 tablet 1    aspirin 81 mg chewable tablet Chew 81 mg daily      Cholecalciferol (VITAMIN D3) 50 MCG (2000 UT) TABS Take 2,000 Units by mouth daily      ciprofloxacin-dexamethasone (CIPRODEX) otic suspension Administer 4 drops to the right ear 2 (two) times a day 7.5 mL 0    dexamethasone (DECADRON) 1 mg tablet 1 tab at 11 pm the night before labs 1 tablet 0    famotidine (PEPCID) 20 mg tablet Take 1 tablet (20 mg total) by mouth daily      fluticasone (FLONASE) 50 mcg/act nasal spray 1 spray into each nostril daily 15.8 mL 1    Iron-Vitamin C (Vitron-C)  MG TABS Take 1 tablet by mouth in the morning 90 tablet 1    levothyroxine 50 mcg tablet TAKE 1 TABLET(50 MCG) BY MOUTH DAILY 90 tablet 1    lisinopril (ZESTRIL) 10 mg tablet Take 1 tablet (10 mg total) by mouth daily 90 tablet 1    phentermine 15 MG capsule Take 1 capsule (15 mg total) by mouth every morning 30 capsule 0    potassium chloride (K-DUR,KLOR-CON) 20 mEq tablet Take 1 tablet (20 mEq total) by mouth daily 90 tablet 0    rosuvastatin (CRESTOR) 10 MG tablet Take 1  "tablet (10 mg total) by mouth daily 90 tablet 3    solifenacin (VESICARE) 5 mg tablet Take 1 tablet (5 mg total) by mouth daily       No current facility-administered medications for this visit.     Allergies: Codeine, Amoxicillin, Oxybutynin, and Morphine    Objective:  Vitals:    04/22/24 1441   BP: 124/84   Pulse: 76   Temp: 98.3 °F (36.8 °C)   SpO2: 96%   Weight: 128 kg (283 lb)   Height: 5' 3\" (1.6 m)   Oxygen Therapy  SpO2: 96 %  .  Wt Readings from Last 3 Encounters:   04/22/24 128 kg (283 lb)   04/10/24 128 kg (282 lb 6.4 oz)   04/09/24 127 kg (280 lb)     Body mass index is 50.13 kg/m².    Physical Exam  Constitutional:       General: She is not in acute distress.     Appearance: Normal appearance. She is well-developed. She is not ill-appearing.   HENT:      Head: Normocephalic and atraumatic.      Mouth/Throat:      Pharynx: Oropharynx is clear.   Eyes:      Pupils: Pupils are equal, round, and reactive to light.   Cardiovascular:      Rate and Rhythm: Normal rate and regular rhythm.   Pulmonary:      Effort: Pulmonary effort is normal. No respiratory distress.      Breath sounds: Normal breath sounds. No decreased breath sounds, wheezing, rhonchi or rales.   Abdominal:      General: Abdomen is flat. There is no distension.   Musculoskeletal:         General: Normal range of motion.      Cervical back: Normal range of motion.      Right lower leg: No edema.      Left lower leg: No edema.   Skin:     General: Skin is warm and dry.      Findings: No rash.   Neurological:      Mental Status: She is alert and oriented to person, place, and time.   Psychiatric:         Mood and Affect: Mood normal.         Behavior: Behavior normal.         Lab Review:   Lab Results   Component Value Date    K 4.1 04/03/2024    K 4.5 04/14/2021     04/03/2024     04/14/2021    CO2 30 04/03/2024    CO2 27 04/14/2021    BUN 15 04/03/2024    BUN 17 04/14/2021    CREATININE 1.26 04/03/2024    CREATININE 1.21 (H) " 04/14/2021    CALCIUM 9.4 04/03/2024    CALCIUM 9.2 04/14/2021     Lab Results   Component Value Date    WBC 6.32 04/03/2024    HGB 14.0 04/03/2024    HCT 44.8 04/03/2024    MCV 86 04/03/2024     04/03/2024       Diagnostics:  I have personally reviewed pertinent reports.    Reviewed compliance  Office Spirometry Results:     ESS: Total score: 0  No results found.

## 2024-04-29 LAB — DEXAMETHASONE SERPL-MCNC: 624 NG/DL

## 2024-05-01 ENCOUNTER — TELEPHONE (OUTPATIENT)
Age: 79
End: 2024-05-01

## 2024-05-01 DIAGNOSIS — K21.9 GASTROESOPHAGEAL REFLUX DISEASE WITHOUT ESOPHAGITIS: Primary | ICD-10-CM

## 2024-05-01 RX ORDER — PANTOPRAZOLE SODIUM 40 MG/1
40 TABLET, DELAYED RELEASE ORAL 2 TIMES DAILY
Qty: 180 TABLET | Refills: 3 | Status: SHIPPED | OUTPATIENT
Start: 2024-05-01 | End: 2025-04-26

## 2024-05-01 NOTE — TELEPHONE ENCOUNTER
Patient called the RX Refill Line. Message is being forwarded to the office.     Patient is requesting a refill on   pantoprazole (PROTONIX) 40 mg tablet . It is no longer on her active med list.  She's confused because she's been taking this med Bid for a long time and no  one told her to stop it.  Shed like someone to call her back.  And if refilling, please send to Diane    Please contact patient at  [phone]

## 2024-05-09 ENCOUNTER — OFFICE VISIT (OUTPATIENT)
Dept: FAMILY MEDICINE CLINIC | Facility: CLINIC | Age: 79
End: 2024-05-09
Payer: MEDICARE

## 2024-05-09 VITALS
WEIGHT: 283.6 LBS | OXYGEN SATURATION: 98 % | HEART RATE: 102 BPM | DIASTOLIC BLOOD PRESSURE: 84 MMHG | BODY MASS INDEX: 50.25 KG/M2 | SYSTOLIC BLOOD PRESSURE: 126 MMHG | TEMPERATURE: 98.1 F | HEIGHT: 63 IN

## 2024-05-09 DIAGNOSIS — R22.2 MASS OF LEFT CHEST WALL: ICD-10-CM

## 2024-05-09 DIAGNOSIS — E66.01 MORBID OBESITY WITH BMI OF 50.0-59.9, ADULT (HCC): Primary | ICD-10-CM

## 2024-05-09 DIAGNOSIS — F17.211 CIGARETTE NICOTINE DEPENDENCE IN REMISSION: ICD-10-CM

## 2024-05-09 PROCEDURE — 99214 OFFICE O/P EST MOD 30 MIN: CPT

## 2024-05-09 NOTE — ASSESSMENT & PLAN NOTE
Has not lost any weight with phentermine, is following healthy diet trying to be more active.  Will send Wegovy once a week shot follow-up in 1 month.  Continue diet and exercise changes.

## 2024-05-09 NOTE — PATIENT INSTRUCTIONS
Problem List Items Addressed This Visit          Other    Morbid obesity with BMI of 50.0-59.9, adult ([facility]) - Primary     Has not lost any weight with phentermine, is following healthy diet trying to be more active.  Will send Wegovy once a week shot follow-up in 1 month.  Continue diet and exercise changes.         Relevant Medications    Semaglutide-Weight Management (WEGOVY) 0.25 MG/0.5ML     Other Visit Diagnoses       Cigarette nicotine dependence in remission        Quit smoking approximately 10 years ago due for annual CT lung screening.  Ordered will call with results    Relevant Orders    CT lung screening program    Mass of left chest wall        2 inch oval mass felt in the left upper chest wall, will do ultrasound and call with results    Relevant Orders    US extremity soft tissue

## 2024-05-09 NOTE — PROGRESS NOTES
Assessment/Plan:         Problem List Items Addressed This Visit        Other    Morbid obesity with BMI of 50.0-59.9, adult ([facility]) - Primary     Has not lost any weight with phentermine, is following healthy diet trying to be more active.  Will send Wegovy once a week shot follow-up in 1 month.  Continue diet and exercise changes.         Relevant Medications    Semaglutide-Weight Management (WEGOVY) 0.25 MG/0.5ML   Other Visit Diagnoses     Cigarette nicotine dependence in remission        Quit smoking approximately 10 years ago due for annual CT lung screening.  Ordered will call with results    Relevant Orders    CT lung screening program    Mass of left chest wall        2 inch oval mass felt in the left upper chest wall, will do ultrasound and call with results    Relevant Orders    US extremity soft tissue            Subjective:      Patient ID: Ladi Mojica is a 78 y.o. female.    Ladi is here for follow up, she has gained weight. She is doing well with the cpap and has noticed a lump on her upper chest.         The following portions of the patient's history were reviewed and updated as appropriate:   Past Medical History:  She has a past medical history of Acute ill-defined cerebrovascular disease, Arthritis, Benign essential tremor, Breast cancer ([facility]), Chronic kidney disease, CKD (chronic kidney disease) stage 3, GFR 30-59 ml/min ([facility]) (09/25/2018), Colon polyp, Depressive disorder, Disease of thyroid gland, GERD (gastroesophageal reflux disease), Heart murmur, Hyperlipidemia, Hypertension, Impaired fasting glucose, Intention tremor, Osteochondropathy, Panic disorder without agoraphobia with mild panic attacks, PONV (postoperative nausea and vomiting), Psychiatric disorder, Sleep apnea, obstructive, Stroke ([facility]), Tubular adenoma of colon, and Urinary tract infection.,  _______________________________________________________________________  Medical Problems:  does not have any pertinent  problems on file.,  _______________________________________________________________________  Past Surgical History:   has a past surgical history that includes Hysterectomy; Cholecystectomy; AUGMENTATION BREAST; Breast lumpectomy w/ needle localization (Left); Colonoscopy; EGD; Breast biopsy (Left); Tubal ligation; Blepharoplasty; and pr exc tumor soft tiss face&scalp subfascial <2cm (Left, 11/9/2023).,  _______________________________________________________________________  Family History:  family history includes Alcohol abuse in her mother; Asthma in her mother; Breast cancer in her sister; COPD in her sister; Cancer in her father and mother; Gout in her father; Heart attack in her sister; Hyperlipidemia in her sister; Hypertension in her sister; Lung cancer in her sister; Mental illness in her mother; No Known Problems in her daughter, daughter, daughter, maternal grandmother, paternal grandmother, and sister; Osteoarthritis in her mother; Other in her sister.,  _______________________________________________________________________  Social History:   reports that she quit smoking about 13 years ago. Her smoking use included cigarettes. She started smoking about 63 years ago. She has a 50 pack-year smoking history. She has been exposed to tobacco smoke. She has never used smokeless tobacco. She reports current alcohol use. She reports that she does not use drugs.,  _______________________________________________________________________  Allergies:  is allergic to codeine, amoxicillin, oxybutynin, and morphine..  _______________________________________________________________________  Current Outpatient Medications   Medication Sig Dispense Refill   • ALPRAZolam (XANAX) 2 MG tablet Take 1 tablet (2 mg total) by mouth once as needed for anxiety (take 1 hour before MRI to relax) for up to 1 dose 1 tablet 0   • amLODIPine (NORVASC) 10 mg tablet Take 1 tablet (10 mg total) by mouth daily 90 tablet 1   • aspirin 81  mg chewable tablet Chew 81 mg daily     • Cholecalciferol (VITAMIN D3) 50 MCG (2000 UT) TABS Take 2,000 Units by mouth daily     • ciprofloxacin-dexamethasone (CIPRODEX) otic suspension Administer 4 drops to the right ear 2 (two) times a day 7.5 mL 0   • dexamethasone (DECADRON) 1 mg tablet 1 tab at 11 pm the night before labs 1 tablet 0   • famotidine (PEPCID) 20 mg tablet Take 1 tablet (20 mg total) by mouth daily     • fluticasone (FLONASE) 50 mcg/act nasal spray 1 spray into each nostril daily 15.8 mL 1   • Iron-Vitamin C (Vitron-C)  MG TABS Take 1 tablet by mouth in the morning 90 tablet 1   • levothyroxine 50 mcg tablet TAKE 1 TABLET(50 MCG) BY MOUTH DAILY 90 tablet 1   • lisinopril (ZESTRIL) 10 mg tablet Take 1 tablet (10 mg total) by mouth daily 90 tablet 1   • pantoprazole (PROTONIX) 40 mg tablet Take 1 tablet (40 mg total) by mouth 2 (two) times a day 180 tablet 3   • phentermine 15 MG capsule Take 1 capsule (15 mg total) by mouth every morning 30 capsule 0   • potassium chloride (K-DUR,KLOR-CON) 20 mEq tablet Take 1 tablet (20 mEq total) by mouth daily 90 tablet 0   • rosuvastatin (CRESTOR) 10 MG tablet Take 1 tablet (10 mg total) by mouth daily 90 tablet 3   • Semaglutide-Weight Management (WEGOVY) 0.25 MG/0.5ML Inject 0.5 mL (0.25 mg total) under the skin once a week 2 mL 0   • solifenacin (VESICARE) 5 mg tablet Take 1 tablet (5 mg total) by mouth daily       No current facility-administered medications for this visit.     _______________________________________________________________________  Review of Systems   Constitutional:  Positive for fatigue. Negative for chills, diaphoresis and fever.   HENT:  Negative for congestion, ear pain, postnasal drip, rhinorrhea, sinus pain and sore throat.    Eyes:  Negative for pain and visual disturbance.   Respiratory:  Negative for cough, chest tightness, shortness of breath and wheezing.    Cardiovascular:  Negative for chest pain and palpitations.  "  Gastrointestinal:  Negative for abdominal pain, constipation, diarrhea, nausea and vomiting.   Genitourinary:  Positive for frequency. Negative for dysuria and hematuria.   Musculoskeletal:  Positive for arthralgias.   Skin:  Negative for color change and rash.   Neurological:  Negative for dizziness, seizures, syncope, light-headedness and headaches.   Psychiatric/Behavioral:  Negative for dysphoric mood and sleep disturbance. The patient is not nervous/anxious.    All other systems reviewed and are negative.        Objective:  Vitals:    05/09/24 1439   BP: 126/84   BP Location: Left arm   Patient Position: Sitting   Pulse: 102   Temp: 98.1 °F (36.7 °C)   SpO2: 98%   Weight: 129 kg (283 lb 9.6 oz)   Height: 5' 3\" (1.6 m)     Body mass index is 50.24 kg/m².     Physical Exam  Vitals and nursing note reviewed.   Constitutional:       Appearance: Normal appearance. She is not ill-appearing.   HENT:      Head: Normocephalic.      Right Ear: Tympanic membrane, ear canal and external ear normal. There is no impacted cerumen.      Left Ear: Tympanic membrane, ear canal and external ear normal. There is no impacted cerumen.      Nose: Nose normal.      Mouth/Throat:      Mouth: Mucous membranes are moist.      Pharynx: No posterior oropharyngeal erythema.   Eyes:      Extraocular Movements: Extraocular movements intact.      Conjunctiva/sclera: Conjunctivae normal.      Pupils: Pupils are equal, round, and reactive to light.   Cardiovascular:      Rate and Rhythm: Normal rate and regular rhythm.      Heart sounds: Normal heart sounds. No murmur heard.  Pulmonary:      Effort: Pulmonary effort is normal.      Breath sounds: Normal breath sounds. No wheezing.   Abdominal:      Palpations: Abdomen is soft.      Tenderness: There is no abdominal tenderness.   Musculoskeletal:         General: Normal range of motion.      Cervical back: Normal range of motion.      Right lower leg: No edema.      Left lower leg: No edema. "   Skin:     General: Skin is warm and dry.   Neurological:      General: No focal deficit present.      Mental Status: She is alert.   Psychiatric:         Mood and Affect: Mood normal.         Behavior: Behavior normal.

## 2024-05-10 ENCOUNTER — TELEPHONE (OUTPATIENT)
Age: 79
End: 2024-05-10

## 2024-05-10 ENCOUNTER — TELEPHONE (OUTPATIENT)
Dept: FAMILY MEDICINE CLINIC | Facility: CLINIC | Age: 79
End: 2024-05-10

## 2024-05-14 NOTE — TELEPHONE ENCOUNTER
PA for WEGOVY 0.25 MG/0.5ML     Submitted via    []CMM-KEY   [x][employer]-Case ID # [id number]   []Faxed to plan   []Other website   []Phone call Case ID #     Office notes sent, clinical questions answered. Awaiting determination    Turnaround time for your insurance to make a decision on your Prior Authorization can take 7-21 business days.

## 2024-05-15 NOTE — TELEPHONE ENCOUNTER
PA for  WEGOVY 0.25 MG/0.5ML  Denied    Reason:              Message sent to provider pool Yes    Denial letter scanned into Media Yes    Appeal started No    (Provider will need to decide if appeal is warranted and send clinical documentation to PA team for initiation.)

## 2024-05-16 ENCOUNTER — HOSPITAL ENCOUNTER (OUTPATIENT)
Age: 79
Discharge: HOME | End: 2024-05-16
Payer: MEDICARE

## 2024-05-16 VITALS — WEIGHT: 283 LBS | HEIGHT: 63 IN | BODY MASS INDEX: 50.14 KG/M2

## 2024-05-16 DIAGNOSIS — E66.01 MORBID OBESITY WITH BMI OF 45.0-49.9, ADULT (HCC): ICD-10-CM

## 2024-05-16 DIAGNOSIS — E28.39 OVARIAN FAILURE DUE TO MENOPAUSE: ICD-10-CM

## 2024-05-16 PROCEDURE — 77080 DXA BONE DENSITY AXIAL: CPT

## 2024-05-16 RX ORDER — PHENTERMINE HYDROCHLORIDE 15 MG/1
15 CAPSULE ORAL EVERY MORNING
Qty: 30 CAPSULE | Refills: 0 | Status: SHIPPED | OUTPATIENT
Start: 2024-05-16

## 2024-05-16 NOTE — TELEPHONE ENCOUNTER
Called patient and she is aware, she said she does not want to do weight management she is willing to try phentermine

## 2024-05-22 ENCOUNTER — HOSPITAL ENCOUNTER (OUTPATIENT)
Dept: CT IMAGING | Facility: HOSPITAL | Age: 79
Discharge: HOME | End: 2024-05-22
Payer: MEDICARE

## 2024-05-22 ENCOUNTER — HOSPITAL ENCOUNTER (OUTPATIENT)
Dept: ULTRASOUND IMAGING | Facility: HOSPITAL | Age: 79
Discharge: HOME | End: 2024-05-22
Payer: MEDICARE

## 2024-05-22 DIAGNOSIS — R22.2 MASS OF LEFT CHEST WALL: ICD-10-CM

## 2024-05-22 DIAGNOSIS — F17.211 CIGARETTE NICOTINE DEPENDENCE IN REMISSION: ICD-10-CM

## 2024-05-22 PROCEDURE — 71271 CT THORAX LUNG CANCER SCR C-: CPT

## 2024-05-22 PROCEDURE — 76705 ECHO EXAM OF ABDOMEN: CPT

## 2024-05-23 ENCOUNTER — TELEPHONE (OUTPATIENT)
Age: 79
End: 2024-05-23

## 2024-05-23 NOTE — TELEPHONE ENCOUNTER
Pt warm transferred over by PEP to relay DEXA scan results.  Relayed provider message:           MONIQUE Sloan  5/22/2024  1:58 PM EDT       DEXA shows osteopenia, make sure she is getting vitamin D3 and calcium every day she would benefit from weightbearing exercise.  Otherwise we will continue to monitor     Pt verbalized understanding.  No further questions or concerns at this time.

## 2024-06-05 ENCOUNTER — RA CDI HCC (OUTPATIENT)
Dept: OTHER | Facility: HOSPITAL | Age: 79
End: 2024-06-05

## 2024-06-11 ENCOUNTER — OFFICE VISIT (OUTPATIENT)
Dept: FAMILY MEDICINE CLINIC | Facility: CLINIC | Age: 79
End: 2024-06-11
Payer: MEDICARE

## 2024-06-11 VITALS
HEIGHT: 63 IN | SYSTOLIC BLOOD PRESSURE: 116 MMHG | WEIGHT: 280 LBS | DIASTOLIC BLOOD PRESSURE: 78 MMHG | BODY MASS INDEX: 49.61 KG/M2

## 2024-06-11 DIAGNOSIS — M51.36 L4-L5 DISC BULGE: Primary | ICD-10-CM

## 2024-06-11 DIAGNOSIS — R73.03 PREDIABETES: ICD-10-CM

## 2024-06-11 DIAGNOSIS — M54.41 CHRONIC BILATERAL LOW BACK PAIN WITH RIGHT-SIDED SCIATICA: ICD-10-CM

## 2024-06-11 DIAGNOSIS — R79.9 ABNORMAL FINDING OF BLOOD CHEMISTRY, UNSPECIFIED: ICD-10-CM

## 2024-06-11 DIAGNOSIS — G89.29 CHRONIC BILATERAL LOW BACK PAIN WITH RIGHT-SIDED SCIATICA: ICD-10-CM

## 2024-06-11 DIAGNOSIS — E66.01 MORBID OBESITY WITH BMI OF 45.0-49.9, ADULT (HCC): ICD-10-CM

## 2024-06-11 PROCEDURE — 99214 OFFICE O/P EST MOD 30 MIN: CPT

## 2024-06-11 RX ORDER — PHENTERMINE HYDROCHLORIDE 15 MG/1
15 CAPSULE ORAL EVERY MORNING
Qty: 30 CAPSULE | Refills: 0 | Status: SHIPPED | OUTPATIENT
Start: 2024-06-11

## 2024-06-11 NOTE — ASSESSMENT & PLAN NOTE
Will refer to spine and pain and pt, will continue to monitor. Symptoms are worsening in the last 6 months, will continue to work on weight loss.

## 2024-06-11 NOTE — ASSESSMENT & PLAN NOTE
Will continue with phentermine, follow up in month. Continue to work on diet and will see spine and pain in an attempt to be more active.

## 2024-06-11 NOTE — PROGRESS NOTES
Assessment/Plan:         Problem List Items Addressed This Visit        Nervous and Auditory    Chronic bilateral low back pain with right-sided sciatica     Will refer to spine and pain and pt, will continue to monitor. Symptoms are worsening in the last 6 months, will continue to work on weight loss.          Relevant Orders    Ambulatory Referral to Comprehensive Spine PT    Ambulatory referral to Spine & Pain Management       Musculoskeletal and Integument    L4-L5 disc bulge - Primary     Will refer to spine and pain and pt, will continue to monitor. Symptoms are worsening in the last 6 months, will continue to work on weight loss.          Relevant Orders    Ambulatory Referral to Comprehensive Spine PT    Ambulatory referral to Spine & Pain Management       Other    Morbid obesity with BMI of 45.0-49.9, adult ([facility])     Will continue with phentermine, follow up in month. Continue to work on diet and will see spine and pain in an attempt to be more active.          Relevant Medications    phentermine 15 MG capsule    Other Relevant Orders    CBC and differential    Comprehensive metabolic panel    Hemoglobin A1C    Lipid panel    TSH, 3rd generation with Free T4 reflex    Vitamin D 25 hydroxy    Iron Panel (Includes Ferritin, Iron Sat%, Iron, and TIBC)   Other Visit Diagnoses     Abnormal finding of blood chemistry, unspecified        Have lab work prior to follow up    Relevant Orders    Hemoglobin A1C    Iron Panel (Includes Ferritin, Iron Sat%, Iron, and TIBC)    Prediabetes        Have lab work prior to follow up    Relevant Orders    TSH, 3rd generation with Free T4 reflex            Subjective:      Patient ID: Ladi Mojica is a 79 y.o. female.    Ladi is here for follow up, she is doing well with the phentermine and is eating a lot healthier. She is having severe low back pain with numbness on the front of her leg.         The following portions of the patient's history were reviewed and  updated as appropriate:   Past Medical History:  She has a past medical history of Acute ill-defined cerebrovascular disease, Arthritis, Benign essential tremor, Breast cancer ([facility]), Chronic kidney disease, CKD (chronic kidney disease) stage 3, GFR 30-59 ml/min ([facility]) (09/25/2018), Colon polyp, Depressive disorder, Disease of thyroid gland, GERD (gastroesophageal reflux disease), Heart murmur, Hyperlipidemia, Hypertension, Impaired fasting glucose, Intention tremor, Osteochondropathy, Panic disorder without agoraphobia with mild panic attacks, PONV (postoperative nausea and vomiting), Psychiatric disorder, Sleep apnea, obstructive, Stroke (HCC), Tubular adenoma of colon, and Urinary tract infection.,  _______________________________________________________________________  Medical Problems:  does not have any pertinent problems on file.,  _______________________________________________________________________  Past Surgical History:   has a past surgical history that includes Hysterectomy; Cholecystectomy; AUGMENTATION BREAST; Breast lumpectomy w/ needle localization (Left); Colonoscopy; EGD; Breast biopsy (Left); Tubal ligation; Blepharoplasty; and pr exc tumor soft tiss face&scalp subfascial <2cm (Left, 11/9/2023).,  _______________________________________________________________________  Family History:  family history includes Alcohol abuse in her mother; Asthma in her mother; Breast cancer in her sister; COPD in her sister; Cancer in her father and mother; Gout in her father; Heart attack in her sister; Hyperlipidemia in her sister; Hypertension in her sister; Lung cancer in her sister; Mental illness in her mother; No Known Problems in her daughter, daughter, daughter, maternal grandmother, paternal grandmother, and sister; Osteoarthritis in her mother; Other in her sister.,  _______________________________________________________________________  Social History:   reports that she quit smoking about 13 years  ago. Her smoking use included cigarettes. She started smoking about 63 years ago. She has a 50 pack-year smoking history. She has been exposed to tobacco smoke. She has never used smokeless tobacco. She reports current alcohol use. She reports that she does not use drugs.,  _______________________________________________________________________  Allergies:  is allergic to codeine, amoxicillin, oxybutynin, and morphine..  _______________________________________________________________________  Current Outpatient Medications   Medication Sig Dispense Refill   • phentermine 15 MG capsule Take 1 capsule (15 mg total) by mouth every morning 30 capsule 0   • amLODIPine (NORVASC) 10 mg tablet Take 1 tablet (10 mg total) by mouth daily 90 tablet 1   • aspirin 81 mg chewable tablet Chew 81 mg daily     • Cholecalciferol (VITAMIN D3) 50 MCG (2000 UT) TABS Take 2,000 Units by mouth daily     • famotidine (PEPCID) 20 mg tablet Take 1 tablet (20 mg total) by mouth daily     • fluticasone (FLONASE) 50 mcg/act nasal spray 1 spray into each nostril daily 15.8 mL 1   • Iron-Vitamin C (Vitron-C)  MG TABS Take 1 tablet by mouth in the morning 90 tablet 1   • levothyroxine 50 mcg tablet TAKE 1 TABLET(50 MCG) BY MOUTH DAILY 90 tablet 1   • lisinopril (ZESTRIL) 10 mg tablet Take 1 tablet (10 mg total) by mouth daily 90 tablet 1   • pantoprazole (PROTONIX) 40 mg tablet Take 1 tablet (40 mg total) by mouth 2 (two) times a day 180 tablet 3   • potassium chloride (K-DUR,KLOR-CON) 20 mEq tablet Take 1 tablet (20 mEq total) by mouth daily 90 tablet 0   • rosuvastatin (CRESTOR) 10 MG tablet Take 1 tablet (10 mg total) by mouth daily 90 tablet 3   • solifenacin (VESICARE) 5 mg tablet Take 1 tablet (5 mg total) by mouth daily       No current facility-administered medications for this visit.     _______________________________________________________________________  Review of Systems   Constitutional:  Negative for chills, diaphoresis,  "fatigue and fever.   HENT:  Negative for congestion, ear pain, postnasal drip, rhinorrhea, sinus pain and sore throat.    Eyes:  Positive for visual disturbance.   Respiratory:  Positive for cough and chest tightness. Negative for shortness of breath.    Cardiovascular:  Negative for chest pain and palpitations.   Gastrointestinal:  Negative for abdominal pain, constipation, diarrhea, nausea and vomiting.   Genitourinary:  Negative for dysuria, frequency and hematuria.   Musculoskeletal:  Positive for back pain.   Skin:  Negative for rash.   Neurological:  Negative for dizziness, syncope, light-headedness and headaches.   Psychiatric/Behavioral:  Negative for dysphoric mood and sleep disturbance. The patient is not nervous/anxious.    All other systems reviewed and are negative.        Objective:  Vitals:    06/11/24 1442   BP: 116/78   Weight: 127 kg (280 lb)   Height: 5' 2.5\" (1.588 m)     Body mass index is 50.4 kg/m².     Physical Exam  Vitals and nursing note reviewed.   Constitutional:       Appearance: She is obese. She is not ill-appearing.   HENT:      Head: Normocephalic.      Right Ear: Tympanic membrane, ear canal and external ear normal. There is no impacted cerumen.      Left Ear: Tympanic membrane, ear canal and external ear normal. There is no impacted cerumen.      Nose: Nose normal. No congestion.      Mouth/Throat:      Mouth: Mucous membranes are moist.      Pharynx: No posterior oropharyngeal erythema.   Eyes:      Extraocular Movements: Extraocular movements intact.      Conjunctiva/sclera: Conjunctivae normal.      Pupils: Pupils are equal, round, and reactive to light.   Cardiovascular:      Rate and Rhythm: Normal rate and regular rhythm.      Heart sounds: Normal heart sounds. No murmur heard.  Pulmonary:      Effort: Pulmonary effort is normal.      Breath sounds: Normal breath sounds. No wheezing.   Abdominal:      Palpations: Abdomen is soft.      Tenderness: There is no abdominal " tenderness.   Musculoskeletal:         General: Normal range of motion.      Cervical back: Normal range of motion.      Right lower leg: No edema.      Left lower leg: No edema.   Skin:     General: Skin is warm and dry.   Neurological:      General: No focal deficit present.      Mental Status: She is alert.   Psychiatric:         Mood and Affect: Mood normal.         Behavior: Behavior normal.

## 2024-06-12 ENCOUNTER — OFFICE VISIT (OUTPATIENT)
Dept: GASTROENTEROLOGY | Facility: CLINIC | Age: 79
End: 2024-06-12
Payer: MEDICARE

## 2024-06-12 VITALS
BODY MASS INDEX: 49.61 KG/M2 | TEMPERATURE: 97.6 F | SYSTOLIC BLOOD PRESSURE: 130 MMHG | OXYGEN SATURATION: 96 % | HEIGHT: 63 IN | WEIGHT: 280 LBS | HEART RATE: 93 BPM | DIASTOLIC BLOOD PRESSURE: 82 MMHG

## 2024-06-12 DIAGNOSIS — K57.90 DIVERTICULAR DISEASE: ICD-10-CM

## 2024-06-12 DIAGNOSIS — Z86.010 HISTORY OF COLON POLYPS: Primary | ICD-10-CM

## 2024-06-12 DIAGNOSIS — D17.5 LIPOMA OF COLON: ICD-10-CM

## 2024-06-12 PROCEDURE — 99203 OFFICE O/P NEW LOW 30 MIN: CPT | Performed by: INTERNAL MEDICINE

## 2024-06-12 NOTE — PATIENT INSTRUCTIONS
Scheduled date of colonoscopy (as of today):7/2/24  Physician performing colonoscopy:Maria L  Location of colonoscopy:[place]  Bowel prep reviewed with patient:Dallin/Miralax  Instructions reviewed with patient by:Clint hendrix  Clearances:  none

## 2024-06-13 NOTE — PROGRESS NOTES
[facility] - Outpatient Consultation  Ladi Mojica 79 y.o. female MRN: [id number]  Encounter: [id number]          ASSESSMENT AND PLAN:      1. History of colon polyps  - Colonoscopy; Future    2. Diverticular disease  - Colonoscopy; Future    3. Lipoma of colon    ______________________________________________________________________    HPI: Ladi is an 79-year-old female who comes the office today to schedule routine screening colonoscopy.  She has a history of colon polyp.  Her last colonoscopy was performed 9/17/2019.  This showed diverticulosis coli.  This showed adenomatous polyps of the colon as well as a lipoma.  There is no family history for colon cancer or for colon polyp.  She denies any problems with the previous procedure including problems with the bowel prep, problems with the anesthesia, or problems with the actual procedure.  She denies abdominal pain, nausea, vomiting, diarrhea, she admits to occasional episodes of constipation.  There is no rectal bleeding or melena.  She denies heartburn, dysphagia, odynophagia.  She denies any gaseousness or bloating.      REVIEW OF SYSTEMS:    CONSTITUTIONAL: Denies any fever, chills, rigors, and weight loss.  HEENT: No earache or tinnitus. Denies hearing loss or visual disturbances.  CARDIOVASCULAR: No chest pain or palpitations.   RESPIRATORY: Denies any cough, hemoptysis, shortness of breath or dyspnea on exertion.  GASTROINTESTINAL: As noted in the History of Present Illness.   GENITOURINARY: No problems with urination. Denies any hematuria or dysuria.  NEUROLOGIC: No dizziness or vertigo, denies headaches.   MUSCULOSKELETAL: Denies any muscle or joint pain.   SKIN: Denies skin rashes or itching.   ENDOCRINE: Denies excessive thirst. Denies intolerance to heat or cold.  PSYCHOSOCIAL: Denies depression or anxiety. Denies any recent memory loss.       Historical Information   Past Medical History:   Diagnosis Date     Acute ill-defined cerebrovascular disease     Arthritis     Benign essential tremor     Breast cancer (HCC)     pt states doesn't remember the year thinks it might have been on the right.    Chronic kidney disease     CKD (chronic kidney disease) stage 3, GFR 30-59 ml/min (HCC) 2018    Colon polyp     Depressive disorder     Disease of thyroid gland     GERD (gastroesophageal reflux disease)     Heart murmur     Hyperlipidemia     Hypertension     Impaired fasting glucose     Intention tremor     last assessed: 2016    Osteochondropathy     Panic disorder without agoraphobia with mild panic attacks     PONV (postoperative nausea and vomiting)     Psychiatric disorder     anxiety    Sleep apnea, obstructive     Stroke (HCC)     Tubular adenoma of colon     Urinary tract infection      Past Surgical History:   Procedure Laterality Date    AUGMENTATION BREAST      pt states during mammo she doesn't have any other surgery than the 2 biopsy    BLEPHAROPLASTY      Upper    BREAST BIOPSY Left     pt doesn't recall when and believes the neg biop was on left    BREAST LUMPECTOMY W/ NEEDLE LOCALIZATION Left     description: benign last assessed; 2016    CHOLECYSTECTOMY      COLONOSCOPY      EGD      HYSTERECTOMY      ME EXC TUMOR SOFT TISS FACE&SCALP SUBFASCIAL <2CM Left 2023    Procedure: EXCISION OF EYELID LESION;  Surgeon: Eugene Garnica MD;  Location: [facility];  Service: Plastics    TUBAL LIGATION       Social History   Social History     Substance and Sexual Activity   Alcohol Use Yes    Comment: occ     Social History     Substance and Sexual Activity   Drug Use Never     Social History     Tobacco Use   Smoking Status Former    Current packs/day: 0.00    Average packs/day: 1 pack/day for 50.0 years (50.0 ttl pk-yrs)    Types: Cigarettes    Start date:     Quit date:     Years since quittin.4    Passive exposure: Past   Smokeless Tobacco Never   Tobacco Comments    10 years ago  "    Family History   Problem Relation Age of Onset    Alcohol abuse Mother     Asthma Mother     Cancer Mother     Mental illness Mother     Osteoarthritis Mother     Gout Father     Cancer Father     Other Sister         carotid arterial disease    COPD Sister     Hyperlipidemia Sister     Hypertension Sister     Heart attack Sister     Breast cancer Sister     No Known Problems Daughter     No Known Problems Maternal Grandmother     No Known Problems Paternal Grandmother     Lung cancer Sister     No Known Problems Sister     No Known Problems Daughter     No Known Problems Daughter        Meds/Allergies       Current Outpatient Medications:     amLODIPine (NORVASC) 10 mg tablet    aspirin 81 mg chewable tablet    Cholecalciferol (VITAMIN D3) 50 MCG (2000 UT) TABS    famotidine (PEPCID) 20 mg tablet    fluticasone (FLONASE) 50 mcg/act nasal spray    Iron-Vitamin C (Vitron-C)  MG TABS    levothyroxine 50 mcg tablet    lisinopril (ZESTRIL) 10 mg tablet    pantoprazole (PROTONIX) 40 mg tablet    phentermine 15 MG capsule    potassium chloride (K-DUR,KLOR-CON) 20 mEq tablet    rosuvastatin (CRESTOR) 10 MG tablet    solifenacin (VESICARE) 5 mg tablet    Allergies   Allergen Reactions    Codeine Other (See Comments)     \"I didn't like the feeling.\"    Amoxicillin Other (See Comments)     Unsure of allergy    Oxybutynin Visual Disturbance    Morphine Other (See Comments)     Does not like feeling           Objective     Blood pressure 130/82, pulse 93, temperature 97.6 °F (36.4 °C), temperature source Temporal, height 5' 2.5\" (1.588 m), weight 127 kg (280 lb), SpO2 96%, not currently breastfeeding. Body mass index is 50.4 kg/m².        PHYSICAL EXAM:      General Appearance:   Alert, cooperative, no distress   HEENT:   Normocephalic, atraumatic, anicteric.     Neck:  Supple, symmetrical, trachea midline   Lungs:   Clear to auscultation bilaterally; no rales, rhonchi or wheezing; respirations unlabored    Heart::   " Regular rate and rhythm; no murmur, rub, or gallop.   Abdomen:   Soft, non-tender, non-distended; normal bowel sounds; no masses, no organomegaly    Genitalia:   Deferred    Rectal:   Deferred    Extremities:  No cyanosis, clubbing or edema    Pulses:  2+ and symmetric    Skin:  No jaundice, rashes, or lesions    Lymph nodes:  No palpable cervical lymphadenopathy        Lab Results:   No visits with results within 1 Day(s) from this visit.   Latest known visit with results is:   Appointment on 04/22/2024   Component Date Value    Color, UA 04/22/2024 Yellow     Clarity, UA 04/22/2024 Turbid     Specific Gravity, UA 04/22/2024 1.022     pH, UA 04/22/2024 6.0     Leukocytes, UA 04/22/2024 Negative     Nitrite, UA 04/22/2024 Negative     Protein, UA 04/22/2024 Trace (A)     Glucose, UA 04/22/2024 Negative     Ketones, UA 04/22/2024 Negative     Urobilinogen, UA 04/22/2024 3.0 (A)     Bilirubin, UA 04/22/2024 Negative     Occult Blood, UA 04/22/2024 Negative     RBC, UA 04/22/2024 1-2     WBC, UA 04/22/2024 1-2     Epithelial Cells 04/22/2024 Occasional     Bacteria, UA 04/22/2024 None Seen     MUCUS THREADS 04/22/2024 Occasional (A)     Creatinine, Ur 04/22/2024 143.2     Albumin,U,Random 04/22/2024 <7.0     Albumin Creat Ratio 04/22/2024 <5          Radiology Results:   US superficial lump (non extremity)    Result Date: 6/3/2024  Narrative: SUPERFICIAL LUMP NONEXTREMITY ULTRASOUND INDICATION: R22.2: Localized swelling, mass and lump, trunk. Palpable abnormality, left infraclavicular region COMPARISON: None TECHNIQUE: Real-time ultrasound of the area of concern was performed with a linear transducer with both volumetric sweeps and still imaging techniques. FINDINGS: Corresponding to the palpable abnormality is an isoechoic ovoid nonshadowing and avascular subcutaneous nodule measuring 2.4 x 0.8 x 1.8 cm. No other abnormalities are seen     Impression: 2.4 cm subcutaneous nodule corresponding to the patient's palpable  finding. Although the findings are not entirely specific, the characteristics are most suggestive of a benign lipoma. 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 COMPARISON: CTA chest dated August 1, 2023. TECHNIQUE: Unenhanced CT examination of the chest was performed utilizing a low dose protocol. Multiplanar 2D reformatted images were created from the source data. Radiation dose length product (DLP) for this visit:  148.73 mGy-cm . This examination, like all CT scans performed in the [facility], was performed utilizing techniques to minimize radiation dose exposure, including the use of iterative reconstruction and automated exposure control. FINDINGS: LUNGS: There are atelectatic changes noted at the medial left lung base. No infiltrate or pleural effusion. No new or suspicious pulmonary nodules. There is no tracheal or endobronchial lesion. PLEURA: Unremarkable. HEART/GREAT VESSELS: Atherosclerotic changes are noted in thoracic aorta and coronary arteries. No thoracic aortic aneurysm. MEDIASTINUM AND SAMY: Unremarkable. CHEST WALL AND LOWER NECK: Unremarkable. VISUALIZED STRUCTURES IN THE UPPER ABDOMEN: Moderate-sized hiatal hernia is again noted. The gallbladder is surgically absent. Again noted are low-density nodules of the adrenal glands bilaterally in keeping with benign adenomas. OSSEOUS STRUCTURES: Spinal degenerative changes are noted. No acute fracture or destructive osseous lesion.     Impression: No new nodules and/or definitely benign nodules.   Lung-RADS1, negative. Continued annual screening with LDCT in 12 months. Moderate-sized hiatal hernia. Workstation  performed: [id number]     DXA bone density spine hip and pelvis    Result Date: 5/22/2024  Narrative: DXA SCAN CLINICAL HISTORY: 78 years postmenopausal female. OTHER RISK FACTORS: Parental history of hip fracture status post minor injury, limited mobility, PPI therapy. PHARMACOLOGIC THERAPY FOR OSTEOPOROSIS:  None. TECHNIQUE: Bone densitometry was performed using a Hologic Horizon W bone densitometer.  Regions of interest appear properly placed. COMPARISON: 11/19/2019. RESULTS: LUMBAR SPINE Level: L1, L3, L4  (L2 vertebra excluded from analysis due to local structural abnormalities or artifact): BMD: 0.882 gm/cm2 T-score: -1.6 LEFT  TOTAL HIP: BMD: 0.861 gm/cm2 T-score: -0.7 LEFT  FEMORAL NECK: BMD: 0.682 gm/cm2 T score: -1.5     Impression: 1. Low bone mass (osteopenia). 2.  Since a DXA study from 11/19/2019, there has been: A  STATISTICALLY SIGNIFICANT DECREASE in bone mineral density of 0.037 g/cm2 (4.0%) in the lumbar spine. A  STATISTICALLY SIGNIFICANT DECREASE in bone mineral density of 0.054 g/cm2 (5.9%) in the left total hip. 3.  The 10 year risk of hip fracture is 9.6% with the 10 year risk of major osteoporotic fracture being 18% as calculated by the [employer] fracture risk assessment tool (FRAX, which is based on data generated by the WHO Collaborating Cooper  for Metabolic Bone Diseases). The patient's weight exceeds the limits in the FRAX database.  The values have been adjusted to a weight of 125 kg. 4.  The current NOF guidelines recommend treating patients with a T-score of -2.5 or less in the lumbar spine or hips, or in post-menopausal women and men over the age of 50 with low bone mass (osteopenia) and a FRAX 10 year risk score of >3% for hip fracture and/or >20% for major osteoporotic fracture. 5.  The NOF recommends follow-up DXA in 1-2 years after initiating therapy for osteoporosis and every 2 years thereafter. More frequent evaluation is appropriate for patients with conditions  associated with rapid bone loss, such as glucocorticoid therapy. The interval between DXA screenings may be longer for individuals without major risk factors and initial T-score in the normal or upper low bone mass range. The FRAX algorithm has certain limitations: -FRAX has not been validated in patients currently or previously treated with pharmacotherapy for osteoporosis.  In such patients, clinical judgment must be exercised in interpreting FRAX scores. -Prior hip, vertebral and humeral fragility fractures appear to confer greater risk of subsequent fracture than fractures at other sites (this is especially true for individuals with severe vertebral fractures), but quantification of this incremental risk is not possible with FRAX. -FRAX underestimates fracture risk in patients with history of multiple fragility fractures. -FRAX may underestimate fracture risk in patients with history of frequent falls. -It is not appropriate to use FRAX to monitor treatment response. WHO CLASSIFICATION: Normal (a T-score of -1.0 or higher) Low bone mineral density (a T-score of less than -1.0 but higher than -2.5) Osteoporosis (a T-score of -2.5 or less) Severe osteoporosis (a T-score of -2.5 or less with a fragility fracture) LEAST SIGNIFICANT CHANGE (AT 95% C.I): Lumbar spine: 0.035 g/cm2; 3.4% Total hip: 0.025 g/cm2; 2.9% Forearm: 0.022 g/cm2; 3.4% Workstation performed: [id number]

## 2024-06-19 ENCOUNTER — TELEPHONE (OUTPATIENT)
Dept: FAMILY MEDICINE CLINIC | Facility: CLINIC | Age: 79
End: 2024-06-19

## 2024-06-19 NOTE — TELEPHONE ENCOUNTER
"Patient upset she could not remember the name of the medicine to stop prior to surgery and it began with a \"P\".     Informed patient to stop the phentermine 5 days prior to the surgery,    She did write down the 27th but forgot the name of it.  "

## 2024-06-25 ENCOUNTER — TELEPHONE (OUTPATIENT)
Age: 79
End: 2024-06-25

## 2024-07-01 ENCOUNTER — TELEPHONE (OUTPATIENT)
Dept: GASTROENTEROLOGY | Facility: HOSPITAL | Age: 79
End: 2024-07-01

## 2024-07-02 ENCOUNTER — HOSPITAL ENCOUNTER (OUTPATIENT)
Dept: GASTROENTEROLOGY | Facility: HOSPITAL | Age: 79
Setting detail: OUTPATIENT SURGERY
Discharge: HOME | End: 2024-07-02
Attending: INTERNAL MEDICINE | Admitting: INTERNAL MEDICINE
Payer: MEDICARE

## 2024-07-02 ENCOUNTER — ANESTHESIA (OUTPATIENT)
Dept: GASTROENTEROLOGY | Facility: HOSPITAL | Age: 79
End: 2024-07-02

## 2024-07-02 ENCOUNTER — ANESTHESIA EVENT (OUTPATIENT)
Dept: GASTROENTEROLOGY | Facility: HOSPITAL | Age: 79
End: 2024-07-02

## 2024-07-02 VITALS
BODY MASS INDEX: 46.9 KG/M2 | DIASTOLIC BLOOD PRESSURE: 73 MMHG | WEIGHT: 274.69 LBS | RESPIRATION RATE: 19 BRPM | HEART RATE: 67 BPM | SYSTOLIC BLOOD PRESSURE: 133 MMHG | OXYGEN SATURATION: 96 % | HEIGHT: 64 IN | TEMPERATURE: 98 F

## 2024-07-02 DIAGNOSIS — K57.90 DIVERTICULAR DISEASE: ICD-10-CM

## 2024-07-02 DIAGNOSIS — Z86.010 HISTORY OF COLON POLYPS: ICD-10-CM

## 2024-07-02 PROCEDURE — 88305 TISSUE EXAM BY PATHOLOGIST: CPT | Performed by: PATHOLOGY

## 2024-07-02 PROCEDURE — 45385 COLONOSCOPY W/LESION REMOVAL: CPT | Performed by: INTERNAL MEDICINE

## 2024-07-02 RX ORDER — SODIUM CHLORIDE, SODIUM LACTATE, POTASSIUM CHLORIDE, CALCIUM CHLORIDE 600; 310; 30; 20 MG/100ML; MG/100ML; MG/100ML; MG/100ML
50 INJECTION, SOLUTION INTRAVENOUS CONTINUOUS
Status: DISCONTINUED | OUTPATIENT
Start: 2024-07-02 | End: 2024-07-06 | Stop reason: HOSPADM

## 2024-07-02 RX ORDER — SODIUM CHLORIDE, SODIUM LACTATE, POTASSIUM CHLORIDE, CALCIUM CHLORIDE 600; 310; 30; 20 MG/100ML; MG/100ML; MG/100ML; MG/100ML
INJECTION, SOLUTION INTRAVENOUS CONTINUOUS PRN
Status: DISCONTINUED | OUTPATIENT
Start: 2024-07-02 | End: 2024-07-02

## 2024-07-02 RX ORDER — PROPOFOL 10 MG/ML
INJECTION, EMULSION INTRAVENOUS AS NEEDED
Status: DISCONTINUED | OUTPATIENT
Start: 2024-07-02 | End: 2024-07-02

## 2024-07-02 RX ADMIN — PROPOFOL 40 MG: 10 INJECTION, EMULSION INTRAVENOUS at 12:31

## 2024-07-02 RX ADMIN — PROPOFOL 40 MG: 10 INJECTION, EMULSION INTRAVENOUS at 12:37

## 2024-07-02 RX ADMIN — PROPOFOL 40 MG: 10 INJECTION, EMULSION INTRAVENOUS at 12:47

## 2024-07-02 RX ADMIN — PROPOFOL 80 MG: 10 INJECTION, EMULSION INTRAVENOUS at 12:20

## 2024-07-02 RX ADMIN — PROPOFOL 40 MG: 10 INJECTION, EMULSION INTRAVENOUS at 12:24

## 2024-07-02 RX ADMIN — PROPOFOL 40 MG: 10 INJECTION, EMULSION INTRAVENOUS at 12:44

## 2024-07-02 RX ADMIN — PROPOFOL 40 MG: 10 INJECTION, EMULSION INTRAVENOUS at 12:34

## 2024-07-02 RX ADMIN — SODIUM CHLORIDE, SODIUM LACTATE, POTASSIUM CHLORIDE, AND CALCIUM CHLORIDE: .6; .31; .03; .02 INJECTION, SOLUTION INTRAVENOUS at 12:20

## 2024-07-02 RX ADMIN — PROPOFOL 40 MG: 10 INJECTION, EMULSION INTRAVENOUS at 12:22

## 2024-07-02 RX ADMIN — PROPOFOL 30 MG: 10 INJECTION, EMULSION INTRAVENOUS at 12:51

## 2024-07-02 RX ADMIN — PROPOFOL 40 MG: 10 INJECTION, EMULSION INTRAVENOUS at 12:40

## 2024-07-02 RX ADMIN — PROPOFOL 40 MG: 10 INJECTION, EMULSION INTRAVENOUS at 12:26

## 2024-07-02 NOTE — H&P
History and Physical - [facility]  Ladi Mojica 79 y.o. female MRN: [id number]      HPI: Ladi Mojica is a 79 y.o. year old female who presents for evaluation of a history of polyps and diverticular disease      REVIEW OF SYSTEMS: Per the HPI, and otherwise unremarkable.    Historical Information   Past Medical History:   Diagnosis Date    Acute ill-defined cerebrovascular disease     Arthritis     Benign essential tremor     Breast cancer (HCC)     pt states doesn't remember the year thinks it might have been on the right.    Chronic kidney disease     CKD (chronic kidney disease) stage 3, GFR 30-59 ml/min (HCC) 09/25/2018    Colon polyp     Depressive disorder     Disease of thyroid gland     GERD (gastroesophageal reflux disease)     Heart murmur     Hyperlipidemia     Hypertension     Impaired fasting glucose     Intention tremor     last assessed: 8/18/2016    Osteochondropathy     Panic disorder without agoraphobia with mild panic attacks     PONV (postoperative nausea and vomiting)     Psychiatric disorder     anxiety    Sleep apnea, obstructive     Stroke (HCC)     Tubular adenoma of colon     Urinary tract infection      Past Surgical History:   Procedure Laterality Date    AUGMENTATION BREAST      pt states during mammo she doesn't have any other surgery than the 2 biopsy    BLEPHAROPLASTY      Upper    BREAST BIOPSY Left     pt doesn't recall when and believes the neg biop was on left    BREAST LUMPECTOMY W/ NEEDLE LOCALIZATION Left     description: benign last assessed; 8/21/2016    CHOLECYSTECTOMY      COLONOSCOPY      EGD      HYSTERECTOMY      AZ EXC TUMOR SOFT TISS FACE&SCALP SUBFASCIAL <2CM Left 11/9/2023    Procedure: EXCISION OF EYELID LESION;  Surgeon: Eugene Garnica MD;  Location: [facility];  Service: Plastics    TUBAL LIGATION       Social History   Social History     Substance and Sexual Activity   Alcohol Use Yes    Comment: occ     Social History  "    Substance and Sexual Activity   Drug Use Never     Social History     Tobacco Use   Smoking Status Former    Current packs/day: 0.00    Average packs/day: 1 pack/day for 50.0 years (50.0 ttl pk-yrs)    Types: Cigarettes    Start date:     Quit date:     Years since quittin.5    Passive exposure: Past   Smokeless Tobacco Never   Tobacco Comments    10 years ago     Family History   Problem Relation Age of Onset    Alcohol abuse Mother     Asthma Mother     Cancer Mother     Mental illness Mother     Osteoarthritis Mother     Gout Father     Cancer Father     Other Sister         carotid arterial disease    COPD Sister     Hyperlipidemia Sister     Hypertension Sister     Heart attack Sister     Breast cancer Sister     No Known Problems Daughter     No Known Problems Maternal Grandmother     No Known Problems Paternal Grandmother     Lung cancer Sister     No Known Problems Sister     No Known Problems Daughter     No Known Problems Daughter        Meds/Allergies     Not in a hospital admission.    Allergies   Allergen Reactions    Codeine Other (See Comments)     \"I didn't like the feeling.\"    Amoxicillin Other (See Comments)     Unsure of allergy    Oxybutynin Visual Disturbance    Morphine Other (See Comments)     Does not like feeling       Objective     Blood pressure 108/82, pulse 88, temperature 97.7 °F (36.5 °C), temperature source Temporal, resp. rate 16, height 5' 4\" (1.626 m), weight 125 kg (274 lb 11.1 oz), SpO2 97%, not currently breastfeeding.      PHYSICAL EXAM    Gen: NAD  CV: RRR  CHEST: Clear  ABD: soft, NT/ND  EXT: no edema      ASSESSMENT/PLAN:  This is a 79 y.o. year old female here for colonoscopy, and she is stable and optimized for her procedure.          "

## 2024-07-02 NOTE — ANESTHESIA POSTPROCEDURE EVALUATION
Post-Op Assessment Note    CV Status:  Stable  Pain Score: 0    Pain management: adequate       Mental Status:  Alert and awake   Hydration Status:  Euvolemic   PONV Controlled:  Controlled   Airway Patency:  Patent     Post Op Vitals Reviewed: Yes    No anethesia notable event occurred.    Staff: NAHUM               /58 (07/02/24 1258)    Temp 98 °F (36.7 °C) (07/02/24 1258)    Pulse 79 (07/02/24 1258)   Resp 12 (07/02/24 1258)    SpO2 92 % (07/02/24 1258)

## 2024-07-02 NOTE — ANESTHESIA PREPROCEDURE EVALUATION
Procedure:  COLONOSCOPY    Relevant Problems   CARDIO   (+) Essential hypertension   (+) Mixed hyperlipidemia      ENDO   (+) Hypothyroidism      GI/HEPATIC   (+) Gastroesophageal reflux disease      /RENAL   (+) CKD (chronic kidney disease) stage 3, GFR 30-59 ml/min (HCC)   (+) Kidney lesion      GYN   (+) Bilateral malignant neoplasm of breast in female (HCC)      MUSCULOSKELETAL   (+) Chronic bilateral low back pain with right-sided sciatica   (+) Sciatica of right side      NEURO/PSYCH   (+) Chronic bilateral low back pain with right-sided sciatica   (+) Depression with anxiety   (+) Severe episode of recurrent major depressive disorder, without psychotic features (HCC)      PULMONARY   (+) Chronic obstructive pulmonary disease, unspecified COPD type (HCC)   (+) OBI (obstructive sleep apnea)   (+) Obstructive sleep apnea syndrome   (+) Shortness of breath      Endocrine   (+) IFG (impaired fasting glucose)      Neurology/Sleep   (+) Benign essential tremor   (+) Intention tremor        Physical Exam    Airway    Mallampati score: II  TM Distance: >3 FB  Neck ROM: full     Dental    upper dentures and lower dentures    Cardiovascular  Cardiovascular exam normal    Pulmonary  Pulmonary exam normal     Other Findings  post-pubertal.      Anesthesia Plan  ASA Score- 3     Anesthesia Type- IV sedation with anesthesia with ASA Monitors.         Additional Monitors:     Airway Plan:            Plan Factors-Exercise tolerance (METS): >4 METS.    Chart reviewed. EKG reviewed. Imaging results reviewed. Existing labs reviewed. Patient summary reviewed.    Patient is not a current smoker.              Induction- intravenous.    Postoperative Plan-         Informed Consent- Anesthetic plan and risks discussed with patient.  I personally reviewed this patient with the CRNA. Discussed and agreed on the Anesthesia Plan with the CRNA..

## 2024-07-05 PROCEDURE — 88305 TISSUE EXAM BY PATHOLOGIST: CPT | Performed by: PATHOLOGY

## 2024-07-09 ENCOUNTER — TELEPHONE (OUTPATIENT)
Dept: GASTROENTEROLOGY | Facility: CLINIC | Age: 79
End: 2024-07-09

## 2024-07-09 NOTE — TELEPHONE ENCOUNTER
----- Message from David Lisa DO sent at 7/9/2024  6:48 AM EDT -----  Please notify the patient that the polyp taken from the colon was a precancerous adenoma.  Based on the patient's age, 79 years old, there will be no additional screening or surveillance colonoscopies.

## 2024-07-12 DIAGNOSIS — R35.0 URINARY FREQUENCY: ICD-10-CM

## 2024-07-12 RX ORDER — SOLIFENACIN SUCCINATE 5 MG/1
5 TABLET, FILM COATED ORAL DAILY
Qty: 90 TABLET | Refills: 1 | Status: SHIPPED | OUTPATIENT
Start: 2024-07-12

## 2024-07-12 NOTE — TELEPHONE ENCOUNTER
Medication: solifenacin (VESICARE)     Dose/Frequency: 5MG    Quantity: 90 tablets    Pharmacy: [id number]  [place] - [address]       Office:   [x] PCP/Provider -   [] Speciality/Provider -     Does the patient have enough for 3 days?   [x] Yes   [] No - Send as HP to POD     Pt called pharmacy for a refill, she claims they told her she is not on this medication anymore so they cannot fill it, she is not sure why this is. Does not remember provider telling her to stop meds at last visit.

## 2024-07-26 ENCOUNTER — OFFICE VISIT (OUTPATIENT)
Dept: FAMILY MEDICINE CLINIC | Facility: CLINIC | Age: 79
End: 2024-07-26
Payer: MEDICARE

## 2024-07-26 VITALS
HEART RATE: 102 BPM | DIASTOLIC BLOOD PRESSURE: 80 MMHG | SYSTOLIC BLOOD PRESSURE: 130 MMHG | OXYGEN SATURATION: 97 % | HEIGHT: 64 IN | WEIGHT: 276.8 LBS | BODY MASS INDEX: 47.25 KG/M2 | TEMPERATURE: 98.1 F

## 2024-07-26 DIAGNOSIS — I10 ESSENTIAL HYPERTENSION: ICD-10-CM

## 2024-07-26 DIAGNOSIS — E66.01 MORBID OBESITY WITH BMI OF 45.0-49.9, ADULT (HCC): ICD-10-CM

## 2024-07-26 DIAGNOSIS — R53.83 OTHER FATIGUE: Primary | ICD-10-CM

## 2024-07-26 DIAGNOSIS — R20.0 ANESTHESIA OF SKIN: ICD-10-CM

## 2024-07-26 PROCEDURE — 99214 OFFICE O/P EST MOD 30 MIN: CPT

## 2024-07-26 RX ORDER — OMEGA-3-ACID ETHYL ESTERS 1 G/1
1 CAPSULE, LIQUID FILLED ORAL DAILY
Qty: 30 CAPSULE | Refills: 1 | Status: SHIPPED | OUTPATIENT
Start: 2024-07-26

## 2024-07-26 RX ORDER — PHENTERMINE HYDROCHLORIDE 15 MG/1
15 CAPSULE ORAL EVERY MORNING
Qty: 30 CAPSULE | Refills: 0 | Status: SHIPPED | OUTPATIENT
Start: 2024-07-26

## 2024-07-26 RX ORDER — HYDROCHLOROTHIAZIDE 25 MG/1
25 TABLET ORAL DAILY
Qty: 30 TABLET | Refills: 1 | Status: SHIPPED | OUTPATIENT
Start: 2024-07-26

## 2024-07-26 NOTE — ASSESSMENT & PLAN NOTE
Doing great! Have lab work, will add water pill, continue healthy diet and try to increase activity. Follow up in 1 month.

## 2024-07-26 NOTE — PROGRESS NOTES
Assessment/Plan:         Problem List Items Addressed This Visit        Cardiovascular and Mediastinum    Essential hypertension     Will add water pill first thing in the morning, follow up in 1 month.          Relevant Medications    hydroCHLOROthiazide 25 mg tablet       Other    Other fatigue - Primary    Relevant Orders    Vitamin B12    Morbid obesity with BMI of 45.0-49.9, adult ([facility])     Doing great! Have lab work, will add water pill, continue healthy diet and try to increase activity. Follow up in 1 month.          Relevant Medications    phentermine 15 MG capsule    hydroCHLOROthiazide 25 mg tablet    omega-3-acid ethyl esters (LOVAZA) 1 g capsule   Other Visit Diagnoses     Anesthesia of skin        Have lab work, will call with results    Relevant Orders    Vitamin B12            Subjective:      Patient ID: Ladi Mojica is a 79 y.o. female.    Ladi is here for follow up, she has lost 5 more lbs and is feeling ok.         The following portions of the patient's history were reviewed and updated as appropriate:   Past Medical History:  She has a past medical history of Acute ill-defined cerebrovascular disease, Arthritis, Benign essential tremor, Breast cancer ([facility]), Chronic kidney disease, CKD (chronic kidney disease) stage 3, GFR 30-59 ml/min ([facility]) (09/25/2018), Colon polyp, Depressive disorder, Disease of thyroid gland, GERD (gastroesophageal reflux disease), Heart murmur, Hyperlipidemia, Hypertension, Impaired fasting glucose, Intention tremor, Osteochondropathy, Panic disorder without agoraphobia with mild panic attacks, PONV (postoperative nausea and vomiting), Psychiatric disorder, Sleep apnea, obstructive, Stroke ([facility]), Tubular adenoma of colon, and Urinary tract infection.,  _______________________________________________________________________  Medical Problems:  does not have any pertinent problems on  file.,  _______________________________________________________________________  Past Surgical History:   has a past surgical history that includes Hysterectomy; Cholecystectomy; AUGMENTATION BREAST; Breast lumpectomy w/ needle localization (Left); Colonoscopy; EGD; Breast biopsy (Left); Tubal ligation; Blepharoplasty; and pr exc tumor soft tiss face&scalp subfascial <2cm (Left, 11/9/2023).,  _______________________________________________________________________  Family History:  family history includes Alcohol abuse in her mother; Asthma in her mother; Breast cancer in her sister; COPD in her sister; Cancer in her father and mother; Gout in her father; Heart attack in her sister; Hyperlipidemia in her sister; Hypertension in her sister; Lung cancer in her sister; Mental illness in her mother; No Known Problems in her daughter, daughter, daughter, maternal grandmother, paternal grandmother, and sister; Osteoarthritis in her mother; Other in her sister.,  _______________________________________________________________________  Social History:   reports that she quit smoking about 13 years ago. Her smoking use included cigarettes. She started smoking about 63 years ago. She has a 50 pack-year smoking history. She has been exposed to tobacco smoke. She has never used smokeless tobacco. She reports current alcohol use. She reports that she does not use drugs.,  _______________________________________________________________________  Allergies:  is allergic to codeine, amoxicillin, oxybutynin, and morphine..  _______________________________________________________________________  Current Outpatient Medications   Medication Sig Dispense Refill   • amLODIPine (NORVASC) 10 mg tablet Take 1 tablet (10 mg total) by mouth daily 90 tablet 1   • aspirin 81 mg chewable tablet Chew 81 mg daily     • Cholecalciferol (VITAMIN D3) 50 MCG (2000 UT) TABS Take 2,000 Units by mouth daily     • famotidine (PEPCID) 20 mg tablet Take 1  tablet (20 mg total) by mouth daily     • fluticasone (FLONASE) 50 mcg/act nasal spray 1 spray into each nostril daily 15.8 mL 1   • hydroCHLOROthiazide 25 mg tablet Take 1 tablet (25 mg total) by mouth daily 30 tablet 1   • Iron-Vitamin C (Vitron-C)  MG TABS Take 1 tablet by mouth in the morning 90 tablet 1   • levothyroxine 50 mcg tablet TAKE 1 TABLET(50 MCG) BY MOUTH DAILY 90 tablet 1   • lisinopril (ZESTRIL) 10 mg tablet Take 1 tablet (10 mg total) by mouth daily 90 tablet 1   • omega-3-acid ethyl esters (LOVAZA) 1 g capsule Take 1 capsule (1 g total) by mouth daily 30 capsule 1   • pantoprazole (PROTONIX) 40 mg tablet Take 1 tablet (40 mg total) by mouth 2 (two) times a day 180 tablet 3   • phentermine 15 MG capsule Take 1 capsule (15 mg total) by mouth every morning 30 capsule 0   • potassium chloride (K-DUR,KLOR-CON) 20 mEq tablet Take 1 tablet (20 mEq total) by mouth daily 90 tablet 0   • rosuvastatin (CRESTOR) 10 MG tablet Take 1 tablet (10 mg total) by mouth daily 90 tablet 3   • solifenacin (VESICARE) 5 mg tablet Take 1 tablet (5 mg total) by mouth daily 90 tablet 1     No current facility-administered medications for this visit.     _______________________________________________________________________  Review of Systems   Constitutional:  Positive for fatigue. Negative for chills, diaphoresis and fever.   HENT:  Negative for congestion, ear pain, rhinorrhea, sinus pressure, sinus pain and sore throat.    Eyes:  Positive for visual disturbance.   Respiratory:  Positive for shortness of breath. Negative for cough and chest tightness.    Cardiovascular:  Negative for chest pain and palpitations.   Gastrointestinal:  Positive for constipation. Negative for abdominal pain, diarrhea, nausea and vomiting.   Genitourinary:  Negative for dysuria, frequency and hematuria.   Musculoskeletal:  Positive for arthralgias and back pain.   Neurological:  Negative for dizziness, syncope, light-headedness and  "headaches.   Psychiatric/Behavioral:  Negative for dysphoric mood and sleep disturbance. The patient is not nervous/anxious.    All other systems reviewed and are negative.        Objective:  Vitals:    07/26/24 1407   BP: 130/80   BP Location: Right arm   Patient Position: Sitting   Cuff Size: Large   Pulse: 102   Temp: 98.1 °F (36.7 °C)   SpO2: 97%   Weight: 126 kg (276 lb 12.8 oz)   Height: 5' 4\" (1.626 m)     Body mass index is 47.51 kg/m².     Physical Exam  Vitals and nursing note reviewed.   Constitutional:       Appearance: Normal appearance. She is obese. She is not ill-appearing.   HENT:      Head: Normocephalic.      Right Ear: Tympanic membrane, ear canal and external ear normal. There is no impacted cerumen.      Left Ear: Tympanic membrane, ear canal and external ear normal. There is no impacted cerumen.      Nose: Nose normal. No congestion.      Mouth/Throat:      Mouth: Mucous membranes are moist.      Pharynx: No posterior oropharyngeal erythema.   Eyes:      Extraocular Movements: Extraocular movements intact.      Conjunctiva/sclera: Conjunctivae normal.      Pupils: Pupils are equal, round, and reactive to light.   Cardiovascular:      Rate and Rhythm: Normal rate and regular rhythm.      Heart sounds: Normal heart sounds. No murmur heard.  Pulmonary:      Effort: Pulmonary effort is normal.      Breath sounds: Normal breath sounds. No wheezing.   Abdominal:      Palpations: Abdomen is soft.      Tenderness: There is no abdominal tenderness.   Musculoskeletal:         General: Normal range of motion.      Cervical back: Normal range of motion.      Right lower leg: No edema.      Left lower leg: No edema.   Skin:     General: Skin is warm and dry.   Neurological:      General: No focal deficit present.      Mental Status: She is alert.   Psychiatric:         Mood and Affect: Mood normal.         Behavior: Behavior normal.         "

## 2024-07-27 ENCOUNTER — APPOINTMENT (OUTPATIENT)
Dept: LAB | Facility: HOSPITAL | Age: 79
End: 2024-07-27
Payer: MEDICARE

## 2024-07-27 DIAGNOSIS — R79.9 ABNORMAL FINDING OF BLOOD CHEMISTRY, UNSPECIFIED: ICD-10-CM

## 2024-07-27 DIAGNOSIS — E66.01 MORBID OBESITY WITH BMI OF 45.0-49.9, ADULT (HCC): ICD-10-CM

## 2024-07-27 DIAGNOSIS — R73.03 PREDIABETES: ICD-10-CM

## 2024-07-27 DIAGNOSIS — R53.83 OTHER FATIGUE: ICD-10-CM

## 2024-07-27 DIAGNOSIS — R20.0 ANESTHESIA OF SKIN: ICD-10-CM

## 2024-07-27 LAB
25(OH)D3 SERPL-MCNC: 77.7 NG/ML (ref 30–100)
ALBUMIN SERPL BCG-MCNC: 3.5 G/DL (ref 3.5–5)
ALP SERPL-CCNC: 77 U/L (ref 34–104)
ALT SERPL W P-5'-P-CCNC: 9 U/L (ref 7–52)
ANION GAP SERPL CALCULATED.3IONS-SCNC: 7 MMOL/L (ref 4–13)
AST SERPL W P-5'-P-CCNC: 11 U/L (ref 13–39)
BASOPHILS # BLD AUTO: 0.03 THOUSANDS/ÂΜL (ref 0–0.1)
BASOPHILS NFR BLD AUTO: 1 % (ref 0–1)
BILIRUB SERPL-MCNC: 0.65 MG/DL (ref 0.2–1)
BUN SERPL-MCNC: 15 MG/DL (ref 5–25)
CALCIUM SERPL-MCNC: 9 MG/DL (ref 8.4–10.2)
CHLORIDE SERPL-SCNC: 106 MMOL/L (ref 96–108)
CHOLEST SERPL-MCNC: 111 MG/DL
CO2 SERPL-SCNC: 29 MMOL/L (ref 21–32)
CREAT SERPL-MCNC: 1.54 MG/DL (ref 0.6–1.3)
EOSINOPHIL # BLD AUTO: 0.18 THOUSAND/ÂΜL (ref 0–0.61)
EOSINOPHIL NFR BLD AUTO: 3 % (ref 0–6)
ERYTHROCYTE [DISTWIDTH] IN BLOOD BY AUTOMATED COUNT: 13.8 % (ref 11.6–15.1)
EST. AVERAGE GLUCOSE BLD GHB EST-MCNC: 111 MG/DL
FERRITIN SERPL-MCNC: 52 NG/ML (ref 11–307)
GFR SERPL CREATININE-BSD FRML MDRD: 31 ML/MIN/1.73SQ M
GLUCOSE P FAST SERPL-MCNC: 103 MG/DL (ref 65–99)
HBA1C MFR BLD: 5.5 %
HCT VFR BLD AUTO: 45.7 % (ref 34.8–46.1)
HDLC SERPL-MCNC: 41 MG/DL
HGB BLD-MCNC: 14.6 G/DL (ref 11.5–15.4)
IMM GRANULOCYTES # BLD AUTO: 0.01 THOUSAND/UL (ref 0–0.2)
IMM GRANULOCYTES NFR BLD AUTO: 0 % (ref 0–2)
IRON SATN MFR SERPL: 26 % (ref 15–50)
IRON SERPL-MCNC: 63 UG/DL (ref 50–212)
LDLC SERPL CALC-MCNC: 51 MG/DL (ref 0–100)
LYMPHOCYTES # BLD AUTO: 1.96 THOUSANDS/ÂΜL (ref 0.6–4.47)
LYMPHOCYTES NFR BLD AUTO: 30 % (ref 14–44)
MCH RBC QN AUTO: 27.7 PG (ref 26.8–34.3)
MCHC RBC AUTO-ENTMCNC: 31.9 G/DL (ref 31.4–37.4)
MCV RBC AUTO: 87 FL (ref 82–98)
MONOCYTES # BLD AUTO: 0.41 THOUSAND/ÂΜL (ref 0.17–1.22)
MONOCYTES NFR BLD AUTO: 6 % (ref 4–12)
NEUTROPHILS # BLD AUTO: 3.9 THOUSANDS/ÂΜL (ref 1.85–7.62)
NEUTS SEG NFR BLD AUTO: 60 % (ref 43–75)
NONHDLC SERPL-MCNC: 70 MG/DL
NRBC BLD AUTO-RTO: 0 /100 WBCS
PLATELET # BLD AUTO: 205 THOUSANDS/UL (ref 149–390)
PMV BLD AUTO: 11 FL (ref 8.9–12.7)
POTASSIUM SERPL-SCNC: 3.8 MMOL/L (ref 3.5–5.3)
PROT SERPL-MCNC: 6.8 G/DL (ref 6.4–8.4)
RBC # BLD AUTO: 5.28 MILLION/UL (ref 3.81–5.12)
SODIUM SERPL-SCNC: 142 MMOL/L (ref 135–147)
TIBC SERPL-MCNC: 239 UG/DL (ref 250–450)
TRIGL SERPL-MCNC: 96 MG/DL
TSH SERPL DL<=0.05 MIU/L-ACNC: 4 UIU/ML (ref 0.45–4.5)
UIBC SERPL-MCNC: 176 UG/DL (ref 155–355)
VIT B12 SERPL-MCNC: 100 PG/ML (ref 180–914)
WBC # BLD AUTO: 6.49 THOUSAND/UL (ref 4.31–10.16)

## 2024-07-27 PROCEDURE — 85025 COMPLETE CBC W/AUTO DIFF WBC: CPT

## 2024-07-27 PROCEDURE — 80053 COMPREHEN METABOLIC PANEL: CPT

## 2024-07-27 PROCEDURE — 83550 IRON BINDING TEST: CPT

## 2024-07-27 PROCEDURE — 36415 COLL VENOUS BLD VENIPUNCTURE: CPT

## 2024-07-27 PROCEDURE — 83036 HEMOGLOBIN GLYCOSYLATED A1C: CPT

## 2024-07-27 PROCEDURE — 82728 ASSAY OF FERRITIN: CPT

## 2024-07-27 PROCEDURE — 82607 VITAMIN B-12: CPT

## 2024-07-27 PROCEDURE — 83540 ASSAY OF IRON: CPT

## 2024-07-27 PROCEDURE — 84443 ASSAY THYROID STIM HORMONE: CPT

## 2024-07-27 PROCEDURE — 82306 VITAMIN D 25 HYDROXY: CPT

## 2024-07-27 PROCEDURE — 80061 LIPID PANEL: CPT

## 2024-07-31 ENCOUNTER — CLINICAL SUPPORT (OUTPATIENT)
Dept: FAMILY MEDICINE CLINIC | Facility: CLINIC | Age: 79
End: 2024-07-31
Payer: MEDICARE

## 2024-07-31 DIAGNOSIS — E53.8 B12 DEFICIENCY: Primary | ICD-10-CM

## 2024-07-31 PROCEDURE — 96372 THER/PROPH/DIAG INJ SC/IM: CPT

## 2024-07-31 RX ORDER — CYANOCOBALAMIN 1000 UG/ML
1000 INJECTION, SOLUTION INTRAMUSCULAR; SUBCUTANEOUS WEEKLY
Status: SHIPPED | OUTPATIENT
Start: 2024-07-31 | End: 2024-08-28

## 2024-07-31 RX ADMIN — CYANOCOBALAMIN 1000 MCG: 1000 INJECTION, SOLUTION INTRAMUSCULAR; SUBCUTANEOUS at 15:08

## 2024-08-07 ENCOUNTER — APPOINTMENT (EMERGENCY)
Dept: RADIOLOGY | Facility: HOSPITAL | Age: 79
End: 2024-08-07
Payer: MEDICARE

## 2024-08-07 ENCOUNTER — TELEPHONE (OUTPATIENT)
Dept: FAMILY MEDICINE CLINIC | Facility: CLINIC | Age: 79
End: 2024-08-07

## 2024-08-07 ENCOUNTER — CLINICAL SUPPORT (OUTPATIENT)
Dept: FAMILY MEDICINE CLINIC | Facility: CLINIC | Age: 79
End: 2024-08-07

## 2024-08-07 ENCOUNTER — HOSPITAL ENCOUNTER (EMERGENCY)
Facility: HOSPITAL | Age: 79
Discharge: HOME | End: 2024-08-07
Payer: MEDICARE

## 2024-08-07 VITALS
TEMPERATURE: 98.1 F | SYSTOLIC BLOOD PRESSURE: 116 MMHG | HEART RATE: 81 BPM | RESPIRATION RATE: 20 BRPM | DIASTOLIC BLOOD PRESSURE: 56 MMHG | OXYGEN SATURATION: 95 %

## 2024-08-07 VITALS
HEART RATE: 120 BPM | DIASTOLIC BLOOD PRESSURE: 61 MMHG | SYSTOLIC BLOOD PRESSURE: 114 MMHG | RESPIRATION RATE: 18 BRPM | OXYGEN SATURATION: 97 %

## 2024-08-07 DIAGNOSIS — R23.1 CLAMMY SKIN: Primary | ICD-10-CM

## 2024-08-07 DIAGNOSIS — R25.1 SHAKY: ICD-10-CM

## 2024-08-07 DIAGNOSIS — R42 LIGHTHEADEDNESS: Primary | ICD-10-CM

## 2024-08-07 LAB
2HR DELTA HS TROPONIN: 4 NG/L
ALBUMIN SERPL BCG-MCNC: 4 G/DL (ref 3.5–5)
ALP SERPL-CCNC: 75 U/L (ref 34–104)
ALT SERPL W P-5'-P-CCNC: 12 U/L (ref 7–52)
ANION GAP SERPL CALCULATED.3IONS-SCNC: 11 MMOL/L (ref 4–13)
AST SERPL W P-5'-P-CCNC: 14 U/L (ref 13–39)
BASOPHILS # BLD AUTO: 0.04 THOUSANDS/ÂΜL (ref 0–0.1)
BASOPHILS NFR BLD AUTO: 1 % (ref 0–1)
BILIRUB SERPL-MCNC: 0.99 MG/DL (ref 0.2–1)
BUN SERPL-MCNC: 27 MG/DL (ref 5–25)
CALCIUM SERPL-MCNC: 9.8 MG/DL (ref 8.4–10.2)
CARDIAC TROPONIN I PNL SERPL HS: 10 NG/L
CARDIAC TROPONIN I PNL SERPL HS: 14 NG/L
CHLORIDE SERPL-SCNC: 100 MMOL/L (ref 96–108)
CO2 SERPL-SCNC: 27 MMOL/L (ref 21–32)
CREAT SERPL-MCNC: 2.05 MG/DL (ref 0.6–1.3)
EOSINOPHIL # BLD AUTO: 0.11 THOUSAND/ÂΜL (ref 0–0.61)
EOSINOPHIL NFR BLD AUTO: 1 % (ref 0–6)
ERYTHROCYTE [DISTWIDTH] IN BLOOD BY AUTOMATED COUNT: 13.9 % (ref 11.6–15.1)
GFR SERPL CREATININE-BSD FRML MDRD: 22 ML/MIN/1.73SQ M
GLUCOSE SERPL-MCNC: 119 MG/DL (ref 65–140)
HCT VFR BLD AUTO: 46.4 % (ref 34.8–46.1)
HGB BLD-MCNC: 15.2 G/DL (ref 11.5–15.4)
IMM GRANULOCYTES # BLD AUTO: 0.03 THOUSAND/UL (ref 0–0.2)
IMM GRANULOCYTES NFR BLD AUTO: 0 % (ref 0–2)
LYMPHOCYTES # BLD AUTO: 1.67 THOUSANDS/ÂΜL (ref 0.6–4.47)
LYMPHOCYTES NFR BLD AUTO: 19 % (ref 14–44)
MCH RBC QN AUTO: 27.6 PG (ref 26.8–34.3)
MCHC RBC AUTO-ENTMCNC: 32.8 G/DL (ref 31.4–37.4)
MCV RBC AUTO: 84 FL (ref 82–98)
MONOCYTES # BLD AUTO: 0.56 THOUSAND/ÂΜL (ref 0.17–1.22)
MONOCYTES NFR BLD AUTO: 7 % (ref 4–12)
NEUTROPHILS # BLD AUTO: 6.25 THOUSANDS/ÂΜL (ref 1.85–7.62)
NEUTS SEG NFR BLD AUTO: 72 % (ref 43–75)
NRBC BLD AUTO-RTO: 0 /100 WBCS
PLATELET # BLD AUTO: 247 THOUSANDS/UL (ref 149–390)
PMV BLD AUTO: 11.3 FL (ref 8.9–12.7)
POTASSIUM SERPL-SCNC: 3.7 MMOL/L (ref 3.5–5.3)
PROT SERPL-MCNC: 7.3 G/DL (ref 6.4–8.4)
RBC # BLD AUTO: 5.51 MILLION/UL (ref 3.81–5.12)
SODIUM SERPL-SCNC: 138 MMOL/L (ref 135–147)
WBC # BLD AUTO: 8.66 THOUSAND/UL (ref 4.31–10.16)

## 2024-08-07 PROCEDURE — 99284 EMERGENCY DEPT VISIT MOD MDM: CPT

## 2024-08-07 PROCEDURE — 93005 ELECTROCARDIOGRAM TRACING: CPT

## 2024-08-07 PROCEDURE — 99285 EMERGENCY DEPT VISIT HI MDM: CPT

## 2024-08-07 PROCEDURE — 84484 ASSAY OF TROPONIN QUANT: CPT

## 2024-08-07 PROCEDURE — 80053 COMPREHEN METABOLIC PANEL: CPT

## 2024-08-07 PROCEDURE — 85025 COMPLETE CBC W/AUTO DIFF WBC: CPT

## 2024-08-07 PROCEDURE — 71045 X-RAY EXAM CHEST 1 VIEW: CPT

## 2024-08-07 PROCEDURE — 36415 COLL VENOUS BLD VENIPUNCTURE: CPT

## 2024-08-07 NOTE — DISCHARGE INSTRUCTIONS
Your labs and EKG are reassuring at this time. I recommend watching your symptoms closely and returning to the ED if you develop further episodes like today or if you develop new symptoms such as chest pain, difficulty breathing, nausea, or other concerns.    Please follow up with your family doctor in 1 week for reevaluation.

## 2024-08-07 NOTE — TELEPHONE ENCOUNTER
Patient came in to the office for a B-12 injection, she felt clammy and shaky. Trinh and Nadine sat patient down in room 2  And gave her some water. I came in and did a finger stick gulose patient 145. She and other vital were taking as well.. in the process patient was in and out the calmness kept coming back in the room as well. Per Bill patient is to go to the er

## 2024-08-07 NOTE — ED NOTES
Patient ambulated out of the ED, AAOx4 resp even and unlabored with no S$S of distress.       Halima Lamar RN  08/07/24 1949

## 2024-08-07 NOTE — ED PROVIDER NOTES
History  Chief Complaint   Patient presents with    Dizziness     Pt arrives via EMS from PCP after becoming lightheaded and clammy in office. Pt denies SOB and chest pain. Hx of anxiety     79F w/ h/o CKD, htn, hypothyroidism, hld, marcos, prior CVA, presenting from pcp office due to becoming lightheaded and clammy. She notes that while she was in the office for the pcp, she started to feel lightheaded. She was having some anxiety and felt a heaviness in her chest. She was then noted to be clammy appearing so they sat her down and called EMS. She notes this has happened in the past and was told it was due to anxiety she was having. She has been having a significant increase in her anxiety recently as she has had a tree fall and break through her roof as well as other issues at home. She notes feeling back to her baseline. Denies any current chest pain, sob, f/c, n/v, or other complaints.     Prior to Admission Medications   Prescriptions Last Dose Informant Patient Reported? Taking?   Cholecalciferol (VITAMIN D3) 50 MCG ( UT) TABS  Self Yes No   Sig: Take 2,000 Units by mouth daily   Iron-Vitamin C (Vitron-C)  MG TABS  Self No No   Sig: Take 1 tablet by mouth in the morning   amLODIPine (NORVASC) 10 mg tablet  Self No No   Sig: Take 1 tablet (10 mg total) by mouth daily   aspirin 81 mg chewable tablet  Self Yes No   Sig: Chew 81 mg daily   famotidine (PEPCID) 20 mg tablet  Self No No   Sig: Take 1 tablet (20 mg total) by mouth daily   fluticasone (FLONASE) 50 mcg/act nasal spray  Self No No   Si spray into each nostril daily   hydroCHLOROthiazide 25 mg tablet   No No   Sig: Take 1 tablet (25 mg total) by mouth daily   levothyroxine 50 mcg tablet  Self No No   Sig: TAKE 1 TABLET(50 MCG) BY MOUTH DAILY   lisinopril (ZESTRIL) 10 mg tablet  Self No No   Sig: Take 1 tablet (10 mg total) by mouth daily   omega-3-acid ethyl esters (LOVAZA) 1 g capsule   No No   Sig: Take 1 capsule (1 g total) by mouth daily    pantoprazole (PROTONIX) 40 mg tablet  Self No No   Sig: Take 1 tablet (40 mg total) by mouth 2 (two) times a day   phentermine 15 MG capsule   No No   Sig: Take 1 capsule (15 mg total) by mouth every morning   potassium chloride (K-DUR,KLOR-CON) 20 mEq tablet  Self No No   Sig: Take 1 tablet (20 mEq total) by mouth daily   rosuvastatin (CRESTOR) 10 MG tablet  Self No No   Sig: Take 1 tablet (10 mg total) by mouth daily   solifenacin (VESICARE) 5 mg tablet   No No   Sig: Take 1 tablet (5 mg total) by mouth daily      Facility-Administered Medications Last Administration Doses Remaining   cyanocobalamin injection 1,000 mcg 7/31/2024  3:08 PM 3          Past Medical History:   Diagnosis Date    Acute ill-defined cerebrovascular disease     Arthritis     Benign essential tremor     Breast cancer (HCC)     pt states doesn't remember the year thinks it might have been on the right.    Chronic kidney disease     CKD (chronic kidney disease) stage 3, GFR 30-59 ml/min (HCC) 09/25/2018    Colon polyp     Depressive disorder     Disease of thyroid gland     GERD (gastroesophageal reflux disease)     Heart murmur     Hyperlipidemia     Hypertension     Impaired fasting glucose     Intention tremor     last assessed: 8/18/2016    Osteochondropathy     Panic disorder without agoraphobia with mild panic attacks     PONV (postoperative nausea and vomiting)     Psychiatric disorder     anxiety    Sleep apnea, obstructive     Stroke (HCC)     Tubular adenoma of colon     Urinary tract infection        Past Surgical History:   Procedure Laterality Date    AUGMENTATION BREAST      pt states during mammo she doesn't have any other surgery than the 2 biopsy    BLEPHAROPLASTY      Upper    BREAST BIOPSY Left     pt doesn't recall when and believes the neg biop was on left    BREAST LUMPECTOMY W/ NEEDLE LOCALIZATION Left     description: benign last assessed; 8/21/2016    CHOLECYSTECTOMY      COLONOSCOPY      EGD      HYSTERECTOMY       [place] EXC TUMOR SOFT TISS FACE&SCALP SUBFASCIAL <2CM Left 2023    Procedure: EXCISION OF EYELID LESION;  Surgeon: Eugene Garnica MD;  Location: [facility];  Service: Plastics    TUBAL LIGATION         Family History   Problem Relation Age of Onset    Alcohol abuse Mother     Asthma Mother     Cancer Mother     Mental illness Mother     Osteoarthritis Mother     Gout Father     Cancer Father     Other Sister         carotid arterial disease    COPD Sister     Hyperlipidemia Sister     Hypertension Sister     Heart attack Sister     Breast cancer Sister     No Known Problems Daughter     No Known Problems Maternal Grandmother     No Known Problems Paternal Grandmother     Lung cancer Sister     No Known Problems Sister     No Known Problems Daughter     No Known Problems Daughter      I have reviewed and agree with the history as documented.    E-Cigarette/Vaping    E-Cigarette Use Never User      E-Cigarette/Vaping Substances    Nicotine No     THC No     CBD No     Flavoring No     Other No     Unknown No      Social History     Tobacco Use    Smoking status: Former     Current packs/day: 0.00     Average packs/day: 1 pack/day for 50.0 years (50.0 ttl pk-yrs)     Types: Cigarettes     Start date:      Quit date:      Years since quittin.6     Passive exposure: Past    Smokeless tobacco: Never    Tobacco comments:     10 years ago   Vaping Use    Vaping status: Never Used   Substance Use Topics    Alcohol use: Yes     Comment: occ    Drug use: Never       Review of Systems   All other systems reviewed and are negative.      Physical Exam  Physical Exam  Vitals reviewed.   Constitutional:       General: She is not in acute distress.  HENT:      Head: Normocephalic.   Cardiovascular:      Rate and Rhythm: Normal rate.   Pulmonary:      Effort: Pulmonary effort is normal.   Abdominal:      General: Abdomen is flat.   Skin:     General: Skin is dry.   Neurological:      General: No focal deficit  present.      Mental Status: She is alert.   Psychiatric:         Mood and Affect: Mood normal.         Vital Signs  ED Triage Vitals   Temperature Pulse Respirations Blood Pressure SpO2   08/07/24 1541 08/07/24 1539 08/07/24 1539 08/07/24 1545 08/07/24 1539   98.1 °F (36.7 °C) 88 20 104/64 95 %      Temp Source Heart Rate Source Patient Position - Orthostatic VS BP Location FiO2 (%)   08/07/24 1541 08/07/24 1539 08/07/24 1549 08/07/24 1700 --   Oral Monitor Lying Right arm       Pain Score       08/07/24 1700       No Pain           Vitals:    08/07/24 1549 08/07/24 1700 08/07/24 1832 08/07/24 1918   BP:  109/61 117/56 116/56   Pulse:  78 81 81   Patient Position - Orthostatic VS: Lying Lying           Visual Acuity  Visual Acuity      Flowsheet Row Most Recent Value   L Pupil Size (mm) 2   R Pupil Size (mm) 2            ED Medications  Medications - No data to display    Diagnostic Studies  Results Reviewed       Procedure Component Value Units Date/Time    HS Troponin I 2hr [[id number]]  (Normal) Collected: 08/07/24 1850    Lab Status: Final result Specimen: Blood from Arm, Left Updated: 08/07/24 1920     hs TnI 2hr 14 ng/L      Delta 2hr hsTnI 4 ng/L     HS Troponin 0hr (reflex protocol) [[id number]]  (Normal) Collected: 08/07/24 1552    Lab Status: Final result Specimen: Blood from Arm, Right Updated: 08/07/24 1621     hs TnI 0hr 10 ng/L     Comprehensive metabolic panel [[id number]]  (Abnormal) Collected: 08/07/24 1552    Lab Status: Final result Specimen: Blood from Arm, Right Updated: 08/07/24 1614     Sodium 138 mmol/L      Potassium 3.7 mmol/L      Chloride 100 mmol/L      CO2 27 mmol/L      ANION GAP 11 mmol/L      BUN 27 mg/dL      Creatinine 2.05 mg/dL      Glucose 119 mg/dL      Calcium 9.8 mg/dL      AST 14 U/L      ALT 12 U/L      Alkaline Phosphatase 75 U/L      Total Protein 7.3 g/dL      Albumin 4.0 g/dL      Total Bilirubin 0.99 mg/dL      eGFR 22 ml/min/1.73sq m     Narrative:      National Kidney  Disease Foundation guidelines for Chronic Kidney Disease (CKD):     Stage 1 with normal or high GFR (GFR > 90 mL/min/1.73 square meters)    Stage 2 Mild CKD (GFR = 60-89 mL/min/1.73 square meters)    Stage 3A Moderate CKD (GFR = 45-59 mL/min/1.73 square meters)    Stage 3B Moderate CKD (GFR = 30-44 mL/min/1.73 square meters)    Stage 4 Severe CKD (GFR = 15-29 mL/min/1.73 square meters)    Stage 5 End Stage CKD (GFR <15 mL/min/1.73 square meters)  Note: GFR calculation is accurate only with a steady state creatinine    CBC and differential [[id number]]  (Abnormal) Collected: 08/07/24 1552    Lab Status: Final result Specimen: Blood from Arm, Right Updated: 08/07/24 1558     WBC 8.66 Thousand/uL      RBC 5.51 Million/uL      Hemoglobin 15.2 g/dL      Hematocrit 46.4 %      MCV 84 fL      MCH 27.6 pg      MCHC 32.8 g/dL      RDW 13.9 %      MPV 11.3 fL      Platelets 247 Thousands/uL      nRBC 0 /100 WBCs      Segmented % 72 %      Immature Grans % 0 %      Lymphocytes % 19 %      Monocytes % 7 %      Eosinophils Relative 1 %      Basophils Relative 1 %      Absolute Neutrophils 6.25 Thousands/µL      Absolute Immature Grans 0.03 Thousand/uL      Absolute Lymphocytes 1.67 Thousands/µL      Absolute Monocytes 0.56 Thousand/µL      Eosinophils Absolute 0.11 Thousand/µL      Basophils Absolute 0.04 Thousands/µL                    XR chest 1 view portable   Final Result by Sage Ramirez MD (08/07 1634)      No acute cardiopulmonary disease.            Workstation performed: [id number]                    Procedures  ECG 12 Lead Documentation Only    Date/Time: 8/7/2024 4:30 PM    Performed by: Conrado Cline MD  Authorized by: Conrado Cline MD    ECG reviewed by me, the ED Provider: yes    Patient location:  ED  Previous ECG:     Previous ECG:  Unavailable  Interpretation:     Interpretation: abnormal    Rate:     ECG rate:  83    ECG rate assessment: normal    Rhythm:     Rhythm: sinus rhythm    Ectopy:      Ectopy: none    QRS:     QRS axis:  Left    QRS intervals:  Normal  Conduction:     Conduction: abnormal      Abnormal conduction: incomplete RBBB    ST segments:     ST segments:  Normal  T waves:     T waves: normal             ED Course  ED Course as of 08/08/24 1917   Wed Aug 07, 2024   1635 hs TnI 0hr: 10  Will get delta given timing               HEART Risk Score      Flowsheet Row Most Recent Value   Heart Score Risk Calculator    History 0 Filed at: 08/08/2024 1912   ECG 0 Filed at: 08/08/2024 1912   Age 2 Filed at: 08/08/2024 1912   Risk Factors 2 Filed at: 08/08/2024 1912   Troponin 0 Filed at: 08/08/2024 1912   HEART Score 4 Filed at: 08/08/2024 1912                                        Medical Decision Making  79 F presenting with lightheadedness/clamminess. May be cardiac in origin so will obtain cardiac workup. Given history and current stressors may be stress/anxiety related. Consider hypoglycemia which was normal on labs. Otherwise exam reassuring as well as patient's return to baseline. Could have also been vagal/orthostatic response.     Ultimately, workup including delta troponin reassuring with 2 hour change of 4. Cr consistent with history of CKD. Patient remained asymptomatic while in the ED and after discussing workup with her, she would prefer continue outpatient evaluation and management. Given return precautions such as recurrent episode, palpitations, persistent chest pain, or other concerns.     Amount and/or Complexity of Data Reviewed  Labs: ordered. Decision-making details documented in ED Course.  Radiology: ordered.                 Disposition  Final diagnoses:   Lightheadedness     Time reflects when diagnosis was documented in both MDM as applicable and the Disposition within this note       Time User Action Codes Description Comment    8/7/2024  7:24 PM Conrado Cline Add [R42] Lightheadedness           ED Disposition       ED Disposition   Discharge    Condition   Stable     Date/Time   Wed Aug 7, 2024 1924    Comment   Ladi Mojica discharge to home/self care.                   Follow-up Information       Follow up With Specialties Details Why Contact Info    MONIQUE Sloan Nurse Practitioner In 1 week  [address] [address] [phone]              Discharge Medication List as of 8/7/2024  7:26 PM        CONTINUE these medications which have NOT CHANGED    Details   amLODIPine (NORVASC) 10 mg tablet Take 1 tablet (10 mg total) by mouth daily, Starting Tue 3/19/2024, Normal      aspirin 81 mg chewable tablet Chew 81 mg daily, Historical Med      Cholecalciferol (VITAMIN D3) 50 MCG (2000 UT) TABS Take 2,000 Units by mouth daily, Historical Med      famotidine (PEPCID) 20 mg tablet Take 1 tablet (20 mg total) by mouth daily, Starting Wed 3/13/2024, No Print      fluticasone (FLONASE) 50 mcg/act nasal spray 1 spray into each nostril daily, Starting Wed 3/13/2024, Normal      hydroCHLOROthiazide 25 mg tablet Take 1 tablet (25 mg total) by mouth daily, Starting Fri 7/26/2024, Normal      Iron-Vitamin C (Vitron-C)  MG TABS Take 1 tablet by mouth in the morning, Starting Thu 1/11/2024, Normal      levothyroxine 50 mcg tablet TAKE 1 TABLET(50 MCG) BY MOUTH DAILY, Normal      lisinopril (ZESTRIL) 10 mg tablet Take 1 tablet (10 mg total) by mouth daily, Starting Wed 3/20/2024, Normal      omega-3-acid ethyl esters (LOVAZA) 1 g capsule Take 1 capsule (1 g total) by mouth daily, Starting Fri 7/26/2024, Normal      pantoprazole (PROTONIX) 40 mg tablet Take 1 tablet (40 mg total) by mouth 2 (two) times a day, Starting Wed 5/1/2024, Until Sat 4/26/2025, Normal      phentermine 15 MG capsule Take 1 capsule (15 mg total) by mouth every morning, Starting Fri 7/26/2024, Normal      potassium chloride (K-DUR,KLOR-CON) 20 mEq tablet Take 1 tablet (20 mEq total) by mouth daily, Starting Tue 5/30/2023, Normal      rosuvastatin (CRESTOR) 10 MG tablet Take 1 tablet (10  mg total) by mouth daily, Starting Wed 2/28/2024, Normal      solifenacin (VESICARE) 5 mg tablet Take 1 tablet (5 mg total) by mouth daily, Starting Fri 7/12/2024, Normal             No discharge procedures on file.    PDMP Review         Value Time User    PDMP Reviewed  Yes 7/26/2024  2:32 PM MONIQUE Sloan            ED Provider  Electronically Signed by             Conrado Cline MD  08/08/24 0621

## 2024-08-08 ENCOUNTER — TELEPHONE (OUTPATIENT)
Dept: FAMILY MEDICINE CLINIC | Facility: CLINIC | Age: 79
End: 2024-08-08

## 2024-08-08 ENCOUNTER — TELEPHONE (OUTPATIENT)
Age: 79
End: 2024-08-08

## 2024-08-08 LAB
ATRIAL RATE: 83 BPM
P AXIS: 30 DEGREES
PR INTERVAL: 158 MS
QRS AXIS: -70 DEGREES
QRSD INTERVAL: 94 MS
QT INTERVAL: 384 MS
QTC INTERVAL: 451 MS
T WAVE AXIS: 48 DEGREES
VENTRICULAR RATE: 83 BPM

## 2024-08-08 PROCEDURE — 93010 ELECTROCARDIOGRAM REPORT: CPT | Performed by: INTERNAL MEDICINE

## 2024-08-08 NOTE — TELEPHONE ENCOUNTER
Pt stopped by the office to  her keys and wanted to know when she can receive her next B-12 injection. Pt states that she is feeling better and her lightheadedness improved. Please let us know when pt's next B-12 can be given.

## 2024-08-08 NOTE — TELEPHONE ENCOUNTER
PA for omega-3-acid ethyl esters (LOVAZA) 1g  SUBMITTED     via    [][employer]ookStat-KEY   [x]Chief Trunk-Case ID # [id number]    []Faxed to plan   []Other website   []Phone call Case ID #     Office notes sent, clinical questions answered. Awaiting determination    Turnaround time for your insurance to make a decision on your Prior Authorization can take 7-21 business days.

## 2024-08-09 NOTE — TELEPHONE ENCOUNTER
It is not clear based off Dana's last note, why the patient was started on Lovaza.  It looks like her last lipid panel, including triglycerides were normal.  Can be sent to Dana when she gets back for clarification.

## 2024-08-09 NOTE — TELEPHONE ENCOUNTER
PA for omega-3-acid ethyl esters (LOVAZA) 1g Denied    Reason:(Screenshot if applicable) Denied due to diagnosis code.         Message sent to office clinical pool Yes    Denial letter scanned into Media Yes    Appeal started No ( Provider will need to decide if appeal is warranted and send clinical documentation to Prior Authorization Team for initiation.)    **Please follow up with your patient regarding denial and next steps**

## 2024-08-14 ENCOUNTER — CLINICAL SUPPORT (OUTPATIENT)
Dept: FAMILY MEDICINE CLINIC | Facility: CLINIC | Age: 79
End: 2024-08-14
Payer: MEDICARE

## 2024-08-14 DIAGNOSIS — E53.8 B12 DEFICIENCY: Primary | ICD-10-CM

## 2024-08-14 PROCEDURE — 96372 THER/PROPH/DIAG INJ SC/IM: CPT

## 2024-08-14 RX ADMIN — CYANOCOBALAMIN 1000 MCG: 1000 INJECTION, SOLUTION INTRAMUSCULAR; SUBCUTANEOUS at 15:05

## 2024-08-21 ENCOUNTER — CLINICAL SUPPORT (OUTPATIENT)
Dept: FAMILY MEDICINE CLINIC | Facility: CLINIC | Age: 79
End: 2024-08-21
Payer: MEDICARE

## 2024-08-21 DIAGNOSIS — E53.8 B12 DEFICIENCY: Primary | ICD-10-CM

## 2024-08-21 PROCEDURE — 96372 THER/PROPH/DIAG INJ SC/IM: CPT

## 2024-08-21 RX ADMIN — CYANOCOBALAMIN 1000 MCG: 1000 INJECTION, SOLUTION INTRAMUSCULAR; SUBCUTANEOUS at 15:00

## 2024-08-25 DIAGNOSIS — K21.9 GASTROESOPHAGEAL REFLUX DISEASE, UNSPECIFIED WHETHER ESOPHAGITIS PRESENT: ICD-10-CM

## 2024-08-25 RX ORDER — FAMOTIDINE 20 MG/1
20 TABLET, FILM COATED ORAL 2 TIMES DAILY
Qty: 60 TABLET | Refills: 0 | Status: SHIPPED | OUTPATIENT
Start: 2024-08-25

## 2024-09-04 DIAGNOSIS — E66.01 MORBID OBESITY WITH BMI OF 45.0-49.9, ADULT (HCC): ICD-10-CM

## 2024-09-05 ENCOUNTER — RA CDI HCC (OUTPATIENT)
Dept: OTHER | Facility: HOSPITAL | Age: 79
End: 2024-09-05

## 2024-09-05 DIAGNOSIS — R79.89 ELEVATED TSH: ICD-10-CM

## 2024-09-05 DIAGNOSIS — R53.83 OTHER FATIGUE: ICD-10-CM

## 2024-09-05 RX ORDER — PHENTERMINE HYDROCHLORIDE 15 MG/1
15 CAPSULE ORAL EVERY MORNING
Qty: 30 CAPSULE | Refills: 0 | OUTPATIENT
Start: 2024-09-05

## 2024-09-06 RX ORDER — LEVOTHYROXINE SODIUM 50 UG/1
TABLET ORAL
Qty: 90 TABLET | Refills: 1 | Status: SHIPPED | OUTPATIENT
Start: 2024-09-06

## 2024-09-11 ENCOUNTER — OFFICE VISIT (OUTPATIENT)
Dept: FAMILY MEDICINE CLINIC | Facility: CLINIC | Age: 79
End: 2024-09-11
Payer: MEDICARE

## 2024-09-11 ENCOUNTER — APPOINTMENT (OUTPATIENT)
Dept: LAB | Facility: HOSPITAL | Age: 79
End: 2024-09-11
Payer: MEDICARE

## 2024-09-11 VITALS
DIASTOLIC BLOOD PRESSURE: 78 MMHG | RESPIRATION RATE: 18 BRPM | SYSTOLIC BLOOD PRESSURE: 122 MMHG | OXYGEN SATURATION: 98 % | HEIGHT: 64 IN | HEART RATE: 100 BPM | BODY MASS INDEX: 46.44 KG/M2 | WEIGHT: 272 LBS

## 2024-09-11 DIAGNOSIS — D50.9 IRON DEFICIENCY ANEMIA, UNSPECIFIED IRON DEFICIENCY ANEMIA TYPE: ICD-10-CM

## 2024-09-11 DIAGNOSIS — E53.8 VITAMIN B 12 DEFICIENCY: ICD-10-CM

## 2024-09-11 DIAGNOSIS — E66.01 MORBID OBESITY WITH BMI OF 45.0-49.9, ADULT (HCC): ICD-10-CM

## 2024-09-11 DIAGNOSIS — E66.01 MORBID OBESITY WITH BMI OF 45.0-49.9, ADULT (HCC): Primary | ICD-10-CM

## 2024-09-11 LAB
ALBUMIN SERPL BCG-MCNC: 3.8 G/DL (ref 3.5–5)
ALP SERPL-CCNC: 69 U/L (ref 34–104)
ALT SERPL W P-5'-P-CCNC: 17 U/L (ref 7–52)
ANION GAP SERPL CALCULATED.3IONS-SCNC: 7 MMOL/L (ref 4–13)
AST SERPL W P-5'-P-CCNC: 16 U/L (ref 13–39)
BASOPHILS # BLD AUTO: 0.03 THOUSANDS/ΜL (ref 0–0.1)
BASOPHILS NFR BLD AUTO: 0 % (ref 0–1)
BILIRUB SERPL-MCNC: 0.74 MG/DL (ref 0.2–1)
BUN SERPL-MCNC: 30 MG/DL (ref 5–25)
CALCIUM SERPL-MCNC: 9.8 MG/DL (ref 8.4–10.2)
CHLORIDE SERPL-SCNC: 102 MMOL/L (ref 96–108)
CO2 SERPL-SCNC: 32 MMOL/L (ref 21–32)
CREAT SERPL-MCNC: 1.95 MG/DL (ref 0.6–1.3)
EOSINOPHIL # BLD AUTO: 0.14 THOUSAND/ΜL (ref 0–0.61)
EOSINOPHIL NFR BLD AUTO: 2 % (ref 0–6)
ERYTHROCYTE [DISTWIDTH] IN BLOOD BY AUTOMATED COUNT: 14.2 % (ref 11.6–15.1)
FERRITIN SERPL-MCNC: 82 NG/ML (ref 11–307)
GFR SERPL CREATININE-BSD FRML MDRD: 23 ML/MIN/1.73SQ M
GLUCOSE SERPL-MCNC: 126 MG/DL (ref 65–140)
HCT VFR BLD AUTO: 42.9 % (ref 34.8–46.1)
HGB BLD-MCNC: 13.7 G/DL (ref 11.5–15.4)
IMM GRANULOCYTES # BLD AUTO: 0.02 THOUSAND/UL (ref 0–0.2)
IMM GRANULOCYTES NFR BLD AUTO: 0 % (ref 0–2)
IRON SATN MFR SERPL: 34 % (ref 15–50)
IRON SERPL-MCNC: 88 UG/DL (ref 50–212)
LYMPHOCYTES # BLD AUTO: 1.55 THOUSANDS/ΜL (ref 0.6–4.47)
LYMPHOCYTES NFR BLD AUTO: 19 % (ref 14–44)
MCH RBC QN AUTO: 27.3 PG (ref 26.8–34.3)
MCHC RBC AUTO-ENTMCNC: 31.9 G/DL (ref 31.4–37.4)
MCV RBC AUTO: 86 FL (ref 82–98)
MONOCYTES # BLD AUTO: 0.6 THOUSAND/ΜL (ref 0.17–1.22)
MONOCYTES NFR BLD AUTO: 8 % (ref 4–12)
NEUTROPHILS # BLD AUTO: 5.63 THOUSANDS/ΜL (ref 1.85–7.62)
NEUTS SEG NFR BLD AUTO: 71 % (ref 43–75)
NRBC BLD AUTO-RTO: 0 /100 WBCS
PLATELET # BLD AUTO: 261 THOUSANDS/UL (ref 149–390)
PMV BLD AUTO: 11.1 FL (ref 8.9–12.7)
POTASSIUM SERPL-SCNC: 4.5 MMOL/L (ref 3.5–5.3)
PROT SERPL-MCNC: 7.2 G/DL (ref 6.4–8.4)
RBC # BLD AUTO: 5.02 MILLION/UL (ref 3.81–5.12)
SODIUM SERPL-SCNC: 141 MMOL/L (ref 135–147)
TIBC SERPL-MCNC: 262 UG/DL (ref 250–450)
UIBC SERPL-MCNC: 174 UG/DL (ref 155–355)
VIT B12 SERPL-MCNC: 337 PG/ML (ref 180–914)
WBC # BLD AUTO: 7.97 THOUSAND/UL (ref 4.31–10.16)

## 2024-09-11 PROCEDURE — 99214 OFFICE O/P EST MOD 30 MIN: CPT

## 2024-09-11 PROCEDURE — 36415 COLL VENOUS BLD VENIPUNCTURE: CPT

## 2024-09-11 PROCEDURE — 80053 COMPREHEN METABOLIC PANEL: CPT

## 2024-09-11 PROCEDURE — 83540 ASSAY OF IRON: CPT

## 2024-09-11 PROCEDURE — 85025 COMPLETE CBC W/AUTO DIFF WBC: CPT

## 2024-09-11 PROCEDURE — 82728 ASSAY OF FERRITIN: CPT

## 2024-09-11 PROCEDURE — 83550 IRON BINDING TEST: CPT

## 2024-09-11 PROCEDURE — 82607 VITAMIN B-12: CPT

## 2024-09-11 RX ORDER — PHENTERMINE HYDROCHLORIDE 15 MG/1
15 CAPSULE ORAL EVERY MORNING
Qty: 30 CAPSULE | Refills: 0 | Status: SHIPPED | OUTPATIENT
Start: 2024-09-11

## 2024-09-11 NOTE — PROGRESS NOTES
Assessment/Plan:         Problem List Items Addressed This Visit          Other    Morbid obesity with BMI of 45.0-49.9, adult ([facility]) - Primary     Great job with weight loss, will continue current treatment, increase hydration. Do recommend increasing healthy calories during the day, follow up in 1 month.          Relevant Medications    phentermine 15 MG capsule    Other Relevant Orders    CBC and differential    Comprehensive metabolic panel    Vitamin B12    Iron Panel (Includes Ferritin, Iron Sat%, Iron, and TIBC)     Other Visit Diagnoses       Iron deficiency anemia, unspecified iron deficiency anemia type        Have lab work, will call with results    Relevant Orders    Iron Panel (Includes Ferritin, Iron Sat%, Iron, and TIBC)    Vitamin B 12 deficiency        Have lab work, will call with results    Relevant Orders    Vitamin B12              Subjective:      Patient ID: Ladi Mojica is a 79 y.o. female.    Ladi is here here follow up she has lost at least 5 more lbs. She is not eating or drinking properly.         The following portions of the patient's history were reviewed and updated as appropriate:   Past Medical History:  She has a past medical history of Acute ill-defined cerebrovascular disease, Arthritis, Benign essential tremor, Breast cancer ([facility]), Chronic kidney disease, CKD (chronic kidney disease) stage 3, GFR 30-59 ml/min ([facility]) (09/25/2018), Colon polyp, Depressive disorder, Disease of thyroid gland, GERD (gastroesophageal reflux disease), Heart murmur, Hyperlipidemia, Hypertension, Impaired fasting glucose, Intention tremor, Osteochondropathy, Panic disorder without agoraphobia with mild panic attacks, PONV (postoperative nausea and vomiting), Psychiatric disorder, Sleep apnea, obstructive, Stroke ([facility]), Tubular adenoma of colon, and Urinary tract infection.,  _______________________________________________________________________  Medical Problems:  does not have any pertinent  problems on file.,  _______________________________________________________________________  Past Surgical History:   has a past surgical history that includes Hysterectomy; Cholecystectomy; AUGMENTATION BREAST; Breast lumpectomy w/ needle localization (Left); Colonoscopy; EGD; Breast biopsy (Left); Tubal ligation; Blepharoplasty; and pr exc tumor soft tiss face&scalp subfascial <2cm (Left, 11/9/2023).,  _______________________________________________________________________  Family History:  family history includes Alcohol abuse in her mother; Asthma in her mother; Breast cancer in her sister; COPD in her sister; Cancer in her father and mother; Gout in her father; Heart attack in her sister; Hyperlipidemia in her sister; Hypertension in her sister; Lung cancer in her sister; Mental illness in her mother; No Known Problems in her daughter, daughter, daughter, maternal grandmother, paternal grandmother, and sister; Osteoarthritis in her mother; Other in her sister.,  _______________________________________________________________________  Social History:   reports that she quit smoking about 13 years ago. Her smoking use included cigarettes. She started smoking about 63 years ago. She has a 50 pack-year smoking history. She has been exposed to tobacco smoke. She has never used smokeless tobacco. She reports current alcohol use. She reports that she does not use drugs.,  _______________________________________________________________________  Allergies:  is allergic to codeine, amoxicillin, oxybutynin, and morphine..  _______________________________________________________________________  Current Outpatient Medications   Medication Sig Dispense Refill    amLODIPine (NORVASC) 10 mg tablet Take 1 tablet (10 mg total) by mouth daily 90 tablet 1    aspirin 81 mg chewable tablet Chew 81 mg daily      Cholecalciferol (VITAMIN D3) 50 MCG (2000 UT) TABS Take 2,000 Units by mouth daily      famotidine (PEPCID) 20 mg tablet  TAKE 1 TABLET(20 MG) BY MOUTH TWICE DAILY 60 tablet 0    fluticasone (FLONASE) 50 mcg/act nasal spray 1 spray into each nostril daily 15.8 mL 1    hydroCHLOROthiazide 25 mg tablet Take 1 tablet (25 mg total) by mouth daily 30 tablet 1    Iron-Vitamin C (Vitron-C)  MG TABS Take 1 tablet by mouth in the morning 90 tablet 1    levothyroxine 50 mcg tablet TAKE 1 TABLET(50 MCG) BY MOUTH DAILY 90 tablet 1    lisinopril (ZESTRIL) 10 mg tablet Take 1 tablet (10 mg total) by mouth daily 90 tablet 1    omega-3-acid ethyl esters (LOVAZA) 1 g capsule Take 1 capsule (1 g total) by mouth daily 30 capsule 1    pantoprazole (PROTONIX) 40 mg tablet Take 1 tablet (40 mg total) by mouth 2 (two) times a day 180 tablet 3    phentermine 15 MG capsule Take 1 capsule (15 mg total) by mouth every morning 30 capsule 0    potassium chloride (K-DUR,KLOR-CON) 20 mEq tablet Take 1 tablet (20 mEq total) by mouth daily 90 tablet 0    rosuvastatin (CRESTOR) 10 MG tablet Take 1 tablet (10 mg total) by mouth daily 90 tablet 3    solifenacin (VESICARE) 5 mg tablet Take 1 tablet (5 mg total) by mouth daily 90 tablet 1     No current facility-administered medications for this visit.     _______________________________________________________________________  Review of Systems   Constitutional:  Positive for fatigue. Negative for chills, diaphoresis and fever.   HENT:  Negative for congestion, ear pain, sinus pressure, sinus pain and sore throat.    Eyes:  Negative for pain and visual disturbance.   Respiratory:  Negative for cough, chest tightness and shortness of breath.    Cardiovascular:  Negative for chest pain and palpitations.   Gastrointestinal:  Negative for abdominal pain, constipation, diarrhea, nausea and vomiting.   Musculoskeletal:  Positive for arthralgias, back pain and myalgias.   Skin:  Negative for color change and rash.   Neurological:  Positive for dizziness. Negative for syncope.   All other systems reviewed and are  "negative.        Objective:  Vitals:    09/11/24 1535   BP: 122/78   BP Location: Right arm   Patient Position: Sitting   Pulse: 100   Resp: 18   SpO2: 98%   Weight: 123 kg (272 lb)   Height: 5' 4\" (1.626 m)     Body mass index is 46.69 kg/m².     Physical Exam  Vitals and nursing note reviewed.   Constitutional:       Appearance: Normal appearance. She is not ill-appearing.   HENT:      Head: Normocephalic.      Right Ear: Tympanic membrane, ear canal and external ear normal. There is no impacted cerumen.      Left Ear: Tympanic membrane, ear canal and external ear normal. There is no impacted cerumen.      Nose: Nose normal. No congestion.      Mouth/Throat:      Mouth: Mucous membranes are moist.      Pharynx: No posterior oropharyngeal erythema.   Eyes:      Extraocular Movements: Extraocular movements intact.      Conjunctiva/sclera: Conjunctivae normal.      Pupils: Pupils are equal, round, and reactive to light.   Cardiovascular:      Rate and Rhythm: Normal rate and regular rhythm.      Heart sounds: Normal heart sounds. No murmur heard.  Pulmonary:      Effort: Pulmonary effort is normal.      Breath sounds: Normal breath sounds. No wheezing.   Abdominal:      Palpations: Abdomen is soft.      Tenderness: There is no abdominal tenderness.   Musculoskeletal:         General: Normal range of motion.      Cervical back: Normal range of motion.      Right lower leg: No edema.      Left lower leg: No edema.   Skin:     General: Skin is warm and dry.   Neurological:      General: No focal deficit present.      Mental Status: She is alert.   Psychiatric:         Mood and Affect: Mood normal.         Behavior: Behavior normal.         "

## 2024-09-11 NOTE — ASSESSMENT & PLAN NOTE
Great job with weight loss, will continue current treatment, increase hydration. Do recommend increasing healthy calories during the day, follow up in 1 month.

## 2024-09-16 DIAGNOSIS — I10 ESSENTIAL HYPERTENSION: ICD-10-CM

## 2024-09-17 RX ORDER — AMLODIPINE BESYLATE 10 MG/1
10 TABLET ORAL DAILY
Qty: 90 TABLET | Refills: 1 | Status: SHIPPED | OUTPATIENT
Start: 2024-09-17 | End: 2024-09-18

## 2024-09-18 ENCOUNTER — OFFICE VISIT (OUTPATIENT)
Dept: FAMILY MEDICINE CLINIC | Facility: CLINIC | Age: 79
End: 2024-09-18
Payer: MEDICARE

## 2024-09-18 VITALS
HEART RATE: 100 BPM | TEMPERATURE: 98.3 F | WEIGHT: 272 LBS | HEIGHT: 64 IN | BODY MASS INDEX: 46.44 KG/M2 | OXYGEN SATURATION: 100 %

## 2024-09-18 DIAGNOSIS — I10 ESSENTIAL HYPERTENSION: ICD-10-CM

## 2024-09-18 DIAGNOSIS — H65.01 NON-RECURRENT ACUTE SEROUS OTITIS MEDIA OF RIGHT EAR: ICD-10-CM

## 2024-09-18 DIAGNOSIS — I10 ESSENTIAL HYPERTENSION: Primary | ICD-10-CM

## 2024-09-18 PROCEDURE — G2211 COMPLEX E/M VISIT ADD ON: HCPCS | Performed by: FAMILY MEDICINE

## 2024-09-18 PROCEDURE — 99213 OFFICE O/P EST LOW 20 MIN: CPT | Performed by: FAMILY MEDICINE

## 2024-09-18 RX ORDER — AMLODIPINE BESYLATE 10 MG/1
10 TABLET ORAL DAILY
Qty: 90 TABLET | Refills: 1 | Status: SHIPPED | OUTPATIENT
Start: 2024-09-18

## 2024-09-18 RX ORDER — FLUTICASONE PROPIONATE 50 MCG
1 SPRAY, SUSPENSION (ML) NASAL DAILY
Qty: 15.8 ML | Refills: 1 | Status: SHIPPED | OUTPATIENT
Start: 2024-09-18

## 2024-09-18 RX ORDER — AZITHROMYCIN 250 MG/1
TABLET, FILM COATED ORAL
Qty: 6 TABLET | Refills: 0 | Status: SHIPPED | OUTPATIENT
Start: 2024-09-18 | End: 2024-09-22

## 2024-09-18 NOTE — PROGRESS NOTES
Ambulatory Visit  Name: Ladi Mojica      : 1945      MRN: [id number]  Encounter Provider: MONIQUE Delong  Encounter Date: 2024   Encounter department: [facility] [address]    Assessment & Plan  Non-recurrent acute serous otitis media of right ear  Discussed plan of care, recommend continued treatment for environmental issues, restart fluticasone on a daily basis, Zithromax for any changes failure to resolve  Orders:    azithromycin (ZITHROMAX) 250 mg tablet; Take 2 tablets today then 1 tablet daily x 4 days    fluticasone (FLONASE) 50 mcg/act nasal spray; 1 spray into each nostril daily    Essential hypertension  Stable, within parameters continue current care          History of Present Illness     C/o left er pain , for the past past week  Neg f/c/denies cough slight postnasal drip  Negative sinus pressure pain, negative cerumen  Pressures remain within parameters negative changes in medications negative missed meds      Earache   There is pain in the left ear. This is a new problem. The current episode started in the past 7 days. The problem occurs every few hours. Pertinent negatives include no abdominal pain, coughing, diarrhea, headaches, hearing loss, rash, rhinorrhea, sore throat or vomiting.         Review of Systems   Constitutional:  Negative for appetite change, chills, fever and unexpected weight change.   HENT:  Positive for ear pain. Negative for congestion, dental problem, hearing loss, postnasal drip, rhinorrhea, sinus pressure, sinus pain, sneezing, sore throat, tinnitus and voice change.    Eyes:  Negative for visual disturbance.   Respiratory:  Negative for apnea, cough, chest tightness and shortness of breath.    Cardiovascular:  Negative for chest pain, palpitations and leg swelling.   Gastrointestinal:  Negative for abdominal pain, blood in stool, constipation, diarrhea, nausea and vomiting.   Endocrine: Negative for cold  "intolerance, heat intolerance, polydipsia, polyphagia and polyuria.   Genitourinary:  Negative for decreased urine volume, difficulty urinating, dysuria, frequency and hematuria.   Musculoskeletal:  Negative for arthralgias, back pain, gait problem, joint swelling and myalgias.   Skin:  Negative for color change, rash and wound.   Allergic/Immunologic: Negative for environmental allergies and food allergies.   Neurological:  Negative for dizziness, syncope, weakness, light-headedness, numbness and headaches.   Hematological:  Negative for adenopathy. Does not bruise/bleed easily.   Psychiatric/Behavioral:  Negative for sleep disturbance and suicidal ideas. The patient is not nervous/anxious.            Objective     Pulse 100   Temp 98.3 °F (36.8 °C)   Ht 5' 4\" (1.626 m)   Wt 123 kg (272 lb)   SpO2 100%   BMI 46.69 kg/m²     Physical Exam  Constitutional:       General: She is not in acute distress.     Appearance: She is well-developed. She is not ill-appearing or toxic-appearing.   HENT:      Head: Normocephalic and atraumatic.      Right Ear: A middle ear effusion is present. Tympanic membrane is injected.      Left Ear: Tympanic membrane normal.      Nose: Rhinorrhea present.   Eyes:      Conjunctiva/sclera: Conjunctivae normal.   Cardiovascular:      Rate and Rhythm: Normal rate and regular rhythm.      Heart sounds: Normal heart sounds.   Pulmonary:      Effort: Pulmonary effort is normal.      Breath sounds: Normal breath sounds.   Musculoskeletal:         General: Normal range of motion.      Cervical back: Normal range of motion and neck supple.   Skin:     General: Skin is warm and dry.   Neurological:      Mental Status: She is alert and oriented to person, place, and time.      Deep Tendon Reflexes: Reflexes are normal and symmetric.   Psychiatric:         Behavior: Behavior normal.         Thought Content: Thought content normal.         Judgment: Judgment normal.         "

## 2024-09-23 ENCOUNTER — CLINICAL SUPPORT (OUTPATIENT)
Dept: FAMILY MEDICINE CLINIC | Facility: CLINIC | Age: 79
End: 2024-09-23
Payer: MEDICARE

## 2024-09-23 DIAGNOSIS — E53.8 VITAMIN B 12 DEFICIENCY: Primary | ICD-10-CM

## 2024-09-23 PROCEDURE — 96372 THER/PROPH/DIAG INJ SC/IM: CPT

## 2024-09-23 RX ORDER — CYANOCOBALAMIN 1000 UG/ML
1000 INJECTION, SOLUTION INTRAMUSCULAR; SUBCUTANEOUS
Status: SHIPPED | OUTPATIENT
Start: 2024-10-23 | End: 2025-02-20

## 2024-09-23 RX ORDER — CYANOCOBALAMIN 1000 UG/ML
1000 INJECTION, SOLUTION INTRAMUSCULAR; SUBCUTANEOUS
Status: DISCONTINUED | OUTPATIENT
Start: 2024-09-23 | End: 2024-09-23

## 2024-09-23 RX ADMIN — CYANOCOBALAMIN 1000 MCG: 1000 INJECTION, SOLUTION INTRAMUSCULAR; SUBCUTANEOUS at 16:03

## 2024-09-24 ENCOUNTER — TELEPHONE (OUTPATIENT)
Dept: NEPHROLOGY | Facility: CLINIC | Age: 79
End: 2024-09-24

## 2024-09-30 ENCOUNTER — APPOINTMENT (OUTPATIENT)
Dept: LAB | Facility: HOSPITAL | Age: 79
End: 2024-09-30
Payer: MEDICARE

## 2024-09-30 DIAGNOSIS — N28.89 RENAL MASS: ICD-10-CM

## 2024-09-30 LAB
ANION GAP SERPL CALCULATED.3IONS-SCNC: 7 MMOL/L (ref 4–13)
BUN SERPL-MCNC: 30 MG/DL (ref 5–25)
CALCIUM SERPL-MCNC: 9.4 MG/DL (ref 8.4–10.2)
CHLORIDE SERPL-SCNC: 104 MMOL/L (ref 96–108)
CO2 SERPL-SCNC: 31 MMOL/L (ref 21–32)
CREAT SERPL-MCNC: 1.88 MG/DL (ref 0.6–1.3)
GFR SERPL CREATININE-BSD FRML MDRD: 24 ML/MIN/1.73SQ M
GLUCOSE P FAST SERPL-MCNC: 105 MG/DL (ref 65–99)
POTASSIUM SERPL-SCNC: 4.4 MMOL/L (ref 3.5–5.3)
SODIUM SERPL-SCNC: 142 MMOL/L (ref 135–147)

## 2024-09-30 PROCEDURE — 80048 BASIC METABOLIC PNL TOTAL CA: CPT

## 2024-09-30 PROCEDURE — 36415 COLL VENOUS BLD VENIPUNCTURE: CPT

## 2024-10-01 ENCOUNTER — HOSPITAL ENCOUNTER (OUTPATIENT)
Dept: MRI IMAGING | Facility: HOSPITAL | Age: 79
Discharge: HOME | End: 2024-10-01
Attending: UROLOGY
Payer: MEDICARE

## 2024-10-01 DIAGNOSIS — E66.01 MORBID OBESITY WITH BMI OF 45.0-49.9, ADULT (HCC): ICD-10-CM

## 2024-10-01 DIAGNOSIS — I10 ESSENTIAL HYPERTENSION: ICD-10-CM

## 2024-10-01 DIAGNOSIS — N28.89 RENAL MASS: ICD-10-CM

## 2024-10-01 PROCEDURE — 74183 MRI ABD W/O CNTR FLWD CNTR: CPT

## 2024-10-01 PROCEDURE — A9585 GADOBUTROL INJECTION: HCPCS | Performed by: UROLOGY

## 2024-10-01 RX ORDER — GADOBUTROL 604.72 MG/ML
6 INJECTION INTRAVENOUS
Status: COMPLETED | OUTPATIENT
Start: 2024-10-01 | End: 2024-10-01

## 2024-10-01 RX ADMIN — GADOBUTROL 6 ML: 604.72 INJECTION INTRAVENOUS at 16:26

## 2024-10-02 RX ORDER — HYDROCHLOROTHIAZIDE 25 MG/1
25 TABLET ORAL DAILY
Qty: 30 TABLET | Refills: 0 | Status: SHIPPED | OUTPATIENT
Start: 2024-10-02 | End: 2024-10-10 | Stop reason: ALTCHOICE

## 2024-10-03 DIAGNOSIS — E66.01 MORBID OBESITY WITH BMI OF 45.0-49.9, ADULT (HCC): ICD-10-CM

## 2024-10-03 NOTE — TELEPHONE ENCOUNTER
Medication:  [facility]   [id number] 09/11/2024 09/11/2024 Phentermine Hcl (Capsule) 30.0 30 15 MG NA [employer] Commercial Insurance 0 / 0 PA     1 [id number] 07/29/2024 07/26/2024 Phentermine Hcl (Capsule) 30.0 30 15 MG NA [employer] Commercial Insurance 0 / 0 PA    1 [id number] 06/17/2024 06/11/2024 Phentermine Hcl (Capsule) 30.0 30 15 MG NA [employer] Commercial Insurance 0 / 0 PA    1 [id number] 05/18/2024 05/16/2024 Phentermine Hcl (Capsule) 30.0 30 15 MG NA [employer] Commercial Insurance 0 / 0       Active agreement on file -Yes

## 2024-10-04 RX ORDER — PHENTERMINE HYDROCHLORIDE 15 MG/1
15 CAPSULE ORAL EVERY MORNING
Qty: 30 CAPSULE | Refills: 0 | Status: SHIPPED | OUTPATIENT
Start: 2024-10-04 | End: 2024-10-07 | Stop reason: SDUPTHER

## 2024-10-07 DIAGNOSIS — E66.01 MORBID OBESITY WITH BMI OF 45.0-49.9, ADULT (HCC): ICD-10-CM

## 2024-10-08 RX ORDER — PHENTERMINE HYDROCHLORIDE 15 MG/1
15 CAPSULE ORAL EVERY MORNING
Qty: 30 CAPSULE | Refills: 0 | Status: SHIPPED | OUTPATIENT
Start: 2024-10-08

## 2024-10-09 ENCOUNTER — TELEPHONE (OUTPATIENT)
Dept: NEPHROLOGY | Facility: CLINIC | Age: 79
End: 2024-10-09

## 2024-10-09 NOTE — PROGRESS NOTES
Patient has BMP labs done 09/30/24 labs are okay for scheduled follow-up visit on 10/10/24 per MONIQUE Sol

## 2024-10-10 ENCOUNTER — OFFICE VISIT (OUTPATIENT)
Dept: NEPHROLOGY | Facility: CLINIC | Age: 79
End: 2024-10-10
Payer: MEDICARE

## 2024-10-10 VITALS
RESPIRATION RATE: 18 BRPM | OXYGEN SATURATION: 98 % | SYSTOLIC BLOOD PRESSURE: 122 MMHG | HEIGHT: 64 IN | TEMPERATURE: 97.8 F | BODY MASS INDEX: 47.25 KG/M2 | WEIGHT: 276.8 LBS | DIASTOLIC BLOOD PRESSURE: 84 MMHG | HEART RATE: 81 BPM

## 2024-10-10 DIAGNOSIS — N28.9 KIDNEY LESION: ICD-10-CM

## 2024-10-10 DIAGNOSIS — I10 ESSENTIAL HYPERTENSION: ICD-10-CM

## 2024-10-10 DIAGNOSIS — N18.32 STAGE 3B CHRONIC KIDNEY DISEASE (HCC): Primary | ICD-10-CM

## 2024-10-10 PROCEDURE — 99214 OFFICE O/P EST MOD 30 MIN: CPT

## 2024-10-10 RX ORDER — HYDROCHLOROTHIAZIDE 12.5 MG/1
12.5 TABLET ORAL DAILY
Qty: 90 TABLET | Refills: 1 | Status: SHIPPED | OUTPATIENT
Start: 2024-10-10

## 2024-10-10 NOTE — PROGRESS NOTES
NEPHROLOGY OFFICE NOTE    Patient: Ladi Mojica               Sex: female           YOB: 1945        Age:  79 y.o.       10/10/2024    ASSESSMENT/PLAN     Assessment & Plan  Stage 3b chronic kidney disease (HCC)  Lab Results   Component Value Date    EGFR 24 09/30/2024    EGFR 23 09/11/2024    EGFR 22 08/07/2024    CREATININE 1.88 (H) 09/30/2024    CREATININE 1.95 (H) 09/11/2024    CREATININE 2.05 (H) 08/07/2024       Orders:    Basic metabolic panel; Future    CBC and differential; Future    Basic metabolic panel; Future    Urinalysis with microscopic; Future    Albumin / creatinine urine ratio; Future    Phosphorus; Future    Uric acid; Future    PTH, intact; Future    Vitamin D 25 hydroxy; Future    After review of the medical record, it appears baseline creatinine levels are fluctuating around 1.2 - 1.4 dating back through 2017.     Patient has a 1.7 x 1.8 cm right mid posterior cortical mass, no evidence of distant metastatic disease on most recent CT 11/8/2023. The lesion bulges the renal capsule, approximately 50% of the lesion projects beyond the confines of the kidney.     Suspect etiology of chronic kidney disease is multifactorial in the setting of hypertensive nephrosclerosis, age-related nephron loss, and nephron loss with documented renal mass.     Noted to have an episode of elevated creatinine 8/7/2024 with creatinine level of 2.05, improving to a current reading of 1.88 but still remains above suspected baseline. No updated urine studies obtained prior to follow-up today.     I highly suspect intravascular volume depletion in the setting of use of thiazide diuretic with decreased oral intake.  Given hypertension and improving renal function, will reduce hydrochlorothiazide to 12.5 mg once daily and encouraged that she increase her water intake.  Will follow-up on BMP in 1 month.      Advised avoidance of nephrotoxic agent such as NSAIDs.  Will repeat CKD labs prior to 6-month  follow-up.  Kidney lesion  Patient has a 1.7 x 1.8 cm right mid posterior cortical mass, no evidence of distant metastatic disease on most recent CT 11/8/2023. The lesion bulges the renal capsule, approximately 50% of the lesion projects beyond the confines of the kidney, concerning for neoplasm.      She is following with Urology, active surveillance at this time.  Most recent MRI noted no change on 10/1/2024.  She has follow-up with Dr. Marshall next week.  Essential hypertension    Orders:    hydroCHLOROthiazide 12.5 mg tablet; Take 1 tablet (12.5 mg total) by mouth daily    Basic metabolic panel; Future    Urinalysis with microscopic; Future    Albumin / creatinine urine ratio; Future    Currently maintained on amlodipine 10 mg daily, HCTZ 25 mg daily, and lisinopril 10 mg daily.  Blood pressure readings overall acceptable. Given worsening renal parameters and suspicion for volume depletion as above, will reduce hydrochlorothiazide to 12.5 mg once daily.     If patient has returning symptoms of dizziness, lightheadedness, or weakness would advise discontinuation of thiazide diuretic.  I advised to monitor blood pressure at home.  Will follow-up in 1 month.    BACKGROUND     I had the pleasure of seeing Ladi Mojica in the nephrology office today for follow-up. She has a past medical history significant for hypertension, GERD, hypothyroidism, sleep apnea, COPD, and chronic kidney disease. She is following with our office for management of chronic kidney disease.      She has underlying hypertension, currently maintained on amlodipine and lisinopril. She was started on HCTZ by her PCP on 7/26/2024 for persistently elevated blood pressure.      She is following with Dr. Mckenzie from Cardiology, she was referred for further work-up of shortness of breath. TTE 9/1/2023 showed an ejection fraction of 63% with grade 1 diastolic dysfunction. She underwent Lexiscan stress testing revealing a small fixed anterior  defect related to breast attenuation with no significant ischemia or infarct. She does have underlying hyperlipidemia and maintained on statin therapy.      She was also evaluated by Pulmonary for shortness of breath with scheduled PFTs. She is maintained on Advair inhaler.      She has GERD, maintained on pantoprazole and famotidine.      She was found to have a 1.7 cm enhancing mass in the interpolar region of the right kidney highly suggestive for neoplasm. This was first noted on CT scan 8/1/2023. She was seen by Dr. Marshall on on 4/8/2024. According to the patient and medical record, she is undergoing active surveillance.     She was also noted to have an adrenal adenoma and was referred to Endocrinology for further work-up and evaluation.  Her workup has been negative to date.    Reports that she has been having episodes of lightheadedness and upper extremity weakness at home.  She does endorse drinking only 2 glasses of iced tea a day with minimal water intake.  She reports the symptoms have been worse over the summer.    Most recent labs were obtained on 9/30/2024, which we have reviewed together.     SUBJECTIVE     She currently has no major complaints at this time and is feeling well. Patient denies any chest pain, shortness of breath, or swelling.    REVIEW OF SYSTEMS     Review of Systems   Constitutional:  Positive for activity change. Negative for chills, fatigue and fever.   HENT:  Negative for trouble swallowing.    Respiratory:  Negative for shortness of breath.    Cardiovascular:  Negative for leg swelling.   Gastrointestinal:  Negative for nausea and vomiting.   Genitourinary:  Negative for difficulty urinating, dysuria, frequency and hematuria.   Musculoskeletal:  Negative for back pain.   Skin:  Negative for pallor.   Neurological:  Positive for dizziness (intermittent) and light-headedness (intermittent). Negative for syncope and weakness.   Psychiatric/Behavioral:  Negative for sleep  disturbance. The patient is not nervous/anxious.        OBJECTIVE     Current Weight: Weight - Scale: 126 kg (276 lb 12.8 oz)  Vitals:    10/10/24 1426   BP: 122/84   Pulse: 81   Resp: 18   Temp: 97.8 °F (36.6 °C)   SpO2: 98%     Body mass index is 47.51 kg/m².    CURRENT MEDICATIONS       Current Outpatient Medications:     amLODIPine (NORVASC) 10 mg tablet, TAKE 1 TABLET(10 MG) BY MOUTH DAILY, Disp: 90 tablet, Rfl: 1    aspirin 81 mg chewable tablet, Chew 81 mg daily, Disp: , Rfl:     Cholecalciferol (VITAMIN D3) 50 MCG (2000 UT) TABS, Take 2,000 Units by mouth daily, Disp: , Rfl:     famotidine (PEPCID) 20 mg tablet, TAKE 1 TABLET(20 MG) BY MOUTH TWICE DAILY, Disp: 60 tablet, Rfl: 0    fluticasone (FLONASE) 50 mcg/act nasal spray, 1 spray into each nostril daily, Disp: 15.8 mL, Rfl: 1    hydroCHLOROthiazide 12.5 mg tablet, Take 1 tablet (12.5 mg total) by mouth daily, Disp: 90 tablet, Rfl: 1    Iron-Vitamin C (Vitron-C)  MG TABS, Take 1 tablet by mouth in the morning, Disp: 90 tablet, Rfl: 1    levothyroxine 50 mcg tablet, TAKE 1 TABLET(50 MCG) BY MOUTH DAILY, Disp: 90 tablet, Rfl: 1    lisinopril (ZESTRIL) 10 mg tablet, Take 1 tablet (10 mg total) by mouth daily, Disp: 90 tablet, Rfl: 1    omega-3-acid ethyl esters (LOVAZA) 1 g capsule, Take 1 capsule (1 g total) by mouth daily, Disp: 30 capsule, Rfl: 1    pantoprazole (PROTONIX) 40 mg tablet, Take 1 tablet (40 mg total) by mouth 2 (two) times a day, Disp: 180 tablet, Rfl: 3    phentermine 15 MG capsule, Take 1 capsule (15 mg total) by mouth every morning, Disp: 30 capsule, Rfl: 0    potassium chloride (K-DUR,KLOR-CON) 20 mEq tablet, Take 1 tablet (20 mEq total) by mouth daily, Disp: 90 tablet, Rfl: 0    rosuvastatin (CRESTOR) 10 MG tablet, Take 1 tablet (10 mg total) by mouth daily, Disp: 90 tablet, Rfl: 3    solifenacin (VESICARE) 5 mg tablet, Take 1 tablet (5 mg total) by mouth daily, Disp: 90 tablet, Rfl: 1    Current Facility-Administered Medications:      [START ON 10/23/2024] cyanocobalamin injection 1,000 mcg, 1,000 mcg, Intramuscular, Q30 Days,     PHYSICAL EXAMINATION     Physical Exam  Vitals reviewed.   Constitutional:       General: She is not in acute distress.  HENT:      Head: Normocephalic.      Mouth/Throat:      Lips: Pink.      Mouth: Mucous membranes are moist.   Eyes:      General: Lids are normal. No scleral icterus.  Cardiovascular:      Rate and Rhythm: Normal rate and regular rhythm.      Heart sounds: S1 normal and S2 normal. No murmur heard.  Pulmonary:      Effort: Pulmonary effort is normal. No accessory muscle usage or respiratory distress.      Breath sounds: Normal breath sounds.   Abdominal:      General: There is no distension.      Tenderness: There is no abdominal tenderness.   Musculoskeletal:      Cervical back: Normal range of motion and neck supple. No tenderness.   Skin:     General: Skin is warm.      Coloration: Skin is not cyanotic or jaundiced.   Neurological:      General: No focal deficit present.      Mental Status: She is alert and oriented to person, place, and time.   Psychiatric:         Attention and Perception: Attention normal.         Speech: Speech normal.         Behavior: Behavior is cooperative.           LAB RESULTS     BMP 9/30/2024:  Sodium 142  Potassium 4.4  Chloride 104  CO2 31  Anion gap 7  BUN 30  Creatinine 1.88  Glucose 105  Calcium 9.4  eGFR 24      RADIOLOGY RESULTS      Results for orders placed during the hospital encounter of 08/07/24    XR chest 1 view portable    Narrative  XR CHEST PORTABLE    INDICATION: lightheadedness/clamminess, shoulder discomfort..    COMPARISON: 2/28/2020    FINDINGS:    Clear lungs. Surgical clips overlie peripheral right base. Moderate hiatal hernia again noted. No pneumothorax or pleural effusion.    Normal cardiomediastinal silhouette.    Bones are unremarkable for age.    Normal upper abdomen.    Impression  No acute cardiopulmonary disease.        Workstation  performed: [id number]    Results for orders placed during the hospital encounter of 02/28/20    XR chest pa & lateral    Narrative  CHEST    INDICATION:   R53.83: Other fatigue  Z85.3: Personal history of malignant neoplasm of breast  R05: Cough.    COMPARISON:  Chest CT from 5/23/2019.    EXAM PERFORMED/VIEWS:  XR CHEST PA & LATERAL  DUAL ENERGY SUBTRACTION TECHNIQUE      FINDINGS:    Cardiomediastinal silhouette appears unremarkable. Redemonstration of moderate hiatal hernia.    The lungs are clear.  No pneumothorax or pleural effusion.    Osseous structures appear within normal limits for patient age.    Cholecystectomy. Clips in the right anterior chest wall.    Impression  No acute cardiopulmonary disease.        Workstation performed: [id number]    MRI abdomen 10/1/2024:  FINDINGS:     LOWER CHEST: Large hiatal hernia.     LIVER:  Normal in size and configuration.  No suspicious mass.  Patent hepatic and portal veins.     BILE DUCTS: No intrahepatic or extrahepatic bile duct dilation.     GALLBLADDER: Post cholecystectomy.     PANCREAS: Unremarkable.     ADRENAL GLANDS: Stable 1.5 cm right adrenal adenoma. Normal left adrenal gland.     SPLEEN: Normal.     KIDNEYS/PROXIMAL URETERS: No hydroureteronephrosis. Redemonstration of hyperenhancing T2 hyperintense mass arising from the posterior aspect of the interpolar right kidney which measures 1.7 x 1.6 x 1.5 cm. This is not significantly changed in size   allowing for measurement differences and motion artifact on the current exam. There are multiple simple cysts and a stable small proteinaceous cyst in the medial aspect of the left upper pole. No new suspicious renal lesions.     BOWEL: No dilated loops of bowel.     PERITONEUM/RETROPERITONEUM: No ascites.     LYMPH NODES: No abdominal lymphadenopathy.     VESSELS: No aneurysm.     ABDOMINAL WALL: Unremarkable     BONES: No suspicious osseous lesion.     IMPRESSION:     No significant change in 1.7 cm  "enhancing right renal mass. No interval abnormality.    Recommend Nephrology follow-up in 6 months.    MONIQUE Copeland  Nephrology  10/10/2024      Portions of the record may have been created with voice recognition software. Occasional wrong word or \"sound a like\" substitutions may have occurred due to the inherent limitations of voice recognition software. Read the chart carefully and recognize, using context, where substitutions have occurred.   "

## 2024-10-10 NOTE — ASSESSMENT & PLAN NOTE
Lab Results   Component Value Date    EGFR 24 09/30/2024    EGFR 23 09/11/2024    EGFR 22 08/07/2024    CREATININE 1.88 (H) 09/30/2024    CREATININE 1.95 (H) 09/11/2024    CREATININE 2.05 (H) 08/07/2024       Orders:    Basic metabolic panel; Future    CBC and differential; Future    Basic metabolic panel; Future    Urinalysis with microscopic; Future    Albumin / creatinine urine ratio; Future    Phosphorus; Future    Uric acid; Future    PTH, intact; Future    Vitamin D 25 hydroxy; Future    After review of the medical record, it appears baseline creatinine levels are fluctuating around 1.2 - 1.4 dating back through 2017.     Patient has a 1.7 x 1.8 cm right mid posterior cortical mass, no evidence of distant metastatic disease on most recent CT 11/8/2023. The lesion bulges the renal capsule, approximately 50% of the lesion projects beyond the confines of the kidney.     Suspect etiology of chronic kidney disease is multifactorial in the setting of hypertensive nephrosclerosis, age-related nephron loss, and nephron loss with documented renal mass.     Noted to have an episode of elevated creatinine 8/7/2024 with creatinine level of 2.05, improving to a current reading of 1.88 but still remains above suspected baseline. No updated urine studies obtained prior to follow-up today.     I highly suspect intravascular volume depletion in the setting of use of thiazide diuretic with decreased oral intake.  Given hypertension and improving renal function, will reduce hydrochlorothiazide to 12.5 mg once daily and encouraged that she increase her water intake.  Will follow-up on BMP in 1 month.      Advised avoidance of nephrotoxic agent such as NSAIDs.  Will repeat CKD labs prior to 6-month follow-up.

## 2024-10-10 NOTE — LETTER
October 10, 2024     MONIQUE Sloan  [address] [address]    Patient: Ladi Mojica   YOB: 1945   Date of Visit: 10/10/2024       Dear Dr. Perez:    Thank you for referring Ladi Mojica to me for evaluation. Below are my notes for this consultation.    If you have questions, please do not hesitate to call me. I look forward to following your patient along with you.         Sincerely,        MONIQUE oCpeland        CC: No Recipients    MONIQUE Copeland  10/10/2024  3:08 PM  Sign when Signing Visit  NEPHROLOGY OFFICE NOTE    Patient: Ladi Mojica               Sex: female           YOB: 1945        Age:  79 y.o.       10/10/2024    ASSESSMENT/PLAN     Assessment & Plan  Stage 3b chronic kidney disease (HCC)  Lab Results   Component Value Date    EGFR 24 09/30/2024    EGFR 23 09/11/2024    EGFR 22 08/07/2024    CREATININE 1.88 (H) 09/30/2024    CREATININE 1.95 (H) 09/11/2024    CREATININE 2.05 (H) 08/07/2024       Orders:  •  Basic metabolic panel; Future  •  CBC and differential; Future  •  Basic metabolic panel; Future  •  Urinalysis with microscopic; Future  •  Albumin / creatinine urine ratio; Future  •  Phosphorus; Future  •  Uric acid; Future  •  PTH, intact; Future  •  Vitamin D 25 hydroxy; Future    After review of the medical record, it appears baseline creatinine levels are fluctuating around 1.2 - 1.4 dating back through 2017.     Patient has a 1.7 x 1.8 cm right mid posterior cortical mass, no evidence of distant metastatic disease on most recent CT 11/8/2023. The lesion bulges the renal capsule, approximately 50% of the lesion projects beyond the confines of the kidney.     Suspect etiology of chronic kidney disease is multifactorial in the setting of hypertensive nephrosclerosis, age-related nephron loss, and nephron loss with documented renal mass.     Noted to have an episode of elevated creatinine  8/7/2024 with creatinine level of 2.05, improving to a current reading of 1.88 but still remains above suspected baseline. No updated urine studies obtained prior to follow-up today.     I highly suspect intravascular volume depletion in the setting of use of thiazide diuretic with decreased oral intake.  Given hypertension and improving renal function, will reduce hydrochlorothiazide to 12.5 mg once daily and encouraged that she increase her water intake.  Will follow-up on BMP in 1 month.      Advised avoidance of nephrotoxic agent such as NSAIDs.  Will repeat CKD labs prior to 6-month follow-up.  Kidney lesion  Patient has a 1.7 x 1.8 cm right mid posterior cortical mass, no evidence of distant metastatic disease on most recent CT 11/8/2023. The lesion bulges the renal capsule, approximately 50% of the lesion projects beyond the confines of the kidney, concerning for neoplasm.      She is following with Urology, active surveillance at this time.  Most recent MRI noted no change on 10/1/2024.  She has follow-up with Dr. Marshall next week.  Essential hypertension    Orders:  •  hydroCHLOROthiazide 12.5 mg tablet; Take 1 tablet (12.5 mg total) by mouth daily  •  Basic metabolic panel; Future  •  Urinalysis with microscopic; Future  •  Albumin / creatinine urine ratio; Future    Currently maintained on amlodipine 10 mg daily, HCTZ 25 mg daily, and lisinopril 10 mg daily.  Blood pressure readings overall acceptable. Given worsening renal parameters and suspicion for volume depletion as above, will reduce hydrochlorothiazide to 12.5 mg once daily.     If patient has returning symptoms of dizziness, lightheadedness, or weakness would advise discontinuation of thiazide diuretic.  I advised to monitor blood pressure at home.  Will follow-up in 1 month.    BACKGROUND     I had the pleasure of seeing Ladi Mojica in the nephrology office today for follow-up. She has a past medical history significant for hypertension,  GERD, hypothyroidism, sleep apnea, COPD, and chronic kidney disease. She is following with our office for management of chronic kidney disease.      She has underlying hypertension, currently maintained on amlodipine and lisinopril. She was started on HCTZ by her PCP on 7/26/2024 for persistently elevated blood pressure.      She is following with Dr. Mckenzie from Cardiology, she was referred for further work-up of shortness of breath. TTE 9/1/2023 showed an ejection fraction of 63% with grade 1 diastolic dysfunction. She underwent Lexiscan stress testing revealing a small fixed anterior defect related to breast attenuation with no significant ischemia or infarct. She does have underlying hyperlipidemia and maintained on statin therapy.      She was also evaluated by Pulmonary for shortness of breath with scheduled PFTs. She is maintained on Advair inhaler.      She has GERD, maintained on pantoprazole and famotidine.      She was found to have a 1.7 cm enhancing mass in the interpolar region of the right kidney highly suggestive for neoplasm. This was first noted on CT scan 8/1/2023. She was seen by Dr. Marshall on on 4/8/2024. According to the patient and medical record, she is undergoing active surveillance.     She was also noted to have an adrenal adenoma and was referred to Endocrinology for further work-up and evaluation.  Her workup has been negative to date.    Reports that she has been having episodes of lightheadedness and upper extremity weakness at home.  She does endorse drinking only 2 glasses of iced tea a day with minimal water intake.  She reports the symptoms have been worse over the summer.    Most recent labs were obtained on 9/30/2024, which we have reviewed together.     SUBJECTIVE     She currently has no major complaints at this time and is feeling well. Patient denies any chest pain, shortness of breath, or swelling.    REVIEW OF SYSTEMS     Review of Systems   Constitutional:  Positive  for activity change. Negative for chills, fatigue and fever.   HENT:  Negative for trouble swallowing.    Respiratory:  Negative for shortness of breath.    Cardiovascular:  Negative for leg swelling.   Gastrointestinal:  Negative for nausea and vomiting.   Genitourinary:  Negative for difficulty urinating, dysuria, frequency and hematuria.   Musculoskeletal:  Negative for back pain.   Skin:  Negative for pallor.   Neurological:  Positive for dizziness (intermittent) and light-headedness (intermittent). Negative for syncope and weakness.   Psychiatric/Behavioral:  Negative for sleep disturbance. The patient is not nervous/anxious.        OBJECTIVE     Current Weight: Weight - Scale: 126 kg (276 lb 12.8 oz)  Vitals:    10/10/24 1426   BP: 122/84   Pulse: 81   Resp: 18   Temp: 97.8 °F (36.6 °C)   SpO2: 98%     Body mass index is 47.51 kg/m².    CURRENT MEDICATIONS       Current Outpatient Medications:   •  amLODIPine (NORVASC) 10 mg tablet, TAKE 1 TABLET(10 MG) BY MOUTH DAILY, Disp: 90 tablet, Rfl: 1  •  aspirin 81 mg chewable tablet, Chew 81 mg daily, Disp: , Rfl:   •  Cholecalciferol (VITAMIN D3) 50 MCG (2000 UT) TABS, Take 2,000 Units by mouth daily, Disp: , Rfl:   •  famotidine (PEPCID) 20 mg tablet, TAKE 1 TABLET(20 MG) BY MOUTH TWICE DAILY, Disp: 60 tablet, Rfl: 0  •  fluticasone (FLONASE) 50 mcg/act nasal spray, 1 spray into each nostril daily, Disp: 15.8 mL, Rfl: 1  •  hydroCHLOROthiazide 12.5 mg tablet, Take 1 tablet (12.5 mg total) by mouth daily, Disp: 90 tablet, Rfl: 1  •  Iron-Vitamin C (Vitron-C)  MG TABS, Take 1 tablet by mouth in the morning, Disp: 90 tablet, Rfl: 1  •  levothyroxine 50 mcg tablet, TAKE 1 TABLET(50 MCG) BY MOUTH DAILY, Disp: 90 tablet, Rfl: 1  •  lisinopril (ZESTRIL) 10 mg tablet, Take 1 tablet (10 mg total) by mouth daily, Disp: 90 tablet, Rfl: 1  •  omega-3-acid ethyl esters (LOVAZA) 1 g capsule, Take 1 capsule (1 g total) by mouth daily, Disp: 30 capsule, Rfl: 1  •   pantoprazole (PROTONIX) 40 mg tablet, Take 1 tablet (40 mg total) by mouth 2 (two) times a day, Disp: 180 tablet, Rfl: 3  •  phentermine 15 MG capsule, Take 1 capsule (15 mg total) by mouth every morning, Disp: 30 capsule, Rfl: 0  •  potassium chloride (K-DUR,KLOR-CON) 20 mEq tablet, Take 1 tablet (20 mEq total) by mouth daily, Disp: 90 tablet, Rfl: 0  •  rosuvastatin (CRESTOR) 10 MG tablet, Take 1 tablet (10 mg total) by mouth daily, Disp: 90 tablet, Rfl: 3  •  solifenacin (VESICARE) 5 mg tablet, Take 1 tablet (5 mg total) by mouth daily, Disp: 90 tablet, Rfl: 1    Current Facility-Administered Medications:   •  [START ON 10/23/2024] cyanocobalamin injection 1,000 mcg, 1,000 mcg, Intramuscular, Q30 Days,     PHYSICAL EXAMINATION     Physical Exam  Vitals reviewed.   Constitutional:       General: She is not in acute distress.  HENT:      Head: Normocephalic.      Mouth/Throat:      Lips: Pink.      Mouth: Mucous membranes are moist.   Eyes:      General: Lids are normal. No scleral icterus.  Cardiovascular:      Rate and Rhythm: Normal rate and regular rhythm.      Heart sounds: S1 normal and S2 normal. No murmur heard.  Pulmonary:      Effort: Pulmonary effort is normal. No accessory muscle usage or respiratory distress.      Breath sounds: Normal breath sounds.   Abdominal:      General: There is no distension.      Tenderness: There is no abdominal tenderness.   Musculoskeletal:      Cervical back: Normal range of motion and neck supple. No tenderness.   Skin:     General: Skin is warm.      Coloration: Skin is not cyanotic or jaundiced.   Neurological:      General: No focal deficit present.      Mental Status: She is alert and oriented to person, place, and time.   Psychiatric:         Attention and Perception: Attention normal.         Speech: Speech normal.         Behavior: Behavior is cooperative.           LAB RESULTS     BMP 9/30/2024:  Sodium 142  Potassium 4.4  Chloride 104  CO2 31  Anion gap 7  BUN  30  Creatinine 1.88  Glucose 105  Calcium 9.4  eGFR 24      RADIOLOGY RESULTS      Results for orders placed during the hospital encounter of 08/07/24    XR chest 1 view portable    Narrative  XR CHEST PORTABLE    INDICATION: lightheadedness/clamminess, shoulder discomfort..    COMPARISON: 2/28/2020    FINDINGS:    Clear lungs. Surgical clips overlie peripheral right base. Moderate hiatal hernia again noted. No pneumothorax or pleural effusion.    Normal cardiomediastinal silhouette.    Bones are unremarkable for age.    Normal upper abdomen.    Impression  No acute cardiopulmonary disease.        Workstation performed: [id number]    Results for orders placed during the hospital encounter of 02/28/20    XR chest pa & lateral    Narrative  CHEST    INDICATION:   R53.83: Other fatigue  Z85.3: Personal history of malignant neoplasm of breast  R05: Cough.    COMPARISON:  Chest CT from 5/23/2019.    EXAM PERFORMED/VIEWS:  XR CHEST PA & LATERAL  DUAL ENERGY SUBTRACTION TECHNIQUE      FINDINGS:    Cardiomediastinal silhouette appears unremarkable. Redemonstration of moderate hiatal hernia.    The lungs are clear.  No pneumothorax or pleural effusion.    Osseous structures appear within normal limits for patient age.    Cholecystectomy. Clips in the right anterior chest wall.    Impression  No acute cardiopulmonary disease.        Workstation performed: [id number]    MRI abdomen 10/1/2024:  FINDINGS:     LOWER CHEST: Large hiatal hernia.     LIVER:  Normal in size and configuration.  No suspicious mass.  Patent hepatic and portal veins.     BILE DUCTS: No intrahepatic or extrahepatic bile duct dilation.     GALLBLADDER: Post cholecystectomy.     PANCREAS: Unremarkable.     ADRENAL GLANDS: Stable 1.5 cm right adrenal adenoma. Normal left adrenal gland.     SPLEEN: Normal.     KIDNEYS/PROXIMAL URETERS: No hydroureteronephrosis. Redemonstration of hyperenhancing T2 hyperintense mass arising from the posterior aspect of  "the interpolar right kidney which measures 1.7 x 1.6 x 1.5 cm. This is not significantly changed in size   allowing for measurement differences and motion artifact on the current exam. There are multiple simple cysts and a stable small proteinaceous cyst in the medial aspect of the left upper pole. No new suspicious renal lesions.     BOWEL: No dilated loops of bowel.     PERITONEUM/RETROPERITONEUM: No ascites.     LYMPH NODES: No abdominal lymphadenopathy.     VESSELS: No aneurysm.     ABDOMINAL WALL: Unremarkable     BONES: No suspicious osseous lesion.     IMPRESSION:     No significant change in 1.7 cm enhancing right renal mass. No interval abnormality.    Recommend Nephrology follow-up in 6 months.    MONIQUE Copeland  Nephrology  10/10/2024      Portions of the record may have been created with voice recognition software. Occasional wrong word or \"sound a like\" substitutions may have occurred due to the inherent limitations of voice recognition software. Read the chart carefully and recognize, using context, where substitutions have occurred.      "

## 2024-10-10 NOTE — ASSESSMENT & PLAN NOTE
Orders:    hydroCHLOROthiazide 12.5 mg tablet; Take 1 tablet (12.5 mg total) by mouth daily    Basic metabolic panel; Future    Urinalysis with microscopic; Future    Albumin / creatinine urine ratio; Future    Currently maintained on amlodipine 10 mg daily, HCTZ 25 mg daily, and lisinopril 10 mg daily.  Blood pressure readings overall acceptable. Given worsening renal parameters and suspicion for volume depletion as above, will reduce hydrochlorothiazide to 12.5 mg once daily.     If patient has returning symptoms of dizziness, lightheadedness, or weakness would advise discontinuation of thiazide diuretic.  I advised to monitor blood pressure at home.  Will follow-up in 1 month.

## 2024-10-10 NOTE — PATIENT INSTRUCTIONS
"We will reduce hydrochlorothiazide to 12.5 mg once daily. If you want to use up the supply of medication at home, you can cut the pills in half.  A new prescription for hydrochlorothiazide was also sent to your pharmacy.   IF you feel no improvement in your lightheadedness and weakness, give our office a call.   We will repeat a BMP in 1 month to check the kidney numbers.   Check your blood pressure at home.     Patient Education     Chronic kidney disease   The Basics   Written by the doctors and editors at [facility]   What is chronic kidney disease? -- Chronic kidney disease, or \"CKD,\" is when the kidneys stop working as well as they should. When they are working normally, the kidneys filter blood and remove waste and excess salt and water (figure 1).  In people with CKD, the kidneys slowly lose the ability to filter blood. In time, the kidneys can stop working completely. That is why it is so important to keep CKD from getting worse.  What are the symptoms of CKD? -- At first, CKD causes no symptoms. As the disease gets worse, it can:   Make your feet, ankles, or legs swell (called \"edema\")   Give you high blood pressure   Make you very tired   Damage your bones  Will I need tests? -- Yes. Your doctor will want to see you regularly. You will probably have appointments at least once a year, and you will get regular tests to check your kidneys. These include blood and urine tests.  If your CKD gets worse over time, you will probably need to see a \"nephrologist.\" This is a doctor who takes care of people with kidney disease.  Is there anything I can do to keep my kidneys from getting worse? -- Yes. If you have CKD, you can protect your kidneys if you:   Take all of your prescribed medicines every day, and follow all of your doctor's instructions for how to take them.   Keep your blood sugar in a healthy range, if you have diabetes.   Change your diet, if your doctor or nurse recommends to. They might suggest working " "with a dietitian (nutrition expert).   Quit smoking, if you smoke.   Lose weight, if you have excess body weight.   Avoid medicines that can harm the kidneys - One example is nonsteroidal antiinflammatory drugs (\"NSAIDs\"). These medicines include ibuprofen (sample brand names: Advil, Motrin) and naproxen (sample brand name: Aleve). There are other medicines that people with CKD need to avoid, too. Check with your doctor, nurse, or kidney specialist before starting any new medicines or supplements, even those you can buy without a prescription.  How is CKD treated? -- People in the early stages of CKD can take medicines to keep the disease from getting worse. For example, many people with CKD should take medicines known as \"ACE inhibitors\" or \"angiotensin receptor blockers.\" If your doctor or nurse prescribes these medicines, it is very important that you take them every day as directed. If they cause side effects or cost too much, tell your doctor or nurse. They might have solutions to offer.  What happens if my kidneys stop working completely? -- If your kidneys can no longer filter blood properly, you can choose between 3 different treatments to take over their job. Your choices are:   Kidney transplant - After transplant surgery, the new kidney can do the job of your own kidneys. If you have a kidney transplant, you will need to take medicines for the rest of your life to keep your body from reacting badly to the new kidney. (You only need 1 kidney to live.)   Hemodialysis - This is a procedure in which a dialysis machine takes over the job of the kidneys. The machine pumps blood out of the body, filters it, and returns it to the body. If you choose hemodialysis, you will need to be hooked up to the machine at least 3 times a week for several hours for the rest of your life. Before you start, you will also need to have surgery to prepare a blood vessel for attachment to the machine.   Peritoneal dialysis - This " involves piping a special fluid into the belly every day. If you choose peritoneal dialysis, you will need surgery to have a tube implanted in your belly. Then, you will have to learn how to pipe the fluid in and out through that tube.  How do I choose between the different treatment options? -- You and your doctor will need to work together to find a treatment that's right for you. Kidney transplant surgery is usually the best option for most people. But often, there are no kidneys available for transplant.  Ask your doctor to explain all of your options and how they might work for you. Then, talk openly with them about how you feel about all of the options. You might even decide that you do not want any treatment. That is your choice.  All topics are updated as new evidence becomes available and our peer review process is complete.  This topic retrieved from [employer] on: May 18, 2024.  Topic 38372 Version 34.0  Release: 32.4.3 - C32.137  © 2024 UpToDate, Inc. and/or its affiliates. All rights reserved.  figure 1: Anatomy of the urinary tract     Urine is made by the kidneys. It passes from the kidneys into the bladder through 2 tubes called the ureters. Then, it leaves the bladder through another tube called the urethra.  Graphic 22623 Version 8.0  Consumer Information Use and Disclaimer   Disclaimer: This generalized information is a limited summary of diagnosis, treatment, and/or medication information. It is not meant to be comprehensive and should be used as a tool to help the user understand and/or assess potential diagnostic and treatment options. It does NOT include all information about conditions, treatments, medications, side effects, or risks that may apply to a specific patient. It is not intended to be medical advice or a substitute for the medical advice, diagnosis, or treatment of a health care provider based on the health care provider's examination and assessment of a patient's specific and unique  circumstances. Patients must speak with a health care provider for complete information about their health, medical questions, and treatment options, including any risks or benefits regarding use of medications. This information does not endorse any treatments or medications as safe, effective, or approved for treating a specific patient. UpToDate, Inc. and its affiliates disclaim any warranty or liability relating to this information or the use thereof.The use of this information is governed by the Terms of Use, available at https://www.[facility].com/en/know/clinical-effectiveness-terms. 2024© UpToDate, Inc. and its affiliates and/or licensors. All rights reserved.  Copyright   © 2024 UpToDate, Inc. and/or its affiliates. All rights reserved.

## 2024-10-10 NOTE — ASSESSMENT & PLAN NOTE
Patient has a 1.7 x 1.8 cm right mid posterior cortical mass, no evidence of distant metastatic disease on most recent CT 11/8/2023. The lesion bulges the renal capsule, approximately 50% of the lesion projects beyond the confines of the kidney, concerning for neoplasm.      She is following with Urology, active surveillance at this time.  Most recent MRI noted no change on 10/1/2024.  She has follow-up with Dr. Marshall next week.

## 2024-10-11 NOTE — PROGRESS NOTES
UROLOGY FOLLOW-UP ENCOUNTER    Ladi Mojica is a 79 y.o. female initially referred for renal lesion.    Pertinent PMH/PSH: CKD stage 3 (8/1/2023: eGFR 36, Cr 1.37); breast cancer s/p radiation    CTA CAP 8/1/23 (performed for SOB): no PE, 1.7 cm enhancing solid R renal mass worrisome for neoplasm; indeterminate 1 cm L anterior upper pole lesion possibly complicated cyst    MRI abdomen w/ wo contrast 8/13/23: 1.7 cm enhancing mass in interpolar region of R kidney highly suggestive for neoplasm; no RP LAD, no renal vein thrombosis; hemorrhagic or proteinaceous LUP renal cyst with multiple additional simple cysts; 1.5 cm R adrenal adenoma    Denied hematuria, flank pain, headaches, BP problems.    CTU 11/8/23: Right 1.5 cm adrenal adenoma, 1.0 cm left adrenal adenoma, both stable; 1.8 cm right mid posterior cortical mass right kidney, no significant interval change, over 50% of the lesion projects beyond the confines of the kidney    Endo 3/27/24: ACTH neg, DHEA neg, cortisol neg; urine metanephrines neg    RBUS on 3/4/24: R renal mass not well visualized    MRI abdomen with and without contrast 10/1/2024: No significant change in 1.7 cm enhancing right renal mass      Assessment and plan:     Renal lesion    Patient has been following with me with the history above.  She has been noted to have a 1.7 cm right renal mass.  She had a CT urogram in November 2023.  At that time, the mass was noted to be 1.8 cm.    Since then, she got MRI abdomen with and without contrast on 10/1/2024.  Images personally reviewed.  There was no significant change in the lesion, lesion measured at 1.7 cm.    I reviewed the stable findings with the patient.  I explained that we can continue yearly surveillance at this time.    However, since then, her CKD has gotten worse with her last creatinine being about 1.9.    I did talk about the possibility of her kidney function getting even worse with contrast.  I did explain that using  "gadolinium MRI contrast has significantly less risk of kidney injury compared to iodinated contrast for CT.  However, she does have pretty significant claustrophobia and getting MRIs can be quite difficult for her.    We therefore will plan on getting a CT in about 1 year.  I talked about the possibility of getting a noncontrast CT with her.  I explained that with a noncontrast CT, we would be able to accurately assess the size of the lesion, however, we would not be able to properly characterize the contrast characteristics such as enhancement.  I explained that if she was okay with the possibility of having slightly less accurate surveillance regarding the kidney lesion, with the benefit of maintaining kidney function, we could pursue the noncontrast CT.  She did agree to proceed with a noncontrast CT in order to preserve her kidney function is much as possible.  In the meantime, she does understand that there may be some important characteristics of the lesion that we may miss with a noncontrast imaging.        PLAN  -She will see me in 1 year with CT abdomen without contrast          Portions of the above record have been created with voice recognition software.  Occasional wrong word or \"sound alike\" substitution may have occurred due to the inherent limitations of voice recognition software.  Read the chart carefully and recognize, using context, where substitution may have occurred.      David Marshall,         Chief Complaint     Renal lesion      History of Present Illness     See summary above    No fevers or chills        The following portions of the patient's history were reviewed and updated as appropriate: allergies, current medications, past family history, past medical history, past social history, past surgical history and problem list.            Review of Systems     Negative for fevers, chills, nausea, vomiting, shortness of breath, blood in urine, painful urination    Allergies " "    Allergies   Allergen Reactions    Codeine Other (See Comments)     \"I didn't like the feeling.\"    Amoxicillin Other (See Comments)     Unsure of allergy    Oxybutynin Visual Disturbance    Morphine Other (See Comments)     Does not like feeling       Physical Exam     No acute distress, normocephalic atraumatic, no CVA tenderness bilaterally, abdomen soft nontender, nonlabored breathing    Vital Signs  Vitals:    10/14/24 1428   BP: 118/86   Pulse: (!) 123   Temp: 97.9 °F (36.6 °C)   TempSrc: Temporal   SpO2: 98%   Weight: 124 kg (273 lb)   Height: 5' 4\" (1.626 m)         Current Medications       Current Outpatient Medications:     amLODIPine (NORVASC) 10 mg tablet, TAKE 1 TABLET(10 MG) BY MOUTH DAILY, Disp: 90 tablet, Rfl: 1    aspirin 81 mg chewable tablet, Chew 81 mg daily, Disp: , Rfl:     Cholecalciferol (VITAMIN D3) 50 MCG (2000 UT) TABS, Take 2,000 Units by mouth daily, Disp: , Rfl:     famotidine (PEPCID) 20 mg tablet, TAKE 1 TABLET(20 MG) BY MOUTH TWICE DAILY, Disp: 60 tablet, Rfl: 0    fluticasone (FLONASE) 50 mcg/act nasal spray, 1 spray into each nostril daily, Disp: 15.8 mL, Rfl: 1    hydroCHLOROthiazide 12.5 mg tablet, Take 1 tablet (12.5 mg total) by mouth daily, Disp: 90 tablet, Rfl: 1    Iron-Vitamin C (Vitron-C)  MG TABS, Take 1 tablet by mouth in the morning, Disp: 90 tablet, Rfl: 1    levothyroxine 50 mcg tablet, TAKE 1 TABLET(50 MCG) BY MOUTH DAILY, Disp: 90 tablet, Rfl: 1    lisinopril (ZESTRIL) 10 mg tablet, Take 1 tablet (10 mg total) by mouth daily, Disp: 90 tablet, Rfl: 1    omega-3-acid ethyl esters (LOVAZA) 1 g capsule, Take 1 capsule (1 g total) by mouth daily, Disp: 30 capsule, Rfl: 1    pantoprazole (PROTONIX) 40 mg tablet, Take 1 tablet (40 mg total) by mouth 2 (two) times a day, Disp: 180 tablet, Rfl: 3    phentermine 15 MG capsule, Take 1 capsule (15 mg total) by mouth every morning, Disp: 30 capsule, Rfl: 0    potassium chloride (K-DUR,KLOR-CON) 20 mEq tablet, Take 1 " tablet (20 mEq total) by mouth daily, Disp: 90 tablet, Rfl: 0    rosuvastatin (CRESTOR) 10 MG tablet, Take 1 tablet (10 mg total) by mouth daily, Disp: 90 tablet, Rfl: 3    solifenacin (VESICARE) 5 mg tablet, Take 1 tablet (5 mg total) by mouth daily, Disp: 90 tablet, Rfl: 1    Current Facility-Administered Medications:     [START ON 10/23/2024] cyanocobalamin injection 1,000 mcg, 1,000 mcg, Intramuscular, Q30 Days,       Active Problems     Patient Active Problem List   Diagnosis    Intention tremor    Benign essential tremor    Depression with anxiety    Essential hypertension    CKD (chronic kidney disease) stage 3, GFR 30-59 ml/min ([facility])    Hypothyroidism    Shortness of breath    Mixed hyperlipidemia    History of breast cancer    Mild cognitive impairment    Bilateral malignant neoplasm of breast in female ([facility])    IFG (impaired fasting glucose)    Compression fracture of fifth lumbar vertebra ([facility])    L4-L5 disc bulge    Sciatica of right side    Chronic obstructive pulmonary disease, unspecified COPD type ([facility])    Severe episode of recurrent major depressive disorder, without psychotic features ([facility])    Incontinence of feces    Other fatigue    Morbid obesity with BMI of 45.0-49.9, adult ([facility])    Kidney lesion    Constipation    Gastroesophageal reflux disease    Urinary frequency    OBI (obstructive sleep apnea)    Obstructive sleep apnea syndrome    Polyp of colon    Adrenal adenoma    Chronic bilateral low back pain with right-sided sciatica         Past Medical History     Past Medical History:   Diagnosis Date    Acute ill-defined cerebrovascular disease     Arthritis     Benign essential tremor     Breast cancer ([facility])     pt states doesn't remember the year thinks it might have been on the right.    Chronic kidney disease     CKD (chronic kidney disease) stage 3, GFR 30-59 ml/min ([facility]) 09/25/2018    Colon polyp     Depressive disorder     Disease of thyroid gland     GERD (gastroesophageal reflux  disease)     Heart murmur     Hyperlipidemia     Hypertension     Impaired fasting glucose     Intention tremor     last assessed: 8/18/2016    Osteochondropathy     Panic disorder without agoraphobia with mild panic attacks     PONV (postoperative nausea and vomiting)     Psychiatric disorder     anxiety    Sleep apnea, obstructive     Stroke (HCC)     Tubular adenoma of colon     Urinary tract infection          Surgical History     Past Surgical History:   Procedure Laterality Date    AUGMENTATION BREAST      pt states during mammo she doesn't have any other surgery than the 2 biopsy    BLEPHAROPLASTY      Upper    BREAST BIOPSY Left     pt doesn't recall when and believes the neg biop was on left    BREAST LUMPECTOMY W/ NEEDLE LOCALIZATION Left     description: benign last assessed; 8/21/2016    CHOLECYSTECTOMY      COLONOSCOPY      EGD      HYSTERECTOMY      NH EXC TUMOR SOFT TISS FACE&SCALP SUBFASCIAL <2CM Left 11/9/2023    Procedure: EXCISION OF EYELID LESION;  Surgeon: Eugene Garnica MD;  Location: [facility];  Service: Plastics    TUBAL LIGATION           Family History     Family History   Problem Relation Age of Onset    Alcohol abuse Mother     Asthma Mother     Cancer Mother     Mental illness Mother     Osteoarthritis Mother     Gout Father     Cancer Father     Other Sister         carotid arterial disease    COPD Sister     Hyperlipidemia Sister     Hypertension Sister     Heart attack Sister     Breast cancer Sister     No Known Problems Daughter     No Known Problems Maternal Grandmother     No Known Problems Paternal Grandmother     Lung cancer Sister     No Known Problems Sister     No Known Problems Daughter     No Known Problems Daughter          Social History     Social History     Social History     Tobacco Use   Smoking Status Former    Current packs/day: 0.00    Average packs/day: 1 pack/day for 50.0 years (50.0 ttl pk-yrs)    Types: Cigarettes    Start date: 1961    Quit date: 2011  "   Years since quittin.7    Passive exposure: Past   Smokeless Tobacco Never   Tobacco Comments    10 years ago         Pertinent Lab Values     Lab Results   Component Value Date    CREATININE 1.88 (H) 2024       No results found for: \"PSA\"      Pertinent Imaging     The patient's images were reviewed by me personally and also in real time with them in the examination room using our PACS imaging system.        CTA CAP 23 (performed for SOB): no PE, 1.7 cm enhancing solid R renal mass worrisome for neoplasm; indeterminate 1 cm L anterior upper pole lesion possibly complicated cyst    MRI abdomen w/ wo contrast 23: 1.7 cm enhancing mass in interpolar region of R kidney highly suggestive for neoplasm; no RP LAD, no renal vein thrombosis; hemorrhagic or proteinaceous LUP renal cyst with multiple additional simple cysts; 1.5 cm R adrenal adenoma    MRI abdomen with and without contrast 10/1/2024: No significant change in 1.7 cm enhancing right renal mass      Pertinent Pathology     N/A      I have spent 30 minutes with Patient  today in which greater than 50% of this time was spent in counseling/coordination of care regarding Diagnostic results, Prognosis, Risks and benefits of tx options, Instructions for management, Patient and family education, Importance of tx compliance, Impressions, Counseling / Coordination of care, Documenting in the medical record, Reviewing / ordering tests, medicine, procedures  , and Obtaining or reviewing history  .    Please note this time includes cumulative time on the day of encounter, including reviewing medical records and/or coordinating care among the patient's other specialists.      "

## 2024-10-14 ENCOUNTER — OFFICE VISIT (OUTPATIENT)
Dept: UROLOGY | Facility: CLINIC | Age: 79
End: 2024-10-14
Payer: MEDICARE

## 2024-10-14 ENCOUNTER — TELEPHONE (OUTPATIENT)
Dept: UROLOGY | Facility: CLINIC | Age: 79
End: 2024-10-14

## 2024-10-14 VITALS
SYSTOLIC BLOOD PRESSURE: 118 MMHG | HEIGHT: 64 IN | TEMPERATURE: 97.9 F | BODY MASS INDEX: 46.61 KG/M2 | HEART RATE: 123 BPM | OXYGEN SATURATION: 98 % | DIASTOLIC BLOOD PRESSURE: 86 MMHG | WEIGHT: 273 LBS

## 2024-10-14 DIAGNOSIS — N28.9 RENAL LESION: Primary | ICD-10-CM

## 2024-10-14 PROCEDURE — 99214 OFFICE O/P EST MOD 30 MIN: CPT | Performed by: UROLOGY

## 2024-10-23 ENCOUNTER — CLINICAL SUPPORT (OUTPATIENT)
Dept: FAMILY MEDICINE CLINIC | Facility: CLINIC | Age: 79
End: 2024-10-23
Payer: MEDICARE

## 2024-10-23 DIAGNOSIS — Z23 ENCOUNTER FOR IMMUNIZATION: Primary | ICD-10-CM

## 2024-10-23 PROCEDURE — G0008 ADMIN INFLUENZA VIRUS VAC: HCPCS

## 2024-10-23 PROCEDURE — 90662 IIV NO PRSV INCREASED AG IM: CPT

## 2024-10-23 PROCEDURE — 96372 THER/PROPH/DIAG INJ SC/IM: CPT

## 2024-10-23 RX ADMIN — CYANOCOBALAMIN 1000 MCG: 1000 INJECTION, SOLUTION INTRAMUSCULAR; SUBCUTANEOUS at 15:21

## 2024-11-07 ENCOUNTER — RA CDI HCC (OUTPATIENT)
Dept: OTHER | Facility: HOSPITAL | Age: 79
End: 2024-11-07

## 2024-11-14 ENCOUNTER — OFFICE VISIT (OUTPATIENT)
Dept: FAMILY MEDICINE CLINIC | Facility: CLINIC | Age: 79
End: 2024-11-14
Payer: MEDICARE

## 2024-11-14 VITALS
WEIGHT: 277 LBS | HEIGHT: 64 IN | BODY MASS INDEX: 47.29 KG/M2 | SYSTOLIC BLOOD PRESSURE: 126 MMHG | HEART RATE: 82 BPM | OXYGEN SATURATION: 98 % | DIASTOLIC BLOOD PRESSURE: 72 MMHG

## 2024-11-14 DIAGNOSIS — J44.9 CHRONIC OBSTRUCTIVE PULMONARY DISEASE, UNSPECIFIED COPD TYPE (HCC): ICD-10-CM

## 2024-11-14 DIAGNOSIS — R26.0 ATAXIC GAIT: ICD-10-CM

## 2024-11-14 DIAGNOSIS — E27.1 PRIMARY ADRENOCORTICAL INSUFFICIENCY (HCC): ICD-10-CM

## 2024-11-14 DIAGNOSIS — R42 DIZZINESS: Primary | ICD-10-CM

## 2024-11-14 DIAGNOSIS — N18.4 CHRONIC KIDNEY DISEASE, STAGE 4 (SEVERE) (HCC): ICD-10-CM

## 2024-11-14 DIAGNOSIS — E66.01 MORBID OBESITY WITH BMI OF 45.0-49.9, ADULT (HCC): ICD-10-CM

## 2024-11-14 PROCEDURE — 99214 OFFICE O/P EST MOD 30 MIN: CPT

## 2024-11-14 RX ORDER — DOXYCYCLINE 100 MG/1
100 TABLET ORAL 2 TIMES DAILY
COMMUNITY
Start: 2024-11-07 | End: 2024-11-21

## 2024-11-14 NOTE — ASSESSMENT & PLAN NOTE
Lab Results   Component Value Date    EGFR 24 09/30/2024    EGFR 23 09/11/2024    EGFR 22 08/07/2024    CREATININE 1.88 (H) 09/30/2024    CREATININE 1.95 (H) 09/11/2024    CREATININE 2.05 (H) 08/07/2024   Reviewed recent labs, stable.  Continue to follow with nephrology.  Try to steadily increase hydration 1 to 2 L of water a day.

## 2024-11-14 NOTE — ASSESSMENT & PLAN NOTE
Referral placed to pulmonology, does have significant shortness of breath.  Will continue to monitor.

## 2024-11-14 NOTE — ASSESSMENT & PLAN NOTE
Reviewed imaging and notes from nephrology and urology.  She will continue to follow closely with them.

## 2024-11-14 NOTE — PROGRESS NOTES
Assessment/Plan:         Problem List Items Addressed This Visit          Respiratory    Chronic obstructive pulmonary disease, unspecified COPD type ([facility])    Referral placed to pulmonology, does have significant shortness of breath.  Will continue to monitor.         Relevant Medications    RSV Pre-Fusion F A&B Vac Rcmb (Abrysvo) SOLR vaccine    Other Relevant Orders    Ambulatory Referral to Pulmonology       Endocrine    Primary adrenocortical insufficiency ([facility])    Reviewed imaging and notes from nephrology and urology.  She will continue to follow closely with them.            Genitourinary    Chronic kidney disease, stage 4 (severe) ([facility])    Lab Results   Component Value Date    EGFR 24 09/30/2024    EGFR 23 09/11/2024    EGFR 22 08/07/2024    CREATININE 1.88 (H) 09/30/2024    CREATININE 1.95 (H) 09/11/2024    CREATININE 2.05 (H) 08/07/2024   Reviewed recent labs, stable.  Continue to follow with nephrology.  Try to steadily increase hydration 1 to 2 L of water a day.            Other    Morbid obesity with BMI of 45.0-49.9, adult ([facility])    Has failed phentermine, will work on diet.  Referral to weight management placed         Relevant Orders    Ambulatory Referral to Weight Management     Other Visit Diagnoses         Dizziness    -  Primary    Will do echocardiogram vascular study and MRI.  Will call with results.  If symptoms get worse do recommend emergency room    Relevant Orders    Ambulatory Referral to Cardiology    Echo complete w/ contrast if indicated    VAS carotid complete study    MRI brain w wo contrast      Ataxic gait        Will do echocardiogram vascular study and MRI.  Will call with results.  If symptoms get worse do recommend emergency room    Relevant Orders    VAS carotid complete study              Subjective:      Patient ID: Ladi Mojica is a 79 y.o. female.    Ladi is here for follow up, she is having weakness and lightheadedness.         The following portions of the  patient's history were reviewed and updated as appropriate:   Past Medical History:  She has a past medical history of Acute ill-defined cerebrovascular disease, Arthritis, Benign essential tremor, Breast cancer ([facility]), Chronic kidney disease, CKD (chronic kidney disease) stage 3, GFR 30-59 ml/min ([facility]) (09/25/2018), Colon polyp, Depressive disorder, Disease of thyroid gland, GERD (gastroesophageal reflux disease), Heart murmur, Hyperlipidemia, Hypertension, Impaired fasting glucose, Intention tremor, Osteochondropathy, Panic disorder without agoraphobia with mild panic attacks, PONV (postoperative nausea and vomiting), Psychiatric disorder, Sleep apnea, obstructive, Stroke (HCC), Tubular adenoma of colon, and Urinary tract infection.,  _______________________________________________________________________  Medical Problems:  does not have any pertinent problems on file.,  _______________________________________________________________________  Past Surgical History:   has a past surgical history that includes Hysterectomy; Cholecystectomy; AUGMENTATION BREAST; Breast lumpectomy w/ needle localization (Left); Colonoscopy; EGD; Breast biopsy (Left); Tubal ligation; Blepharoplasty; and pr exc tumor soft tiss face&scalp subfascial <2cm (Left, 11/9/2023).,  _______________________________________________________________________  Family History:  family history includes Alcohol abuse in her mother; Asthma in her mother; Breast cancer in her sister; COPD in her sister; Cancer in her father and mother; Gout in her father; Heart attack in her sister; Hyperlipidemia in her sister; Hypertension in her sister; Lung cancer in her sister; Mental illness in her mother; No Known Problems in her daughter, daughter, daughter, maternal grandmother, paternal grandmother, and sister; Osteoarthritis in her mother; Other in her sister.,  _______________________________________________________________________  Social History:   reports  that she quit smoking about 13 years ago. Her smoking use included cigarettes. She started smoking about 63 years ago. She has a 50 pack-year smoking history. She has been exposed to tobacco smoke. She has never used smokeless tobacco. She reports current alcohol use. She reports that she does not use drugs.,  _______________________________________________________________________  Allergies:  is allergic to codeine, amoxicillin, oxybutynin, and morphine..  _______________________________________________________________________  Current Outpatient Medications   Medication Sig Dispense Refill    doxycycline (ADOXA) 100 MG tablet Take 100 mg by mouth 2 (two) times a day      RSV Pre-Fusion F A&B Vac Rcmb (Abrysvo) SOLR vaccine Inject 0.5 mL into a muscle once for 1 dose 1 each 0    amLODIPine (NORVASC) 10 mg tablet TAKE 1 TABLET(10 MG) BY MOUTH DAILY 90 tablet 1    aspirin 81 mg chewable tablet Chew 81 mg daily      Cholecalciferol (VITAMIN D3) 50 MCG (2000 UT) TABS Take 2,000 Units by mouth daily      famotidine (PEPCID) 20 mg tablet TAKE 1 TABLET(20 MG) BY MOUTH TWICE DAILY 60 tablet 0    fluticasone (FLONASE) 50 mcg/act nasal spray 1 spray into each nostril daily 15.8 mL 1    hydroCHLOROthiazide 12.5 mg tablet Take 1 tablet (12.5 mg total) by mouth daily 90 tablet 1    Iron-Vitamin C (Vitron-C)  MG TABS Take 1 tablet by mouth in the morning 90 tablet 1    levothyroxine 50 mcg tablet TAKE 1 TABLET(50 MCG) BY MOUTH DAILY 90 tablet 1    lisinopril (ZESTRIL) 10 mg tablet Take 1 tablet (10 mg total) by mouth daily 90 tablet 1    omega-3-acid ethyl esters (LOVAZA) 1 g capsule Take 1 capsule (1 g total) by mouth daily 30 capsule 1    pantoprazole (PROTONIX) 40 mg tablet Take 1 tablet (40 mg total) by mouth 2 (two) times a day 180 tablet 3    potassium chloride (K-DUR,KLOR-CON) 20 mEq tablet Take 1 tablet (20 mEq total) by mouth daily 90 tablet 0    rosuvastatin (CRESTOR) 10 MG tablet Take 1 tablet (10 mg total) by  "mouth daily 90 tablet 3    solifenacin (VESICARE) 5 mg tablet Take 1 tablet (5 mg total) by mouth daily 90 tablet 1     Current Facility-Administered Medications   Medication Dose Route Frequency Provider Last Rate Last Admin    cyanocobalamin injection 1,000 mcg  1,000 mcg Intramuscular Q30 Days    1,000 mcg at 10/23/24 1521     _______________________________________________________________________  Review of Systems   Constitutional:  Negative for chills, diaphoresis and fever.   HENT:  Negative for congestion, ear pain, sinus pressure, sinus pain and sore throat.    Eyes:  Negative for visual disturbance.   Respiratory:  Positive for shortness of breath. Negative for cough, chest tightness and wheezing.    Cardiovascular:  Negative for chest pain and palpitations.   Gastrointestinal:  Negative for abdominal pain, constipation, diarrhea, nausea and vomiting.   Genitourinary:  Negative for dysuria, frequency and hematuria.   Musculoskeletal:  Positive for arthralgias and back pain.   Skin:  Negative for rash.   Neurological:  Positive for dizziness and light-headedness. Negative for syncope and headaches.   Psychiatric/Behavioral:  Negative for dysphoric mood and sleep disturbance. The patient is not nervous/anxious.    All other systems reviewed and are negative.        Objective:  Vitals:    11/14/24 1347   BP: 126/72   Pulse: 82   SpO2: 98%   Weight: 126 kg (277 lb)   Height: 5' 4\" (1.626 m)     Body mass index is 47.55 kg/m².     Physical Exam  Vitals and nursing note reviewed.   Constitutional:       Appearance: Normal appearance. She is not ill-appearing.   HENT:      Head: Normocephalic.      Right Ear: Tympanic membrane, ear canal and external ear normal. There is no impacted cerumen.      Left Ear: Tympanic membrane, ear canal and external ear normal. There is no impacted cerumen.      Nose: Nose normal. No congestion.      Mouth/Throat:      Mouth: Mucous membranes are moist.      Pharynx: No posterior " oropharyngeal erythema.   Eyes:      Extraocular Movements: Extraocular movements intact.      Conjunctiva/sclera: Conjunctivae normal.      Pupils: Pupils are equal, round, and reactive to light.   Cardiovascular:      Rate and Rhythm: Normal rate. Rhythm irregular.      Heart sounds: Murmur heard.   Pulmonary:      Effort: Pulmonary effort is normal.      Breath sounds: Normal breath sounds. No wheezing.   Abdominal:      Palpations: Abdomen is soft.      Tenderness: There is no abdominal tenderness.   Musculoskeletal:         General: Normal range of motion.      Cervical back: Normal range of motion.      Right lower leg: No edema.      Left lower leg: No edema.   Skin:     General: Skin is warm and dry.   Neurological:      General: No focal deficit present.      Mental Status: She is alert.   Psychiatric:         Mood and Affect: Mood normal.         Behavior: Behavior normal.

## 2024-12-02 DIAGNOSIS — R79.89 ELEVATED TSH: ICD-10-CM

## 2024-12-02 DIAGNOSIS — K21.9 GASTROESOPHAGEAL REFLUX DISEASE, UNSPECIFIED WHETHER ESOPHAGITIS PRESENT: ICD-10-CM

## 2024-12-02 DIAGNOSIS — R53.83 OTHER FATIGUE: ICD-10-CM

## 2024-12-02 DIAGNOSIS — R35.0 URINARY FREQUENCY: ICD-10-CM

## 2024-12-03 RX ORDER — FAMOTIDINE 20 MG/1
20 TABLET, FILM COATED ORAL 2 TIMES DAILY
Qty: 60 TABLET | Refills: 5 | Status: SHIPPED | OUTPATIENT
Start: 2024-12-03 | End: 2024-12-05

## 2024-12-04 DIAGNOSIS — K21.9 GASTROESOPHAGEAL REFLUX DISEASE, UNSPECIFIED WHETHER ESOPHAGITIS PRESENT: ICD-10-CM

## 2024-12-04 RX ORDER — LEVOTHYROXINE SODIUM 50 UG/1
50 TABLET ORAL DAILY
Qty: 90 TABLET | Refills: 1 | Status: SHIPPED | OUTPATIENT
Start: 2024-12-04

## 2024-12-04 RX ORDER — SOLIFENACIN SUCCINATE 5 MG/1
5 TABLET, FILM COATED ORAL DAILY
Qty: 90 TABLET | Refills: 1 | Status: SHIPPED | OUTPATIENT
Start: 2024-12-04

## 2024-12-05 RX ORDER — FAMOTIDINE 20 MG/1
20 TABLET, FILM COATED ORAL 2 TIMES DAILY
Qty: 180 TABLET | Refills: 1 | Status: SHIPPED | OUTPATIENT
Start: 2024-12-05

## 2024-12-28 DIAGNOSIS — I10 ESSENTIAL HYPERTENSION: ICD-10-CM

## 2024-12-30 RX ORDER — HYDROCHLOROTHIAZIDE 12.5 MG/1
12.5 TABLET ORAL DAILY
Qty: 90 TABLET | Refills: 1 | Status: SHIPPED | OUTPATIENT
Start: 2024-12-30

## 2024-12-30 RX ORDER — LISINOPRIL 10 MG/1
10 TABLET ORAL DAILY
Qty: 90 TABLET | Refills: 1 | Status: SHIPPED | OUTPATIENT
Start: 2024-12-30

## 2024-12-31 ENCOUNTER — APPOINTMENT (OUTPATIENT)
Age: 79
End: 2024-12-31
Payer: MEDICARE

## 2024-12-31 ENCOUNTER — OFFICE VISIT (OUTPATIENT)
Age: 79
End: 2024-12-31
Payer: MEDICARE

## 2024-12-31 VITALS
HEART RATE: 97 BPM | RESPIRATION RATE: 18 BRPM | BODY MASS INDEX: 47.8 KG/M2 | OXYGEN SATURATION: 97 % | WEIGHT: 280 LBS | DIASTOLIC BLOOD PRESSURE: 78 MMHG | HEIGHT: 64 IN | SYSTOLIC BLOOD PRESSURE: 130 MMHG | TEMPERATURE: 98.1 F

## 2024-12-31 DIAGNOSIS — R06.02 SHORTNESS OF BREATH: Primary | ICD-10-CM

## 2024-12-31 DIAGNOSIS — J44.9 CHRONIC OBSTRUCTIVE PULMONARY DISEASE, UNSPECIFIED COPD TYPE (HCC): ICD-10-CM

## 2024-12-31 DIAGNOSIS — R06.02 SHORTNESS OF BREATH: ICD-10-CM

## 2024-12-31 DIAGNOSIS — G47.33 OSA (OBSTRUCTIVE SLEEP APNEA): ICD-10-CM

## 2024-12-31 PROCEDURE — G2211 COMPLEX E/M VISIT ADD ON: HCPCS | Performed by: PHYSICIAN ASSISTANT

## 2024-12-31 PROCEDURE — 71046 X-RAY EXAM CHEST 2 VIEWS: CPT

## 2024-12-31 PROCEDURE — 99214 OFFICE O/P EST MOD 30 MIN: CPT | Performed by: PHYSICIAN ASSISTANT

## 2024-12-31 RX ORDER — ALBUTEROL SULFATE 90 UG/1
2 INHALANT RESPIRATORY (INHALATION) EVERY 6 HOURS PRN
Qty: 18 G | Refills: 1 | Status: SHIPPED | OUTPATIENT
Start: 2024-12-31

## 2024-12-31 NOTE — ASSESSMENT & PLAN NOTE
Previous PFT with mild obstruction, mildly decreased DLCO  Patient with increasing shortness of breath/dyspnea on exertion over the past month or so  Not convinced this is pulmonary in nature, has also been having some dizziness/lightheadedness, suspect there may be a cardiac component  She is already scheduled to see her cardiologist and has an echocardiogram ordered  Will send her for chest x-ray today, will also prescribe an albuterol inhaler for her to try to use as needed to see if any relief  Had been off any bronchodilators for several months and had been doing well with her breathing for some time  Orders:    Ambulatory Referral to Pulmonology    albuterol (Ventolin HFA) 90 mcg/act inhaler; Inhale 2 puffs every 6 (six) hours as needed for wheezing

## 2024-12-31 NOTE — PROGRESS NOTES
Name: Ladi Mojica      : 1945      MRN: [id number]  Encounter Provider: Lei Mantilla PA-C  Encounter Date: 2024   Encounter department: [facility]  :  Assessment & Plan  Shortness of breath  Shortness of breath, suspect more cardiac in nature rather than related to her COPD  Lungs are clear on exam today, had been off bronchodilators for many months  Will order her chest x-ray, she already has an echo ordered and follow-up with cardiology  Will prescribe an albuterol inhaler to see if she finds any relief with this  Orders:    albuterol (Ventolin HFA) 90 mcg/act inhaler; Inhale 2 puffs every 6 (six) hours as needed for wheezing    XR chest pa and lateral; Future    Chronic obstructive pulmonary disease, unspecified COPD type (HCC)  Previous PFT with mild obstruction, mildly decreased DLCO  Patient with increasing shortness of breath/dyspnea on exertion over the past month or so  Not convinced this is pulmonary in nature, has also been having some dizziness/lightheadedness, suspect there may be a cardiac component  She is already scheduled to see her cardiologist and has an echocardiogram ordered  Will send her for chest x-ray today, will also prescribe an albuterol inhaler for her to try to use as needed to see if any relief  Had been off any bronchodilators for several months and had been doing well with her breathing for some time  Orders:    Ambulatory Referral to Pulmonology    albuterol (Ventolin HFA) 90 mcg/act inhaler; Inhale 2 puffs every 6 (six) hours as needed for wheezing    OBI (obstructive sleep apnea)  Continues on CPAP at night           History of Present Illness   Ladi Mojica is a 79 y.o. female former smoker with past medical history of COPD, GERD, hypothyroidism, CKD, history of breast cancer who presents for worsening shortness of breath.  She has noted over the past month or so that she is much more winded even doing minimal  activity.  Has also been seen by her PCP as well as in the emergency room for dizziness over the past few months.  She has been off any long-acting bronchodilators for more than a year, has not needed her rescue inhaler in many months.  She no longer has an inhaler at home.    Review of Systems   Constitutional: Negative.    HENT: Negative.     Respiratory:  Positive for shortness of breath.    Cardiovascular: Negative.    Gastrointestinal: Negative.    Genitourinary: Negative.    Musculoskeletal: Negative.    Skin: Negative.    Allergic/Immunologic: Negative.    Neurological:  Positive for dizziness and light-headedness.   Psychiatric/Behavioral: Negative.       Current Outpatient Medications on File Prior to Visit   Medication Sig Dispense Refill    amLODIPine (NORVASC) 10 mg tablet TAKE 1 TABLET(10 MG) BY MOUTH DAILY 90 tablet 1    aspirin 81 mg chewable tablet Chew 81 mg daily      Cholecalciferol (VITAMIN D3) 50 MCG (2000 UT) TABS Take 2,000 Units by mouth daily      famotidine (PEPCID) 20 mg tablet TAKE 1 TABLET(20 MG) BY MOUTH TWICE DAILY 180 tablet 1    fluticasone (FLONASE) 50 mcg/act nasal spray 1 spray into each nostril daily 15.8 mL 1    hydroCHLOROthiazide 12.5 mg tablet Take 1 tablet (12.5 mg total) by mouth daily 90 tablet 1    Iron-Vitamin C (Vitron-C)  MG TABS Take 1 tablet by mouth in the morning 90 tablet 1    levothyroxine 50 mcg tablet Take 1 tablet (50 mcg total) by mouth daily 90 tablet 1    lisinopril (ZESTRIL) 10 mg tablet Take 1 tablet (10 mg total) by mouth daily 90 tablet 1    omega-3-acid ethyl esters (LOVAZA) 1 g capsule Take 1 capsule (1 g total) by mouth daily 30 capsule 1    pantoprazole (PROTONIX) 40 mg tablet Take 1 tablet (40 mg total) by mouth 2 (two) times a day 180 tablet 3    potassium chloride (K-DUR,KLOR-CON) 20 mEq tablet Take 1 tablet (20 mEq total) by mouth daily 90 tablet 0    rosuvastatin (CRESTOR) 10 MG tablet Take 1 tablet (10 mg total) by mouth daily 90 tablet  "3    solifenacin (VESICARE) 5 mg tablet Take 1 tablet (5 mg total) by mouth daily 90 tablet 1     Current Facility-Administered Medications on File Prior to Visit   Medication Dose Route Frequency Provider Last Rate Last Admin    cyanocobalamin injection 1,000 mcg  1,000 mcg Intramuscular Q30 Days    1,000 mcg at 10/23/24 1521         Historical Information   Tobacco History: Former smoker  Occupational History:     Objective   /78 (BP Location: Left arm, Patient Position: Sitting, Cuff Size: Large)   Pulse 97   Temp 98.1 °F (36.7 °C) (Oral)   Resp 18   Ht 5' 4\" (1.626 m)   Wt 127 kg (280 lb)   SpO2 97%   BMI 48.06 kg/m²      Physical Exam  Vitals reviewed.   Constitutional:       Appearance: Normal appearance.   HENT:      Head: Normocephalic and atraumatic.      Mouth/Throat:      Pharynx: Oropharynx is clear.   Eyes:      Conjunctiva/sclera: Conjunctivae normal.   Cardiovascular:      Rate and Rhythm: Normal rate and regular rhythm.   Pulmonary:      Effort: Pulmonary effort is normal. No respiratory distress.      Breath sounds: Normal breath sounds. No decreased breath sounds, wheezing, rhonchi or rales.   Abdominal:      General: Abdomen is flat. There is no distension.   Musculoskeletal:         General: Normal range of motion.      Cervical back: Normal range of motion.      Right lower leg: No edema.      Left lower leg: No edema.   Skin:     General: Skin is warm and dry.   Neurological:      Mental Status: She is alert and oriented to person, place, and time.   Psychiatric:         Mood and Affect: Mood normal.         Behavior: Behavior normal.           Lab Results: I have reviewed pertinent labs.    Radiology Results Review: I have reviewed the following images/report studies in PACS: Prior chest x-ray and PFT.   Other Study Results: Other Study Results Review : No additional pertinent studies reviewed.  PFT Results Reviewed: reviewed      "

## 2024-12-31 NOTE — ASSESSMENT & PLAN NOTE
Shortness of breath, suspect more cardiac in nature rather than related to her COPD  Lungs are clear on exam today, had been off bronchodilators for many months  Will order her chest x-ray, she already has an echo ordered and follow-up with cardiology  Will prescribe an albuterol inhaler to see if she finds any relief with this  Orders:    albuterol (Ventolin HFA) 90 mcg/act inhaler; Inhale 2 puffs every 6 (six) hours as needed for wheezing    XR chest pa and lateral; Future

## 2025-01-02 NOTE — TELEPHONE ENCOUNTER
Medication refill  PROGRESS NOTE       Subjective     Britni is a 89 year old here for Office Visit and Follow-up     Chief Complaint   Patient presents with    Office Visit    Follow-up       The patient presents for follow-up  Accompanied by her daughter,  is in the lobby this morning    Hyponatremia  - Has been stable off urea  - Due for repeat BMP today  - States that she has been feeling well and has no acute concerns  - She reports a general sense of well-being, with no recent episodes of lightheadedness, stable balance, and no recent falls.    # Anxiety  Presently on Lexapro 10 mg daily    Supplemental information:   She has a scheduled appointment with Dr. Barajas on Tuesday.     IMMUNIZATIONS  - Up to date on influenza vaccine  - Up to date on COVID-19 vaccine    Review of Systems  As documented above.    SDOH Never Smoker       Objective   Vitals:    01/02/25 1026   BP: 139/74   Pulse: (!) 58   SpO2: 99%   Weight: 69.3 kg (152 lb 12.5 oz)   Height: 5' 6\" (1.676 m)   BMI (Calculated): 24.66     Physical Exam  Vitals and nursing note reviewed.   Constitutional:       General: She is not in acute distress.     Appearance: She is well-developed.   HENT:      Head: Normocephalic and atraumatic.      Right Ear: External ear normal.      Left Ear: External ear normal.      Nose: Nose normal.   Eyes:      Conjunctiva/sclera: Conjunctivae normal.   Cardiovascular:      Rate and Rhythm: Normal rate and regular rhythm.   Pulmonary:      Effort: Pulmonary effort is normal.      Breath sounds: Normal breath sounds.   Abdominal:      General: There is no distension.   Musculoskeletal:         General: Normal range of motion.   Skin:     General: Skin is warm and dry.   Neurological:      Mental Status: She is alert.             Lungs clear. Heart sounds normal.    Vital Signs  /74.     ASSESSMENT AND PLAN        Hyponatremia  - Off urea for 2 months  - Repeat BMP today  - Consider discontinuing escitalopram if sodium levels  decrease    Mood management  - On escitalopram  - Consider discontinuation if hyponatremia persists    Blood pressure management  - /74, stable  - No changes to current plan    Follow-up in 3 months    1. Hyponatremia  Assessment & Plan:  Stable off urea  Continue to monitor, consider discontinuation Lexapro if hyponatremia recurs  Orders:  -     Basic Metabolic Panel; Future  2. Benign essential hypertension  Assessment & Plan:  Stable  Continue amlodipine 5 mg daily, Benicar 40 mg daily  3. Dyslipidemia (high LDL; low HDL)  Assessment & Plan:  Continue lipitor 10 mg daily  4. Stage 3a chronic kidney disease  (CMD)  Assessment & Plan:  Check metabolic panel  5. Anxiety  Assessment & Plan:  Lexapro to 10 mg daily  Watch sodium closely if stable monitor every several months    6. Dizziness  Assessment & Plan:  Presently resolved  No orthostasis  No hyponatremia  Continue to monitor  7. Orthostatic lightheadedness  8. PAT (paroxysmal atrial tachycardia) (CMD)  Assessment & Plan:  Monitor: The problem is stable.  Evaluation: No labs/tests required today.  Assessment/Treatment:  Continue current treatment/monitoring regimen.      Return in about 3 months (around 4/2/2025).

## 2025-01-27 DIAGNOSIS — I10 ESSENTIAL HYPERTENSION: ICD-10-CM

## 2025-01-28 RX ORDER — HYDROCHLOROTHIAZIDE 12.5 MG/1
12.5 TABLET ORAL DAILY
Qty: 90 TABLET | Refills: 1 | Status: SHIPPED | OUTPATIENT
Start: 2025-01-28

## 2025-02-18 DIAGNOSIS — E78.2 MIXED HYPERLIPIDEMIA: ICD-10-CM

## 2025-02-18 DIAGNOSIS — R35.0 URINARY FREQUENCY: ICD-10-CM

## 2025-02-18 RX ORDER — SOLIFENACIN SUCCINATE 5 MG/1
5 TABLET, FILM COATED ORAL DAILY
Qty: 90 TABLET | Refills: 1 | Status: SHIPPED | OUTPATIENT
Start: 2025-02-18

## 2025-02-18 RX ORDER — ROSUVASTATIN CALCIUM 10 MG/1
10 TABLET, COATED ORAL DAILY
Qty: 90 TABLET | Refills: 1 | Status: SHIPPED | OUTPATIENT
Start: 2025-02-18

## 2025-03-08 DIAGNOSIS — R79.89 ELEVATED TSH: ICD-10-CM

## 2025-03-08 DIAGNOSIS — R53.83 OTHER FATIGUE: ICD-10-CM

## 2025-03-08 DIAGNOSIS — K21.9 GASTROESOPHAGEAL REFLUX DISEASE, UNSPECIFIED WHETHER ESOPHAGITIS PRESENT: ICD-10-CM

## 2025-03-10 DIAGNOSIS — K21.9 GASTROESOPHAGEAL REFLUX DISEASE, UNSPECIFIED WHETHER ESOPHAGITIS PRESENT: ICD-10-CM

## 2025-03-10 RX ORDER — FAMOTIDINE 20 MG/1
20 TABLET, FILM COATED ORAL 2 TIMES DAILY
Qty: 60 TABLET | Refills: 5 | Status: SHIPPED | OUTPATIENT
Start: 2025-03-10

## 2025-03-10 RX ORDER — LEVOTHYROXINE SODIUM 50 UG/1
50 TABLET ORAL DAILY
Qty: 30 TABLET | Refills: 5 | Status: SHIPPED | OUTPATIENT
Start: 2025-03-10

## 2025-03-11 DIAGNOSIS — I10 ESSENTIAL HYPERTENSION: ICD-10-CM

## 2025-03-11 RX ORDER — AMLODIPINE BESYLATE 10 MG/1
10 TABLET ORAL DAILY
Qty: 90 TABLET | Refills: 0 | Status: SHIPPED | OUTPATIENT
Start: 2025-03-11

## 2025-03-11 RX ORDER — FAMOTIDINE 20 MG/1
20 TABLET, FILM COATED ORAL 2 TIMES DAILY
Qty: 180 TABLET | OUTPATIENT
Start: 2025-03-11

## 2025-03-12 DIAGNOSIS — I10 ESSENTIAL HYPERTENSION: ICD-10-CM

## 2025-03-13 RX ORDER — AMLODIPINE BESYLATE 10 MG/1
10 TABLET ORAL DAILY
Qty: 90 TABLET | Refills: 0 | OUTPATIENT
Start: 2025-03-13

## 2025-04-08 ENCOUNTER — TELEPHONE (OUTPATIENT)
Dept: NEPHROLOGY | Facility: CLINIC | Age: 80
End: 2025-04-08

## 2025-04-14 DIAGNOSIS — K21.9 GASTROESOPHAGEAL REFLUX DISEASE, UNSPECIFIED WHETHER ESOPHAGITIS PRESENT: ICD-10-CM

## 2025-04-15 RX ORDER — FAMOTIDINE 20 MG/1
20 TABLET, FILM COATED ORAL 2 TIMES DAILY
Qty: 60 TABLET | Refills: 5 | Status: SHIPPED | OUTPATIENT
Start: 2025-04-15

## 2025-04-21 ENCOUNTER — RA CDI HCC (OUTPATIENT)
Dept: OTHER | Facility: HOSPITAL | Age: 80
End: 2025-04-21

## 2025-04-21 PROBLEM — G47.33 OBSTRUCTIVE SLEEP APNEA SYNDROME: Status: ACTIVE | Noted: 2023-11-09

## 2025-04-25 ENCOUNTER — OFFICE VISIT (OUTPATIENT)
Dept: FAMILY MEDICINE CLINIC | Facility: CLINIC | Age: 80
End: 2025-04-25
Payer: MEDICARE

## 2025-04-25 VITALS
WEIGHT: 284 LBS | SYSTOLIC BLOOD PRESSURE: 124 MMHG | HEART RATE: 103 BPM | HEIGHT: 64 IN | BODY MASS INDEX: 48.49 KG/M2 | DIASTOLIC BLOOD PRESSURE: 80 MMHG | OXYGEN SATURATION: 96 %

## 2025-04-25 DIAGNOSIS — Z12.31 ENCOUNTER FOR SCREENING MAMMOGRAM FOR BREAST CANCER: ICD-10-CM

## 2025-04-25 DIAGNOSIS — L98.9 SKIN LESION: ICD-10-CM
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
## 2025-04-25 DIAGNOSIS — E66.01 MORBID OBESITY WITH BMI OF 45.0-49.9, ADULT (HCC): ICD-10-CM

## 2025-04-25 DIAGNOSIS — R20.0 ANESTHESIA OF SKIN: ICD-10-CM

## 2025-04-25 DIAGNOSIS — S40.862D TICK BITE OF LEFT UPPER ARM, SUBSEQUENT ENCOUNTER: ICD-10-CM

## 2025-04-25 DIAGNOSIS — F17.211 CIGARETTE NICOTINE DEPENDENCE IN REMISSION: ICD-10-CM

## 2025-04-25 DIAGNOSIS — Z00.00 MEDICARE ANNUAL WELLNESS VISIT, SUBSEQUENT: Primary | ICD-10-CM

## 2025-04-25 DIAGNOSIS — M54.41 CHRONIC BILATERAL LOW BACK PAIN WITH RIGHT-SIDED SCIATICA: ICD-10-CM

## 2025-04-25 DIAGNOSIS — M51.369 L4-L5 DISC BULGE: ICD-10-CM

## 2025-04-25 DIAGNOSIS — J44.9 CHRONIC OBSTRUCTIVE PULMONARY DISEASE, UNSPECIFIED COPD TYPE (HCC): ICD-10-CM

## 2025-04-25 DIAGNOSIS — F33.2 SEVERE EPISODE OF RECURRENT MAJOR DEPRESSIVE DISORDER, WITHOUT PSYCHOTIC FEATURES (HCC): ICD-10-CM

## 2025-04-25 DIAGNOSIS — M54.31 SCIATICA OF RIGHT SIDE: ICD-10-CM

## 2025-04-25 DIAGNOSIS — R79.9 ABNORMAL FINDING OF BLOOD CHEMISTRY, UNSPECIFIED: ICD-10-CM

## 2025-04-25 DIAGNOSIS — E27.1 PRIMARY ADRENOCORTICAL INSUFFICIENCY (HCC): ICD-10-CM

## 2025-04-25 DIAGNOSIS — C50.911 BILATERAL MALIGNANT NEOPLASM OF BREAST IN FEMALE, UNSPECIFIED ESTROGEN RECEPTOR STATUS, UNSPECIFIED SITE OF BREAST (HCC): ICD-10-CM

## 2025-04-25 DIAGNOSIS — G89.29 CHRONIC BILATERAL LOW BACK PAIN WITH RIGHT-SIDED SCIATICA: ICD-10-CM

## 2025-04-25 DIAGNOSIS — C50.912 BILATERAL MALIGNANT NEOPLASM OF BREAST IN FEMALE, UNSPECIFIED ESTROGEN RECEPTOR STATUS, UNSPECIFIED SITE OF BREAST (HCC): ICD-10-CM

## 2025-04-25 DIAGNOSIS — N18.4 CHRONIC KIDNEY DISEASE, STAGE 4 (SEVERE) (HCC): ICD-10-CM

## 2025-04-25 PROCEDURE — G0439 PPPS, SUBSEQ VISIT: HCPCS

## 2025-04-25 PROCEDURE — 99214 OFFICE O/P EST MOD 30 MIN: CPT

## 2025-04-25 RX ORDER — TIRZEPATIDE 2.5 MG/.5ML
2.5 INJECTION, SOLUTION SUBCUTANEOUS WEEKLY
Qty: 2 ML | Refills: 0 | Status: SHIPPED | OUTPATIENT
Start: 2025-04-25 | End: 2025-05-23

## 2025-04-25 RX ORDER — FLUTICASONE PROPIONATE AND SALMETEROL 100; 50 UG/1; UG/1
1 POWDER RESPIRATORY (INHALATION) 2 TIMES DAILY
Qty: 60 BLISTER | Refills: 5 | Status: SHIPPED | OUTPATIENT
Start: 2025-04-25 | End: 2025-10-22

## 2025-04-25 NOTE — ASSESSMENT & PLAN NOTE
Will start daily Advair rinse mouth after use continue albuterol as needed continue with pulmonology  Orders:  •  Fluticasone-Salmeterol (Advair) 100-50 mcg/dose inhaler; Inhale 1 puff 2 (two) times a day Rinse mouth after use.  •  tirzepatide (Zepbound) 2.5 mg/0.5 mL auto-injector; Inject 0.5 mL (2.5 mg total) under the skin once a week for 28 days

## 2025-04-25 NOTE — ASSESSMENT & PLAN NOTE
patient with history of adrenal adenoma reached out to endocrine to see when she needs to follow-up again

## 2025-04-25 NOTE — PROGRESS NOTES
Name: Ladi Mojica      : 1945      MRN: [id number]  Encounter Provider: MONIQUE Sloan  Encounter Date: 2025   Encounter department: [facility] [address]  :  Assessment & Plan  Medicare annual wellness visit, subsequent  Updated as appropriate       Sciatica of right side  Recommend water physical therapy referral placed to spine and pain  Orders:  •  Ambulatory referral to Spine & Pain Management; Future  •  Ambulatory Referral to Physical Therapy; Future  •  Vitamin B12; Future  •  Folate; Future  •  Iron Panel (Includes Ferritin, Iron Sat%, Iron, and TIBC); Future    Chronic bilateral low back pain with right-sided sciatica  Recommend water physical therapy referral placed to spine and pain  Orders:  •  Ambulatory referral to Spine & Pain Management; Future  •  Ambulatory Referral to Physical Therapy; Future    L4-L5 disc bulge  Recommend water physical therapy referral placed to spine and pain  Orders:  •  Ambulatory referral to Spine & Pain Management; Future  •  Ambulatory Referral to Physical Therapy; Future    Morbid obesity with BMI of 45.0-49.9, adult (HCC)  Prior Authorization Clinical Questions for Weight Management Pharmacotherapy    1. Does the patient have a contrainidcation to medication prescribed for weight management?: No  2. Does the patient have a diagnosis of obesity, confirmed by a BMI greater than or equal to 30 kg/m^2?: Yes  3. Does the patient have a BMI of greater than or equal to 27 kg/m^2 with at least one weight-related comorbidity/risk factor/complication (e.g. diabetes, dyslipidemia, coronary artery disease)?: Yes  4. Weight-related co-morbidities/risk factors: obstructive sleep apnea (OBI), osteoarthritis, asthma/reactive airway disease  5. WEGOVY CVA Indication: Does patient have established documented cardiovascular disease (history of a prior heart attack (myocardial infarction), stroke, or symptomatic peripheral  arterial disease (PAD)?: N/A  6. ZEPBOUND OBI Indication: Does patient have documented OBI diagnosed via sleep study (insurance will require copy of sleep study results for approval)?: Yes  7. Has the patient been on a weight loss regimen of low-calorie diet, increased physical activity, and lifestyle modifications for a minimum of 6 months?: Yes  8. Has the patient completed a comprehensive weight loss program (ie, Weight Watchers, Noom, Bariatrics, other lizette on phone)? If so, what?: No  9. Does the patient have a history of type 2 diabetes?: No  10. Has the member tried and failed other weight loss medication within the past 12 months?: Yes   -- Q10 Further explanation: Phentermine   11. Will the member use requested medication in combination with another GLP agonist or weight loss drug?: No  12. Is the medication a controlled substance?: No  For renewals: Has the patient had a positive outcome with current weight management medication (i.e., change in body weight of at least 4-5% after 12-16 weeks on maximally tolerated dose)?: No     Baseline weight (in pounds): 284 lbs  Current weight (in pounds): 284 lbs  Weight loss percentage: 0%       Has tried and failed phentermine, continue healthy diet will be starting physical therapy.   Suggest my fitness Pal.  Reduce daily calories by 300 calories a day.  Increase activity to 150 minutes a week.  Suggest lean protein high fiber diet.    Eats small portions every 3 hours. 5-9 fruits and vegetables a day.  Do not drink sugary drinks.  Will start Zepbound follow-up 1 month  Orders:  •  CBC and differential; Future  •  Comprehensive metabolic panel; Future  •  Hemoglobin A1C; Future  •  Lipid panel; Future  •  Vitamin D 25 hydroxy; Future  •  TSH, 3rd generation with Free T4 reflex; Future  •  Urinalysis with microscopic; Future  •  tirzepatide (Zepbound) 2.5 mg/0.5 mL auto-injector; Inject 0.5 mL (2.5 mg total) under the skin once a week for 28 days    Chronic kidney  disease, stage 4 (severe) (HCC)  Lab Results   Component Value Date    EGFR 24 09/30/2024    EGFR 23 09/11/2024    EGFR 22 08/07/2024    CREATININE 1.88 (H) 09/30/2024    CREATININE 1.95 (H) 09/11/2024    CREATININE 2.05 (H) 08/07/2024   Continue with nephrology have lab work we will call with results    Orders:  •  Albumin / creatinine urine ratio; Future  •  Urinalysis with microscopic; Future    Primary adrenocortical insufficiency (HCC)  patient with history of adrenal adenoma reached out to endocrine to see when she needs to follow-up again       Chronic obstructive pulmonary disease, unspecified COPD type (HCC)  Will start daily Advair rinse mouth after use continue albuterol as needed continue with pulmonology  Orders:  •  Fluticasone-Salmeterol (Advair) 100-50 mcg/dose inhaler; Inhale 1 puff 2 (two) times a day Rinse mouth after use.  •  tirzepatide (Zepbound) 2.5 mg/0.5 mL auto-injector; Inject 0.5 mL (2.5 mg total) under the skin once a week for 28 days    Severe episode of recurrent major depressive disorder, without psychotic features (HCC)  Stable at this time we will continue to monitor         Bilateral malignant neoplasm of breast in female, unspecified estrogen receptor status, unspecified site of breast (HCC)  Mammo ordered       Tick bite of left upper arm, subsequent encounter  Was treated in ER will check lab work  Orders:  •  Lyme Total AB W Reflex to IGM/IGG; Future    Abnormal finding of blood chemistry, unspecified  Have lab work we will call with results  Orders:  •  Hemoglobin A1C; Future  •  Iron Panel (Includes Ferritin, Iron Sat%, Iron, and TIBC); Future    Anesthesia of skin  Have lab work we will call with results  Orders:  •  Vitamin B12; Future  •  TSH, 3rd generation with Free T4 reflex; Future  •  Folate; Future    Cigarette nicotine dependence in remission  Due for annual lung screening  Orders:  •  CT lung screening program; Future    Encounter for screening mammogram for breast  cancer  Mammo ordered  Orders:  •  Mammo screening bilateral w 3d and cad; Future    Skin lesion  Patient will follow-up for skin biopsy  Orders:  •  Ambulatory Referral to Dermatology; Future      Depression Screening and Follow-up Plan: Patient was screened for depression during today's encounter. They screened negative with a PHQ-9 score of 0.      Preventive health issues were discussed with patient, and age appropriate screening tests were ordered as noted in patient's After Visit Summary. Personalized health advice and appropriate referrals for health education or preventive services given if needed, as noted in patient's After Visit Summary.    History of Present Illness     Ladi is here for annual, she wants referral to spine and pain and also a blood test for lyme disease.      Patient Care Team:  MONIQUE Sloan as PCP - General (Nurse Practitioner)  MD Yani Kwon CRNP as Nurse Practitioner (Nurse Practitioner)    Review of Systems   Constitutional:  Positive for fatigue. Negative for chills, diaphoresis and fever.   HENT:  Negative for congestion, ear pain, sinus pressure, sinus pain and sore throat.    Eyes:  Negative for visual disturbance.   Respiratory:  Positive for shortness of breath and wheezing. Negative for cough and chest tightness.    Cardiovascular:  Negative for chest pain and palpitations.   Gastrointestinal:  Positive for constipation. Negative for abdominal pain, diarrhea, nausea and vomiting.   Genitourinary:  Negative for dysuria, frequency and hematuria.   Musculoskeletal:  Positive for arthralgias, back pain and myalgias.   Skin:  Negative for rash.   Neurological:  Positive for light-headedness. Negative for dizziness, syncope and headaches.   Psychiatric/Behavioral:  Negative for dysphoric mood and sleep disturbance. The patient is not nervous/anxious.    All other systems reviewed and are negative.    Medical History Reviewed by  provider this encounter:  Tobacco  Allergies  Meds  Problems  Med Hx  Surg Hx  Fam Hx       Annual Wellness Visit Questionnaire   Ladi is here for her Subsequent Wellness visit. Last Medicare Wellness visit information reviewed, patient interviewed, no change since last AWV.     Health Risk Assessment:   Patient rates overall health as good. Patient feels that their physical health rating is slightly worse. Patient is satisfied with their life. Eyesight was rated as same. Hearing was rated as same. Patient feels that their emotional and mental health rating is same. Patients states they are never, rarely angry. Patient states they are always unusually tired/fatigued. Pain experienced in the last 7 days has been a lot. Patient's pain rating has been 10/10. Patient states that she has experienced no weight loss or gain in last 6 months.     Depression Screening:   PHQ-9 Score: 0      Fall Risk Screening:   In the past year, patient has experienced: no history of falling in past year      Urinary Incontinence Screening:   Patient has not leaked urine accidently in the last six months.     Home Safety:  Patient has trouble with stairs inside or outside of their home. Patient has working smoke alarms and has working carbon monoxide detector. Home safety hazards include: none.     Nutrition:   Current diet is Regular.     Medications:   Patient is currently taking over-the-counter supplements. OTC medications include: see medication list. Patient is able to manage medications. Iron, potassium, b12    Activities of Daily Living (ADLs)/Instrumental Activities of Daily Living (IADLs):   Walk and transfer into and out of bed and chair?: Yes  Dress and groom yourself?: Yes    Bathe or shower yourself?: Yes    Feed yourself? Yes  Do your laundry/housekeeping?: Yes  Manage your money, pay your bills and track your expenses?: Yes  Make your own meals?: Yes    Do your own shopping?: Yes    Previous Hospitalizations:    Any hospitalizations or ED visits within the last 12 months?: Yes    How many hospitalizations have you had in the last year?: 1-2    Advance Care Planning:   Living will: No    Durable POA for healthcare: No    Advanced directive: No    Advanced directive counseling given: Yes    ACP document given: Yes    Patient declined ACP directive: No    End of Life Decisions reviewed with patient: Yes    Provider agrees with end of life decisions: Yes      Comments: If no hope for meaningful survival would not want attempts made, would like sister to make medical decisions for her.     Cognitive Screening:   Provider or family/friend/caregiver concerned regarding cognition?: No    Preventive Screenings      Cardiovascular Screening:    General: Screening Not Indicated and History Lipid Disorder    Due for: Lipid Panel      Diabetes Screening:     General: Screening Current    Due for: Blood Glucose      Colorectal Cancer Screening:     General: Screening Current      Breast Cancer Screening:     General: History Breast Cancer    Due for: Mammogram        Cervical Cancer Screening:    General: Screening Not Indicated      Osteoporosis Screening:    General: Screening Current      Abdominal Aortic Aneurysm (AAA) Screening:        General: Screening Not Indicated      Lung Cancer Screening:     General: Screening Not Indicated    Due for: Low Dose CT (LDCT)      Hepatitis C Screening:    General: Screening Current    Immunizations:  - Immunizations due: Zoster (Shingrix)    Screening, Brief Intervention, and Referral to Treatment (SBIRT)     Screening    Typical number of drinks in a week: 0    Single Item Drug Screening:  How often have you used an illegal drug (including marijuana) or a prescription medication for non-medical reasons in the past year? never    Single Item Drug Screen Score: 0  Interpretation: Negative screen for possible drug use disorder    Brief Intervention  Alcohol & drug use screenings were reviewed.  "No concerns regarding substance use disorder identified.     Other Counseling Topics:   Car/seat belt/driving safety.     Social Drivers of Health     Financial Resource Strain: Low Risk  (2/12/2024)    Overall Financial Resource Strain (CARDIA)    • Difficulty of Paying Living Expenses: Not very hard   Food Insecurity: No Food Insecurity (4/25/2025)    Hunger Vital Sign    • Worried About Running Out of Food in the Last Year: Never true    • Ran Out of Food in the Last Year: Never true   Transportation Needs: No Transportation Needs (4/25/2025)    PRAPARE - Transportation    • Lack of Transportation (Medical): No    • Lack of Transportation (Non-Medical): No   Housing Stability: Unknown (4/25/2025)    Housing Stability Vital Sign    • Unable to Pay for Housing in the Last Year: No    • Homeless in the Last Year: No   Utilities: Not At Risk (4/25/2025)    [facility] Utilities    • Threatened with loss of utilities: No     No results found.    Objective   /80 (BP Location: Left arm, Patient Position: Sitting, Cuff Size: Large)   Pulse 103   Ht 5' 4\" (1.626 m)   Wt 129 kg (284 lb)   SpO2 96%   BMI 48.75 kg/m²     Physical Exam  Vitals and nursing note reviewed.   Constitutional:       General: She is not in acute distress.     Appearance: Normal appearance. She is well-developed. She is not ill-appearing.   HENT:      Head: Normocephalic and atraumatic.      Right Ear: Tympanic membrane, ear canal and external ear normal. There is no impacted cerumen.      Left Ear: Tympanic membrane, ear canal and external ear normal. There is no impacted cerumen.      Nose: Nose normal. No congestion.      Mouth/Throat:      Mouth: Mucous membranes are moist.      Pharynx: No posterior oropharyngeal erythema.   Eyes:      Extraocular Movements: Extraocular movements intact.      Conjunctiva/sclera: Conjunctivae normal.      Pupils: Pupils are equal, round, and reactive to light.   Cardiovascular:      Rate and Rhythm: Normal " rate and regular rhythm.      Heart sounds: No murmur heard.  Pulmonary:      Effort: Pulmonary effort is normal. No respiratory distress.      Breath sounds: Normal breath sounds.   Abdominal:      Palpations: Abdomen is soft.      Tenderness: There is no abdominal tenderness.   Musculoskeletal:         General: No swelling.      Cervical back: Normal range of motion and neck supple.      Right lower leg: No edema.      Left lower leg: No edema.   Lymphadenopathy:      Cervical: No cervical adenopathy.   Skin:     General: Skin is warm and dry.      Capillary Refill: Capillary refill takes less than 2 seconds.   Neurological:      General: No focal deficit present.      Mental Status: She is alert.   Psychiatric:         Mood and Affect: Mood normal.

## 2025-04-25 NOTE — PATIENT INSTRUCTIONS
Medicare Preventive Visit Patient Instructions  Thank you for completing your Welcome to Medicare Visit or Medicare Annual Wellness Visit today. Your next wellness visit will be due in one year (4/26/2026).  The screening/preventive services that you may require over the next 5-10 years are detailed below. Some tests may not apply to you based off risk factors and/or age. Screening tests ordered at today's visit but not completed yet may show as past due. Also, please note that scanned in results may not display below.  Preventive Screenings:  Service Recommendations Previous Testing/Comments   Colorectal Cancer Screening  * Colonoscopy    * Fecal Occult Blood Test (FOBT)/Fecal Immunochemical Test (FIT)  * Fecal DNA/Cologuard Test  * Flexible Sigmoidoscopy Age: 45-75 years old   Colonoscopy: every 10 years (may be performed more frequently if at higher risk)  OR  FOBT/FIT: every 1 year  OR  Cologuard: every 3 years  OR  Sigmoidoscopy: every 5 years  Screening may be recommended earlier than age 45 if at higher risk for colorectal cancer. Also, an individualized decision between you and your healthcare provider will decide whether screening between the ages of 76-85 would be appropriate. Colonoscopy: 07/02/2024  FOBT/FIT: Not on file  Cologuard: Not on file  Sigmoidoscopy: Not on file    Screening Current     Breast Cancer Screening Age: 40+ years old  Frequency: every 1-2 years  Not required if history of left and right mastectomy Mammogram: 08/03/2023    History Breast Cancer   Cervical Cancer Screening Between the ages of 21-29, pap smear recommended once every 3 years.   Between the ages of 30-65, can perform pap smear with HPV co-testing every 5 years.   Recommendations may differ for women with a history of total hysterectomy, cervical cancer, or abnormal pap smears in past. Pap Smear: Not on file    Screening Not Indicated   Hepatitis C Screening Once for adults born between 1945 and 1965  More frequently in  patients at high risk for Hepatitis C Hep C Antibody: 09/02/2021    Screening Current   Diabetes Screening 1-2 times per year if you're at risk for diabetes or have pre-diabetes Fasting glucose: 105 mg/dL (9/30/2024)  A1C: 5.5 % (7/27/2024)  Screening Current   Cholesterol Screening Once every 5 years if you don't have a lipid disorder. May order more often based on risk factors. Lipid panel: 07/27/2024    Screening Not Indicated  History Lipid Disorder     Other Preventive Screenings Covered by Medicare:  Abdominal Aortic Aneurysm (AAA) Screening: covered once if your at risk. You're considered to be at risk if you have a family history of AAA.  Lung Cancer Screening: covers low dose CT scan once per year if you meet all of the following conditions: (1) Age 55-77; (2) No signs or symptoms of lung cancer; (3) Current smoker or have quit smoking within the last 15 years; (4) You have a tobacco smoking history of at least 20 pack years (packs per day multiplied by number of years you smoked); (5) You get a written order from a healthcare provider.  Glaucoma Screening: covered annually if you're considered high risk: (1) You have diabetes OR (2) Family history of glaucoma OR (3)  aged 50 and older OR (4)  American aged 65 and older  Osteoporosis Screening: covered every 2 years if you meet one of the following conditions: (1) You're estrogen deficient and at risk for osteoporosis based off medical history and other findings; (2) Have a vertebral abnormality; (3) On glucocorticoid therapy for more than 3 months; (4) Have primary hyperparathyroidism; (5) On osteoporosis medications and need to assess response to drug therapy.   Last bone density test (DXA Scan): 05/16/2024.  HIV Screening: covered annually if you're between the age of 15-65. Also covered annually if you are younger than 15 and older than 65 with risk factors for HIV infection. For pregnant patients, it is covered up to 3 times per  pregnancy.    Immunizations:  Immunization Recommendations   Influenza Vaccine Annual influenza vaccination during flu season is recommended for all persons aged >= 6 months who do not have contraindications   Pneumococcal Vaccine   * Pneumococcal conjugate vaccine = PCV13 (Prevnar 13), PCV15 (Vaxneuvance), PCV20 (Prevnar 20)  * Pneumococcal polysaccharide vaccine = PPSV23 (Pneumovax) Adults 19-65 yo with certain risk factors or if 65+ yo  If never received any pneumonia vaccine: recommend Prevnar 20 (PCV20)  Give PCV20 if previously received 1 dose of PCV13 or PPSV23   Hepatitis B Vaccine 3 dose series if at intermediate or high risk (ex: diabetes, end stage renal disease, liver disease)   Respiratory syncytial virus (RSV) Vaccine - COVERED BY MEDICARE PART D  * RSVPreF3 (Arexvy) CDC recommends that adults 60 years of age and older may receive a single dose of RSV vaccine using shared clinical decision-making (SCDM)   Tetanus (Td) Vaccine - COST NOT COVERED BY MEDICARE PART B Following completion of primary series, a booster dose should be given every 10 years to maintain immunity against tetanus. Td may also be given as tetanus wound prophylaxis.   Tdap Vaccine - COST NOT COVERED BY MEDICARE PART B Recommended at least once for all adults. For pregnant patients, recommended with each pregnancy.   Shingles Vaccine (Shingrix) - COST NOT COVERED BY MEDICARE PART B  2 shot series recommended in those 19 years and older who have or will have weakened immune systems or those 50 years and older     Health Maintenance Due:      Topic Date Due   • Breast Cancer Screening: Mammogram  08/03/2024   • Lung Cancer Screening  05/22/2025   • DXA SCAN  05/16/2026   • Hepatitis C Screening  Completed   • Colorectal Cancer Screening  Discontinued     Immunizations Due:      Topic Date Due   • COVID-19 Vaccine (4 - 2024-25 season) 09/01/2024     Advance Directives   What are advance directives?  Advance directives are legal documents  that state your wishes and plans for medical care. These plans are made ahead of time in case you lose your ability to make decisions for yourself. Advance directives can apply to any medical decision, such as the treatments you want, and if you want to donate organs.   What are the types of advance directives?  There are many types of advance directives, and each state has rules about how to use them. You may choose a combination of any of the following:  Living will:  This is a written record of the treatment you want. You can also choose which treatments you do not want, which to limit, and which to stop at a certain time. This includes surgery, medicine, IV fluid, and tube feedings.   Durable power of  for healthcare (DPAHC):  This is a written record that states who you want to make healthcare choices for you when you are unable to make them for yourself. This person, called a proxy, is usually a family member or a friend. You may choose more than 1 proxy.  Do not resuscitate (DNR) order:  A DNR order is used in case your heart stops beating or you stop breathing. It is a request not to have certain forms of treatment, such as CPR. A DNR order may be included in other types of advance directives.  Medical directive:  This covers the care that you want if you are in a coma, near death, or unable to make decisions for yourself. You can list the treatments you want for each condition. Treatment may include pain medicine, surgery, blood transfusions, dialysis, IV or tube feedings, and a ventilator (breathing machine).  Values history:  This document has questions about your views, beliefs, and how you feel and think about life. This information can help others choose the care that you would choose.  Why are advance directives important?  An advance directive helps you control your care. Although spoken wishes may be used, it is better to have your wishes written down. Spoken wishes can be misunderstood, or  not followed. Treatments may be given even if you do not want them. An advance directive may make it easier for your family to make difficult choices about your care.   Urinary Incontinence   Urinary incontinence (UI)  is when you lose control of your bladder. UI develops because your bladder cannot store or empty urine properly. The 3 most common types of UI are stress incontinence, urge incontinence, or both.  Medicines:   May be given to help strengthen your bladder control. Report any side effects of medication to your healthcare provider.  Do pelvic muscle exercises often:  Your pelvic muscles help you stop urinating. Squeeze these muscles tight for 5 seconds, then relax for 5 seconds. Gradually work up to squeezing for 10 seconds. Do 3 sets of 15 repetitions a day, or as directed. This will help strengthen your pelvic muscles and improve bladder control.  Train your bladder:  Go to the bathroom at set times, such as every 2 hours, even if you do not feel the urge to go. You can also try to hold your urine when you feel the urge to go. For example, hold your urine for 5 minutes when you feel the urge to go. As that becomes easier, hold your urine for 10 minutes.   Self-care:   Keep a UI record.  Write down how often you leak urine and how much you leak. Make a note of what you were doing when you leaked urine.  Drink liquids as directed. You may need to limit the amount of liquid you drink to help control your urine leakage. Do not drink any liquid right before you go to bed. Limit or do not have drinks that contain caffeine or alcohol.   Prevent constipation.  Eat a variety of high-fiber foods. Good examples are high-fiber cereals, beans, vegetables, and whole-grain breads. Walking is the best way to trigger your intestines to have a bowel movement.  Exercise regularly and maintain a healthy weight.  Weight loss and exercise will decrease pressure on your bladder and help you control your leakage.   Use a  catheter as directed  to help empty your bladder. A catheter is a tiny, plastic tube that is put into your bladder to drain your urine.   Go to behavior therapy as directed.  Behavior therapy may be used to help you learn to control your urge to urinate.    Weight Management   Why it is important to manage your weight:  Being overweight increases your risk of health conditions such as heart disease, high blood pressure, type 2 diabetes, and certain types of cancer. It can also increase your risk for osteoarthritis, sleep apnea, and other respiratory problems. Aim for a slow, steady weight loss. Even a small amount of weight loss can lower your risk of health problems.  How to lose weight safely:  A safe and healthy way to lose weight is to eat fewer calories and get regular exercise. You can lose up about 1 pound a week by decreasing the number of calories you eat by 500 calories each day.   Healthy meal plan for weight management:  A healthy meal plan includes a variety of foods, contains fewer calories, and helps you stay healthy. A healthy meal plan includes the following:  Eat whole-grain foods more often.  A healthy meal plan should contain fiber. Fiber is the part of grains, fruits, and vegetables that is not broken down by your body. Whole-grain foods are healthy and provide extra fiber in your diet. Some examples of whole-grain foods are whole-wheat breads and pastas, oatmeal, brown rice, and bulgur.  Eat a variety of vegetables every day.  Include dark, leafy greens such as spinach, kale, neeraj greens, and mustard greens. Eat yellow and orange vegetables such as carrots, sweet potatoes, and winter squash.   Eat a variety of fruits every day.  Choose fresh or canned fruit (canned in its own juice or light syrup) instead of juice. Fruit juice has very little or no fiber.  Eat low-fat dairy foods.  Drink fat-free (skim) milk or 1% milk. Eat fat-free yogurt and low-fat cottage cheese. Try low-fat cheeses such  as mozzarella and other reduced-fat cheeses.  Choose meat and other protein foods that are low in fat.  Choose beans or other legumes such as split peas or lentils. Choose fish, skinless poultry (chicken or turkey), or lean cuts of red meat (beef or pork). Before you cook meat or poultry, cut off any visible fat.   Use less fat and oil.  Try baking foods instead of frying them. Add less fat, such as margarine, sour cream, regular salad dressing and mayonnaise to foods. Eat fewer high-fat foods. Some examples of high-fat foods include french fries, doughnuts, ice cream, and cakes.  Eat fewer sweets.  Limit foods and drinks that are high in sugar. This includes candy, cookies, regular soda, and sweetened drinks.  Exercise:  Exercise at least 30 minutes per day on most days of the week. Some examples of exercise include walking, biking, dancing, and swimming. You can also fit in more physical activity by taking the stairs instead of the elevator or parking farther away from stores. Ask your healthcare provider about the best exercise plan for you.      © Copyright [employer] 2018 Information is for End User's use only and may not be sold, redistributed or otherwise used for commercial purposes. All illustrations and images included in CareNotes® are the copyrighted property of [employer] or [employer]

## 2025-04-25 NOTE — ASSESSMENT & PLAN NOTE
Lab Results   Component Value Date    EGFR 24 09/30/2024    EGFR 23 09/11/2024    EGFR 22 08/07/2024    CREATININE 1.88 (H) 09/30/2024    CREATININE 1.95 (H) 09/11/2024    CREATININE 2.05 (H) 08/07/2024   Continue with nephrology have lab work we will call with results    Orders:  •  Albumin / creatinine urine ratio; Future  •  Urinalysis with microscopic; Future

## 2025-04-25 NOTE — ASSESSMENT & PLAN NOTE
Recommend water physical therapy referral placed to spine and pain  Orders:  •  Ambulatory referral to Spine & Pain Management; Future  •  Ambulatory Referral to Physical Therapy; Future  •  Vitamin B12; Future  •  Folate; Future  •  Iron Panel (Includes Ferritin, Iron Sat%, Iron, and TIBC); Future

## 2025-04-25 NOTE — ASSESSMENT & PLAN NOTE
Recommend water physical therapy referral placed to spine and pain  Orders:  •  Ambulatory referral to Spine & Pain Management; Future  •  Ambulatory Referral to Physical Therapy; Future   left to right

## 2025-04-25 NOTE — ASSESSMENT & PLAN NOTE
Recommend water physical therapy referral placed to spine and pain  Orders:  •  Ambulatory referral to Spine & Pain Management; Future  •  Ambulatory Referral to Physical Therapy; Future

## 2025-04-25 NOTE — ASSESSMENT & PLAN NOTE
Prior Authorization Clinical Questions for Weight Management Pharmacotherapy    1. Does the patient have a contrainidcation to medication prescribed for weight management?: No  2. Does the patient have a diagnosis of obesity, confirmed by a BMI greater than or equal to 30 kg/m^2?: Yes  3. Does the patient have a BMI of greater than or equal to 27 kg/m^2 with at least one weight-related comorbidity/risk factor/complication (e.g. diabetes, dyslipidemia, coronary artery disease)?: Yes  4. Weight-related co-morbidities/risk factors: obstructive sleep apnea (OBI), osteoarthritis, asthma/reactive airway disease  5. WEGOVY CVA Indication: Does patient have established documented cardiovascular disease (history of a prior heart attack (myocardial infarction), stroke, or symptomatic peripheral arterial disease (PAD)?: N/A  6. ZEPBOUND OBI Indication: Does patient have documented OBI diagnosed via sleep study (insurance will require copy of sleep study results for approval)?: Yes  7. Has the patient been on a weight loss regimen of low-calorie diet, increased physical activity, and lifestyle modifications for a minimum of 6 months?: Yes  8. Has the patient completed a comprehensive weight loss program (ie, Weight Watchers, Noom, Bariatrics, other lizette on phone)? If so, what?: No  9. Does the patient have a history of type 2 diabetes?: No  10. Has the member tried and failed other weight loss medication within the past 12 months?: Yes   -- Q10 Further explanation: Phentermine   11. Will the member use requested medication in combination with another GLP agonist or weight loss drug?: No  12. Is the medication a controlled substance?: No  For renewals: Has the patient had a positive outcome with current weight management medication (i.e., change in body weight of at least 4-5% after 12-16 weeks on maximally tolerated dose)?: No     Baseline weight (in pounds): 284 lbs  Current weight (in pounds): 284 lbs  Weight loss percentage:  0%       Has tried and failed phentermine, continue healthy diet will be starting physical therapy.   Suggest my fitness Pal.  Reduce daily calories by 300 calories a day.  Increase activity to 150 minutes a week.  Suggest lean protein high fiber diet.    Eats small portions every 3 hours. 5-9 fruits and vegetables a day.  Do not drink sugary drinks.  Will start Zepbound follow-up 1 month  Orders:  •  CBC and differential; Future  •  Comprehensive metabolic panel; Future  •  Hemoglobin A1C; Future  •  Lipid panel; Future  •  Vitamin D 25 hydroxy; Future  •  TSH, 3rd generation with Free T4 reflex; Future  •  Urinalysis with microscopic; Future  •  tirzepatide (Zepbound) 2.5 mg/0.5 mL auto-injector; Inject 0.5 mL (2.5 mg total) under the skin once a week for 28 days

## 2025-04-28 ENCOUNTER — TELEPHONE (OUTPATIENT)
Age: 80
End: 2025-04-28

## 2025-04-28 ENCOUNTER — APPOINTMENT (OUTPATIENT)
Dept: LAB | Facility: HOSPITAL | Age: 80
End: 2025-04-28
Payer: MEDICARE

## 2025-04-28 DIAGNOSIS — M54.31 SCIATICA OF RIGHT SIDE: ICD-10-CM

## 2025-04-28 DIAGNOSIS — E66.01 MORBID OBESITY WITH BMI OF 45.0-49.9, ADULT (HCC): ICD-10-CM

## 2025-04-28 DIAGNOSIS — S40.862D TICK BITE OF LEFT UPPER ARM, SUBSEQUENT ENCOUNTER: ICD-10-CM

## 2025-04-28 DIAGNOSIS — N18.4 CHRONIC KIDNEY DISEASE, STAGE 4 (SEVERE) (HCC): ICD-10-CM

## 2025-04-28 DIAGNOSIS — R20.0 ANESTHESIA OF SKIN: ICD-10-CM

## 2025-04-28 DIAGNOSIS — R79.9 ABNORMAL FINDING OF BLOOD CHEMISTRY, UNSPECIFIED: ICD-10-CM

## 2025-04-28 DIAGNOSIS — I10 ESSENTIAL HYPERTENSION: ICD-10-CM
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
## 2025-04-28 DIAGNOSIS — N18.32 STAGE 3B CHRONIC KIDNEY DISEASE (HCC): ICD-10-CM

## 2025-04-28 LAB
25(OH)D3 SERPL-MCNC: 90.7 NG/ML (ref 30–100)
ALBUMIN SERPL BCG-MCNC: 3.7 G/DL (ref 3.5–5)
ALP SERPL-CCNC: 53 U/L (ref 34–104)
ALT SERPL W P-5'-P-CCNC: 11 U/L (ref 7–52)
ANION GAP SERPL CALCULATED.3IONS-SCNC: 6 MMOL/L (ref 4–13)
AST SERPL W P-5'-P-CCNC: 12 U/L (ref 13–39)
BACTERIA UR QL AUTO: ABNORMAL /HPF
BASOPHILS # BLD AUTO: 0.03 THOUSANDS/ÂΜL (ref 0–0.1)
BASOPHILS NFR BLD AUTO: 0 % (ref 0–1)
BILIRUB SERPL-MCNC: 0.61 MG/DL (ref 0.2–1)
BILIRUB UR QL STRIP: NEGATIVE
BUN SERPL-MCNC: 24 MG/DL (ref 5–25)
CALCIUM SERPL-MCNC: 9.5 MG/DL (ref 8.4–10.2)
CHLORIDE SERPL-SCNC: 105 MMOL/L (ref 96–108)
CHOLEST SERPL-MCNC: 114 MG/DL (ref ?–200)
CLARITY UR: ABNORMAL
CO2 SERPL-SCNC: 33 MMOL/L (ref 21–32)
COLOR UR: YELLOW
CREAT SERPL-MCNC: 1.79 MG/DL (ref 0.6–1.3)
CREAT UR-MCNC: 152.7 MG/DL
EOSINOPHIL # BLD AUTO: 0.22 THOUSAND/ÂΜL (ref 0–0.61)
EOSINOPHIL NFR BLD AUTO: 3 % (ref 0–6)
ERYTHROCYTE [DISTWIDTH] IN BLOOD BY AUTOMATED COUNT: 13.8 % (ref 11.6–15.1)
EST. AVERAGE GLUCOSE BLD GHB EST-MCNC: 108 MG/DL
FERRITIN SERPL-MCNC: 51 NG/ML (ref 30–307)
FOLATE SERPL-MCNC: 7.4 NG/ML
GFR SERPL CREATININE-BSD FRML MDRD: 26 ML/MIN/1.73SQ M
GLUCOSE P FAST SERPL-MCNC: 106 MG/DL (ref 65–99)
GLUCOSE UR STRIP-MCNC: NEGATIVE MG/DL
HBA1C MFR BLD: 5.4 %
HCT VFR BLD AUTO: 43.4 % (ref 34.8–46.1)
HDLC SERPL-MCNC: 43 MG/DL
HGB BLD-MCNC: 13.4 G/DL (ref 11.5–15.4)
HGB UR QL STRIP.AUTO: NEGATIVE
IMM GRANULOCYTES # BLD AUTO: 0.02 THOUSAND/UL (ref 0–0.2)
IMM GRANULOCYTES NFR BLD AUTO: 0 % (ref 0–2)
IRON SATN MFR SERPL: 28 % (ref 15–50)
IRON SERPL-MCNC: 72 UG/DL (ref 50–212)
KETONES UR STRIP-MCNC: NEGATIVE MG/DL
LDLC SERPL CALC-MCNC: 48 MG/DL (ref 0–100)
LEUKOCYTE ESTERASE UR QL STRIP: ABNORMAL
LYMPHOCYTES # BLD AUTO: 1.4 THOUSANDS/ÂΜL (ref 0.6–4.47)
LYMPHOCYTES NFR BLD AUTO: 20 % (ref 14–44)
MCH RBC QN AUTO: 27 PG (ref 26.8–34.3)
MCHC RBC AUTO-ENTMCNC: 30.9 G/DL (ref 31.4–37.4)
MCV RBC AUTO: 87 FL (ref 82–98)
MICROALBUMIN UR-MCNC: 27.4 MG/L
MICROALBUMIN/CREAT 24H UR: 18 MG/G CREATININE (ref 0–30)
MONOCYTES # BLD AUTO: 0.4 THOUSAND/ÂΜL (ref 0.17–1.22)
MONOCYTES NFR BLD AUTO: 6 % (ref 4–12)
MUCOUS THREADS UR QL AUTO: ABNORMAL
NEUTROPHILS # BLD AUTO: 4.96 THOUSANDS/ÂΜL (ref 1.85–7.62)
NEUTS SEG NFR BLD AUTO: 71 % (ref 43–75)
NITRITE UR QL STRIP: POSITIVE
NON-SQ EPI CELLS URNS QL MICRO: ABNORMAL /HPF
NONHDLC SERPL-MCNC: 71 MG/DL
NRBC BLD AUTO-RTO: 0 /100 WBCS
PH UR STRIP.AUTO: 5.5 [PH]
PHOSPHATE SERPL-MCNC: 3 MG/DL (ref 2.3–4.1)
PLATELET # BLD AUTO: 202 THOUSANDS/UL (ref 149–390)
PMV BLD AUTO: 11.4 FL (ref 8.9–12.7)
POTASSIUM SERPL-SCNC: 3.7 MMOL/L (ref 3.5–5.3)
PROT SERPL-MCNC: 6.9 G/DL (ref 6.4–8.4)
PROT UR STRIP-MCNC: NEGATIVE MG/DL
PTH-INTACT SERPL-MCNC: 74.9 PG/ML (ref 12–88)
RBC # BLD AUTO: 4.97 MILLION/UL (ref 3.81–5.12)
RBC #/AREA URNS AUTO: ABNORMAL /HPF
SODIUM SERPL-SCNC: 144 MMOL/L (ref 135–147)
SP GR UR STRIP.AUTO: 1.02 (ref 1–1.03)
TIBC SERPL-MCNC: 256.2 UG/DL (ref 250–450)
TRANSFERRIN SERPL-MCNC: 183 MG/DL (ref 203–362)
TRIGL SERPL-MCNC: 115 MG/DL (ref ?–150)
TSH SERPL DL<=0.05 MIU/L-ACNC: 2.71 UIU/ML (ref 0.45–4.5)
UIBC SERPL-MCNC: 184 UG/DL (ref 155–355)
URATE SERPL-MCNC: 8.2 MG/DL (ref 2–7.5)
UROBILINOGEN UR STRIP-ACNC: 2 MG/DL
VIT B12 SERPL-MCNC: 1382 PG/ML (ref 180–914)
WBC # BLD AUTO: 7.03 THOUSAND/UL (ref 4.31–10.16)
WBC #/AREA URNS AUTO: ABNORMAL /HPF

## 2025-04-28 PROCEDURE — 81001 URINALYSIS AUTO W/SCOPE: CPT

## 2025-04-28 PROCEDURE — 84443 ASSAY THYROID STIM HORMONE: CPT

## 2025-04-28 PROCEDURE — 86617 LYME DISEASE ANTIBODY: CPT

## 2025-04-28 PROCEDURE — 83970 ASSAY OF PARATHORMONE: CPT

## 2025-04-28 PROCEDURE — 86618 LYME DISEASE ANTIBODY: CPT

## 2025-04-28 PROCEDURE — 82306 VITAMIN D 25 HYDROXY: CPT

## 2025-04-28 PROCEDURE — 83550 IRON BINDING TEST: CPT

## 2025-04-28 PROCEDURE — 80061 LIPID PANEL: CPT

## 2025-04-28 PROCEDURE — 85025 COMPLETE CBC W/AUTO DIFF WBC: CPT

## 2025-04-28 PROCEDURE — 82570 ASSAY OF URINE CREATININE: CPT

## 2025-04-28 PROCEDURE — 83540 ASSAY OF IRON: CPT

## 2025-04-28 PROCEDURE — 82728 ASSAY OF FERRITIN: CPT

## 2025-04-28 PROCEDURE — 83036 HEMOGLOBIN GLYCOSYLATED A1C: CPT

## 2025-04-28 PROCEDURE — 82607 VITAMIN B-12: CPT

## 2025-04-28 PROCEDURE — 82746 ASSAY OF FOLIC ACID SERUM: CPT

## 2025-04-28 PROCEDURE — 84550 ASSAY OF BLOOD/URIC ACID: CPT

## 2025-04-28 PROCEDURE — 36415 COLL VENOUS BLD VENIPUNCTURE: CPT

## 2025-04-28 PROCEDURE — 80053 COMPREHEN METABOLIC PANEL: CPT

## 2025-04-28 PROCEDURE — 84100 ASSAY OF PHOSPHORUS: CPT

## 2025-04-28 PROCEDURE — 82043 UR ALBUMIN QUANTITATIVE: CPT

## 2025-04-28 NOTE — TELEPHONE ENCOUNTER
Rec'd call from patient requesting to schedule a NP to have some skin tags removed. Patient prefers to see a dermatologist in [place] and with Dr. Benitez not having any NP appts available she will call back at a later time to check for appt. No further questions at this time.

## 2025-04-29 ENCOUNTER — RESULTS FOLLOW-UP (OUTPATIENT)
Dept: FAMILY MEDICINE CLINIC | Facility: CLINIC | Age: 80
End: 2025-04-29

## 2025-04-29 DIAGNOSIS — A69.20 LYME DISEASE: ICD-10-CM

## 2025-04-29 DIAGNOSIS — N18.4 STAGE 4 CHRONIC KIDNEY DISEASE (HCC): Primary | ICD-10-CM

## 2025-04-29 LAB
B BURGDOR IGG SERPL QL IA: POSITIVE
B BURGDOR IGG+IGM SER QL IA: POSITIVE
B BURGDOR IGM SERPL QL IA: POSITIVE

## 2025-04-29 NOTE — TELEPHONE ENCOUNTER
Patient called results given per providers's message, Patient understood and advised to call back with any questions.

## 2025-04-30 RX ORDER — DOXYCYCLINE 100 MG/1
100 CAPSULE ORAL EVERY 12 HOURS SCHEDULED
Qty: 28 CAPSULE | Refills: 0 | Status: SHIPPED | OUTPATIENT
Start: 2025-04-30 | End: 2025-05-14

## 2025-05-02 ENCOUNTER — TELEPHONE (OUTPATIENT)
Dept: MAMMOGRAPHY | Facility: CLINIC | Age: 80
End: 2025-05-02

## 2025-05-02 NOTE — TELEPHONE ENCOUNTER
Called patient in regards to cancelled screening mammogram. Advised over voicemail from 2/5/2024 breast imaging further imaging needs to be diagnostic until cleared. Left name/#/office hours with request for call back to assist in scheduling.

## 2025-05-07 DIAGNOSIS — R35.0 URINARY FREQUENCY: ICD-10-CM

## 2025-05-07 RX ORDER — SOLIFENACIN SUCCINATE 5 MG/1
5 TABLET, FILM COATED ORAL DAILY
Qty: 90 TABLET | Refills: 0 | Status: SHIPPED | OUTPATIENT
Start: 2025-05-07

## 2025-05-10 DIAGNOSIS — K21.9 GASTROESOPHAGEAL REFLUX DISEASE WITHOUT ESOPHAGITIS: ICD-10-CM

## 2025-05-10 DIAGNOSIS — R79.89 ELEVATED TSH: ICD-10-CM

## 2025-05-10 DIAGNOSIS — R53.83 OTHER FATIGUE: ICD-10-CM

## 2025-05-11 RX ORDER — PANTOPRAZOLE SODIUM 40 MG/1
40 TABLET, DELAYED RELEASE ORAL 2 TIMES DAILY
Qty: 180 TABLET | Refills: 1 | Status: SHIPPED | OUTPATIENT
Start: 2025-05-11 | End: 2026-05-06

## 2025-05-11 RX ORDER — LEVOTHYROXINE SODIUM 50 UG/1
50 TABLET ORAL DAILY
Qty: 30 TABLET | Refills: 5 | Status: SHIPPED | OUTPATIENT
Start: 2025-05-11

## 2025-05-12 DIAGNOSIS — R53.83 OTHER FATIGUE: ICD-10-CM

## 2025-05-12 DIAGNOSIS — R79.89 ELEVATED TSH: ICD-10-CM

## 2025-05-12 DIAGNOSIS — K21.9 GASTROESOPHAGEAL REFLUX DISEASE WITHOUT ESOPHAGITIS: ICD-10-CM

## 2025-05-13 RX ORDER — LEVOTHYROXINE SODIUM 50 UG/1
50 TABLET ORAL DAILY
Qty: 30 TABLET | Refills: 0 | OUTPATIENT
Start: 2025-05-13

## 2025-05-13 RX ORDER — PANTOPRAZOLE SODIUM 40 MG/1
40 TABLET, DELAYED RELEASE ORAL 2 TIMES DAILY
Qty: 180 TABLET | Refills: 0 | OUTPATIENT
Start: 2025-05-13 | End: 2026-05-08

## 2025-05-15 ENCOUNTER — TELEPHONE (OUTPATIENT)
Dept: PAIN MEDICINE | Facility: CLINIC | Age: 80
End: 2025-05-15

## 2025-05-15 ENCOUNTER — TELEPHONE (OUTPATIENT)
Age: 80
End: 2025-05-15

## 2025-05-15 NOTE — PROGRESS NOTES
Assessment:  1. Lumbar spondylosis    2. Neurogenic claudication    3. Chronic bilateral low back pain with right-sided sciatica        Plan:  Orders Placed This Encounter   Procedures    MRI lumbar spine wo contrast     Standing Status:   Future     Expected Date:   5/19/2025     Expiration Date:   5/19/2029     Scheduling Instructions:      There is no preparation for this test. Please leave your jewelry and valuables at home, wedding rings are the exception. Magnetic nail polish must be removed prior to arrival for your test. Please bring your insurance cards, a form of photo ID and a list of your medications with you. Arrive 15       minutes prior to your appointment time in order to register. Please bring any prior CT or MRI studies of this area that were not performed at a [facility].            To schedule this appointment, please contact Central Scheduling at [phone].            Prior to your appointment, please make sure you complete the MRI Screening Form when you e-Check in for your appointment. This will be available starting 7 days before your appointment in [email or web address]. You may receive an e-mail with an activation code if you do not have a [email or web address] account. If you do not       have access to a device, we will complete your screening at your appointment.     What is the patient's sedation requirement?:   No Sedation     Does this procedure require the 3T MRI?:   No     Release to patient through [facility]:   Immediate     Is order priority selected as STAT?:   No     Reason for Exam:   lumbar radiculopathy       No orders of the defined types were placed in this encounter.      My impressions and treatment recommendations were discussed in detail with the patient, who verbalized understanding and had no further questions.    This is an 79-year-old female presents to our office chief complaint of lower back pain with radiation to the right anterior thigh with burning and numbness.  She also  notes increased leg weakness with short ambulatory distance.  Symptoms appear to be secondary to combination of lumbar facet disease as well as possibly neurogenic claudication secondary to spinal stenosis.  We will order MRI of the lumbar spine.  Exam is notable for diminished patellar reflex on the right.    Results of MRI will help to determine next course of action which may include epidural steroid injection which was discussed in detail.  She was given referral to aquatic therapy by her PCP which I encouraged her to continue with.    [place] Prescription Drug Monitoring Program report was reviewed and was appropriate     Complete risks and benefits including bleeding, infection, tissue reaction, nerve injury and allergic reaction were discussed. The approach was demonstrated using models and literature was provided. Verbal and written consent was obtained.    Discharge instructions were provided. I personally saw and examined the patient and I agree with the above discussed plan of care.    History of Present Illness:    Ladi Mojica is a 79 y.o. female who presents to [facility] for initial evaluation of the above stated pain complaints. The patient has a past medical and chronic pain history as outlined in the assessment section. She was referred by MONIQUE Sloan  [address] [place] [address].    Here with complaint of lower back pain with radiation into the right thigh with burning and numbness.  Chronic for over 2 years.    Pain severe over the past 1.  8 out of 10.  Constant.  Burning, numbness, sharp in nature.    She was given referral to physical therapy but did not schedule this.    No tobacco or marijuana use.  Allergies latex.    She did have x-ray done by her chiropractor in November 2024.  Report reviewed today.  Noted to have moderate facet disease in the lumbar spine based on chiropractor interpretation.    Patient reports most of her  symptoms in her back radiating into the right anterior thigh with numbness and burning over the anterior thigh.  Does not radiate past the knee.  She also reports increased leg weakness with ambulation.  Denies bowel bladder incontinence or saddle anesthesia.  Symptoms improved with sitting or flexion.    Review of Systems:    Review of Systems   Respiratory:  Positive for shortness of breath.    Musculoskeletal:  Positive for myalgias.   Neurological:  Positive for dizziness and numbness.           Past Medical History:   Diagnosis Date    Acute ill-defined cerebrovascular disease     Arthritis     Benign essential tremor     Breast cancer (HCC)     pt states doesn't remember the year thinks it might have been on the right.    Chronic kidney disease     CKD (chronic kidney disease) stage 3, GFR 30-59 ml/min ([facility]) 09/25/2018    Colon polyp     Depressive disorder     Disease of thyroid gland     GERD (gastroesophageal reflux disease)     Heart murmur     Hyperlipidemia     Hypertension     Hypothyroidism     Impaired fasting glucose     Intention tremor     last assessed: 8/18/2016    Left anterior fascicular block     Osteochondropathy     Panic disorder without agoraphobia with mild panic attacks     PONV (postoperative nausea and vomiting)     Psychiatric disorder     anxiety    Sleep apnea, obstructive     CPAP    Stroke ([facility])     Tubular adenoma of colon     Urinary tract infection        Past Surgical History:   Procedure Laterality Date    AUGMENTATION BREAST      pt states during mammo she doesn't have any other surgery than the 2 biopsy    BLEPHAROPLASTY      Upper    BREAST BIOPSY Left     pt doesn't recall when and believes the neg biop was on left    BREAST LUMPECTOMY W/ NEEDLE LOCALIZATION Left     description: benign last assessed; 8/21/2016    CHOLECYSTECTOMY      COLONOSCOPY      EGD      HYSTERECTOMY      KY EXC TUMOR SOFT TISS FACE&SCALP SUBFASCIAL <2CM Left 11/9/2023    Procedure: EXCISION OF  "EYELID LESION;  Surgeon: Eugene Garnica MD;  Location: [facility];  Service: Plastics    TUBAL LIGATION         Family History   Problem Relation Age of Onset    Alcohol abuse Mother     Asthma Mother     Cancer Mother     Mental illness Mother     Osteoarthritis Mother     Gout Father     Cancer Father     Other Sister         carotid arterial disease    COPD Sister     Hyperlipidemia Sister     Hypertension Sister     Heart attack Sister     Breast cancer Sister     No Known Problems Daughter     No Known Problems Maternal Grandmother     No Known Problems Paternal Grandmother     Lung cancer Sister     No Known Problems Sister     No Known Problems Daughter     No Known Problems Daughter        Social History     Occupational History    Occupation: Retired   Tobacco Use    Smoking status: Former     Current packs/day: 0.00     Average packs/day: 1 pack/day for 50.0 years (50.0 ttl pk-yrs)     Types: Cigarettes     Start date:      Quit date:      Years since quittin.3     Passive exposure: Past    Smokeless tobacco: Never    Tobacco comments:     10 years ago   Vaping Use    Vaping status: Never Used   Substance and Sexual Activity    Alcohol use: Yes     Comment: occ    Drug use: Never    Sexual activity: Not Currently     Partners: Male       Current Medications[1]    Allergies[2]    Physical Exam:    Ht 5' 4\" (1.626 m)   Wt 129 kg (284 lb)   BMI 48.75 kg/m²     Constitutional: normal, well developed, well nourished, alert, in no distress and non-toxic and no overt pain behavior.  Eyes: anicteric  HEENT: grossly intact  Neck: supple, symmetric, trachea midline and no masses   Pulmonary:even and unlabored  Cardiovascular:No edema or pitting edema present  Skin:Normal without rashes or lesions and well hydrated  Psychiatric:Mood and affect appropriate  Neurologic:Cranial Nerves II-XII grossly intact  Musculoskeletal:normal    Lumbar Spine Exam    Appearance:  Normal lordosis    Sensory:  no " sensory deficits noted  Range of Motion:  Severely limited flexion and extension due to pain  Motor Strength:  Left hip flexion:  5/5  Left hip extension:  5/5  Right hip flexion:  5/5  Right hip extension:  5/5  Left knee flexion:  5/5  Left knee extension:  5/5  Right knee flexion:  5/5  Right knee extension:  5/5  Left foot dorsiflexion:  5/5  Left foot plantar flexion:  5/5  Right foot dorsiflexion:  5/5  Right foot plantar flexion:  5/5  Reflexes:  Left Patellar:  2+   Right Patellar:  1+   Left Achilles:  2+   Right Achilles:  2+     Imaging    CT lumbar spine reconstructions  4/14/21     HISTORY: Trauma. Low back pain.     COMPARISON: None.     TECHNIQUE: Sagittal, axial, and coronal reconstructions of the lumbar spine were   derived from a CT chest abdomen pelvis enhanced examination performed 4/14/2021     IMPRESSION: these reconstructions, there is cortical discontinuity and height loss involving   the superior aspect of L4 on L5 suspicious for minimal compression fractures.   There are sclerotic changes involving the superior aspect of S1, on a   degenerative basis.     There are SI joint sclerotic degenerative changes. The visualized sacrum is   intact. The posterior elements of the lumbar spine appear intact.       MRI lumbar spine wo contrast    (Results Pending)       Orders Placed This Encounter   Procedures    MRI lumbar spine wo contrast          [1]   Current Outpatient Medications:     albuterol (Ventolin HFA) 90 mcg/act inhaler, Inhale 2 puffs every 6 (six) hours as needed for wheezing, Disp: 18 g, Rfl: 1    amLODIPine (NORVASC) 10 mg tablet, Take 1 tablet (10 mg total) by mouth daily, Disp: 90 tablet, Rfl: 0    aspirin 81 mg chewable tablet, Chew 81 mg in the morning., Disp: , Rfl:     Cholecalciferol (VITAMIN D3) 50 MCG (2000 UT) TABS, Take 2,000 Units by mouth in the morning., Disp: , Rfl:     famotidine (PEPCID) 20 mg tablet, Take 1 tablet (20 mg total) by mouth 2 (two) times a day, Disp: 60  "tablet, Rfl: 5    fluticasone (FLONASE) 50 mcg/act nasal spray, 1 spray into each nostril daily, Disp: 15.8 mL, Rfl: 1    Fluticasone-Salmeterol (Advair) 100-50 mcg/dose inhaler, Inhale 1 puff 2 (two) times a day Rinse mouth after use., Disp: 60 blister, Rfl: 5    hydroCHLOROthiazide 12.5 mg tablet, Take 1 tablet (12.5 mg total) by mouth daily, Disp: 90 tablet, Rfl: 1    Iron-Vitamin C (Vitron-C)  MG TABS, Take 1 tablet by mouth in the morning, Disp: 90 tablet, Rfl: 1    levothyroxine 50 mcg tablet, Take 1 tablet (50 mcg total) by mouth daily, Disp: 30 tablet, Rfl: 5    lisinopril (ZESTRIL) 10 mg tablet, Take 1 tablet (10 mg total) by mouth daily, Disp: 90 tablet, Rfl: 1    omega-3-acid ethyl esters (LOVAZA) 1 g capsule, Take 1 capsule (1 g total) by mouth daily, Disp: 30 capsule, Rfl: 1    pantoprazole (PROTONIX) 40 mg tablet, Take 1 tablet (40 mg total) by mouth 2 (two) times a day, Disp: 180 tablet, Rfl: 1    potassium chloride (K-DUR,KLOR-CON) 20 mEq tablet, Take 1 tablet (20 mEq total) by mouth daily, Disp: 90 tablet, Rfl: 0    rosuvastatin (CRESTOR) 10 MG tablet, Take 1 tablet (10 mg total) by mouth daily, Disp: 90 tablet, Rfl: 1    solifenacin (VESICARE) 5 mg tablet, Take 1 tablet (5 mg total) by mouth daily, Disp: 90 tablet, Rfl: 0    tirzepatide (Zepbound) 2.5 mg/0.5 mL auto-injector, Inject 0.5 mL (2.5 mg total) under the skin once a week for 28 days (Patient not taking: Reported on 5/19/2025), Disp: 2 mL, Rfl: 0  [2]   Allergies  Allergen Reactions    Codeine Other (See Comments)     \"I didn't like the feeling.\"    Amoxicillin Other (See Comments)     Unsure of allergy    Oxybutynin Visual Disturbance    Morphine Other (See Comments)     Does not like feeling     "

## 2025-05-15 NOTE — TELEPHONE ENCOUNTER
Patient called asking if repeat blood tests are going to need to be done as she just finished her doxycycline today. Please let Ladi know, at [phone]

## 2025-05-19 ENCOUNTER — CONSULT (OUTPATIENT)
Dept: PAIN MEDICINE | Facility: CLINIC | Age: 80
End: 2025-05-19
Payer: MEDICARE

## 2025-05-19 VITALS — WEIGHT: 284 LBS | HEIGHT: 64 IN | BODY MASS INDEX: 48.49 KG/M2

## 2025-05-19 DIAGNOSIS — M54.41 CHRONIC BILATERAL LOW BACK PAIN WITH RIGHT-SIDED SCIATICA: ICD-10-CM

## 2025-05-19 DIAGNOSIS — R29.818 NEUROGENIC CLAUDICATION: ICD-10-CM

## 2025-05-19 DIAGNOSIS — G89.29 CHRONIC BILATERAL LOW BACK PAIN WITH RIGHT-SIDED SCIATICA: ICD-10-CM

## 2025-05-19 DIAGNOSIS — M47.816 LUMBAR SPONDYLOSIS: Primary | ICD-10-CM

## 2025-05-19 PROCEDURE — 99204 OFFICE O/P NEW MOD 45 MIN: CPT | Performed by: STUDENT IN AN ORGANIZED HEALTH CARE EDUCATION/TRAINING PROGRAM

## 2025-05-19 NOTE — PATIENT INSTRUCTIONS
"Patient Education     Epidural injection   The Basics   Written by the doctors and editors at [facility]   What is an epidural injection? -- An epidural injection can be used to treat a condition called \"radiculopathy.\" This is the medical term for the pain, weakness, numbness, or tingling that happens when nerves coming from the spinal cord get pinched or damaged.  The doctor injects medicines into the space outside the covering of the spinal cord (figure 1). This is similar to an \"epidural\" that is used for pain relief during labor and childbirth.  Epidural injections can be given into different parts of your back:   Cervical epidural injection - Used to help with pain in the head or arms.   Thoracic epidural injection - Used to help with pain in the upper or middle back.   Lumbar epidural injection - Used to help with pain in the lower back or legs.  How do I prepare for an epidural injection? -- The doctor or nurse will tell you if you need to do anything special to prepare. Before your procedure, your doctor will do an exam. They might send you to get tests, such as:   X-ray, ultrasound, or other imaging tests - Imaging tests create pictures of the inside of the body.  Your doctor will also ask you about your \"health history.\" This involves asking you questions about any health problems you have or had in the past, past surgeries, and any medicines you take. Tell them about:   Any medicines you are taking - This includes any prescription or \"over-the-counter\" medicines you use, plus any herbal supplements you take. It helps to write down and bring a list of any medicines you take, or bring a bag with all of your medicines with you.   Any allergies you have   Any bleeding problems you have - Certain medicines, including some herbs and supplements, can increase the risk of bleeding. Some health conditions also increase this risk.  You will also get information about:   Eating and drinking before your procedure - In " "some cases, you might need to \"fast\" before surgery. This means not eating or drinking anything for a period of time. In other cases, you might be allowed to have liquids until a short time before the procedure. Whether you need to fast, and for how long, depends on the procedure you are having.   What help you will need when you go home - For example, you might need to have someone else bring you home or stay with you for some time while you recover.  Ask the doctor or nurse if you have questions or if there is anything you do not understand.  What happens during an epidural injection? -- When it is time for the procedure:   You might get an \"IV,\" which is a thin tube that goes into a vein. This can be used to give you fluids and medicines.   You will get anesthesia medicines to numb the area where the doctor will give the injection. This is to make sure that you do not feel pain during the procedure. You might also get medicines to make you relax and feel sleepy, called \"sedatives.\"   The doctors and nurses will monitor your breathing, blood pressure, and heart rate during the procedure.   The doctor might use a continuous X-ray called \"fluoroscopy.\" This is to help make sure that the medicines are injected into the right place. The doctor might also inject a dye to see where to give the medicine.   The doctor will place a needle through your skin and inject the medicine into a space near your spine. Then, they will remove the needle and cover the area with a clean bandage.   The procedure takes 15 to 30 minutes.  What happens after an epidural injection? -- After your procedure, the staff will watch you closely for a short time. It might take a few days before you feel the effects of the epidural injection.  Before you go home, make sure that you know what problems to look out for and when to call the doctor. Make sure that you understand your doctor's or nurse's instructions. Ask questions about anything you do " "not understand.  For the rest of the day after your procedure:   Try to rest. Limit activities like exercise or driving.   The doctor might recommend an over-the-counter pain medicine. These include acetaminophen (sample brand name: Tylenol), ibuprofen (sample brand names: Advil, Motrin), and naproxen (sample brand name: Aleve).   Ice can help with pain and swelling. Put a cold gel pack, bag of ice, or bag of frozen vegetables on the injection site area every 1 to 2 hours, for 15 minutes each time. Put a thin towel between the ice (or other cold object) and the skin.  What are the risks of an epidural injection? -- Your doctor will talk to you about all of the possible risks, and answer your questions. Possible risks include:   Bleeding   Infection   Headache   Nerve injury  When should I call the doctor? -- Call for emergency help right away (in the [place] and [place], call 9-1-1) if:   You can't move your arms or legs.  Call for advice if:   You have a fever of 100.4°F (38°C) or higher, or chills.   You have redness or swelling around the injection site.   You have a headache.   Your arms or legs are numb, weak, or tingly.  All topics are updated as new evidence becomes available and our peer review process is complete.  This topic retrieved from [employer] on: May 15, 2024.  Topic 548696 Version 1.0  Release: 32.4.3 - C32.134  © 2024 UpToDate, Inc. and/or its affiliates. All rights reserved.  figure 1: Epidural injection     Duringan epidural injection, the doctor inserts a needle between 2 of the bones thatmake up the spine. Then, they inject medicines into the area around the spinalcord. This is an illustration of a \"lumbar\" epidural injection, which is given into the low back. The doctor can place the needle in other areas to treat other types of pain.  Graphic 460237 Version 1.0  Consumer Information Use and Disclaimer   Disclaimer: This generalized information is a limited summary of diagnosis, treatment, and/or " medication information. It is not meant to be comprehensive and should be used as a tool to help the user understand and/or assess potential diagnostic and treatment options. It does NOT include all information about conditions, treatments, medications, side effects, or risks that may apply to a specific patient. It is not intended to be medical advice or a substitute for the medical advice, diagnosis, or treatment of a health care provider based on the health care provider's examination and assessment of a patient's specific and unique circumstances. Patients must speak with a health care provider for complete information about their health, medical questions, and treatment options, including any risks or benefits regarding use of medications. This information does not endorse any treatments or medications as safe, effective, or approved for treating a specific patient. UpToDate, Inc. and its affiliates disclaim any warranty or liability relating to this information or the use thereof.The use of this information is governed by the Terms of Use, available at https://www.[employer].com/en/know/clinical-effectiveness-terms. 2024© UpToDate, Inc. and its affiliates and/or licensors. All rights reserved.  Copyright   © 2024 UpToDate, Inc. and/or its affiliates. All rights reserved.

## 2025-05-27 ENCOUNTER — HOSPITAL ENCOUNTER (OUTPATIENT)
Dept: CT IMAGING | Facility: HOSPITAL | Age: 80
Discharge: HOME | End: 2025-05-27
Payer: MEDICARE

## 2025-05-27 DIAGNOSIS — F17.211 CIGARETTE NICOTINE DEPENDENCE IN REMISSION: ICD-10-CM

## 2025-05-27 DIAGNOSIS — N28.9 RENAL LESION: ICD-10-CM

## 2025-06-02 ENCOUNTER — OFFICE VISIT (OUTPATIENT)
Dept: FAMILY MEDICINE CLINIC | Facility: CLINIC | Age: 80
End: 2025-06-02
Payer: MEDICARE

## 2025-06-02 ENCOUNTER — TELEPHONE (OUTPATIENT)
Age: 80
End: 2025-06-02

## 2025-06-02 VITALS
SYSTOLIC BLOOD PRESSURE: 126 MMHG | OXYGEN SATURATION: 99 % | BODY MASS INDEX: 49.17 KG/M2 | WEIGHT: 288 LBS | HEART RATE: 91 BPM | DIASTOLIC BLOOD PRESSURE: 76 MMHG | HEIGHT: 64 IN

## 2025-06-02 DIAGNOSIS — L98.9 SKIN LESION: ICD-10-CM

## 2025-06-02 DIAGNOSIS — L91.8 MULTIPLE ACQUIRED SKIN TAGS: ICD-10-CM

## 2025-06-02 PROCEDURE — 99214 OFFICE O/P EST MOD 30 MIN: CPT | Performed by: NURSE PRACTITIONER

## 2025-06-02 PROCEDURE — G2211 COMPLEX E/M VISIT ADD ON: HCPCS | Performed by: NURSE PRACTITIONER

## 2025-06-02 PROCEDURE — 88304 TISSUE EXAM BY PATHOLOGIST: CPT | Performed by: STUDENT IN AN ORGANIZED HEALTH CARE EDUCATION/TRAINING PROGRAM

## 2025-06-02 PROCEDURE — 17110 DESTRUCTION B9 LES UP TO 14: CPT | Performed by: NURSE PRACTITIONER

## 2025-06-02 NOTE — PROGRESS NOTES
"Name: Ladi Mojica      : 1945      MRN: [id number]  Encounter Provider: MONIQUE Cross  Encounter Date: 2025   Encounter department: [facility] [address]  :  Assessment & Plan  Skin lesion    Orders:    Ambulatory Referral to Dermatology; Future    Multiple acquired skin tags                History of Present Illness   Patient presents to have moles removed.       Review of Systems   Constitutional: Negative.    Respiratory: Negative.     Cardiovascular: Negative.    Gastrointestinal: Negative.    Neurological: Negative.        Objective   /76   Pulse 91   Ht 5' 4\" (1.626 m)   Wt 131 kg (288 lb)   SpO2 99%   BMI 49.44 kg/m²      Physical Exam  Constitutional:       General: She is not in acute distress.  Pulmonary:      Effort: Pulmonary effort is normal. No respiratory distress.     Neurological:      Mental Status: She is alert and oriented to person, place, and time.       Lesion Destruction    Date/Time: 2025 2:20 PM    Performed by: MONIQUE Cross  Authorized by: MONIQUE Cross    Procedure Details - Lesion Destruction:     Number of Lesions:  6  Lesion 1:     Body area:  Head/neck    Malignancy: benign lesion      Destruction method: cryotherapy    Lesion 2:     Body area:  Head/neck    Head/neck location:  Neck    Malignancy: benign lesion      Destruction method: cryotherapy    Lesion 3:     Body area:  Head/neck    Head/neck location:  Neck    Malignancy: benign lesion      Destruction method: cryotherapy    Lesion 4:     Body area:  Head/neck    Head/neck location:  Neck    Malignancy: benign lesion      Destruction method: cryotherapy    Lesion 5:     Body area:  Head/neck    Head/neck location:  Neck    Malignancy: benign lesion      Destruction method: cryotherapy    Lesion 6:     Body area:  Head/neck    Head/neck location:  Neck    Malignancy: benign lesion      Destruction method: cryotherapy       Lesion 7 Body " Area: neck  Initial Size (mm):   Final Defect size (mm):   Malignancy type:   Skin cancer type:   Destruction Method: cryo  Cosmetic: Yes       , Lesion 8  Body Area: neck  Initial Size (mm):   Final Defect size (mm):   Malignancy type: benign  Skin cancer type:   Destruction Method: cryo  Cosmetic: Yes       , Lesion 9  Body Area: neck  Initial Size (mm):   Final Defect size (mm):   Malignancy type:   Skin cancer type:   Destruction Method: cryo  Cosmetic: Yes

## 2025-06-02 NOTE — TELEPHONE ENCOUNTER
Patient called looking to schedule a new patient appointment offered first available and declined

## 2025-06-04 ENCOUNTER — TELEPHONE (OUTPATIENT)
Age: 80
End: 2025-06-04

## 2025-06-04 NOTE — TELEPHONE ENCOUNTER
Relayed results to patient as per provider message. Patient expressed understanding and did not have any further questions.                                                                               CT lung screening program: Result Notes     Jory Mills MA  6/4/2025  2:03 PM EDT       Called and no response, left a message to call us back     MONIQUE Sloan  6/4/2025 12:35 PM EDT       Will continue monitoring the lungs once a year

## 2025-06-05 PROCEDURE — 88305 TISSUE EXAM BY PATHOLOGIST: CPT | Performed by: STUDENT IN AN ORGANIZED HEALTH CARE EDUCATION/TRAINING PROGRAM

## 2025-06-06 ENCOUNTER — RESULTS FOLLOW-UP (OUTPATIENT)
Dept: FAMILY MEDICINE CLINIC | Facility: CLINIC | Age: 80
End: 2025-06-06

## 2025-06-10 ENCOUNTER — HOSPITAL ENCOUNTER (OUTPATIENT)
Dept: MRI IMAGING | Facility: CLINIC | Age: 80
Discharge: HOME | End: 2025-06-10
Attending: STUDENT IN AN ORGANIZED HEALTH CARE EDUCATION/TRAINING PROGRAM

## 2025-06-10 DIAGNOSIS — R29.818 NEUROGENIC CLAUDICATION: ICD-10-CM

## 2025-06-10 DIAGNOSIS — M54.41 CHRONIC BILATERAL LOW BACK PAIN WITH RIGHT-SIDED SCIATICA: ICD-10-CM

## 2025-06-10 DIAGNOSIS — G89.29 CHRONIC BILATERAL LOW BACK PAIN WITH RIGHT-SIDED SCIATICA: ICD-10-CM

## 2025-06-14 ENCOUNTER — HOSPITAL ENCOUNTER (OUTPATIENT)
Dept: MRI IMAGING | Facility: HOSPITAL | Age: 80
Discharge: HOME | End: 2025-06-14
Attending: STUDENT IN AN ORGANIZED HEALTH CARE EDUCATION/TRAINING PROGRAM
Payer: MEDICARE

## 2025-06-14 DIAGNOSIS — I10 ESSENTIAL HYPERTENSION: ICD-10-CM

## 2025-06-14 PROCEDURE — 72148 MRI LUMBAR SPINE W/O DYE: CPT

## 2025-06-15 RX ORDER — AMLODIPINE BESYLATE 10 MG/1
10 TABLET ORAL DAILY
Qty: 90 TABLET | Refills: 1 | Status: SHIPPED | OUTPATIENT
Start: 2025-06-15

## 2025-06-17 ENCOUNTER — RESULTS FOLLOW-UP (OUTPATIENT)
Dept: RADIOLOGY | Facility: CLINIC | Age: 80
End: 2025-06-17

## 2025-06-23 ENCOUNTER — HOSPITAL ENCOUNTER (OUTPATIENT)
Dept: NON INVASIVE DIAGNOSTICS | Facility: CLINIC | Age: 80
Discharge: HOME | End: 2025-06-23
Payer: MEDICARE

## 2025-06-23 VITALS
HEART RATE: 87 BPM | HEIGHT: 64 IN | BODY MASS INDEX: 49.31 KG/M2 | WEIGHT: 288.8 LBS | SYSTOLIC BLOOD PRESSURE: 126 MMHG | DIASTOLIC BLOOD PRESSURE: 76 MMHG

## 2025-06-23 DIAGNOSIS — R26.0 ATAXIC GAIT: ICD-10-CM

## 2025-06-23 DIAGNOSIS — R42 DIZZINESS: ICD-10-CM

## 2025-06-23 LAB
AORTIC ROOT: 3.3 CM
BSA FOR ECHO PROCEDURE: 2.29 M2
E WAVE DECELERATION TIME: 200 MS
E/A RATIO: 0.7
FRACTIONAL SHORTENING: 36 (ref 28–44)
INTERVENTRICULAR SEPTUM IN DIASTOLE (PARASTERNAL SHORT AXIS VIEW): 1.2 CM
INTERVENTRICULAR SEPTUM: 1.2 CM (ref 0.6–1.1)
LAAS-AP2: 9.6 CM2
LAAS-AP4: 15.4 CM2
LEFT ATRIUM AREA SYSTOLE SINGLE PLANE A4C: 15.2 CM2
LEFT ATRIUM SIZE: 3.9 CM
LEFT ATRIUM VOLUME (MOD BIPLANE): 26 ML
LEFT ATRIUM VOLUME INDEX (MOD BIPLANE): 11.4 ML/M2
LEFT INTERNAL DIMENSION IN SYSTOLE: 2.8 CM (ref 2.1–4)
LEFT VENTRICULAR INTERNAL DIMENSION IN DIASTOLE: 4.4 CM (ref 3.5–6)
LEFT VENTRICULAR POSTERIOR WALL IN END DIASTOLE: 1.2 CM
LEFT VENTRICULAR STROKE VOLUME: 57 ML
LV EF US.2D.A4C+ESTIMATED: 62 %
LVSV (TEICH): 57 ML
MV E'TISSUE VEL-LAT: 6 CM/S
MV E'TISSUE VEL-SEP: 8 CM/S
MV PEAK A VEL: 1.05 M/S
MV PEAK E VEL: 74 CM/S
MV STENOSIS PRESSURE HALF TIME: 58 MS
MV VALVE AREA P 1/2 METHOD: 3.79
RIGHT ATRIUM AREA SYSTOLE A4C: 12 CM2
RIGHT VENTRICLE ID DIMENSION: 2.5 CM
SL CV LEFT ATRIUM LENGTH A2C: 4.7 CM
SL CV LV EF: 55
SL CV PED ECHO LEFT VENTRICLE DIASTOLIC VOLUME (MOD BIPLANE) 2D: 87 ML
SL CV PED ECHO LEFT VENTRICLE SYSTOLIC VOLUME (MOD BIPLANE) 2D: 30 ML
TR MAX PG: 27 MMHG
TR PEAK VELOCITY: 2.6 M/S
TRICUSPID ANNULAR PLANE SYSTOLIC EXCURSION: 2.7 CM
TRICUSPID VALVE PEAK REGURGITATION VELOCITY: 2.62 M/S

## 2025-06-23 PROCEDURE — 93880 EXTRACRANIAL BILAT STUDY: CPT

## 2025-06-23 PROCEDURE — 93306 TTE W/DOPPLER COMPLETE: CPT

## 2025-06-23 PROCEDURE — 93880 EXTRACRANIAL BILAT STUDY: CPT | Performed by: SURGERY

## 2025-06-23 PROCEDURE — 93306 TTE W/DOPPLER COMPLETE: CPT | Performed by: INTERNAL MEDICINE

## 2025-06-24 ENCOUNTER — OFFICE VISIT (OUTPATIENT)
Dept: PAIN MEDICINE | Facility: CLINIC | Age: 80
End: 2025-06-24
Payer: MEDICARE

## 2025-06-24 ENCOUNTER — APPOINTMENT (OUTPATIENT)
Dept: RADIOLOGY | Facility: CLINIC | Age: 80
End: 2025-06-24
Payer: MEDICARE

## 2025-06-24 VITALS — WEIGHT: 282 LBS | HEIGHT: 64 IN | BODY MASS INDEX: 48.14 KG/M2

## 2025-06-24 DIAGNOSIS — M47.816 LUMBAR SPONDYLOSIS: ICD-10-CM

## 2025-06-24 DIAGNOSIS — M25.551 RIGHT HIP PAIN: ICD-10-CM

## 2025-06-24 DIAGNOSIS — M79.651 RIGHT THIGH PAIN: ICD-10-CM

## 2025-06-24 DIAGNOSIS — G89.29 CHRONIC BILATERAL LOW BACK PAIN, UNSPECIFIED WHETHER SCIATICA PRESENT: Primary | ICD-10-CM

## 2025-06-24 DIAGNOSIS — R07.81 RIB PAIN ON RIGHT SIDE: ICD-10-CM

## 2025-06-24 DIAGNOSIS — M54.50 CHRONIC BILATERAL LOW BACK PAIN, UNSPECIFIED WHETHER SCIATICA PRESENT: Primary | ICD-10-CM

## 2025-06-24 PROCEDURE — 71100 X-RAY EXAM RIBS UNI 2 VIEWS: CPT

## 2025-06-24 PROCEDURE — 99214 OFFICE O/P EST MOD 30 MIN: CPT

## 2025-06-24 PROCEDURE — 73502 X-RAY EXAM HIP UNI 2-3 VIEWS: CPT

## 2025-06-24 PROCEDURE — 72110 X-RAY EXAM L-2 SPINE 4/>VWS: CPT

## 2025-06-24 RX ORDER — METHYLPREDNISOLONE 4 MG/1
TABLET ORAL
Qty: 1 EACH | Refills: 0 | Status: SHIPPED | OUTPATIENT
Start: 2025-06-24

## 2025-06-24 NOTE — PROGRESS NOTES
Name: Ladi Mojica      : 1945      MRN: [id number]  Encounter Provider: MONIQUE Prado  Encounter Date: 2025   Encounter department: [facility]  :  Assessment & Plan  Chronic bilateral low back pain, unspecified whether sciatica present  Right hip pain  Right thigh pain  Lumbar spondylosis  Rib pain on right side      Orders:  •  XR ribs 2 vw right; Future  •  XR spine lumbar minimum 4 views non injury; Future  •  XR hip/pelv 2-3 vws right if performed; Future  •  methylPREDNISolone 4 MG tablet therapy pack; Use as directed on package      Patient returns to the office with worsening back pain along with right thigh numbness.  Discussed with the patient that I feel she has overlapping pain concerns.  Feel the patient's pain is facet mediated and she would benefit from a medial branch block to radiofrequency ablation.  Patient has yet to begin aqua therapy she is scheduled to start next week, advised the patient that if her pain should persist after 4 weeks of aqua therapy we can order a medial branch block to radiofrequency ablation of her bilateral L4-L5, L5-S1. Patient understands that a medial branch block is a diagnostic tool which would need to be followed by a confirmatory medial branch block followed by a radiofrequency ablation which will provide the patient with meaningful pain relief.  Patient is also reporting right anterior thigh pain/numbness.  On exam patient appears to have pain that is being generated by her right hip I will get an x-ray of her right hip to check for degeneration and discussed potential right hip injection.  Patient is unsure about having injectable interventions at this time.  Patient took pamphlets to review and will contact the office if she wishes to pursue these options.    Patient states that a family member states that she should trial Celebrex advised the patient that she has stage IV kidney disease therefore using  any nonsteroid anti-inflammatory drugs such as Celebrex is contraindicated.  Patient states that she takes so many medications that at times she forgets that she can take Tylenol, advised the patient that she may take up to 2000 mg of Tylenol day if she needed to.  Advised the patient that I can prescribe her a Medrol dose steroid pack due to her having severe 10 out of 10 pain.  Patient agreeable to this plan.    My impressions and treatment recommendations were discussed in detail with the patient who verbalized understanding and had no further questions.  Discharge instructions were provided. I personally saw and examined the patient and I agree with the above discussed plan of care.    History of Present Illness {?Quick Links Encounters * My Last Note * Last Note in Specialty * Snapshot * Since Last Visit * History :51010}    Ladi Mojica is a 80 y.o. female who presents for a follow up office visit in regards to Back Pain (6/14 MRI lumbar spine /Back and leg pain right side sciatica /Numbness and tingling  ? Yes /Leg weakness ? Yes /Does take tylenol /Is supposed to start PT for a consult ). At today's visit patient states that their pain symptoms are worse with a pain score of 10/10 on the verbal numeric pain scale.  The patient's pain is worse with any activity.  The patient's pain is Constant in nature.  And the quality of the patient's pain is described as dull-aching, throbbing, numbness, and pins-and-needles.  The patient's pain is located in the patient has bilateral low back pain and right anterior thigh pain.  Patient typically uses Tylenol as needed for pain.    Review of Systems   Respiratory:  Positive for shortness of breath.    Cardiovascular:  Negative for chest pain.   Gastrointestinal:  Negative for constipation, diarrhea, nausea and vomiting.   Musculoskeletal:  Positive for back pain and gait problem. Negative for arthralgias, joint swelling and myalgias.   Skin:  Negative for rash.  "  Neurological:  Negative for dizziness, seizures and weakness.   All other systems reviewed and are negative.      Medical History Reviewed by provider this encounter:  Tobacco  Allergies  Meds  Problems  Med Hx  Surg Hx  Fam Hx     .     Objective {?Quick Links Trend Vitals * Enter New Vitals * Results Review * Timeline (Adult) * Labs * Imaging * Cardiology * Procedures * Lung Cancer Screening * Surgical eConsent :03546}  Ht 5' 4\" (1.626 m)   Wt 128 kg (282 lb)   BMI 48.41 kg/m²      Pain Score: 10-Worst pain ever (pain at its worst)    Constitutional:normal, well developed, well nourished, alert, in no distress and non-toxic and no overt pain behavior. and obese  Eyes:anicteric  HEENT:grossly intact  Neck:supple, symmetric, trachea midline and no masses   Pulmonary:even and unlabored  Cardiovascular:No edema or pitting edema present  Skin:Normal without rashes or lesions and well hydrated  Psychiatric:Mood and affect appropriate  Neurologic:Cranial Nerves II-XII grossly intact  Musculoskeletal: antalgic gait    Lumbar Spine Exam    Appearance:  Normal lordosis  Palpation/Tenderness:  left lumbar paraspinal tenderness  right lumbar paraspinal tenderness  left sacroiliac joint tenderness  right sacroiliac joint tenderness  Sensory:  dimished light touch sensation, location: right anterior thigh  Range of Motion:  Flexion:  Minimally limited  with pain  Extension:  Moderately limited  with pain  Lateral Flexion - Left:  Moderately limited  with pain  Lateral Flexion - Right:  Moderately limited  with pain  Rotation - Left:  Moderately limited  with pain  Rotation - Right:  Moderately limited  with pain  Special Tests:  Left Straight Leg Test:  negative  Right Straight Leg Test:  negative  Left Pilo's Maneuver:  negative  Right Pilo's Maneuver:  Positive anteriorly  Left Gaenslen's Test:  positive  Right Gaenslen's Test:  positive  Left Pelvic Distraction Test:  negative  Right Pelvic Distraction Test: "  negative    This document was created using speech voice recognition software.   Grammatical errors, random word insertions, pronoun errors, and incomplete sentences are an occasional consequence of this system due to software limitations, ambient noise, and hardware issues.   Any formal questions or concerns about content, text, or information contained within the body of this dictation should be directly addressed to the provider for clarification.

## 2025-06-26 NOTE — PATIENT INSTRUCTIONS
Cervical, Thoracic & Lumbar Medial Branch and Radiofrequency Ablation Procedure  The medial branch block procedure helps determine whether your facet joints in your spine are a source of your pain. These joints become inflamed due to injury or arthritis and cause pain. In order to determine if these joints are the source of your pain, we complete a diagnostic block procedure. During this procedure, the doctor will inject a local anesthetic near the medial branch nerves to attempt to block pain signals. Our clinical team will provide you with a pain diary that will have you document your pain level for 12 hours post injection. If you have temporary relief of your pain doing the activities that normally aggravate it (goal: 6-8 hours), we will complete a second confirmatory block at a later date. If you do not have relief of your pain that day, we know the facet joints are not the source of your pain and we reevaluate.   If the first two blocks are positive and you have about 6-8 hours of relief of your pain that day, we move onto the third procedure, radiofrequency ablation. This procedure will cauterizes the nerves (through localized heating) that block the chronic pain signals.    For more information, please scan the below QR code for a video link:

## 2025-06-27 DIAGNOSIS — I10 ESSENTIAL HYPERTENSION: ICD-10-CM

## 2025-06-30 RX ORDER — LISINOPRIL 10 MG/1
10 TABLET ORAL DAILY
Qty: 90 TABLET | Refills: 1 | Status: SHIPPED | OUTPATIENT
Start: 2025-06-30

## 2025-07-01 ENCOUNTER — EVALUATION (OUTPATIENT)
Dept: PHYSICAL THERAPY | Age: 80
End: 2025-07-01
Payer: MEDICARE

## 2025-07-01 DIAGNOSIS — G89.29 CHRONIC RIGHT-SIDED LOW BACK PAIN WITHOUT SCIATICA: Primary | ICD-10-CM

## 2025-07-01 DIAGNOSIS — M54.50 CHRONIC RIGHT-SIDED LOW BACK PAIN WITHOUT SCIATICA: Primary | ICD-10-CM

## 2025-07-01 DIAGNOSIS — E78.2 MIXED HYPERLIPIDEMIA: ICD-10-CM

## 2025-07-01 DIAGNOSIS — M25.551 RIGHT HIP PAIN: ICD-10-CM

## 2025-07-01 DIAGNOSIS — M79.604 RIGHT LEG PAIN: ICD-10-CM

## 2025-07-01 PROCEDURE — 97162 PT EVAL MOD COMPLEX 30 MIN: CPT | Performed by: PHYSICAL THERAPIST

## 2025-07-01 PROCEDURE — 97110 THERAPEUTIC EXERCISES: CPT | Performed by: PHYSICAL THERAPIST

## 2025-07-01 NOTE — LETTER
2025    MONIQUE Sloan  [address] [place] [address]    Patient: Ladi Mojica   YOB: 1945   Date of Visit: 2025     Encounter Diagnosis     ICD-10-CM    1. Chronic right-sided low back pain without sciatica  M54.50     G89.29       2. Right hip pain  M25.551       3. Right leg pain  M79.604           Dear MONIQUE Ozuna:    Thank you for your recent referral of Ladi Mojica. Please review the attached evaluation summary from Ladi's recent visit.     Please verify that you agree with the plan of care by signing the attached order.     If you have any questions or concerns, please do not hesitate to call.     I sincerely appreciate the opportunity to share in the care of one of your patients and hope to have another opportunity to work with you in the near future.       Sincerely,    Carolina Vegas, PT      Referring Provider:      I certify that I have read the below Plan of Care and certify the need for these services furnished under this plan of treatment while under my care.                    MONIQUE Sloan  [address] [place] [id number]  Via In Basket          PT Evaluation     Today's date: 2025  Patient name: Ladi Mojica  : 1945  MRN: [id number]  Referring provider: Dana Perez CRNP  Dx:   Encounter Diagnosis     ICD-10-CM    1. Chronic right-sided low back pain without sciatica  M54.50     G89.29       2. Right hip pain  M25.551       3. Right leg pain  M79.604                      Assessment  Impairments: abnormal or restricted ROM, impaired physical strength, lacks appropriate home exercise program, pain with function, poor posture , participation limitations and activity limitations  Functional limitations: walking, standing  Symptom irritability: moderate    Assessment details: Ladi Mojica is a pleasant 81 yo female with a history right low back and right LE pain. She has significant  "limitation in lumbar extension and aberrant motion and return to stand from a flexed position.  She has pain produced with resisted hip flexion and general weakness in hip flexion and abduction bilaterally.  She does have paresthesias present in the right anterior thigh but sensation at this time is relatively intact.  She has limited walking tolerance but does appear to be safe on steps at this time.  She did have difficulty rising of a chair during 5 times sit to stand test.    This time Ladi does appear safe and a good candidate to begin a trial of aquatic therapy to work on lower extremity strengthening as well as general mobility in a gravity reduced environment.    Goals  Short-term goal 2 to 4 weeks:  1.  Patient will tolerate 5 minutes of walking in aquatic environment without increased back or lower extremity pain.  2.  Patient will be able to perform a 30-minute aquatic exercise program without excessive fatigue or increased pain in her low back and hip to promote reconditioning and improve mobility.  Long-term goals 6 to 8 weeks:  1.  Patient will demonstrate increase strength in lower extremities by half to 1 minute muscle test.  2.  Patient will be able to walk greater than 10 minutes out of her home in order to attend recreational activities including racing.  3.  Patient will be able to tolerate standing in order to wash her dishes with decreased rest periods required to achieve completion of activity in 10 minutes.  4.  Patient will be able to participate in independent aquatic fitness program by discharge.    Plan  Patient would benefit from: skilled physical therapy    Frequency: 2x week  Plan of Care beginning date: 7/1/2025  Plan of Care expiration date: 9/29/2025  Treatment plan discussed with: patient        Subjective Evaluation    History of Present Illness  Mechanism of injury: Pt notes onset of pain in her right leg \"hip\" for 1 year.   She has intermittent numbness in the front of the " leg to the knee and occasional tingling. She also reports right low back pain.     She has difficulty standing for long periods and has difficulty doing dishes and cooking. She reports standing toelrance is less than 5 minutes. She gets relief with sitting. She has no pain with sleeping.     Pt lives alone and is able to dress herself. She has to rest for household chores she has to rest. She is sedentary.    Pt's goals are to be able to go out and watch racing on a dirt track.   Patient Goals  Patient goals for therapy: decreased pain  Patient goal: be able walk to go watch racing  Pain  Current pain ratin  At worst pain rating: 10  Location: middle of low back into tright LE (anterior to the knee)  Quality: dull ache, sharp and radiating  Relieving factors: rest and relaxation  Aggravating factors: standing and walking    Social Support  Stairs in house: no   Lives in: one-story house  Lives with: alone          Objective     Concurrent Complaints      Additional Special Questions  No red flags    Neurological Testing     Sensation     Lumbar   Left   Intact: light touch and pin prick    Right   Intact: light touch and pin prick  Paresthesia: light touch    Comments   Right light touch: anterior thigh    Active Range of Motion     Lumbar   Flexion:  Restriction level: minimal  Extension:  Restriction level: maximal  Left lateral flexion:  Restriction level: minimal  Right lateral flexion:  Restriction level: minimal  Left rotation:  Restriction level: minimal  Right rotation:  Restriction level: minimal    Strength/Myotome Testing     Left Hip   Planes of Motion   Flexion: 5  Abduction: 4-  Adduction: 4    Right Hip   Planes of Motion   Flexion: 4- (pain provoked in hip)  Abduction: 4-  Adduction: 4    Left Knee   Flexion: 5  Extension: 4    Right Knee   Flexion: 5  Extension: 4    Left Ankle/Foot   Dorsiflexion: 5  Plantar flexion: 4-    Right Ankle/Foot   Dorsiflexion: 5  Plantar flexion: 4-    Ambulation    Weight-Bearing Status   Assistive device used: single point cane    Additional Weight-Bearing Status Details  Pt walks without ad but has sp cane as needed    Ambulation: Level Surfaces   Ambulation with assistive device: independent  Ambulation without assistive device: independent    Ambulation: Stairs   Ascend stairs: independent  Pattern: non-reciprocal  Railings: one rail  Descend stairs: independent  Pattern: non-reciprocal  Railings: one rail    Additional Stairs Ambulation Details  Pt is safe on stairs with use of railing    General Comments:      Lumbar Comments  5x sit-stand 23.17             Precautions: HTN, CVA, tremor right hand      TherEx 7/1            Pt ed  10' pool                                                   Neuro Re-Ed                                                                                                        Ther Ex                                                                                                                     Ther Activity                                       Gait Training                                       Modalities

## 2025-07-01 NOTE — PROGRESS NOTES
PT Evaluation     Today's date: 2025  Patient name: Ladi Mojica  : 1945  MRN: [id number]  Referring provider: Dana Perez CRNP  Dx:   Encounter Diagnosis     ICD-10-CM    1. Chronic right-sided low back pain without sciatica  M54.50     G89.29       2. Right hip pain  M25.551       3. Right leg pain  M79.604                      Assessment  Impairments: abnormal or restricted ROM, impaired physical strength, lacks appropriate home exercise program, pain with function, poor posture , participation limitations and activity limitations  Functional limitations: walking, standing  Symptom irritability: moderate    Assessment details: Ladi Mojica is a pleasant 81 yo female with a history right low back and right LE pain. She has significant limitation in lumbar extension and aberrant motion and return to stand from a flexed position.  She has pain produced with resisted hip flexion and general weakness in hip flexion and abduction bilaterally.  She does have paresthesias present in the right anterior thigh but sensation at this time is relatively intact.  She has limited walking tolerance but does appear to be safe on steps at this time.  She did have difficulty rising of a chair during 5 times sit to stand test.    This time Ladi does appear safe and a good candidate to begin a trial of aquatic therapy to work on lower extremity strengthening as well as general mobility in a gravity reduced environment.    Goals  Short-term goal 2 to 4 weeks:  1.  Patient will tolerate 5 minutes of walking in aquatic environment without increased back or lower extremity pain.  2.  Patient will be able to perform a 30-minute aquatic exercise program without excessive fatigue or increased pain in her low back and hip to promote reconditioning and improve mobility.  Long-term goals 6 to 8 weeks:  1.  Patient will demonstrate increase strength in lower extremities by half to 1 minute muscle test.  2.   "Patient will be able to walk greater than 10 minutes out of her home in order to attend recreational activities including racing.  3.  Patient will be able to tolerate standing in order to wash her dishes with decreased rest periods required to achieve completion of activity in 10 minutes.  4.  Patient will be able to participate in independent aquatic fitness program by discharge.    Plan  Patient would benefit from: skilled physical therapy    Frequency: 2x week  Plan of Care beginning date: 2025  Plan of Care expiration date: 2025  Treatment plan discussed with: patient        Subjective Evaluation    History of Present Illness  Mechanism of injury: Pt notes onset of pain in her right leg \"hip\" for 1 year.   She has intermittent numbness in the front of the leg to the knee and occasional tingling. She also reports right low back pain.     She has difficulty standing for long periods and has difficulty doing dishes and cooking. She reports standing toelrance is less than 5 minutes. She gets relief with sitting. She has no pain with sleeping.     Pt lives alone and is able to dress herself. She has to rest for household chores she has to rest. She is sedentary.    Pt's goals are to be able to go out and watch racing on a dirt track.   Patient Goals  Patient goals for therapy: decreased pain  Patient goal: be able walk to go watch racing  Pain  Current pain ratin  At worst pain rating: 10  Location: middle of low back into tright LE (anterior to the knee)  Quality: dull ache, sharp and radiating  Relieving factors: rest and relaxation  Aggravating factors: standing and walking    Social Support  Stairs in house: no   Lives in: one-story house  Lives with: alone          Objective     Concurrent Complaints      Additional Special Questions  No red flags    Neurological Testing     Sensation     Lumbar   Left   Intact: light touch and pin prick    Right   Intact: light touch and pin prick  Paresthesia: " light touch    Comments   Right light touch: anterior thigh    Active Range of Motion     Lumbar   Flexion:  Restriction level: minimal  Extension:  Restriction level: maximal  Left lateral flexion:  Restriction level: minimal  Right lateral flexion:  Restriction level: minimal  Left rotation:  Restriction level: minimal  Right rotation:  Restriction level: minimal    Strength/Myotome Testing     Left Hip   Planes of Motion   Flexion: 5  Abduction: 4-  Adduction: 4    Right Hip   Planes of Motion   Flexion: 4- (pain provoked in hip)  Abduction: 4-  Adduction: 4    Left Knee   Flexion: 5  Extension: 4    Right Knee   Flexion: 5  Extension: 4    Left Ankle/Foot   Dorsiflexion: 5  Plantar flexion: 4-    Right Ankle/Foot   Dorsiflexion: 5  Plantar flexion: 4-    Ambulation   Weight-Bearing Status   Assistive device used: single point cane    Additional Weight-Bearing Status Details  Pt walks without ad but has sp cane as needed    Ambulation: Level Surfaces   Ambulation with assistive device: independent  Ambulation without assistive device: independent    Ambulation: Stairs   Ascend stairs: independent  Pattern: non-reciprocal  Railings: one rail  Descend stairs: independent  Pattern: non-reciprocal  Railings: one rail    Additional Stairs Ambulation Details  Pt is safe on stairs with use of railing    General Comments:      Lumbar Comments  5x sit-stand 23.17             Precautions: HTN, CVA, tremor right hand      TherEx 7/1            Pt ed  10' pool                                                   Neuro Re-Ed                                                                                                        Ther Ex                                                                                                                     Ther Activity                                       Gait Training                                       Modalities

## 2025-07-02 RX ORDER — ROSUVASTATIN CALCIUM 10 MG/1
10 TABLET, COATED ORAL DAILY
Qty: 90 TABLET | Refills: 0 | Status: SHIPPED | OUTPATIENT
Start: 2025-07-02

## 2025-07-03 ENCOUNTER — TELEPHONE (OUTPATIENT)
Age: 80
End: 2025-07-03

## 2025-07-03 NOTE — TELEPHONE ENCOUNTER
Caller: sheldon Bledsoe    Doctor: Dr. Shirley    Reason for call: pt calling to schedule a procedure that she & MG had spoken about    Call back#: [phone]

## 2025-07-07 NOTE — TELEPHONE ENCOUNTER
In order to have a medial branch block you must attend physical therapy for a home exercise size plan for minimum of 4 weeks or be discharged from a physical therapy.    The evaluation that you had on 7/1/2025 they felt that you  would benefit from their program.  Anything new can make the pain worse for about 2 weeks prior to it improving.    If you are uncomfortable with aqua therapy we can change it to land-based physical therapy or we can send you a home exercise plan that you would need to do a minimum of 3 times per week 45 minutes/day and if your pain was still persistent in 4 weeks you would return to the office to be reevaluated and then at that time we could order the MBB.

## 2025-07-08 ENCOUNTER — OFFICE VISIT (OUTPATIENT)
Dept: PHYSICAL THERAPY | Age: 80
End: 2025-07-08
Payer: MEDICARE

## 2025-07-08 DIAGNOSIS — M79.604 RIGHT LEG PAIN: ICD-10-CM

## 2025-07-08 DIAGNOSIS — G89.29 CHRONIC RIGHT-SIDED LOW BACK PAIN WITHOUT SCIATICA: Primary | ICD-10-CM

## 2025-07-08 DIAGNOSIS — M54.50 CHRONIC RIGHT-SIDED LOW BACK PAIN WITHOUT SCIATICA: Primary | ICD-10-CM

## 2025-07-08 DIAGNOSIS — M25.551 RIGHT HIP PAIN: ICD-10-CM

## 2025-07-08 PROCEDURE — 97113 AQUATIC THERAPY/EXERCISES: CPT

## 2025-07-08 NOTE — PROGRESS NOTES
Daily Note     Today's date: 2025  Patient name: Ladi Mojica  : 1945  MRN: [id number]  Referring provider: Dana Perez CRNP  Dx:   Encounter Diagnosis     ICD-10-CM    1. Chronic right-sided low back pain without sciatica  M54.50     G89.29       2. Right hip pain  M25.551       3. Right leg pain  M79.604           Start Time: 1200  Stop Time: 1300  Total time in clinic (min): 60 minutes    Subjective: pt notes she is very nervous about the pool. She states she felt good in the deep water, no c/o pain.      Objective: See treatment diary below  Pt seen 1:1 for 30 min    Assessment: Tolerated treatment fair.  First day in the water today and just wanted to make pt feel at ease. Gentle standing ex's in 3 ft section and then again in 5ft section. Using a noodle and HHA pt went to 7ft section for DWTX and DW ex's for LE's. Good relief in DW. Finished w/ WW at the wall and noodle ex's for core, balance and posture. Pt had poor balance w/ noodle ex's so had pt w/ back against the wall. Patient would benefit from continued PT      Plan: Continue per plan of care.        Precautions: HTN, CVA, tremor right hand  Precautions:  Daily Treatment Diary     Date           Patient education 10          Water Walk (FW, BW, side) x10’  3,2          LE work at wall               Hip FF/ext swing              Toe/heel  2            Hip ABD/ADD  2                        Knee flexion & extension 2            Squats 1          UE noodle x3             Push/pull  1 BAW            Rotate  1 BAW            Row forward & back  2 BAW            OH side bend            UE/Core (AROM, paddles, MB)              ABD/ADD              Horizontal Pec Fly              Alt. arm swing (shld flex/ext)              Push pull              Single paddle rotation           SLS (EO, EC, ball toss)            Aqua Step (FW, lat, eccentric)            Core work:              MF press (red, blue, blk)             Kickboard  press (blue, red)              Row/ext w/ tubing (red, blue, blk)           Seated on bench             ankle DF/PF              March              ABD/ADD              Knee flexion/extension            DW TX - hang in the deep water  6,6            DW ABD/ADD  1            DW Bicycle  3            DW ankle pumps 1            DW DKTC            Stretches              HS at step              Wall stretches              Calf stretch at wall              Other:            Hot Tub - 10 minutes only                  TherEx 7/1            Pt ed  10' pool                                                   Neuro Re-Ed                                                                                                        Ther Ex                                                                                                                     Ther Activity                                       Gait Training                                       Modalities

## 2025-07-10 ENCOUNTER — OFFICE VISIT (OUTPATIENT)
Dept: PHYSICAL THERAPY | Age: 80
End: 2025-07-10
Payer: MEDICARE

## 2025-07-10 DIAGNOSIS — M79.604 RIGHT LEG PAIN: ICD-10-CM

## 2025-07-10 DIAGNOSIS — M54.50 CHRONIC RIGHT-SIDED LOW BACK PAIN WITHOUT SCIATICA: Primary | ICD-10-CM

## 2025-07-10 DIAGNOSIS — M25.551 RIGHT HIP PAIN: ICD-10-CM

## 2025-07-10 DIAGNOSIS — G89.29 CHRONIC RIGHT-SIDED LOW BACK PAIN WITHOUT SCIATICA: Primary | ICD-10-CM

## 2025-07-10 PROCEDURE — 97113 AQUATIC THERAPY/EXERCISES: CPT

## 2025-07-10 NOTE — PROGRESS NOTES
Daily Note     Today's date: 7/10/2025  Patient name: Ladi Mojica  : 1945  MRN: [id number]  Referring provider: Dana Perez CRNP  Dx:   Encounter Diagnosis     ICD-10-CM    1. Chronic right-sided low back pain without sciatica  M54.50     G89.29       2. Right hip pain  M25.551       3. Right leg pain  M79.604           Start Time: 1300  Stop Time: 1400  Total time in clinic (min): 60 minutes    Subjective: pt notes she was not sore and had no c/o increased pain after initial session in the water.       Objective: See treatment diary below      Assessment: Tolerated treatment fair. Pt showed improved balance today w/ standing ex's. Pt able to walk across the pool today w/ a noodle stick only for balance assist. Pt had no LOB w/ WW. I was able to add resistive paddles today to work on balance, core  and posture strengthening. Pt did have increased radicular pain after water walking today but it was relieved w/ DWTX. Patient would benefit from continued PT      Plan: Continue per plan of care.      Precautions: HTN, CVA, tremor right hand  Precautions:  Daily Treatment Diary     Date 7/8 7/10         Patient education 10 5         Water Walk (FW, BW, side) x10’  3,2 5' F w/ NS         LE work at wall               Hip FF/ext swing   2           Toe/heel  2 1           Hip ABD/ADD  2 , 2 1,1           Knee flexion & extension 2 1           Squats 1 1         UE noodle x3             Push/pull  1 BAW 1 BAW           Rotate  1 BAW 1'           Row forward & back  2 BAW 2'BAW           OH side bend   X3         UE/Core (AROM, paddles, MB)   GREEN           ABD/ADD   1           Horizontal Pec Fly   1           Alt. arm swing (shld flex/ext)              Push pull   1           Single paddle rotation           SLS (EO, EC, ball toss)            Aqua Step (FW, lat, eccentric)            Core work:              MF press (red, blue, blk)             Kickboard press (blue, red)   RED  5''X5           Row/ext w/ tubing (red, blue, blk)           Seated on bench             ankle DF/PF              March              ABD/ADD              Knee flexion/extension            DW TX - hang in the deep water  6,6 12           DW ABD/ADD  1 1           DW Bicycle  3 4           DW ankle pumps 1 1           DW DKTC   X5         Stretches              HS at step              Wall stretches              Calf stretch at wall              Other:            Hot Tub - 10 minutes only   5               TherEx 7/1            Pt ed  10' pool                                                   Neuro Re-Ed                                                                                                        Ther Ex                                                                                                                     Ther Activity                                       Gait Training                                       Modalities

## 2025-07-11 DIAGNOSIS — R79.89 ELEVATED TSH: ICD-10-CM

## 2025-07-11 DIAGNOSIS — R53.83 OTHER FATIGUE: ICD-10-CM

## 2025-07-12 RX ORDER — LEVOTHYROXINE SODIUM 50 UG/1
50 TABLET ORAL DAILY
Qty: 30 TABLET | Refills: 0 | Status: SHIPPED | OUTPATIENT
Start: 2025-07-12

## 2025-07-14 ENCOUNTER — PATIENT MESSAGE (OUTPATIENT)
Dept: PAIN MEDICINE | Facility: CLINIC | Age: 80
End: 2025-07-14

## 2025-07-14 DIAGNOSIS — K21.9 GASTROESOPHAGEAL REFLUX DISEASE, UNSPECIFIED WHETHER ESOPHAGITIS PRESENT: ICD-10-CM

## 2025-07-14 NOTE — PATIENT COMMUNICATION
"Pt is scheduled for MBBs with Dr Shirley on 7/29/25 and 8/12/25    Pt reports she is not diabetic and does not have a pacemaker or defibrillator    Pt given instructions over phone and via myc message    Have you completed PT/HEP/Chiro in the past 6 months for dedicated area? Current AT with [facility] - reported it is \"helping some\"  If yes, how long did you complete?  What was the frequency?  Did it provide relief?  If no, reason therapy was not completed?    "

## 2025-07-15 ENCOUNTER — OFFICE VISIT (OUTPATIENT)
Dept: PHYSICAL THERAPY | Age: 80
End: 2025-07-15
Payer: MEDICARE

## 2025-07-15 DIAGNOSIS — K21.9 GASTROESOPHAGEAL REFLUX DISEASE, UNSPECIFIED WHETHER ESOPHAGITIS PRESENT: ICD-10-CM

## 2025-07-15 DIAGNOSIS — G89.29 CHRONIC RIGHT-SIDED LOW BACK PAIN WITHOUT SCIATICA: Primary | ICD-10-CM

## 2025-07-15 DIAGNOSIS — M79.604 RIGHT LEG PAIN: ICD-10-CM

## 2025-07-15 DIAGNOSIS — M25.551 RIGHT HIP PAIN: ICD-10-CM

## 2025-07-15 DIAGNOSIS — M54.50 CHRONIC RIGHT-SIDED LOW BACK PAIN WITHOUT SCIATICA: Primary | ICD-10-CM

## 2025-07-15 PROCEDURE — 97150 GROUP THERAPEUTIC PROCEDURES: CPT

## 2025-07-15 PROCEDURE — 97113 AQUATIC THERAPY/EXERCISES: CPT

## 2025-07-15 RX ORDER — FAMOTIDINE 20 MG/1
20 TABLET, FILM COATED ORAL 2 TIMES DAILY
Qty: 60 TABLET | Refills: 0 | Status: SHIPPED | OUTPATIENT
Start: 2025-07-15 | End: 2025-07-16

## 2025-07-16 RX ORDER — FAMOTIDINE 20 MG/1
20 TABLET, FILM COATED ORAL 2 TIMES DAILY
Qty: 180 TABLET | Refills: 0 | Status: SHIPPED | OUTPATIENT
Start: 2025-07-16

## 2025-07-17 ENCOUNTER — OFFICE VISIT (OUTPATIENT)
Dept: PHYSICAL THERAPY | Age: 80
End: 2025-07-17
Payer: MEDICARE

## 2025-07-17 DIAGNOSIS — M54.50 CHRONIC RIGHT-SIDED LOW BACK PAIN WITHOUT SCIATICA: Primary | ICD-10-CM

## 2025-07-17 DIAGNOSIS — M25.551 RIGHT HIP PAIN: ICD-10-CM

## 2025-07-17 DIAGNOSIS — G89.29 CHRONIC RIGHT-SIDED LOW BACK PAIN WITHOUT SCIATICA: Primary | ICD-10-CM

## 2025-07-17 DIAGNOSIS — M79.604 RIGHT LEG PAIN: ICD-10-CM

## 2025-07-17 PROCEDURE — 97113 AQUATIC THERAPY/EXERCISES: CPT

## 2025-07-17 NOTE — PROGRESS NOTES
Daily Note     Today's date: 2025  Patient name: Ladi Mojica  : 1945  MRN: [id number]  Referring provider: Dana Perez CRNP  Dx:   Encounter Diagnosis     ICD-10-CM    1. Chronic right-sided low back pain without sciatica  M54.50     G89.29       2. Right hip pain  M25.551       3. Right leg pain  M79.604           Start Time: 1300  Stop Time: 1400  Total time in clinic (min): 60 minutes    Subjective: pt notes she is liking the  pool.       Objective: See treatment diary below  Pt seen 1:1 for 30 min and group therapy for remainder    Assessment: Tolerated treatment fair.  Patient would benefit from continued PT      Plan: Continue per plan of care.      Precautions: HTN, CVA, tremor right hand  Precautions:  Daily Treatment Diary     Date 7/8 7/10 7/15 7/17       Patient education 10 5         Water Walk (FW, BW, side) x10’  3,2 5' F w/ NS 5, 5 w/ NS 10 w/ NS       LE work at wall               Hip FF/ext swing   2 2 2         Toe/heel  2 1 1 1         Hip ABD/ADD  2 2,1 2 2         March 2 1,1 1 1         Knee flexion & extension 2 1 1 1         Squats 1 1 1 1       UE noodle x3             Push/pull  1 BAW 1 BAW 1 1         Rotate  1 BAW 1' 1 1         Row forward & back  2 BAW 2'BAW 2 black N 2         OH side bend   X3         UE/Core (AROM, paddles, MB)   GREEN green green         ABD/ADD   1 1 1         Horizontal Pec Fly   1 1 1         Alt. arm swing (shld flex/ext)    1 1         Push pull   1 1 1         Single paddle rotation           SLS (EO, EC, ball toss)            Aqua Step (FW, lat, eccentric)            Core work:              MF press (red, blue, blk)             Kickboard press (blue, red)   RED 5''X5 5''x8 5''x10 blue         Row/ext w/ tubing (red, blue, blk)           Seated on bench             ankle DF/PF              March              ABD/ADD              Knee flexion/extension            DW TX - hang in the deep water  6,6 12 10 5         DW ABD/ADD  1 1 1 1          DW Bicycle  3 4 5 6         DW ankle pumps 1 1 1          DW DKTC   X5 5 x10       Stretches              HS at step              Wall stretches              Calf stretch at wall              Other:            Hot Tub - 10 minutes only   5 5 D/c             TherEx 7/1            Pt ed  10' pool                                                   Neuro Re-Ed                                                                                                        Ther Ex                                                                                                                     Ther Activity                                       Gait Training                                       Modalities

## 2025-07-22 ENCOUNTER — APPOINTMENT (OUTPATIENT)
Dept: PHYSICAL THERAPY | Age: 80
End: 2025-07-22
Payer: MEDICARE

## 2025-07-23 ENCOUNTER — OFFICE VISIT (OUTPATIENT)
Dept: PHYSICAL THERAPY | Age: 80
End: 2025-07-23
Payer: MEDICARE

## 2025-07-23 DIAGNOSIS — M25.551 RIGHT HIP PAIN: ICD-10-CM

## 2025-07-23 DIAGNOSIS — M79.604 RIGHT LEG PAIN: ICD-10-CM

## 2025-07-23 DIAGNOSIS — M54.50 CHRONIC RIGHT-SIDED LOW BACK PAIN WITHOUT SCIATICA: Primary | ICD-10-CM

## 2025-07-23 DIAGNOSIS — G89.29 CHRONIC RIGHT-SIDED LOW BACK PAIN WITHOUT SCIATICA: Primary | ICD-10-CM

## 2025-07-23 PROCEDURE — 97113 AQUATIC THERAPY/EXERCISES: CPT

## 2025-07-23 NOTE — PROGRESS NOTES
"Daily Note     Today's date: 2025  Patient name: Ladi Mojica  : 1945  MRN: [id number]  Referring provider: Dana Perez CRNP  Dx:   Encounter Diagnosis     ICD-10-CM    1. Chronic right-sided low back pain without sciatica  M54.50     G89.29       2. Right hip pain  M25.551       3. Right leg pain  M79.604           Start Time: 1500  Stop Time: 1600  Total time in clinic (min): 60 minutes    Subjective: Pt has no new complaints       Objective: See treatment diary below      Assessment: Continued current POC only by increasing resistance or repetitions as outlined below.  Tolerated treatment well. Patient demonstrated fatigue post treatment      Plan: Continue per plan of care.      Precautions: HTN, CVA, tremor right hand  Precautions:  Daily Treatment Diary     Date 7/8 7/10 7/15 7/17 7/21      Patient education 10 5         Water Walk (FW, BW, side) x10’  3,2 5' F w/ NS 5, 5 w/ NS 10 w/ NS 10' w/ n       LE work at wall               Hip FF/ext swing   2 2 2 2        Toe/heel  2 1 1 1 1        Hip ABD/ADD  2   2,1 2 2 2        March 2 1,1 1 1 1          Knee flexion & extension 2 1 1 1 1        Squats 1 1 1 1 1      UE noodle x3             Push/pull  1 BAW 1 BAW 1 1 1        Rotate  1 BAW 1' 1 1 1        Row forward & back  2 BAW 2'BAW 2 black N 2 2        OH side bend   X3         UE/Core (AROM, paddles, MB)   GREEN green green g        ABD/ADD   1 1 1 1        Horizontal Pec Fly   1 1 1 1        Alt. arm swing (shld flex/ext)    1 1 1        Push pull   1 1 1 1        Single paddle rotation           SLS (EO, EC, ball toss)            Aqua Step (FW, lat, eccentric)            Core work:              MF press (red, blue, blk)             Kickboard press (blue, red)   RED 5''X5 5''x8 5''x10 blue 5\"x10 blue        Row/ext w/ tubing (red, blue, blk)           Seated on bench             ankle DF/PF              March              ABD/ADD              Knee flexion/extension            DW TX " - hang in the deep water  6,6 12 10 5 5        DW ABD/ADD  1 1 1 1 1          DW Bicycle  3 4 5 6 6        DW ankle pumps 1 1 1          DW DKTC   X5 5 x10 x10      Stretches              HS at step              Wall stretches              Calf stretch at wall              Other:            Hot Tub - 10 minutes only   5 5 D/c             TherEx 7/1              Pt ed  10' pool                                                     Neuro Re-Ed                                                                                                        Ther Ex                                                                                                                     Ther Activity                                       Gait Training                                       Modalities

## 2025-07-27 DIAGNOSIS — I10 ESSENTIAL HYPERTENSION: ICD-10-CM

## 2025-07-29 ENCOUNTER — HOSPITAL ENCOUNTER (OUTPATIENT)
Dept: RADIOLOGY | Facility: CLINIC | Age: 80
Discharge: HOME | End: 2025-07-29
Admitting: STUDENT IN AN ORGANIZED HEALTH CARE EDUCATION/TRAINING PROGRAM
Payer: MEDICARE

## 2025-07-29 VITALS
DIASTOLIC BLOOD PRESSURE: 77 MMHG | RESPIRATION RATE: 17 BRPM | TEMPERATURE: 98.3 F | SYSTOLIC BLOOD PRESSURE: 117 MMHG | OXYGEN SATURATION: 96 % | HEART RATE: 86 BPM

## 2025-07-29 DIAGNOSIS — M47.816 LUMBAR SPONDYLOSIS: ICD-10-CM

## 2025-07-29 PROCEDURE — 64494 INJ PARAVERT F JNT L/S 2 LEV: CPT | Performed by: STUDENT IN AN ORGANIZED HEALTH CARE EDUCATION/TRAINING PROGRAM

## 2025-07-29 PROCEDURE — 64493 INJ PARAVERT F JNT L/S 1 LEV: CPT | Performed by: STUDENT IN AN ORGANIZED HEALTH CARE EDUCATION/TRAINING PROGRAM

## 2025-07-29 RX ORDER — BUPIVACAINE HCL/PF 2.5 MG/ML
3 VIAL (ML) INJECTION ONCE
Status: COMPLETED | OUTPATIENT
Start: 2025-07-29 | End: 2025-07-29

## 2025-07-29 RX ORDER — HYDROCHLOROTHIAZIDE 12.5 MG/1
12.5 TABLET ORAL DAILY
Qty: 90 TABLET | Refills: 1 | Status: SHIPPED | OUTPATIENT
Start: 2025-07-29

## 2025-07-29 RX ADMIN — BUPIVACAINE HYDROCHLORIDE 3 ML: 2.5 INJECTION, SOLUTION EPIDURAL; INFILTRATION; INTRACAUDAL at 14:57

## 2025-07-30 ENCOUNTER — TELEPHONE (OUTPATIENT)
Dept: RADIOLOGY | Facility: CLINIC | Age: 80
End: 2025-07-30

## 2025-07-30 ENCOUNTER — DOCUMENTATION (OUTPATIENT)
Dept: PAIN MEDICINE | Facility: CLINIC | Age: 80
End: 2025-07-30

## 2025-08-01 ENCOUNTER — OFFICE VISIT (OUTPATIENT)
Dept: PHYSICAL THERAPY | Age: 80
End: 2025-08-01
Payer: MEDICARE

## 2025-08-01 DIAGNOSIS — M54.50 CHRONIC RIGHT-SIDED LOW BACK PAIN WITHOUT SCIATICA: Primary | ICD-10-CM

## 2025-08-01 DIAGNOSIS — M25.551 RIGHT HIP PAIN: ICD-10-CM

## 2025-08-01 DIAGNOSIS — M79.604 RIGHT LEG PAIN: ICD-10-CM

## 2025-08-01 DIAGNOSIS — G89.29 CHRONIC RIGHT-SIDED LOW BACK PAIN WITHOUT SCIATICA: Primary | ICD-10-CM

## 2025-08-01 PROCEDURE — 97113 AQUATIC THERAPY/EXERCISES: CPT

## 2025-08-05 ENCOUNTER — OFFICE VISIT (OUTPATIENT)
Dept: PHYSICAL THERAPY | Age: 80
End: 2025-08-05
Payer: MEDICARE

## 2025-08-05 DIAGNOSIS — M54.50 CHRONIC RIGHT-SIDED LOW BACK PAIN WITHOUT SCIATICA: Primary | ICD-10-CM

## 2025-08-05 DIAGNOSIS — M25.551 RIGHT HIP PAIN: ICD-10-CM

## 2025-08-05 DIAGNOSIS — G89.29 CHRONIC RIGHT-SIDED LOW BACK PAIN WITHOUT SCIATICA: Primary | ICD-10-CM

## 2025-08-05 DIAGNOSIS — M79.604 RIGHT LEG PAIN: ICD-10-CM

## 2025-08-05 PROCEDURE — 97150 GROUP THERAPEUTIC PROCEDURES: CPT

## 2025-08-05 PROCEDURE — 97113 AQUATIC THERAPY/EXERCISES: CPT

## 2025-08-07 ENCOUNTER — OFFICE VISIT (OUTPATIENT)
Dept: PHYSICAL THERAPY | Age: 80
End: 2025-08-07
Payer: MEDICARE

## 2025-08-07 DIAGNOSIS — G89.29 CHRONIC RIGHT-SIDED LOW BACK PAIN WITHOUT SCIATICA: Primary | ICD-10-CM

## 2025-08-07 DIAGNOSIS — M54.50 CHRONIC RIGHT-SIDED LOW BACK PAIN WITHOUT SCIATICA: Primary | ICD-10-CM

## 2025-08-07 DIAGNOSIS — M79.604 RIGHT LEG PAIN: ICD-10-CM

## 2025-08-07 DIAGNOSIS — M25.551 RIGHT HIP PAIN: ICD-10-CM

## 2025-08-07 PROCEDURE — 97113 AQUATIC THERAPY/EXERCISES: CPT

## 2025-08-12 ENCOUNTER — HOSPITAL ENCOUNTER (OUTPATIENT)
Dept: RADIOLOGY | Facility: CLINIC | Age: 80
Discharge: HOME | End: 2025-08-12
Payer: MEDICARE

## 2025-08-13 ENCOUNTER — TELEPHONE (OUTPATIENT)
Dept: RADIOLOGY | Facility: CLINIC | Age: 80
End: 2025-08-13

## 2025-08-19 ENCOUNTER — HOSPITAL ENCOUNTER (OUTPATIENT)
Dept: MAMMOGRAPHY | Facility: CLINIC | Age: 80
Discharge: HOME | End: 2025-08-19
Payer: MEDICARE

## 2025-08-19 ENCOUNTER — HOSPITAL ENCOUNTER (OUTPATIENT)
Dept: ULTRASOUND IMAGING | Facility: CLINIC | Age: 80
Discharge: HOME | End: 2025-08-19
Payer: MEDICARE

## 2025-08-19 VITALS — WEIGHT: 282 LBS | HEIGHT: 64 IN | BODY MASS INDEX: 48.14 KG/M2

## 2025-08-19 DIAGNOSIS — R92.8 ABNORMAL MAMMOGRAM: ICD-10-CM

## 2025-08-19 DIAGNOSIS — R53.83 OTHER FATIGUE: ICD-10-CM

## 2025-08-19 DIAGNOSIS — R79.89 ELEVATED TSH: ICD-10-CM

## 2025-08-19 PROCEDURE — 76642 ULTRASOUND BREAST LIMITED: CPT

## 2025-08-19 PROCEDURE — G0279 TOMOSYNTHESIS, MAMMO: HCPCS

## 2025-08-19 PROCEDURE — 77066 DX MAMMO INCL CAD BI: CPT

## 2025-08-19 RX ORDER — LEVOTHYROXINE SODIUM 50 UG/1
50 TABLET ORAL DAILY
Qty: 30 TABLET | Refills: 5 | Status: SHIPPED | OUTPATIENT
Start: 2025-08-19

## 2025-08-21 ENCOUNTER — EVALUATION (OUTPATIENT)
Dept: PHYSICAL THERAPY | Age: 80
End: 2025-08-21
Payer: MEDICARE

## 2025-08-21 DIAGNOSIS — M54.50 CHRONIC RIGHT-SIDED LOW BACK PAIN WITHOUT SCIATICA: Primary | ICD-10-CM

## 2025-08-21 DIAGNOSIS — M79.604 RIGHT LEG PAIN: ICD-10-CM

## 2025-08-21 DIAGNOSIS — G89.29 CHRONIC RIGHT-SIDED LOW BACK PAIN WITHOUT SCIATICA: Primary | ICD-10-CM

## 2025-08-21 DIAGNOSIS — M25.551 RIGHT HIP PAIN: ICD-10-CM

## 2025-08-21 PROCEDURE — 97113 AQUATIC THERAPY/EXERCISES: CPT

## 2025-08-21 PROCEDURE — 97113 AQUATIC THERAPY/EXERCISES: CPT | Performed by: PHYSICAL THERAPIST

## (undated) DEVICE — PAD GROUNDING ADULT

## (undated) DEVICE — Device

## (undated) DEVICE — PLUMEPEN PRO 10FT

## (undated) DEVICE — GLOVE INDICATOR PI UNDERGLOVE SZ 7 BLUE

## (undated) DEVICE — GLOVE SRG BIOGEL 7

## (undated) DEVICE — PACK PBDS PLASTIC HEAD AND NECK RF

## (undated) DEVICE — ELECTRODE BLADE MOD E-Z CLEAN  2.75IN 7CM -0012AM

## (undated) DEVICE — INTENDED FOR TISSUE SEPARATION, AND OTHER PROCEDURES THAT REQUIRE A SHARP SURGICAL BLADE TO PUNCTURE OR CUT.: Brand: BARD-PARKER SAFETY BLADES SIZE 15, STERILE

## (undated) DEVICE — INTENDED FOR TISSUE SEPARATION, AND OTHER PROCEDURES THAT REQUIRE A SHARP SURGICAL BLADE TO PUNCTURE OR CUT.: Brand: BARD-PARKER SAFETY BLADES SIZE 10, STERILE